# Patient Record
Sex: FEMALE | Race: WHITE | NOT HISPANIC OR LATINO | Employment: FULL TIME | URBAN - NONMETROPOLITAN AREA
[De-identification: names, ages, dates, MRNs, and addresses within clinical notes are randomized per-mention and may not be internally consistent; named-entity substitution may affect disease eponyms.]

---

## 2017-01-19 ENCOUNTER — HISTORY (OUTPATIENT)
Dept: FAMILY MEDICINE | Facility: OTHER | Age: 37
End: 2017-01-19

## 2017-01-19 ENCOUNTER — AMBULATORY - GICH (OUTPATIENT)
Dept: FAMILY MEDICINE | Facility: OTHER | Age: 37
End: 2017-01-19

## 2017-01-19 DIAGNOSIS — K21.9 GASTRO-ESOPHAGEAL REFLUX DISEASE WITHOUT ESOPHAGITIS: ICD-10-CM

## 2017-01-19 DIAGNOSIS — Z01.812 ENCOUNTER FOR PREPROCEDURAL LABORATORY EXAMINATION: ICD-10-CM

## 2017-01-19 DIAGNOSIS — M77.32 CALCANEAL SPUR OF LEFT FOOT: ICD-10-CM

## 2017-01-19 DIAGNOSIS — F33.9 RECURRENT MAJOR DEPRESSIVE DISORDER (H): ICD-10-CM

## 2017-01-19 DIAGNOSIS — Z01.818 ENCOUNTER FOR OTHER PREPROCEDURAL EXAMINATION: ICD-10-CM

## 2017-01-19 DIAGNOSIS — J45.20 MILD INTERMITTENT ASTHMA, UNCOMPLICATED: ICD-10-CM

## 2017-01-19 LAB
HEMOGLOBIN: 14.5 G/DL (ref 12–16)
MCV RBC AUTO: 95 FL (ref 80–100)

## 2017-02-03 ENCOUNTER — AMBULATORY - GICH (OUTPATIENT)
Dept: SCHEDULING | Facility: OTHER | Age: 37
End: 2017-02-03

## 2017-02-09 ENCOUNTER — AMBULATORY - GICH (OUTPATIENT)
Dept: SCHEDULING | Facility: OTHER | Age: 37
End: 2017-02-09

## 2017-03-21 ENCOUNTER — HISTORY (OUTPATIENT)
Dept: FAMILY MEDICINE | Facility: OTHER | Age: 37
End: 2017-03-21

## 2017-03-21 ENCOUNTER — OFFICE VISIT - GICH (OUTPATIENT)
Dept: FAMILY MEDICINE | Facility: OTHER | Age: 37
End: 2017-03-21

## 2017-03-21 DIAGNOSIS — Z12.4 ENCOUNTER FOR SCREENING FOR MALIGNANT NEOPLASM OF CERVIX: ICD-10-CM

## 2017-03-21 DIAGNOSIS — N91.1 SECONDARY AMENORRHEA: ICD-10-CM

## 2017-03-21 LAB
A/G RATIO - HISTORICAL: 1.6 (ref 1–2)
ABSOLUTE BASOPHILS - HISTORICAL: 0.1 THOU/CU MM
ABSOLUTE EOSINOPHILS - HISTORICAL: 0.1 THOU/CU MM
ABSOLUTE LYMPHOCYTES - HISTORICAL: 3.3 THOU/CU MM (ref 0.9–2.9)
ABSOLUTE MONOCYTES - HISTORICAL: 0.7 THOU/CU MM
ABSOLUTE NEUTROPHILS - HISTORICAL: 5.8 THOU/CU MM (ref 1.7–7)
ALBUMIN SERPL-MCNC: 4.5 G/DL (ref 3.5–5.7)
ALP SERPL-CCNC: 95 IU/L (ref 34–104)
ALT (SGPT) - HISTORICAL: 31 IU/L (ref 7–52)
ANION GAP - HISTORICAL: 8 (ref 5–18)
AST SERPL-CCNC: 24 IU/L (ref 13–39)
BASOPHILS # BLD AUTO: 0.6 %
BILIRUB SERPL-MCNC: 0.3 MG/DL (ref 0.3–1)
BUN SERPL-MCNC: 10 MG/DL (ref 7–25)
BUN/CREAT RATIO - HISTORICAL: 15
CALCIUM SERPL-MCNC: 9.7 MG/DL (ref 8.6–10.3)
CHLORIDE SERPLBLD-SCNC: 103 MMOL/L (ref 98–107)
CO2 SERPL-SCNC: 28 MMOL/L (ref 21–31)
CREAT SERPL-MCNC: 0.68 MG/DL (ref 0.7–1.3)
EOSINOPHIL NFR BLD AUTO: 0.8 %
ERYTHROCYTE [DISTWIDTH] IN BLOOD BY AUTOMATED COUNT: 11.6 % (ref 11.5–15.5)
ESTRADIOL SERPL-MCNC: 221 PG/ML
GFR IF NOT AFRICAN AMERICAN - HISTORICAL: >60 ML/MIN/1.73M2
GLOBULIN - HISTORICAL: 2.9 G/DL (ref 2–3.7)
GLUCOSE SERPL-MCNC: 101 MG/DL (ref 70–105)
HCG BETA QUANT,PREGNANCY - HISTORICAL: <0.6 MIU/ML
HCT VFR BLD AUTO: 40.4 % (ref 33–51)
HEMOGLOBIN: 14 G/DL (ref 12–16)
LYMPHOCYTES NFR BLD AUTO: 33.4 % (ref 20–44)
MCH RBC QN AUTO: 33.8 PG (ref 26–34)
MCHC RBC AUTO-ENTMCNC: 34.5 G/DL (ref 32–36)
MCV RBC AUTO: 98 FL (ref 80–100)
MONOCYTES NFR BLD AUTO: 7.1 %
NEUTROPHILS NFR BLD AUTO: 58.1 % (ref 42–72)
PLATELET # BLD AUTO: 321 THOU/CU MM (ref 140–440)
PMV BLD: 7.9 FL (ref 6.5–11)
POTASSIUM SERPL-SCNC: 3.8 MMOL/L (ref 3.5–5.1)
PROT SERPL-MCNC: 7.4 G/DL (ref 6.4–8.9)
RED BLOOD COUNT - HISTORICAL: 4.12 MIL/CU MM (ref 4–5.2)
SODIUM SERPL-SCNC: 139 MMOL/L (ref 133–143)
TSH - HISTORICAL: 1.56 UIU/ML (ref 0.34–5.6)
WHITE BLOOD COUNT - HISTORICAL: 10 THOU/CU MM (ref 4.5–11)

## 2017-03-21 ASSESSMENT — ANXIETY QUESTIONNAIRES
4. TROUBLE RELAXING: SEVERAL DAYS
1. FEELING NERVOUS, ANXIOUS, OR ON EDGE: MORE THAN HALF THE DAYS
2. NOT BEING ABLE TO STOP OR CONTROL WORRYING: NEARLY EVERY DAY
7. FEELING AFRAID AS IF SOMETHING AWFUL MIGHT HAPPEN: NEARLY EVERY DAY
5. BEING SO RESTLESS THAT IT IS HARD TO SIT STILL: NOT AT ALL
6. BECOMING EASILY ANNOYED OR IRRITABLE: NEARLY EVERY DAY
3. WORRYING TOO MUCH ABOUT DIFFERENT THINGS: NEARLY EVERY DAY
GAD7 TOTAL SCORE: 15

## 2017-03-21 ASSESSMENT — PATIENT HEALTH QUESTIONNAIRE - PHQ9: SUM OF ALL RESPONSES TO PHQ QUESTIONS 1-9: 12

## 2017-03-28 ENCOUNTER — HOSPITAL ENCOUNTER (OUTPATIENT)
Dept: RADIOLOGY | Facility: OTHER | Age: 37
End: 2017-03-28
Attending: FAMILY MEDICINE

## 2017-03-28 DIAGNOSIS — N91.1 SECONDARY AMENORRHEA: ICD-10-CM

## 2017-03-29 ENCOUNTER — COMMUNICATION - GICH (OUTPATIENT)
Dept: FAMILY MEDICINE | Facility: OTHER | Age: 37
End: 2017-03-29

## 2017-04-04 ENCOUNTER — COMMUNICATION - GICH (OUTPATIENT)
Dept: FAMILY MEDICINE | Facility: OTHER | Age: 37
End: 2017-04-04

## 2017-04-05 ENCOUNTER — COMMUNICATION - GICH (OUTPATIENT)
Dept: FAMILY MEDICINE | Facility: OTHER | Age: 37
End: 2017-04-05

## 2017-04-05 DIAGNOSIS — N91.1 SECONDARY AMENORRHEA: ICD-10-CM

## 2017-04-06 ENCOUNTER — AMBULATORY - GICH (OUTPATIENT)
Dept: LAB | Facility: OTHER | Age: 37
End: 2017-04-06

## 2017-04-06 DIAGNOSIS — N91.1 SECONDARY AMENORRHEA: ICD-10-CM

## 2017-04-06 LAB
FSH - HISTORICAL: 7.8 MIU/ML
LUTEINIZING HORMONE - HISTORICAL: 3.8 MIU/ML

## 2017-04-13 ENCOUNTER — AMBULATORY - GICH (OUTPATIENT)
Dept: SCHEDULING | Facility: OTHER | Age: 37
End: 2017-04-13

## 2017-05-25 ENCOUNTER — AMBULATORY - GICH (OUTPATIENT)
Dept: SCHEDULING | Facility: OTHER | Age: 37
End: 2017-05-25

## 2017-06-05 ENCOUNTER — AMBULATORY - GICH (OUTPATIENT)
Dept: SCHEDULING | Facility: OTHER | Age: 37
End: 2017-06-05

## 2017-08-14 ENCOUNTER — AMBULATORY - GICH (OUTPATIENT)
Dept: SCHEDULING | Facility: OTHER | Age: 37
End: 2017-08-14

## 2017-08-14 ENCOUNTER — AMBULATORY - GICH (OUTPATIENT)
Dept: RADIOLOGY | Facility: OTHER | Age: 37
End: 2017-08-14

## 2017-08-14 DIAGNOSIS — R52 PAIN: ICD-10-CM

## 2017-08-14 DIAGNOSIS — R60.9 EDEMA: ICD-10-CM

## 2017-08-14 DIAGNOSIS — S86.012A STRAIN OF LEFT ACHILLES TENDON: ICD-10-CM

## 2017-08-14 DIAGNOSIS — R53.1 WEAKNESS: ICD-10-CM

## 2017-08-16 ENCOUNTER — HOSPITAL ENCOUNTER (OUTPATIENT)
Dept: RADIOLOGY | Facility: OTHER | Age: 37
End: 2017-08-16
Attending: PODIATRIST

## 2017-08-16 DIAGNOSIS — S86.012A STRAIN OF LEFT ACHILLES TENDON: ICD-10-CM

## 2017-08-16 DIAGNOSIS — R60.9 EDEMA: ICD-10-CM

## 2017-08-16 DIAGNOSIS — R53.1 WEAKNESS: ICD-10-CM

## 2017-08-16 DIAGNOSIS — R52 PAIN: ICD-10-CM

## 2017-08-21 ENCOUNTER — AMBULATORY - GICH (OUTPATIENT)
Dept: SCHEDULING | Facility: OTHER | Age: 37
End: 2017-08-21

## 2017-09-11 ENCOUNTER — AMBULATORY - GICH (OUTPATIENT)
Dept: SCHEDULING | Facility: OTHER | Age: 37
End: 2017-09-11

## 2017-09-19 ENCOUNTER — HISTORY (OUTPATIENT)
Dept: FAMILY MEDICINE | Facility: OTHER | Age: 37
End: 2017-09-19

## 2017-09-19 ENCOUNTER — OFFICE VISIT - GICH (OUTPATIENT)
Dept: FAMILY MEDICINE | Facility: OTHER | Age: 37
End: 2017-09-19

## 2017-09-19 DIAGNOSIS — G43.009 MIGRAINE WITHOUT AURA AND WITHOUT STATUS MIGRAINOSUS, NOT INTRACTABLE: ICD-10-CM

## 2017-09-19 DIAGNOSIS — G56.02 CARPAL TUNNEL SYNDROME OF LEFT WRIST: ICD-10-CM

## 2017-09-26 ENCOUNTER — HISTORY (OUTPATIENT)
Dept: EMERGENCY MEDICINE | Facility: OTHER | Age: 37
End: 2017-09-26

## 2017-09-28 ENCOUNTER — COMMUNICATION - GICH (OUTPATIENT)
Dept: FAMILY MEDICINE | Facility: OTHER | Age: 37
End: 2017-09-28

## 2017-09-28 DIAGNOSIS — G89.18 OTHER ACUTE POSTPROCEDURAL PAIN: ICD-10-CM

## 2017-12-28 NOTE — TELEPHONE ENCOUNTER
Patient Information     Patient Name MRN Nohelia Yousif [id number] Female 1980      Telephone Encounter by Dawn Ordoñez at 2017  2:31 PM     Author:  Dawn Ordoñez Service:  (none) Author Type:  (none)     Filed:  2017  2:32 PM Encounter Date:  2017 Status:  Signed     :  Dawn Ordoñez            Informed Nohelia of prescription sent to pharmacy.  Dawn Ordoñez LPN............................... 2017 2:32 PM

## 2017-12-28 NOTE — TELEPHONE ENCOUNTER
Patient Information     Patient Name MRN Nohelia Yousif [id number] Female 1980      Telephone Encounter by Lyla Schneider DO at 2017  2:01 PM     Author:  Lyla Schneider DO Service:  (none) Author Type:  PHYS- Osteopathic     Filed:  2017  2:05 PM Encounter Date:  2017 Status:  Signed     :  Lyla Schneider DO (PHYS- Osteopathic)            All pain medication works relatively similar; and you have an allergy for oxycodone.  I would be willing to try tramadol to see if that would help relieve more pain.    LYLA SCHNEIDER DO

## 2017-12-28 NOTE — TELEPHONE ENCOUNTER
Patient Information     Patient Name MRN Nohelia Yousif [id number] Female 1980      Telephone Encounter by Oleg Hill at 2017  9:59 AM     Author:  Oleg Hill Service:  (none) Author Type:  (none)     Filed:  2017 10:00 AM Encounter Date:  2017 Status:  Signed     :  Oleg Hill            SMS-Pt called and stated she had surgery in [place], and the pain meds they gave her are not working. They told her to contact her PCP. Please call pt and advise.  Thank you,  Oleg Hill ....................  2017   10:00 AM

## 2017-12-28 NOTE — TELEPHONE ENCOUNTER
Patient Information     Patient Name MRN Nohelia Yousif [id number] Female 1980      Telephone Encounter by Dawn Ordoñez at 2017  2:21 PM     Author:  Dawn Ordoñez Service:  (none) Author Type:  (none)     Filed:  2017  2:22 PM Encounter Date:  2017 Status:  Signed     :  Dawn Ordoñez            Prescription faxed to [facility]. Called and left message for Nohelia to return call.  .Dawn Ordoñez LPN............................... 2017 2:22 PM

## 2017-12-28 NOTE — PROGRESS NOTES
Patient Information     Patient Name MRN Sex Nohelia Aguero [id number] Female 1980      Progress Notes by Jamaica Bruno at 2017  8:57 AM     Author:  Jamaica Bruno Service:  (none) Author Type:  Other Clinical Staff     Filed:  2017  8:57 AM Date of Service:  2017  8:57 AM Status:  Signed     :  Jamaica Bruno (Other Clinical Staff)            Falls Risk Criteria:    Age 65 and older or under age 4        Sensory deficits    Poor vision    Use of ambulatory aides    Impaired judgment    Unable to walk independently    Meets High Risk criteria for falls:  no

## 2017-12-28 NOTE — PROGRESS NOTES
"Patient Information     Patient Name MRN Sex Nohelia Aguero [id number] Female 1980      Progress Notes by Heriberto Roche MD at 2017  4:00 PM     Author:  Heriberto Roche MD Service:  (none) Author Type:  Physician     Filed:  2017  7:55 AM Encounter Date:  2017 Status:  Signed     :  Heriberto Roche MD (Physician)            SUBJECTIVE:    Nohelia Crockett is a 37 y.o. female who presents for left arm symptoms     HPI    For a few days has had a sense the left arm is \"falling asleep\"  Initially was intermittent, but is not constant.  Tries to shake it out, without much help.  Tingling into all 5 fingers.  Never has had this before.  Has had 2 shoulder surgeries, last was about 3 years ago, no tingling then.  S/P right carpal tunnel release.    Also wants a refill on her migraine med.    Allergies      Allergen   Reactions     Aspirin  Hives     Pcn [Penicillins]  Anaphylaxis     Percocet [Oxycodone-Acetaminophen]  Intolerance-Can't Take     Can't take too high of a dose/ causes nausea    ,   Current Outpatient Prescriptions on File Prior to Visit       Medication  Sig Dispense Refill     citalopram (CELEXA) 20 mg tablet Take 1 tablet by mouth once daily. 90 tablet 4     No current facility-administered medications on file prior to visit.    ,   Past Medical History:     Diagnosis  Date     Adhesive capsulitis of right shoulder 2016     ASTHMA, MILD     intermittent       ATTENTION DEFICIT DISORDER      H pylori ulcer      treated      H/O mass excision right hand 9/3/2014     Hemorrhagic cyst of ovary 2006    Left      History of renal stone 2004    Right      LEARNING DISABILITY      Mass of hand 2014     MIGRAINE HEADACHE     Occasional severe HA, does not require daily prophylaxis       Plantar fasciitis 2014     S/P arthrocopic shoulder surgery 2016    and   Past Surgical History:      Procedure  Laterality Date     APPENDECTOMY       CARPAL " TUNNEL RELEASE Right 2012     CHOLECYSTECTOMY  1999     CYSTOSCOPY  2004    with retrogrades       DILATION AND CURETTAGE  11/07    Ablation       EXCISION OF MASS FROM HAND Right 9/3/2014    Pathology showed epidermal inclusion cysts       Excision of right volar small finger mass  08/13/2012     LITHOTRIPSY  12/04    extracorporeal shockwave lithotripsy       SHOULDER ARTHROSCOPY  10/05    débridement and subacromial decompression of right shoulder.       SHOULDER ARTHROSCOPY  08/2007    acromioplasty distal clavicle excision, and extensive debridement of the bursa of the right shoulder by Dr. Villa.  Also left?       TUBAL LIGATION  2004       REVIEW OF SYSTEMS:  Review of Systems   Constitutional: Negative for chills and fever.   Neurological: Positive for tingling and headaches.       OBJECTIVE:  /68  Resp 16  Wt 105.7 kg (233 lb)  BMI 38.77 kg/m2    EXAM:   Physical Exam   Constitutional: She is oriented to person, place, and time and well-developed, well-nourished, and in no distress. No distress.   Neck: Normal range of motion.   Musculoskeletal:   No pain in right ulnar groove.  tinell's at wrist is mildly positive and Phalen's is positive.   Neurological: She is alert and oriented to person, place, and time.   Skin: She is not diaphoretic.   cap refill in left hand is normal.     ASSESSMENT/PLAN:    ICD-10-CM    1. Carpal tunnel syndrome, left G56.02 WRIST BRACE      EMG   2. Migraine without aura and without status migrainosus, not intractable G43.009 rizatriptan (MAXALT) 5 mg tablet        Plan:  She is quite bothered by this and wants to pursue an aggressive work up with the EMG.  Brace in the meantime for symptoms relief.    Heriberto Roche MD ....................  9/19/2017   4:19 PM

## 2017-12-28 NOTE — TELEPHONE ENCOUNTER
Patient Information     Patient Name MRN Nohelia Yousif [id number] Female 1980      Telephone Encounter by Dawn Ordoñez at 2017 12:58 PM     Author:  Dawn Ordoñez Service:  (none) Author Type:  (none)     Filed:  2017  1:05 PM Encounter Date:  2017 Status:  Signed     :  Dawn Ordoñez            Nohelia states that she was given hydrocodone from her hand surgery on Tuesday. Nohelia states that she was bit by a dog, brought the the ER and then brought to [place] for surgery. The hydrocodone is making her sleepy but isn't really helping with the pain. She states that it feels like it is burning.  Can something else be prescribed?  Dawn Ordoñez LPN............................... 2017 1:05 PM

## 2017-12-30 NOTE — NURSING NOTE
Patient Information     Patient Name MRN Sex Nohelia Aguero [id number] Female 1980      Nursing Note by Robina Fuentes at 2017  4:00 PM     Author:  Robina Fuentes Service:  (none) Author Type:  (none)     Filed:  2017  4:13 PM Encounter Date:  2017 Status:  Signed     :  Robina Fuentes            Numb feeling going down Lt arm to her fingers, times two days  Robina Fuentes ....................  2017   4:07 PM

## 2018-01-03 NOTE — NURSING NOTE
Patient Information     Patient Name MRN Sex Nohelia Aguero [id number] Female 1980      Nursing Note by Gosselin, Norma J at 3/21/2017  2:45 PM     Author:  Gosselin, Norma J Service:  (none) Author Type:  (none)     Filed:  3/21/2017  2:59 PM Encounter Date:  3/21/2017 Status:  Signed     :  Gosselin, Norma J            Patient presents to clinic follow up Celexa and she has been having missed periods.  Norma J Gosselin LPN....................  3/21/2017   2:48 PM

## 2018-01-03 NOTE — H&P
Patient Information     Patient Name MRN Sex Nohelia Aguero [id number] Female 1980      H&P by Lyla Hendrix DO at 2017  9:15 AM     Author:  Lyla Hendrix DO Service:  (none) Author Type:  PHYS- Osteopathic     Filed:  2017  5:49 AM Encounter Date:  2017 Status:  Signed     :  Lyla Hendrix DO (PHYS- Osteopathic)            ----------------- PREOPERATIVE EXAM ------------------  2017    SUBJECTIVE:  Nohelia Crockett is a 36 y.o. female here for preoperative optimization.    I was asked to see Nohelia Crockett by Dr. Moore for  preoperative evaluation due to GERD, Asthma.    Date of Surgery: 2017  Type of Surgery: Bone spur revision  Surgeon: Dr. Moore  Hospital:  [facility]    HPI:  Nohelia has undergone multiple procedures for bone spurs that were not improved with more conservative treatments; she again plans to have one removed from her heel.  She has a history of Asthma, which has been under good control as of late.  ATAQ score = 0 today.  She has not needed her albuterol inhaler in ~2 months.  No nighttime symptoms.  Typically only needs if she is going to be really active.    She also has GERD, which she relies on her Omeprazole.  Hasn't been on other medications.  Has not required EGD procedures.  Fairly well controlled unless she is eating a large meal, or forgets her medication.      Fever/Chills or other infectious symptoms in past month:  (NO)   >10lb weight loss in past two months:  (NO)     Health Care Directive/Code status: FULL  Hx of blood transfusions:   (NO)   History of VRE/MRSA:  (NO) Date:  Site:     Preoperative Evaluation: Obstructive Sleep Apnea screening    S: Snore -  Do you snore loudly? (louder than talking or loud enough to be heard through closed doors)(NO)  T: Tired - Do you often feel tired, fatigued, or sleepy during the daytime?(NO)  O: Observed - Has anyone ever observed you stop breathing during  "your sleep?(NO)  P: Pressure - Do you have or are you being treated for high blood pressure?(NO)  B: BMI - BMI greater than 35kg/m2?(YES)  A: Age - Age over 50 years old?(NO)  N: Neck - Neck circumference greater than 40 cm?(YES)  G: Gender - Gender: Male?(NO)    Total number of \"YES\" responses:  2  Scoring: Low risk of NOMAN 0-2      Patient Active Problem List       Diagnosis  Date Noted     Obesity (BMI 35.0-39.9 without comorbidity)  12/14/2016     S/P arthrocopic shoulder surgery  06/06/2016     Adhesive capsulitis of right shoulder  06/06/2016     Gastroesophageal reflux disease without esophagitis  05/26/2016     Pain of multiple sites  10/19/2015     History of arthroscopy of right knee  07/02/2015     Plantar fasciitis  11/14/2014     H/O mass excision right hand  09/03/2014     GENITAL HERPES  12/07/2009     Has had only one outbreak        ATTENTION DEFICIT DISORDER       LEARNING DISABILITY       KNEE PAIN       secondary to patellofemoral dysfunction          MIGRAINE HEADACHE       Occasional severe HA, does not require daily prophylaxis        ASTHMA, MILD       intermittent            Past Medical History      Diagnosis   Date     Adhesive capsulitis of right shoulder  6/6/2016     ASTHMA, MILD       intermittent       ATTENTION DEFICIT DISORDER       H pylori ulcer  1999      treated      H/O mass excision right hand  9/3/2014     Hemorrhagic cyst of ovary  2006     Left      History of renal stone  2004     Right      LEARNING DISABILITY       Mass of hand  8/26/2014     MIGRAINE HEADACHE       Occasional severe HA, does not require daily prophylaxis       Plantar fasciitis  11/14/2014     S/P arthrocopic shoulder surgery  6/6/2016       Past Surgical History       Procedure   Laterality Date     Appendectomy   1999     Cholecystectomy   1999     Tubal ligation   2004     Cystoscopy   2004     with retrogrades       Lithotripsy   12/04     extracorporeal shockwave lithotripsy       Shoulder " arthroscopy   10/05     débridement and subacromial decompression of right shoulder.       Shoulder arthroscopy   2007     acromioplasty distal clavicle excision, and extensive debridement of the bursa of the right shoulder by Dr. Villa.  Also left?       Dilation and curettage        Ablation       Excision of right volar small finger mass   2012     Carpal tunnel release  Right 2012     Excision of mass from hand  Right 9/3/2014     Pathology showed epidermal inclusion cysts         Current Outpatient Prescriptions       Medication  Sig Dispense Refill     albuterol HFA (PROAIR HFA) 90 mcg/actuation inhaler Inhale 2 Puffs by mouth 4 times daily if needed for Shortness Of Breath or Wheezing. 1 Inhaler 0     citalopram (CELEXA) 20 mg tablet Take 1 tablet by mouth once daily. 90 tablet 4     omeprazole (PRILOSEC-OTC) 20 mg tablet Take 1 tablet by mouth once daily. 30 tablet 11     rizatriptan (MAXALT) 5 mg tablet Take 1-2 tablets by mouth every 2 hours if needed for Migraine. Max dose: 30mg per 24 hrs. 20 tablet 5     No current facility-administered medications for this visit.      Medications have been reviewed by me and are current to the best of my knowledge and ability.    Recent use of: NSAIDS    Allergies:  Allergies      Allergen   Reactions     Aspirin  Hives     Pcn [Penicillins]  Anaphylaxis     Percocet [Oxycodone-Acetaminophen]  Intolerance-Can't Take     Can't take too high of a dose/ causes nausea      Latex allergy  no  Immunizations:  Immunization History     Administered  Date(s) Administered     Td (Age >=7 Years) 2004     Tdap 2013       Family History       Problem   Relation Age of Onset     Cancer  Mother 46     Lung cancer       Blood Disease  Mother      Asthma  Father      Diabetes  Father      2       Hypertension  Father      Heart Disease  Father      CHF       Other  Father       at age 65 from complications of a motor vehicle accident.       Good Health   "Sister      Good Health  Sister      Cancer-breast  No Family History      Cancer-colon  No Family History      Cancer-prostate  No Family History      Cancer-ovarian  No Family History      Anesthesia Problem  No Family History        Denies family hx of bleeding tendencies, anesthesia complications, or other problems with surgery.    Social History        Substance Use Topics          Smoking status:   Former Smoker      Packs/day:  0.50      Years:  16.00      Types:  Cigarettes      Smokeless tobacco:   Never Used      Alcohol use   0.0 oz/week     0 Standard drinks or equivalent per week        Comment: rarely         ROS:    Surgical:  patient denies previous complications from prior surgeries including but not limited to prolonged bleeding, anesthesia complications, dysrhythmias, surgical wound infections, or prolonged hospital stay.  Cardiorespiratory: denies chest pain, palpitations, shortness of breath, cough. Most exertion in the past 2 weeks was up and down stairs and housework at home and work.  Scraping the driveway at work (45 minutes).  Complete ROS otherwise negative except as noted in HPI.     -------------------------------------------------------------    PHYSICAL EXAM:  /70  Pulse 77  Ht 1.651 m (5' 5\")  Wt 102.5 kg (226 lb)  SpO2 99%  BMI 37.61 kg/m2    EXAM:  General Appearance: Pleasant, alert, appropriate appearance for age. No acute distress  Head Exam: Normal. Normocephalic, atraumatic.  Eyes: PERRL, EOMI  Ears: Normal TM's bilaterally.   OroPharynx: Mucosa pink and moist. Dentition in good repair.  Mallampati II.  Neck: Supple, no masses or nodes, no lymphadenopathy.  No thyromegaly.  Lungs: Normal chest wall and respirations. Clear to auscultation, no wheezes or crackles.  Cardiovascular: Regular rate and rhythm. S1, S2, no murmurs.  Gastrointestinal: Soft, nontender, no abnormal masses or organomegaly. BS normal   Musculoskeletal: No edema. No warm or erythematous joints.  Skin: " no concerning or new rashes.  Neurologic Exam: CN 2-12 grossly intact.  Normal gait.  Symmetric DTRs, normal gross motor movement, tone, and coordination. No tremor.  Psychiatric Exam: Alert and oriented, appropriate affect.      EKG:  not indicated    Results for orders placed or performed in visit on 01/19/17      HEMOGLOBIN      Result  Value Ref Range    HEMOGLOBIN                14.5 12.0 - 16.0 g/dL    MCV                       95 80 - 100 fL     ---------------------------------------------------------------    ASSESSEMENT AND PLAN:     Nohelia was seen today for preoperative exam.    Diagnoses and all orders for this visit:    Preoperative examination  -     HEMOGLOBIN; Future  -     HEMOGLOBIN    Pre-procedure lab exam  -     HEMOGLOBIN; Future  -     HEMOGLOBIN    Calcaneal spur of foot, left  -     HEMOGLOBIN; Future  -     HEMOGLOBIN    Gastroesophageal reflux disease without esophagitis    Mild intermittent asthma without complication    Episode of recurrent major depressive disorder, unspecified depression episode severity  -     citalopram (CELEXA) 20 mg tablet; Take 1 tablet by mouth once daily.  -     HEMOGLOBIN; Future  -     HEMOGLOBIN        PRE OP RECOMMENDATIONS:    No family history of problems with bleeding or anesthesia. Patient is able to tolerate greater than 4 METs of activity without any cardiopulmonary symptoms. ASA PS class 2 . No cardiopulmonary workup is neccessary for the current procedure. Please contact the office with any questions or concerns.    Patient is on chronic pain medications (NO);   Patient is on antiplatlet/anticoagulation (NO)  Other medications that need adjustment perioperatively (NO)    Other:  Patient was advised to call our office and the surgical services with any change in condition or new symptoms if they were to develop between today and their surgical date; especially any cardiopulmonary symptoms or symptoms concerning for an infection.    Recommendations:  Proceed with surgery as previously planned.

## 2018-01-03 NOTE — PROGRESS NOTES
Patient Information     Patient Name MRN Sex Nohelia Aguero [id number] Female 1980      Progress Notes by Lyla Hendrix DO at 2017  9:15 AM     Author:  Lyla Hendrix DO Service:  (none) Author Type:  PHYS- Osteopathic     Filed:  2017  5:49 AM Encounter Date:  2017 Status:  Signed     :  Lyla Hendrix DO (PHYS- Osteopathic)            ----------------- PREOPERATIVE EXAM ------------------  2017    SUBJECTIVE:  Nohelia Crockett is a 36 y.o. female here for preoperative optimization.    I was asked to see Nohelia Crockett by Dr. Moore for  preoperative evaluation due to GERD, Asthma.    Date of Surgery: 2017  Type of Surgery: Bone spur revision  Surgeon: Dr. Moore  Hospital:  [facility] [place]    HPI:  Nohelia has undergone multiple procedures for bone spurs that were not improved with more conservative treatments; she again plans to have one removed from her heel.  She has a history of Asthma, which has been under good control as of late.  ATAQ score = 0 today.  She has not needed her albuterol inhaler in ~2 months.  No nighttime symptoms.  Typically only needs if she is going to be really active.    She also has GERD, which she relies on her Omeprazole.  Hasn't been on other medications.  Has not required EGD procedures.  Fairly well controlled unless she is eating a large meal, or forgets her medication.      Fever/Chills or other infectious symptoms in past month:  (NO)   >10lb weight loss in past two months:  (NO)     Health Care Directive/Code status: FULL  Hx of blood transfusions:   (NO)   History of VRE/MRSA:  (NO) Date:  Site:     Preoperative Evaluation: Obstructive Sleep Apnea screening    S: Snore -  Do you snore loudly? (louder than talking or loud enough to be heard through closed doors)(NO)  T: Tired - Do you often feel tired, fatigued, or sleepy during the daytime?(NO)  O: Observed - Has anyone ever observed you stop  "breathing during your sleep?(NO)  P: Pressure - Do you have or are you being treated for high blood pressure?(NO)  B: BMI - BMI greater than 35kg/m2?(YES)  A: Age - Age over 50 years old?(NO)  N: Neck - Neck circumference greater than 40 cm?(YES)  G: Gender - Gender: Male?(NO)    Total number of \"YES\" responses:  2  Scoring: Low risk of NOMAN 0-2      Patient Active Problem List       Diagnosis  Date Noted     Obesity (BMI 35.0-39.9 without comorbidity)  12/14/2016     S/P arthrocopic shoulder surgery  06/06/2016     Adhesive capsulitis of right shoulder  06/06/2016     Gastroesophageal reflux disease without esophagitis  05/26/2016     Pain of multiple sites  10/19/2015     History of arthroscopy of right knee  07/02/2015     Plantar fasciitis  11/14/2014     H/O mass excision right hand  09/03/2014     GENITAL HERPES  12/07/2009     Has had only one outbreak        ATTENTION DEFICIT DISORDER       LEARNING DISABILITY       KNEE PAIN       secondary to patellofemoral dysfunction          MIGRAINE HEADACHE       Occasional severe HA, does not require daily prophylaxis        ASTHMA, MILD       intermittent            Past Medical History      Diagnosis   Date     Adhesive capsulitis of right shoulder  6/6/2016     ASTHMA, MILD       intermittent       ATTENTION DEFICIT DISORDER       H pylori ulcer  1999      treated      H/O mass excision right hand  9/3/2014     Hemorrhagic cyst of ovary  2006     Left      History of renal stone  2004     Right      LEARNING DISABILITY       Mass of hand  8/26/2014     MIGRAINE HEADACHE       Occasional severe HA, does not require daily prophylaxis       Plantar fasciitis  11/14/2014     S/P arthrocopic shoulder surgery  6/6/2016       Past Surgical History       Procedure   Laterality Date     Appendectomy   1999     Cholecystectomy   1999     Tubal ligation   2004     Cystoscopy   2004     with retrogrades       Lithotripsy   12/04     extracorporeal shockwave lithotripsy       " Shoulder arthroscopy   10/05     débridement and subacromial decompression of right shoulder.       Shoulder arthroscopy   2007     acromioplasty distal clavicle excision, and extensive debridement of the bursa of the right shoulder by Dr. Villa.  Also left?       Dilation and curettage        Ablation       Excision of right volar small finger mass   2012     Carpal tunnel release  Right      Excision of mass from hand  Right 9/3/2014     Pathology showed epidermal inclusion cysts         Current Outpatient Prescriptions       Medication  Sig Dispense Refill     albuterol HFA (PROAIR HFA) 90 mcg/actuation inhaler Inhale 2 Puffs by mouth 4 times daily if needed for Shortness Of Breath or Wheezing. 1 Inhaler 0     citalopram (CELEXA) 20 mg tablet Take 1 tablet by mouth once daily. 90 tablet 4     omeprazole (PRILOSEC-OTC) 20 mg tablet Take 1 tablet by mouth once daily. 30 tablet 11     rizatriptan (MAXALT) 5 mg tablet Take 1-2 tablets by mouth every 2 hours if needed for Migraine. Max dose: 30mg per 24 hrs. 20 tablet 5     No current facility-administered medications for this visit.      Medications have been reviewed by me and are current to the best of my knowledge and ability.    Recent use of: NSAIDS    Allergies:  Allergies      Allergen   Reactions     Aspirin  Hives     Pcn [Penicillins]  Anaphylaxis     Percocet [Oxycodone-Acetaminophen]  Intolerance-Can't Take     Can't take too high of a dose/ causes nausea      Latex allergy  no  Immunizations:  Immunization History     Administered  Date(s) Administered     Td (Age >=7 Years) 2004     Tdap 2013       Family History       Problem   Relation Age of Onset     Cancer  Mother 46     Lung cancer       Blood Disease  Mother      Asthma  Father      Diabetes  Father      2       Hypertension  Father      Heart Disease  Father      CHF       Other  Father       at age 65 from complications of a motor vehicle accident.       Good  "Health  Sister      Good Health  Sister      Cancer-breast  No Family History      Cancer-colon  No Family History      Cancer-prostate  No Family History      Cancer-ovarian  No Family History      Anesthesia Problem  No Family History        Denies family hx of bleeding tendencies, anesthesia complications, or other problems with surgery.    Social History        Substance Use Topics          Smoking status:   Former Smoker      Packs/day:  0.50      Years:  16.00      Types:  Cigarettes      Smokeless tobacco:   Never Used      Alcohol use   0.0 oz/week     0 Standard drinks or equivalent per week        Comment: rarely         ROS:    Surgical:  patient denies previous complications from prior surgeries including but not limited to prolonged bleeding, anesthesia complications, dysrhythmias, surgical wound infections, or prolonged hospital stay.  Cardiorespiratory: denies chest pain, palpitations, shortness of breath, cough. Most exertion in the past 2 weeks was up and down stairs and housework at home and work.  Scraping the driveway at work (45 minutes).  Complete ROS otherwise negative except as noted in HPI.     -------------------------------------------------------------    PHYSICAL EXAM:  /70  Pulse 77  Ht 1.651 m (5' 5\")  Wt 102.5 kg (226 lb)  SpO2 99%  BMI 37.61 kg/m2    EXAM:  General Appearance: Pleasant, alert, appropriate appearance for age. No acute distress  Head Exam: Normal. Normocephalic, atraumatic.  Eyes: PERRL, EOMI  Ears: Normal TM's bilaterally.   OroPharynx: Mucosa pink and moist. Dentition in good repair.  Mallampati II.  Neck: Supple, no masses or nodes, no lymphadenopathy.  No thyromegaly.  Lungs: Normal chest wall and respirations. Clear to auscultation, no wheezes or crackles.  Cardiovascular: Regular rate and rhythm. S1, S2, no murmurs.  Gastrointestinal: Soft, nontender, no abnormal masses or organomegaly. BS normal   Musculoskeletal: No edema. No warm or erythematous " joints.  Skin: no concerning or new rashes.  Neurologic Exam: CN 2-12 grossly intact.  Normal gait.  Symmetric DTRs, normal gross motor movement, tone, and coordination. No tremor.  Psychiatric Exam: Alert and oriented, appropriate affect.      EKG:  not indicated    Results for orders placed or performed in visit on 01/19/17      HEMOGLOBIN      Result  Value Ref Range    HEMOGLOBIN                14.5 12.0 - 16.0 g/dL    MCV                       95 80 - 100 fL     ---------------------------------------------------------------    ASSESSEMENT AND PLAN:     Nohelia was seen today for preoperative exam.    Diagnoses and all orders for this visit:    Preoperative examination  -     HEMOGLOBIN; Future  -     HEMOGLOBIN    Pre-procedure lab exam  -     HEMOGLOBIN; Future  -     HEMOGLOBIN    Calcaneal spur of foot, left  -     HEMOGLOBIN; Future  -     HEMOGLOBIN    Gastroesophageal reflux disease without esophagitis    Mild intermittent asthma without complication    Episode of recurrent major depressive disorder, unspecified depression episode severity  -     citalopram (CELEXA) 20 mg tablet; Take 1 tablet by mouth once daily.  -     HEMOGLOBIN; Future  -     HEMOGLOBIN        PRE OP RECOMMENDATIONS:    No family history of problems with bleeding or anesthesia. Patient is able to tolerate greater than 4 METs of activity without any cardiopulmonary symptoms. ASA PS class 2 . No cardiopulmonary workup is neccessary for the current procedure. Please contact the office with any questions or concerns.    Patient is on chronic pain medications (NO);   Patient is on antiplatlet/anticoagulation (NO)  Other medications that need adjustment perioperatively (NO)    Other:  Patient was advised to call our office and the surgical services with any change in condition or new symptoms if they were to develop between today and their surgical date; especially any cardiopulmonary symptoms or symptoms concerning for an  infection.    Recommendations: Proceed with surgery as previously planned.

## 2018-01-03 NOTE — NURSING NOTE
Patient Information     Patient Name MRN Sex Nohelia Aguero [id number] Female 1980      Nursing Note by Dawn Ordoñez at 2017  9:15 AM     Author:  Dawn Ordoñez Service:  (none) Author Type:  (none)     Filed:  2017  9:32 AM Encounter Date:  2017 Status:  Signed     :  Dawn Ordoñez            This patient presents today for a Preoperative exam for this procedure: burn spur removed left heel    Date of Surgery: 17    Surgeon:  Dr. Moore  Facility:  [facility]  Dawn Ordoñez LPN............................... 2017 9:18 AM

## 2018-01-04 NOTE — TELEPHONE ENCOUNTER
Patient Information     Patient Name MRN Sex Nohelia Aguero [id number] Female 1980      Telephone Encounter by Marylu Anderson at 3/29/2017  2:39 PM     Author:  Marylu Anderson Service:  (none) Author Type:  (none)     Filed:  3/29/2017  2:41 PM Encounter Date:  3/29/2017 Status:  Signed     :  Marylu Anderson            Patient called for results of ultrasound done yesterday. I advised her of normal results. She would like to know what Dr Hendrix would like her to do next for evaluation/treatment .

## 2018-01-04 NOTE — TELEPHONE ENCOUNTER
"Patient Information     Patient Name MRN Nohelia Yousif [id number] Female 1980      Telephone Encounter by Lyla Schneider DO at 3/29/2017  2:48 PM     Author:  Lyla Schneider DO Service:  (none) Author Type:  PHYS- Osteopathic     Filed:  3/29/2017  2:49 PM Encounter Date:  3/29/2017 Status:  Signed     :  Lyla Schneider DO (PHYS- Osteopathic)            Normal ultrasound; and pap smear also returned normal.  Awaiting some hormone levels to monitor for possible \"early menopause.\"  Plan will depend on that.  In the meantime, continue monitoring/tracking menses or lack of menses.  LYLA SCHNEIDER DO         "

## 2018-01-04 NOTE — TELEPHONE ENCOUNTER
Patient Information     Patient Name MRN Nohelia Yousif [id number] Female 1980      Telephone Encounter by Jamaica Armstrong at 2017  3:11 PM     Author:  Jamaica Armstrong Service:  (none) Author Type:  (none)     Filed:  2017  3:11 PM Encounter Date:  2017 Status:  Signed     :  Jamaica Armstrong            Pt would like to have her results from her hormone test by phone if possible.    Jamaica Armstrong ....................  2017   3:11 PM

## 2018-01-04 NOTE — TELEPHONE ENCOUNTER
Patient Information     Patient Name MRNohelia Marshall [id number] Female 1980      Telephone Encounter by Dori Barraza at 2017  1:53 PM     Author:  Dori Barraza  Service:  (none) Author Type:  (none)     Filed:  2017  7:31 AM  Encounter Date:  2017 Status:  Addendum     :  Dori Barraza        Related Notes: Original Note by Dori Barraza filed at 2017  1:54 PM            Patient is wondering if her test results are in yet and if she could get those from the nurse. Please advise.    Dori Barraza ....................  2017   1:54 PM

## 2018-01-04 NOTE — TELEPHONE ENCOUNTER
Patient Information     Patient Name MRN Sex Nohelia Aguero [id number] Female 1980      Telephone Encounter by Lyla Schneider DO at 2017  2:04 PM     Author:  Lyla Schneider DO Service:  (none) Author Type:  PHYS- Osteopathic     Filed:  2017  2:05 PM Encounter Date:  2017 Status:  Signed     :  Lyla Schneider DO (PHYS- Osteopathic)            Not returned yet.  LYLA SCHNEIDER DO

## 2018-01-04 NOTE — PROGRESS NOTES
Patient Information     Patient Name MRN Sex Nohelia Aguero [id number] Female 1980      Progress Notes by Lyla Hendrix DO at 3/21/2017  2:45 PM     Author:  Lyla Hendrix DO Service:  (none) Author Type:  PHYS- Osteopathic     Filed:  3/21/2017  8:36 PM Encounter Date:  3/21/2017 Status:  Signed     :  Lyla Hendrix DO (PHYS- Osteopathic)            SUBJECTIVE:  Nohelia Crockett is a 37 y.o. female who presents for concerns of missed period.    HPI  Nohelia is here with concerns of missed period since December.  She feels like she still gets a vaginal odor/discharge change around the time of her supposed to have a period.  No increased hot flashes; always has to sleep with a fan on.  Sexually active with her ; history of TL and uterine ablation in the past.  No chance of pregnancy.      Allergies      Allergen   Reactions     Aspirin  Hives     Pcn [Penicillins]  Anaphylaxis     Percocet [Oxycodone-Acetaminophen]  Intolerance-Can't Take     Can't take too high of a dose/ causes nausea    ,   Current Outpatient Prescriptions on File Prior to Visit       Medication  Sig Dispense Refill     albuterol HFA (PROAIR HFA) 90 mcg/actuation inhaler Inhale 2 Puffs by mouth 4 times daily if needed for Shortness Of Breath or Wheezing. 1 Inhaler 0     citalopram (CELEXA) 20 mg tablet Take 1 tablet by mouth once daily. 90 tablet 4     omeprazole (PRILOSEC-OTC) 20 mg tablet Take 1 tablet by mouth once daily. 30 tablet 11     rizatriptan (MAXALT) 5 mg tablet Take 1-2 tablets by mouth every 2 hours if needed for Migraine. Max dose: 30mg per 24 hrs. 20 tablet 5     No current facility-administered medications on file prior to visit.     and   Past Medical History      Diagnosis   Date     Adhesive capsulitis of right shoulder  2016     ASTHMA, MILD       intermittent       ATTENTION DEFICIT DISORDER       H pylori ulcer        treated      H/O mass excision right hand  9/3/2014      "Hemorrhagic cyst of ovary  2006     Left      History of renal stone  2004     Right      LEARNING DISABILITY       Mass of hand  8/26/2014     MIGRAINE HEADACHE       Occasional severe HA, does not require daily prophylaxis       Plantar fasciitis  11/14/2014     S/P arthrocopic shoulder surgery  6/6/2016       REVIEW OF SYSTEMS:  Review of Systems   Constitutional: Negative for chills and fever.   Cardiovascular: Negative for chest pain and palpitations.   Gastrointestinal: Negative for constipation, diarrhea, nausea and vomiting.       OBJECTIVE:  /80  Pulse 60  Temp 99  F (37.2  C) (Tympanic)  Ht 1.651 m (5' 5\")  Wt 103.4 kg (228 lb)  BMI 37.94 kg/m2    EXAM:   Physical Exam   Constitutional: She is well-developed, well-nourished, and in no distress.   HENT:   Head: Normocephalic and atraumatic.   Eyes: EOM are normal. Pupils are equal, round, and reactive to light.   Cardiovascular: Normal rate and normal heart sounds.    Pulmonary/Chest: Effort normal and breath sounds normal.   Abdominal: Soft. Bowel sounds are normal. She exhibits no distension and no mass. There is no tenderness. There is no rebound and no guarding.   Genitourinary: Vagina normal, uterus normal, cervix normal, right adnexa normal and left adnexa normal. Rectal exam shows guaiac negative stool. No vaginal discharge found.   Psychiatric: Mood and affect normal.   Nursing note and vitals reviewed.    Results for orders placed or performed in visit on 03/21/17      TSH      Result  Value Ref Range    TSH 1.56 0.34 - 5.60 uIU/mL   ESTRADIOL      Result  Value Ref Range    ESTRADIOL  pg/mL   COMP METABOLIC PANEL      Result  Value Ref Range    SODIUM 139 133 - 143 mmol/L    POTASSIUM 3.8 3.5 - 5.1 mmol/L    CHLORIDE 103 98 - 107 mmol/L    CO2,TOTAL 28 21 - 31 mmol/L    ANION GAP 8 5 - 18                    GLUCOSE 101 70 - 105 mg/dL    CALCIUM 9.7 8.6 - 10.3 mg/dL    BUN 10 7 - 25 mg/dL    CREATININE 0.68 (L) 0.70 - 1.30 mg/dL    " BUN/CREAT RATIO           15                    GFR if African American >60 >60 ml/min/1.73m2    GFR if not African American >60 >60 ml/min/1.73m2    ALBUMIN 4.5 3.5 - 5.7 g/dL    PROTEIN,TOTAL 7.4 6.4 - 8.9 g/dL    GLOBULIN                  2.9 2.0 - 3.7 g/dL    A/G RATIO 1.6 1.0 - 2.0                    BILIRUBIN,TOTAL 0.3 0.3 - 1.0 mg/dL    ALK PHOSPHATASE 95 34 - 104 IU/L    ALT (SGPT) 31 7 - 52 IU/L    AST (SGOT) 24 13 - 39 IU/L   HCG BETA QUANT,PREGNANCY      Result  Value Ref Range    HCG BETA QUANT,PREGNANCY GIH <0.6 <0.6 mIU/mL   CBC WITH AUTO DIFFERENTIAL      Result  Value Ref Range    WHITE BLOOD COUNT         10.0 4.5 - 11.0 thou/cu mm    RED BLOOD COUNT           4.12 4.00 - 5.20 mil/cu mm    HEMOGLOBIN                14.0 12.0 - 16.0 g/dL    HEMATOCRIT                40.4 33.0 - 51.0 %    MCV                       98 80 - 100 fL    MCH                       33.8 26.0 - 34.0 pg    MCHC                      34.5 32.0 - 36.0 g/dL    RDW                       11.6 11.5 - 15.5 %    PLATELET COUNT            321 140 - 440 thou/cu mm    MPV                       7.9 6.5 - 11.0 fL    NEUTROPHILS               58.1 42.0 - 72.0 %    LYMPHOCYTES               33.4 20.0 - 44.0 %    MONOCYTES                 7.1 <12.0 %    EOSINOPHILS               0.8 <8.0 %    BASOPHILS                 0.6 <3.0 %    ABSOLUTE NEUTROPHILS      5.8 1.7 - 7.0 thou/cu mm    ABSOLUTE LYMPHOCYTES      3.3 (H) 0.9 - 2.9 thou/cu mm    ABSOLUTE MONOCYTES        0.7 <0.9 thou/cu mm    ABSOLUTE EOSINOPHILS      0.1 <0.5 thou/cu mm    ABSOLUTE BASOPHILS        0.1 <0.3 thou/cu mm       ASSESSMENT/PLAN:    ICD-10-CM    1. Secondary amenorrhea N91.1 FSH      LUTEINIZING HORMONE      TSH      ESTRADIOL      CBC AND DIFFERENTIAL      COMP METABOLIC PANEL      HCG BETA QUANT,PREGNANCY      US PELVIS COMPLETE TA AND TV      TSH      ESTRADIOL      CBC AND DIFFERENTIAL      COMP METABOLIC PANEL      HCG BETA QUANT,PREGNANCY      CBC WITH AUTO  DIFFERENTIAL   2. Cervical cancer screening Z12.4 GYN THIN PREP PAP SCREEN IMAGED      GYN THIN PREP PAP SCREEN IMAGED        Plan:   Secondary amenorrhea, new:  Labs ordered as above.  Exam reassuring.  Will also plan to obtain a pelvic US.    If all normal; consideration for progesterone challenge; otherwise if any abnormality seen - referral to OBGYN.    Follow up pending above results.  Pap smear also obtained during pelvic exam as she was due.

## 2018-01-04 NOTE — TELEPHONE ENCOUNTER
Patient Information     Patient Name MRN Sex Nohelia Aguero [id number] Female 1980      Telephone Encounter by Lyla Schneider DO at 2017  3:45 PM     Author:  Lyla Schneider DO Service:  (none) Author Type:  PHYS- Osteopathic     Filed:  2017  3:46 PM Encounter Date:  2017 Status:  Signed     :  Lyla Schneider DO (PHYS- Osteopathic)            See phone note, 2017   LYLA SCHNEIDER DO

## 2018-01-04 NOTE — TELEPHONE ENCOUNTER
Patient Information     Patient Name MRN Sex Nohelia Aguero [id number] Female 1980      Telephone Encounter by Dawn Ordoñez at 3/29/2017  2:57 PM     Author:  Dawn Ordoñez Service:  (none) Author Type:  (none)     Filed:  3/29/2017  2:57 PM Encounter Date:  3/29/2017 Status:  Signed     :  Dawn Ordoñez of DO boo Robin.  Dawn Ordoñez LPN............................... 3/29/2017 2:57 PM

## 2018-01-04 NOTE — TELEPHONE ENCOUNTER
Patient Information     Patient Name MRN Nohelia Yousif [id number] Female 1980      Telephone Encounter by Gosselin, Norma J at 2017  3:22 PM     Author:  Gosselin, Norma J Service:  (none) Author Type:  (none)     Filed:  2017  3:26 PM Encounter Date:  2017 Status:  Signed     :  Gosselin, Norma J            Please order LH and FSH again. These were not drawn during her last appointment. Then patient will need to need a lab only appointment.  Patient will schedule a lab only appointment.  Norma J Gosselin LPN....................  2017   3:23 PM

## 2018-01-04 NOTE — TELEPHONE ENCOUNTER
Patient Information     Patient Name MRN Sex Nohelia Aguero [id number] Female 1980      Telephone Encounter by Gosselin, Norma J at 2017  4:24 PM     Author:  Gosselin, Norma J Service:  (none) Author Type:  (none)     Filed:  2017  4:24 PM Encounter Date:  2017 Status:  Signed     :  Gosselin, Norma J            Orders are in.  Norma J Gosselin LPN....................  2017   4:24 PM

## 2018-01-04 NOTE — TELEPHONE ENCOUNTER
Patient Information     Patient Name MRN Nohelia Yousif [id number] Female 1980      Telephone Encounter by Dawn Ordoñez at 2017  3:21 PM     Author:  Dawn Ordoñez Service:  (none) Author Type:  (none)     Filed:  2017  3:23 PM Encounter Date:  2017 Status:  Signed     :  Dawn Ordoñez            Spoke with Glo in lab and the 2 hormone labs that are still pending were not ordered correctly, therefore they were not ran. Would you like to re-order the labs and valerie as future/standing order instead of switching to normal? Once orders are placed, Nohelia will have to come back to be redrawn.  Dawn Ordoñez LPN............................... 2017 3:23 PM

## 2018-01-04 NOTE — TELEPHONE ENCOUNTER
Patient Information     Patient Name MRN Sex Nohelia Aguero [id number] Female 1980      Telephone Encounter by Ingrid Garcia CNA at 3/29/2017 12:50 PM     Author:  Ingrid Garcia CNA Service:  (none) Author Type:  NURS- Nurse Assist/Care Tech     Filed:  3/29/2017 12:52 PM Encounter Date:  3/29/2017 Status:  Signed     :  Ingrid Garcia CNA (NURS- Nurse Assist/Care Tech)            Patient called in regards to her results from her ultrasound that was done yesterday morning. Please call patient back about this. Thank you!

## 2018-01-04 NOTE — PROGRESS NOTES
Patient Information     Patient Name MRN Sex Nohelia Aguero [id number] Female 1980      Progress Notes by Viviane Velázquez R.T. ([facility]) at 3/28/2017  9:22 AM     Author:  [facility] Service:  (none) Author Type:  [employer] - Registered Radiologic Technologist     Filed:  3/28/2017  9:22 AM Date of Service:  3/28/2017  9:22 AM Status:  Signed     :  Viviane Velázquez R.T. ([facility]) ([facility] - Registered Radiologic Technologist)            Falls Risk Criteria:    Age 65 and older or under age 4        Sensory deficits    Poor vision    Use of ambulatory aides    Impaired judgment    Unable to walk independently    Meets High Risk criteria for falls:  no

## 2018-01-18 ENCOUNTER — COMMUNICATION - GICH (OUTPATIENT)
Dept: FAMILY MEDICINE | Facility: OTHER | Age: 38
End: 2018-01-18

## 2018-01-25 ENCOUNTER — HISTORY (OUTPATIENT)
Dept: FAMILY MEDICINE | Facility: OTHER | Age: 38
End: 2018-01-25

## 2018-01-25 ENCOUNTER — OFFICE VISIT - GICH (OUTPATIENT)
Dept: FAMILY MEDICINE | Facility: OTHER | Age: 38
End: 2018-01-25

## 2018-01-25 DIAGNOSIS — F33.9 RECURRENT MAJOR DEPRESSIVE DISORDER (H): ICD-10-CM

## 2018-01-25 DIAGNOSIS — G43.709 CHRONIC MIGRAINE WITHOUT AURA WITHOUT STATUS MIGRAINOSUS, NOT INTRACTABLE: ICD-10-CM

## 2018-01-25 DIAGNOSIS — F41.1 GENERALIZED ANXIETY DISORDER: ICD-10-CM

## 2018-01-25 ASSESSMENT — PATIENT HEALTH QUESTIONNAIRE - PHQ9: SUM OF ALL RESPONSES TO PHQ QUESTIONS 1-9: 12

## 2018-01-26 VITALS
SYSTOLIC BLOOD PRESSURE: 128 MMHG | RESPIRATION RATE: 16 BRPM | HEIGHT: 65 IN | BODY MASS INDEX: 37.99 KG/M2 | TEMPERATURE: 99 F | WEIGHT: 233 LBS | DIASTOLIC BLOOD PRESSURE: 80 MMHG | BODY MASS INDEX: 38.77 KG/M2 | WEIGHT: 228 LBS | SYSTOLIC BLOOD PRESSURE: 120 MMHG | HEART RATE: 60 BPM | DIASTOLIC BLOOD PRESSURE: 68 MMHG

## 2018-01-26 VITALS
OXYGEN SATURATION: 99 % | HEIGHT: 65 IN | HEART RATE: 77 BPM | WEIGHT: 226 LBS | DIASTOLIC BLOOD PRESSURE: 70 MMHG | BODY MASS INDEX: 37.65 KG/M2 | SYSTOLIC BLOOD PRESSURE: 100 MMHG

## 2018-02-04 ASSESSMENT — ANXIETY QUESTIONNAIRES: GAD7 TOTAL SCORE: 15

## 2018-02-04 ASSESSMENT — PATIENT HEALTH QUESTIONNAIRE - PHQ9: SUM OF ALL RESPONSES TO PHQ QUESTIONS 1-9: 12

## 2018-02-09 VITALS
HEART RATE: 84 BPM | BODY MASS INDEX: 39.14 KG/M2 | SYSTOLIC BLOOD PRESSURE: 138 MMHG | DIASTOLIC BLOOD PRESSURE: 90 MMHG | WEIGHT: 235.2 LBS

## 2018-02-11 ASSESSMENT — PATIENT HEALTH QUESTIONNAIRE - PHQ9: SUM OF ALL RESPONSES TO PHQ QUESTIONS 1-9: 12

## 2018-02-13 NOTE — TELEPHONE ENCOUNTER
Patient Information     Patient Name MRN Nohelia Yousif [id number] Female 1980      Telephone Encounter by Emeli Vallejo at 2018 11:24 AM     Author:  Emeli Vallejo Service:  (none) Author Type:  (none)     Filed:  2018 11:27 AM Encounter Date:  2018 Status:  Signed     :  Emeli Vallejo            Patient is moving, and the place to where she is moving is asking for a note from provider in order for her to have her dog. She says that this needs to indicate that she has her dog and needs the dog because of her depression. Please advise. Patient would like to pick this up today if/when done.   Emeli Vallejo LPN...................2018   11:27 AM

## 2018-02-13 NOTE — TELEPHONE ENCOUNTER
Patient Information     Patient Name MRN Sex Nohelia Aguero [id number] Female 1980      Telephone Encounter by Bernice La at 2018 11:06 AM     Author:  Bernice La Service:  (none) Author Type:  (none)     Filed:  2018 11:13 AM Encounter Date:  2018 Status:  Signed     :  Bernice La            SMS - Patient requesting note from provider to be able to have  dog because of depression. Please call.    Bernice La ....................  2018   11:13 AM

## 2018-02-13 NOTE — TELEPHONE ENCOUNTER
Patient Information     Patient Name MRN Nohelia Yousif [id number] Female 1980      Telephone Encounter by Dawn Ordoñez at 2018  1:25 PM     Author:  Dawn Ordoñez Service:  (none) Author Type:  (none)     Filed:  2018  1:26 PM Encounter Date:  2018 Status:  Signed     :  Dawn Ordoñez here at check in window for letter, Dori,  was told Lyla Hendrix DO response by Lyla Hendrix DO and she is going to relay the message to Nohelia.  Dawn Ordoñez LPN............................... 2018 1:26 PM

## 2018-02-13 NOTE — NURSING NOTE
Patient Information     Patient Name MRN Nohelia Yousif [id number] Female 1980      Nursing Note by Dawn Ordoñez at 2018 10:30 AM     Author:  Dawn Ordoñez Service:  (none) Author Type:  (none)     Filed:  2018 10:31 AM Encounter Date:  2018 Status:  Signed     :  Dawn Ordoñez is wanting a note for a  pet note.  Dawn Ordoñez LPN............................... 2018 10:26 AM

## 2018-02-13 NOTE — PROGRESS NOTES
Patient Information     Patient Name MRN Sex Nohelia Aguero [id number] Female 1980      Progress Notes by Lyla Hendirx DO at 2018 10:30 AM     Author:  Lyla Hendrix DO Service:  (none) Author Type:  PHYS- Osteopathic     Filed:  2018  6:19 PM Encounter Date:  2018 Status:  Signed     :  Lyla Hendrix DO (PHYS- Osteopathic)            SUBJECTIVE:  Nohelia Crockett is a 37 y.o. female who presents for discussion re: anxiety and depression - needing letter for pet dog; along with migraine medication adjustment.    HPI  Nohelia is here for a letter stating her dog is beneficial for her anxiety and depression.  She has had  letters previously for a cat, who has since passed.  She has had her dog x 2 years.  He (Moises) is a boxer, pit, lab, border collie mix that is brindle in color and approximately 2 years old and approximately 40#.    She also reports that her migraine medication is no longer helping.  Has tried imitrex and now as of late - on Maxalt up to 10mg at a time.  Continues to get migraines.  Trials with Excedrin Migraine first, and if not resolved, will trial her triptan.  Interested in trying a different med.    Allergies      Allergen   Reactions     Aspirin  Hives     Pcn [Penicillins]  Anaphylaxis     Percocet [Oxycodone-Acetaminophen]  Intolerance-Can't Take     Can't take too high of a dose/ causes nausea    ,   No current outpatient prescriptions on file prior to visit.     No current facility-administered medications on file prior to visit.     and   Past Medical History:     Diagnosis  Date     Adhesive capsulitis of right shoulder 2016     ASTHMA, MILD     intermittent       ATTENTION DEFICIT DISORDER      H pylori ulcer      treated      H/O mass excision right hand 9/3/2014     Hemorrhagic cyst of ovary 2006    Left      History of renal stone 2004    Right      LEARNING DISABILITY      Mass of hand 2014     MIGRAINE  HEADACHE     Occasional severe HA, does not require daily prophylaxis       Plantar fasciitis 11/14/2014     S/P arthrocopic shoulder surgery 6/6/2016     REVIEW OF SYSTEMS:  Review of Systems   All other systems reviewed and are negative.      OBJECTIVE:  /90 (Cuff Site: Right Arm, Position: Sitting, Cuff Size: Adult Large)  Pulse 84  Wt 106.7 kg (235 lb 3.2 oz)  BMI 39.14 kg/m2    EXAM:   Physical Exam   Constitutional: She is well-developed, well-nourished, and in no distress.   HENT:   Head: Normocephalic and atraumatic.   Right Ear: External ear normal.   Left Ear: External ear normal.   Cardiovascular: Normal rate and normal heart sounds.    Pulmonary/Chest: Effort normal and breath sounds normal.   Psychiatric: Mood and affect normal.   Nursing note and vitals reviewed.      ASSESSMENT/PLAN:    ICD-10-CM    1. Episode of recurrent major depressive disorder, unspecified depression episode severity (HC) F33.9 citalopram (CELEXA) 40 mg tablet   2. DARA (generalized anxiety disorder) F41.1    3. Chronic migraine without aura without status migrainosus, not intractable G43.709 eletriptan (RELPAX) 20 mg tablet        Plan:   1/2.   letter completed as she has had in the past.  Continue with therapy and medications as planned.  Acutely worsened due to going through a divorce and moving at this time.  Celexa increased to 40mg daily today; consider decrease again ~spring/summer if situation is improving at that time.  She continues to have a positive outlook.  3. Migraine, chronic, stable: D/C maxalt; start Relpax.  If not effective; consider Fiorcet vs prophylactic medication.    Follow up in 3-4 months; sooner prn.

## 2018-02-13 NOTE — TELEPHONE ENCOUNTER
Patient Information     Patient Name MRN Nohelia Yousif [id number] Female 1980      Telephone Encounter by Lyla Schneider DO at 2018  1:14 PM     Author:  Lyla Schneider DO Service:  (none) Author Type:  PHYS- Osteopathic     Filed:  2018  1:15 PM Encounter Date:  2018 Status:  Signed     :  Lyla Schneider DO (PHYS- Osteopathic)            I request my patients to get this from their psychiatric provider if they have one or make an appointment, as we have not discussed this in the past.  LYLA SCHNEIDER DO

## 2018-02-19 ENCOUNTER — DOCUMENTATION ONLY (OUTPATIENT)
Dept: FAMILY MEDICINE | Facility: OTHER | Age: 38
End: 2018-02-19

## 2018-02-19 PROBLEM — G43.909 MIGRAINE HEADACHE: Status: ACTIVE | Noted: 2018-02-19

## 2018-02-19 PROBLEM — M25.569 KNEE PAIN: Status: ACTIVE | Noted: 2018-02-19

## 2018-02-19 PROBLEM — J45.909 ASTHMA: Status: ACTIVE | Noted: 2018-02-19

## 2018-02-19 PROBLEM — F98.8 ATTENTION DEFICIT DISORDER: Status: ACTIVE | Noted: 2018-02-19

## 2018-02-19 PROBLEM — F81.9 LEARNING DISABILITY: Status: ACTIVE | Noted: 2018-02-19

## 2018-02-19 RX ORDER — CITALOPRAM HYDROBROMIDE 20 MG/1
20 TABLET ORAL DAILY
COMMUNITY
Start: 2017-01-19 | End: 2018-07-25

## 2018-02-19 RX ORDER — RIZATRIPTAN BENZOATE 5 MG/1
5-10 TABLET ORAL
COMMUNITY
Start: 2017-09-19 | End: 2018-07-25

## 2018-02-19 RX ORDER — TRAMADOL HYDROCHLORIDE 50 MG/1
50 TABLET ORAL 3 TIMES DAILY PRN
COMMUNITY
Start: 2017-09-28 | End: 2018-07-25

## 2018-04-30 ENCOUNTER — OFFICE VISIT (OUTPATIENT)
Dept: FAMILY MEDICINE | Facility: OTHER | Age: 38
End: 2018-04-30
Attending: NURSE PRACTITIONER
Payer: MEDICAID

## 2018-04-30 VITALS
OXYGEN SATURATION: 98 % | SYSTOLIC BLOOD PRESSURE: 120 MMHG | HEART RATE: 102 BPM | HEIGHT: 65 IN | BODY MASS INDEX: 38.15 KG/M2 | DIASTOLIC BLOOD PRESSURE: 82 MMHG | WEIGHT: 229 LBS | TEMPERATURE: 99.6 F

## 2018-04-30 DIAGNOSIS — J06.9 VIRAL UPPER RESPIRATORY TRACT INFECTION: ICD-10-CM

## 2018-04-30 DIAGNOSIS — R07.0 THROAT PAIN: Primary | ICD-10-CM

## 2018-04-30 LAB
DEPRECATED S PYO AG THROAT QL EIA: NORMAL
DEPRECATED S PYO AG THROAT QL EIA: NORMAL
SPECIMEN SOURCE: NORMAL

## 2018-04-30 PROCEDURE — G0463 HOSPITAL OUTPT CLINIC VISIT: HCPCS | Performed by: NURSE PRACTITIONER

## 2018-04-30 PROCEDURE — 87880 STREP A ASSAY W/OPTIC: CPT | Performed by: NURSE PRACTITIONER

## 2018-04-30 PROCEDURE — 99213 OFFICE O/P EST LOW 20 MIN: CPT | Performed by: NURSE PRACTITIONER

## 2018-04-30 NOTE — PATIENT INSTRUCTIONS
Self-Care for Sore Throats    Sore throats happen for many reasons, such as colds, allergies, and infections caused by viruses or bacteria. In any case, your throat becomes red and sore. Your goal for self-care is to reduce your discomfort while giving your throat a chance to heal.  Moisten and soothe your throat  Tips include the following:    Try a sip of water first thing after waking up.    Keep your throat moist by drinking 6 or more glasses of clear liquids every day.    Run a cool-air humidifier in your room overnight.    Avoid cigarette smoke.     Suck on throat lozenges, cough drops, hard candy, ice chips, or frozen fruit-juice bars. Use the sugar-free versions if your diet or medical condition requires them.  Gargle to ease irritation  Gargling every hour or 2 can ease irritation. Try gargling with 1 of these solutions:    1/4 teaspoon of salt in 1/2 cup of warm water    An over-the-counter anesthetic gargle  Use medicine for more relief  Over-the-counter medicine can reduce sore throat symptoms. Ask your pharmacist if you have questions about which medicine to use:    Ease pain with anesthetic sprays. Aspirin or an aspirin substitute also helps. Remember, never give aspirin to anyone 18 or younger, or if you are already taking blood thinners.     For sore throats caused by allergies, try antihistamines to block the allergic reaction.    Remember: unless a sore throat is caused by a bacterial infection, antibiotics won t help you.  Prevent future sore throats  Prevention tips include the following:    Stop smoking or reduce contact with secondhand smoke. Smoke irritates the tender throat lining.    Limit contact with pets and with allergy-causing substances, such as pollen and mold.    When you re around someone with a sore throat or cold, wash your hands often to keep viruses or bacteria from spreading.    Don t strain your vocal cords.  Call your healthcare provider  Contact your healthcare provider if  you have:    A temperature over 101 F (38.3 C)    White spots on the throat    Great difficulty swallowing    Trouble breathing    A skin rash    Recent exposure to someone else with strep bacteria    Severe hoarseness and swollen glands in the neck or jaw

## 2018-04-30 NOTE — PROGRESS NOTES
"Nursing Notes:   Sanaz Go CMA  4/30/2018  3:53 PM  Signed  Patient present to clinic today with a sore throat and dizziness. Started last night and continued to get worse. No other cold or flu sx.  Sanaz Go CMA..............4/30/2018........3:27 PM      SUBJECTIVE:   Nohelia Crockett is a 38 year old female who presents to clinic today for the following health issues:    Sore throat      Duration: last night    Description  sore throat and dizziness feeling. No nasal congestion, no cough    Severity: mild    Accompanying signs and symptoms: Hurts to swallow, no other uri s/s.     History (predisposing factors):  none    Precipitating or alleviating factors: None    Therapies tried and outcome:  none      Problem list and histories reviewed & adjusted, as indicated.  Additional history: as documented    Current Outpatient Prescriptions   Medication Sig Dispense Refill     citalopram (CELEXA) 20 MG tablet Take 20 mg by mouth daily       rizatriptan (MAXALT) 5 MG tablet Take 5-10 mg by mouth every 2 hours as needed       traMADol (ULTRAM) 50 MG tablet Take 50 mg by mouth 3 times daily as needed       Allergies   Allergen Reactions     Penicillins Anaphylaxis     Aspirin Hives     Oxycodone-Acetaminophen Other (See Comments)     Can't take too high of a dose/ causes nausea       Reviewed and updated as needed this visit by clinical staff  Tobacco  Allergies  Meds  Med Hx  Surg Hx  Fam Hx  Soc Hx      Reviewed and updated as needed this visit by Provider         ROS:  A comprehensive 10 point ROS was obtained and documented for notable findings in the HPI.       OBJECTIVE:     /82 (BP Location: Right arm, Patient Position: Sitting, Cuff Size: Adult Regular)  Pulse 102  Temp 99.6  F (37.6  C) (Tympanic)  Ht 5' 4.75\" (1.645 m)  Wt 229 lb (103.9 kg)  SpO2 98%  Breastfeeding? No  BMI 38.4 kg/m2  Body mass index is 38.4 kg/(m^2).  GENERAL: healthy, alert and no distress  EYES: Eyes grossly " normal to inspection, PERRL and conjunctivae and sclerae normal  HENT: normal cephalic/atraumatic, ear canals and TM's normal, nose and mouth without ulcers or lesions, oropharynx clear, oral mucous membranes moist and tonsillar erythema  NECK: no adenopathy  RESP: lungs clear to auscultation - no rales, rhonchi or wheezes  CV: regular rate and rhythm, normal S1 S2, no S3 or S4, no murmur, click or rub, no peripheral edema and peripheral pulses strong  SKIN: no suspicious lesions or rashes  PSYCH: mentation appears normal, affect normal/bright    Diagnostic Test Results:  Strep screen - Negative    ASSESSMENT/PLAN:     1. Throat pain  - Strep, Rapid Screen    2. Viral upper respiratory tract infection      Medical Decision Making:    Differential Diagnosis:  URI Adult/Peds:  Strep pharyngitis, Viral pharyngitis and Viral upper respiratory illness    Serious Comorbid Conditions:  Adult:  None    PLAN:    URI Adult:  Tylenol, Ibuprofen, Fluids, Rest, Saline gargles, Saline nasal spray and Vaporizer  Viral illness discussed.     Followup:    If not improving or if condition worsens, follow up with your Primary Care Provider        Mela Quiñones NP, 4/30/2018 3:47 PM

## 2018-04-30 NOTE — MR AVS SNAPSHOT
After Visit Summary   4/30/2018    Nohelia Crockett    MRN: [id number]           Patient Information     Date Of Birth          1980        Visit Information        Provider Department      4/30/2018 3:15 PM Mela Quiñones NP [facility]        Today's Diagnoses     Throat pain    -  1      Care Instructions      Self-Care for Sore Throats    Sore throats happen for many reasons, such as colds, allergies, and infections caused by viruses or bacteria. In any case, your throat becomes red and sore. Your goal for self-care is to reduce your discomfort while giving your throat a chance to heal.  Moisten and soothe your throat  Tips include the following:    Try a sip of water first thing after waking up.    Keep your throat moist by drinking 6 or more glasses of clear liquids every day.    Run a cool-air humidifier in your room overnight.    Avoid cigarette smoke.     Suck on throat lozenges, cough drops, hard candy, ice chips, or frozen fruit-juice bars. Use the sugar-free versions if your diet or medical condition requires them.  Gargle to ease irritation  Gargling every hour or 2 can ease irritation. Try gargling with 1 of these solutions:    1/4 teaspoon of salt in 1/2 cup of warm water    An over-the-counter anesthetic gargle  Use medicine for more relief  Over-the-counter medicine can reduce sore throat symptoms. Ask your pharmacist if you have questions about which medicine to use:    Ease pain with anesthetic sprays. Aspirin or an aspirin substitute also helps. Remember, never give aspirin to anyone 18 or younger, or if you are already taking blood thinners.     For sore throats caused by allergies, try antihistamines to block the allergic reaction.    Remember: unless a sore throat is caused by a bacterial infection, antibiotics won t help you.  Prevent future sore throats  Prevention tips include the following:    Stop smoking or reduce contact with secondhand smoke.  "Smoke irritates the tender throat lining.    Limit contact with pets and with allergy-causing substances, such as pollen and mold.    When you re around someone with a sore throat or cold, wash your hands often to keep viruses or bacteria from spreading.    Don t strain your vocal cords.  Call your healthcare provider  Contact your healthcare provider if you have:    A temperature over 101 F (38.3 C)    White spots on the throat    Great difficulty swallowing    Trouble breathing    A skin rash    Recent exposure to someone else with strep bacteria    Severe hoarseness and swollen glands in the neck or jaw                   Follow-ups after your visit        Who to contact     If you have questions or need follow up information about today's clinic visit or your schedule please contact [facility] [place] directly at [phone].  Normal or non-critical lab and imaging results will be communicated to you by "[employer]hart, letter or phone within 4 business days after the clinic has received the results. If you do not hear from us within 7 days, please contact the clinic through "[employer]hart or phone. If you have a critical or abnormal lab result, we will notify you by phone as soon as possible.  Submit refill requests through [employer] or call your pharmacy and they will forward the refill request to us. Please allow 3 business days for your refill to be completed.          Additional Information About Your Visit        [employer] Information     [employer] lets you send messages to your doctor, view your test results, renew your prescriptions, schedule appointments and more. To sign up, go to [email or web address] . Click on \"Log in\" on the left side of the screen, which will take you to the Welcome page. Then click on \"Sign up Now\" on the right side of the page.     You will be asked to enter the access code listed below, as well as some personal information. Please follow the directions to create your username and " "password.     Your access code is: [email or web address]  Expires: 2018  3:58 PM     Your access code will  in 90 days. If you need help or a new code, please call your [facility] or [phone].        Care EveryWhere ID     This is your Care EveryWhere ID. This could be used by other organizations to access your [facility] medical records  [id number]        Your Vitals Were     Pulse Temperature Height Pulse Oximetry Breastfeeding? BMI (Body Mass Index)    102 99.6  F (37.6  C) (Tympanic) 5' 4.75\" (1.645 m) 98% No 38.4 kg/m2       Blood Pressure from Last 3 Encounters:   18 120/82   18 138/90   17 128/68    Weight from Last 3 Encounters:   18 229 lb (103.9 kg)   18 235 lb 3.2 oz (106.7 kg)   17 233 lb (105.7 kg)              We Performed the Following     Strep, Rapid Screen        Primary Care Provider Office Phone # Fax #    Lyla Hendrix [phone]       [address]  [facility] [address]        Equal Access to Services     RYAN JIN AH: Hadii prashant stephenson hadasho Soomaali, waaxda luqadaha, qaybta kaalmada adeegyada, lópez boykin. So [facility] [phone].    ATENCIÓN: Si habla español, tiene a holly disposición servicios gratuitos de asistencia lingüística. Llapril al [phone].    We comply with applicable federal civil rights laws and [place] laws. We do not discriminate on the basis of race, color, national origin, age, disability, sex, sexual orientation, or gender identity.            Thank you!     Thank you for choosing [facility] AND [facility]  for your care. Our goal is always to provide you with excellent care. Hearing back from our patients is one way we can continue to improve our services. Please take a few minutes to complete the written survey that you may receive in the mail after your visit with us. Thank you!             Your Updated Medication List - Protect others around you: Learn how to " safely use, store and throw away your medicines at www.disposemymeds.org.          This list is accurate as of 4/30/18  3:58 PM.  Always use your most recent med list.                   Brand Name Dispense Instructions for use Diagnosis    citalopram 20 MG tablet    celeXA     Take 20 mg by mouth daily        rizatriptan 5 MG tablet    MAXALT     Take 5-10 mg by mouth every 2 hours as needed        traMADol 50 MG tablet    ULTRAM     Take 50 mg by mouth 3 times daily as needed

## 2018-04-30 NOTE — NURSING NOTE
Patient present to clinic today with a sore throat and dizziness. Started last night and continued to get worse. No other cold or flu sx.  Sanaz Go CMA..............4/30/2018........3:27 PM

## 2018-07-23 NOTE — PROGRESS NOTES
Patient Information     Patient Name  Nohelia Crockett MRN  [id number] Sex  Female   1980      Letter by Lyla Schneider DO at      Author:  Lyla Schneider DO Service:  (none) Author Type:  (none)    Filed:   Encounter Date:  2018 Status:  (Other)           Nohelia Crockett  [address]  [address] [facility] [address]          2018    To whom it may concern:    I have been caring for Nohelia for 2 years and am writing a letter to support her need for a  animal in the form of her dog.  He is beneficial for her anxiety and depression; and is also an effective therapy dog for her Autistic son as well.  Her dog, Moises, is approximately 2 years old (~40#) mixed breed who is brindle in color.    If you have any further questions or concerns.    Sincerely,          LYLA SCHNEIDER DO

## 2018-07-25 ENCOUNTER — OFFICE VISIT (OUTPATIENT)
Dept: FAMILY MEDICINE | Facility: OTHER | Age: 38
End: 2018-07-25
Attending: NURSE PRACTITIONER
Payer: COMMERCIAL

## 2018-07-25 ENCOUNTER — HOSPITAL ENCOUNTER (OUTPATIENT)
Dept: GENERAL RADIOLOGY | Facility: OTHER | Age: 38
Discharge: HOME | End: 2018-07-25
Attending: NURSE PRACTITIONER | Admitting: NURSE PRACTITIONER
Payer: COMMERCIAL

## 2018-07-25 VITALS
WEIGHT: 226.8 LBS | DIASTOLIC BLOOD PRESSURE: 68 MMHG | SYSTOLIC BLOOD PRESSURE: 118 MMHG | TEMPERATURE: 97.8 F | BODY MASS INDEX: 38.03 KG/M2 | HEART RATE: 104 BPM

## 2018-07-25 DIAGNOSIS — M79.672 LEFT FOOT PAIN: Primary | ICD-10-CM

## 2018-07-25 DIAGNOSIS — N64.4 BREAST PAIN: ICD-10-CM

## 2018-07-25 DIAGNOSIS — M79.672 LEFT FOOT PAIN: ICD-10-CM

## 2018-07-25 PROCEDURE — 73630 X-RAY EXAM OF FOOT: CPT | Mod: LT

## 2018-07-25 PROCEDURE — 99214 OFFICE O/P EST MOD 30 MIN: CPT | Performed by: NURSE PRACTITIONER

## 2018-07-25 PROCEDURE — G0463 HOSPITAL OUTPT CLINIC VISIT: HCPCS | Mod: 25

## 2018-07-25 PROCEDURE — G0463 HOSPITAL OUTPT CLINIC VISIT: HCPCS

## 2018-07-25 ASSESSMENT — ENCOUNTER SYMPTOMS
ARTHRALGIAS: 1
JOINT SWELLING: 1

## 2018-07-25 ASSESSMENT — PAIN SCALES - GENERAL: PAINLEVEL: SEVERE PAIN (7)

## 2018-07-25 NOTE — PROGRESS NOTES
SUBJECTIVE:   Nohelia Crockett is a 38 year old female who presents to clinic today for the following health issues:    Since today for couple of issues--first issue is her left foot namely Achilles tendon and heel--had surgery done by Dr. Moore a year and a half ago and continues to have ongoing pain and intermittent swelling.  She denies any injury.  Has been elevating it frequently wears soft wide toe box shoes  Denies any redness, nausea, fevers--is able to put full weight and walk however chronic pain told by this time pain would have resolved    Also reports left generalized breast pain, started a few weeks ago, denies any injury, history of biopsy and left breast negative findings  Denies any nipple redness, drainage, dimpling  Last mammogram 2 years ago    HPI    Patient Active Problem List   Diagnosis     Adhesive capsulitis of right shoulder     Asthma     Attention deficit disorder     Gastroesophageal reflux disease without esophagitis     Genital herpes     H/O excision of mass     History of arthroscopy of right knee     Knee pain     Learning disability     Migraine headache     Obesity (BMI 35.0-39.9 without comorbidity)     Pain of multiple sites     Plantar fasciitis     S/P shoulder surgery     Past Surgical History:   Procedure Laterality Date     APPENDECTOMY OPEN  1999     ARTHROSCOPY SHOULDER      10/05,debridement and subacromial decompression of right shoulder.     ARTHROSCOPY SHOULDER  08/2007    acromioplasty distal clavicle excision, and extensive debridement of the bursa of the right shoulder by Dr. Villa.  Also left?     CHOLECYSTECTOMY  1999     CYSTOSCOPY  2004    with retrogrades     DILATION AND CURETTAGE  11/2007    Ablation     LAPAROSCOPIC TUBAL LIGATION  2004     LITHOTRIPSY  12/2004    extracorporeal shockwave     OTHER SURGICAL HISTORY  08/13/2012     OTHER SURGICAL HISTORY Right 09/03/2014    Pathology showed epidermal inclusion cysts     RELEASE CARPAL TUNNEL  2012        Social History   Substance Use Topics     Smoking status: Former Smoker     Packs/day: 0.50     Years: 16.00     Types: Cigarettes     Smokeless tobacco: Never Used     Alcohol use 0.0 oz/week      Comment: Alcoholic Drinks/day: rarely     Family History   Problem Relation Age of Onset     Cancer Mother 46     Cancer,Lung cancer     Blood Disease Mother      Blood Disease     Asthma Father      Asthma     Diabetes Father      Diabetes,2     Hypertension Father      Hypertension     HEART DISEASE Father      Heart Disease,CHF     Other - See Comments Father       at age 65 from complications of a motor vehicle accident.     Family History Negative Sister      Good Health     Family History Negative Sister      Good Health     Breast Cancer No family hx of      Cancer-breast     Colon Cancer No family hx of      Cancer-colon     Prostate Cancer No family hx of      Cancer-prostate     Ovarian Cancer No family hx of      Cancer-ovarian     Anesthesia Reaction No family hx of      Anesthesia Problem         Current Outpatient Prescriptions   Medication Sig Dispense Refill     Citalopram Hydrobromide (CELEXA PO) Take 20 mg by mouth daily       Allergies   Allergen Reactions     Penicillins Anaphylaxis     Aspirin Hives     Oxycodone-Acetaminophen Other (See Comments)     Can't take too high of a dose/ causes nausea     BP Readings from Last 3 Encounters:   18 118/68   18 120/82   18 138/90    Wt Readings from Last 3 Encounters:   18 226 lb 12.8 oz (102.9 kg)   18 229 lb (103.9 kg)   18 235 lb 3.2 oz (106.7 kg)                  Labs reviewed in EPIC    Review of Systems   Breasts:  Positive for tenderness.   Musculoskeletal: Positive for arthralgias and joint swelling.   All other systems reviewed and are negative.       OBJECTIVE:     /68 (BP Location: Right arm, Patient Position: Sitting, Cuff Size: Adult Large)  Pulse 104  Temp 97.8  F (36.6  C) (Tympanic)  Wt 226  lb 12.8 oz (102.9 kg)  Breastfeeding? No  BMI 38.03 kg/m2  Body mass index is 38.03 kg/(m^2).  Physical Exam   Constitutional: She is oriented to person, place, and time. She appears well-developed and well-nourished.   Cardiovascular: Normal rate.    Pulmonary/Chest: Effort normal.   Bilateral breast exam limited by body habitus, unable to palpate any masses or focal tender areas  No erythema, dimpling, nipple discharge  Unable to palpate any lymphadenopathy  Denies any tenderness palpating left breast   Musculoskeletal: Normal range of motion. She exhibits edema and tenderness. She exhibits no deformity.   Left Achilles tendon area mild edema, no warmth, erythema  CMS intact and equal bilateral lower extremities  Normal heel toe gait reproducible palpable tenderness over posterior Achilles tendon area    No ankle edema   Neurological: She is alert and oriented to person, place, and time.   Skin: Skin is warm and dry.   Psychiatric: She has a normal mood and affect. Her behavior is normal. Thought content normal.   Nursing note and vitals reviewed.          ASSESSMENT/PLAN:     Called Dr. Moore's office during office visit--coordinated a follow-up appointment tomorrow on chronic foot pain post surgical site  At [facility]--- obtained plain x-rays and CD to carry to appointment    Will schedule for diagnostic mammography    1. Left foot pain    - PODIATRY/FOOT & ANKLE SURGERY REFERRAL  - XR Foot Left G/E 3 Views; Future    2. Breast pain    - MA Diagnostic Bilateral w/Garth; Future      MILLICENT Mccollum [facility]

## 2018-07-25 NOTE — NURSING NOTE
Pt present to clinic today for left foot pain, she has had surgery on it over a year ago and is having more pain and swelling. She also has concerns with left breast, she states it feels like there is fluid in it and it has been sore. It has been going on for a couple of weeks. She would like a refill on the celexa.  Donya Mcpherson LPN on 7/25/2018 at 10:37 AM

## 2018-07-25 NOTE — MR AVS SNAPSHOT
After Visit Summary   7/25/2018    Nohelia Crockett    MRN: [id number]           Patient Information     Date Of Birth          1980        Visit Information        Provider Department      7/25/2018 10:30 AM Renetta Flores APRN CNP [facility]        Today's Diagnoses     Left foot pain    -  1    Breast pain          Care Instructions    You have apt tomorrow 726 with Dr Moore at [facility] office    You will be scheduled for breast imaging          Follow-ups after your visit        Additional Services     PODIATRY/FOOT & ANKLE SURGERY REFERRAL       Dr Moore--has apt tomorrow  7/26 at [facility] with Dr Moore  followup on left foot achilles tendinosis and surgery January 2017                  Follow-up notes from your care team     Return if symptoms worsen or fail to improve.      Future tests that were ordered for you today     Open Future Orders        Priority Expected Expires Ordered    MA Diagnostic Bilateral w/Garth Routine  7/25/2019 7/25/2018    XR Foot Left G/E 3 Views Routine 7/25/2018 7/25/2019 7/25/2018            Who to contact     If you have questions or need follow up information about today's clinic visit or your schedule please contact [facility] directly at [phone].  Normal or non-critical lab and imaging results will be communicated to you by MyChart, letter or phone within 4 business days after the clinic has received the results. If you do not hear from us within 7 days, please contact the clinic through MyChart or phone. If you have a critical or abnormal lab result, we will notify you by phone as soon as possible.  Submit refill requests through itsDapper or call your pharmacy and they will forward the refill request to us. Please allow 3 business days for your refill to be completed.          Additional Information About Your Visit        Care EveryWhere ID     This is your Care EveryWhere ID. This  could be used by other organizations to access your [place] medical records  [id number]        Your Vitals Were     Pulse Temperature Breastfeeding? BMI (Body Mass Index)          104 97.8  F (36.6  C) (Tympanic) No 38.03 kg/m2         Blood Pressure from Last 3 Encounters:   07/25/18 118/68   04/30/18 120/82   01/25/18 138/90    Weight from Last 3 Encounters:   07/25/18 226 lb 12.8 oz (102.9 kg)   04/30/18 229 lb (103.9 kg)   01/25/18 235 lb 3.2 oz (106.7 kg)              We Performed the Following     PODIATRY/FOOT & ANKLE SURGERY REFERRAL        Primary Care Provider Office Phone # Fax #    Lyla DAVIS Simeon,  [phone]       [address] [facility] [address]        Equal Access to Services     [facility] HospitalSAUL[facility] : Hadii prashant stephenson hadasho Soomaali, waaxda luqadaha, qaybta kaalmada adebakariyamaite, lópez dorado . So [facility] [phone].    ATENCIÓN: Si habla español, tiene a holly disposición servicios gratuitos de asistencia lingüística. Llame al [phone].    We comply with applicable federal civil rights laws and [place] laws. We do not discriminate on the basis of race, color, national origin, age, disability, sex, sexual orientation, or gender identity.            Thank you!     Thank you for choosing [facility] AND [facility]  for your care. Our goal is always to provide you with excellent care. Hearing back from our patients is one way we can continue to improve our services. Please take a few minutes to complete the written survey that you may receive in the mail after your visit with us. Thank you!             Your Updated Medication List - Protect others around you: Learn how to safely use, store and throw away your medicines at www.disposemymeds.org.          This list is accurate as of 7/25/18 11:30 AM.  Always use your most recent med list.                   Brand Name Dispense Instructions for use Diagnosis    CELEXA PO      Take 20 mg by mouth daily

## 2018-07-25 NOTE — PATIENT INSTRUCTIONS
You have apt tomorrow 726 with Dr Moore at [facility]    You will be scheduled for breast imaging

## 2018-07-26 ENCOUNTER — HOSPITAL ENCOUNTER (OUTPATIENT)
Dept: MAMMOGRAPHY | Facility: OTHER | Age: 38
Discharge: HOME | End: 2018-07-26
Attending: NURSE PRACTITIONER | Admitting: NURSE PRACTITIONER
Payer: COMMERCIAL

## 2018-07-26 DIAGNOSIS — N64.4 BREAST PAIN: ICD-10-CM

## 2018-07-26 PROCEDURE — 77066 DX MAMMO INCL CAD BI: CPT

## 2018-07-26 ASSESSMENT — PATIENT HEALTH QUESTIONNAIRE - PHQ9: SUM OF ALL RESPONSES TO PHQ QUESTIONS 1-9: 12

## 2019-02-19 NOTE — PROGRESS NOTES
"There are no exam notes on file for this visit.    ANNUAL PHYSICAL - FEMALE    HPI: patient who presents for a yearly exam.  Concerns include:  Chronic pain.  She is hurting all the time, but mornings are worse and after periods of rest are also bothersome.  Takes her 30-40 minutes to get up moving before she feels functional.  Her biggest areas of concern remain her:  1. L heel.  She had an MRI to this area with Dr. Moore at the end of 2017; but hasn't had significant follow up with him.  At that time some tendon thickening was observed.  2. Hands.  Had images in 2014 of hands that were normal.  Describing a stiffness sensation.  Has been active with her hands in repetitive jobs in the past.  3. Back.  Mostly upper between the shoulder blades; however upon further questioning what she is describing her \"hips\" is her SI and lumbar region of the back is also bothersome.  She does admit to some weight gain; and is \"waiting for the weather to get nice\" so she can get outside and be more active.  Describes that she is always \"on the move\" at work.  Worse with prolonged standing/sitting in one position; better with movement or laying down.  Has had PT in the past; no prior water therapy.    Other areas that are less bothersome include her shoulders, knees.    She also is interested in starting back on something for her anxiety; and possibly her ADHD as well.  She previously was seeing Dr. Quick and he stopped her Adderall.  She mostly feels anxious without it.  She has not been reprimanded at work because of not finishing tasks.   Previously she was also on an serotonin specific reuptake inhibitor for mood.  Would like to return due to some anxiety she has been experiencing.      No LMP recorded. Patient has had an ablation.   Contraception: TL; ablation  Risk for STI?: New boyfriend x 4 months.  Last pap: 3/21/2017, normal.  Due next year.  Any hx of abnormal paps:  No  FH of early CA?: Mom with lung CA in her " 40s  Cholesterol/DM concerns/screening: Due  Tobacco?: No  Calcium intake: No  DEXA: NA  Last mammo: 7/23/2018; normal  Colonoscopy: due @ 48  Immunizations: Tdap 5/30/2013; declines flu.  Will discuss shingles vaccine @ 48; pneumonia vaccines @ 65/64.    Patient Active Problem List   Diagnosis     Adhesive capsulitis of right shoulder     Asthma     Attention deficit disorder     Gastroesophageal reflux disease without esophagitis     Genital herpes     H/O excision of mass     History of arthroscopy of right knee     Knee pain     Learning disability     Migraine headache     Obesity (BMI 35.0-39.9 without comorbidity)     Pain of multiple sites     Plantar fasciitis     S/P shoulder surgery     Past Medical History:   Diagnosis Date     Adhesive capsulitis of right shoulder     6/6/2016     Cyst of ovary 2006    Left     Localized swelling, mass, or lump of upper extremity 08/26/2014     Migraine without status migrainosus, not intractable     Occasional severe HA, does not require daily prophylaxis     Other developmental disorders of scholastic skills      Other specified behavioral and emotional disorders with onset usually occurring in childhood and adolescence      Other specified postprocedural states 09/03/2014     Other specified postprocedural states 06/06/2016     Peptic ulcer without hemorrhage or perforation 1999     Personal history of urinary calculi 2004    Right     Plantar fascial fibromatosis 11/14/2014     Uncomplicated asthma     intermittent     Past Surgical History:   Procedure Laterality Date     APPENDECTOMY OPEN  1999     ARTHROSCOPY SHOULDER      10/05,debridement and subacromial decompression of right shoulder.     ARTHROSCOPY SHOULDER  08/2007    acromioplasty distal clavicle excision, and extensive debridement of the bursa of the right shoulder by Dr. Villa.  Also left?     CHOLECYSTECTOMY  1999     CYSTOSCOPY  2004    with retrogrades     DILATION AND CURETTAGE  11/2007     Ablation     LAPAROSCOPIC TUBAL LIGATION  2004     LITHOTRIPSY  2004    extracorporeal shockwave     OTHER SURGICAL HISTORY  2012     OTHER SURGICAL HISTORY Right 2014    Pathology showed epidermal inclusion cysts     RELEASE CARPAL TUNNEL       Social History     Socioeconomic History     Marital status:      Spouse name: Not on file     Number of children: Not on file     Years of education: Not on file     Highest education level: Not on file   Social Needs     Financial resource strain: Not on file     Food insecurity - worry: Not on file     Food insecurity - inability: Not on file     Transportation needs - medical: Not on file     Transportation needs - non-medical: Not on file   Occupational History     Not on file   Tobacco Use     Smoking status: Former Smoker     Packs/day: 0.50     Years: 16.00     Pack years: 8.00     Types: Cigarettes     Smokeless tobacco: Never Used   Substance and Sexual Activity     Alcohol use: Yes     Alcohol/week: 0.0 oz     Comment: Alcoholic Drinks/day: rarely     Drug use: No     Comment: Drug use: No     Sexual activity: Yes     Partners: Male     Birth control/protection: Surgical   Other Topics Concern     Parent/sibling w/ CABG, MI or angioplasty before 65F 55M? Not Asked   Social History Narrative    Patient is , lives with  and her 2 children, and his 1 child.  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     Family History   Problem Relation Age of Onset     Cancer Mother 46        Cancer,Lung cancer     Blood Disease Mother         Blood Disease     Asthma Father         Asthma     Diabetes Father         Diabetes,2     Hypertension Father         Hypertension     Heart Disease Father         Heart Disease,CHF     Other - See Comments Father          at age 65 from complications of a motor vehicle accident.     Family History  "Negative Sister         Good Health     Family History Negative Sister         Good Health     Breast Cancer No family hx of         Cancer-breast     Colon Cancer No family hx of         Cancer-colon     Prostate Cancer No family hx of         Cancer-prostate     Ovarian Cancer No family hx of         Cancer-ovarian     Anesthesia Reaction No family hx of         Anesthesia Problem       Current Outpatient Medications   Medication Sig Dispense Refill     citalopram (CELEXA) 20 MG tablet Take 1 tablet (20 mg) by mouth daily 90 tablet 3     meloxicam (MOBIC) 15 MG tablet Take 1 tablet (15 mg) by mouth daily 90 tablet 3      REVIEW OF SYSTEMS:  Refer to HPI; all other systems reviewed and negative.    PHYSICAL EXAM:  /72 (BP Location: Right arm, Patient Position: Sitting, Cuff Size: Adult Large)   Pulse 80   Temp 98.2  F (36.8  C)   Resp 25   Ht 1.651 m (5' 5\")   Wt 104 kg (229 lb 3.2 oz)   BMI 38.14 kg/m    CONSTITUTIONAL:  Alert, cooperative, NAD.  EYES: No scleral icterus.  PERRLA.  Conjunctiva clear.  ENT/MOUTH: External ears and nose normal.  TMs normal.  Moist mucous membranes. Oropharynx clear.    ENDO: No thyromegaly or thyroid nodules.  LYMPH:  No cervical or supraclavicular LA.    BREASTS: Declines  CARDIOVASCULAR: Regular, S1, S2.  No S3 or S4.  No murmur/gallop/rub.  No peripheral edema.  RESPIRATORY: CTA bilaterally, no wheezes, rhonchi or rales.  GI: Bowel sounds wnl.  Soft, nontender, non-distended.  No masses or HSM.  No rebound or guarding.  : Declines as pap is not due.  Pap smear obtained: No  MSKEL: Grossly normal ROM.  No clubbing.  + Heel and toe stance adequate with a couple steps in each direction.   INTEGUMENTARY:Warm, dry.  No rash noted on exposed skin.  NEUROLOGIC: Facies symmetric.  Grossly normal movement and tone.  No tremor.  Normal DTRs.  Sensation intact.  PSYCHIATRIC: Affect normal.  Speech fluent.      PHQ-9 SCORE 1/25/2018 7/25/2018 2/20/2019   PHQ-9 Total Score 12 12 " 13     Results for orders placed or performed in visit on 02/20/19   Anti Nuclear Tiffani IgG by IFA with Reflex   Result Value Ref Range    MICH interpretation Negative NEG^Negative   Rheumatoid factor   Result Value Ref Range    Rheumatoid Factor <14 <14 IU/mL   CRP inflammation   Result Value Ref Range    CRP Inflammation 0.9 (H) <0.5 mg/L   Sedimentation Rate (ESR)   Result Value Ref Range    Sed Rate 14 1 - 15 mm/h   Cyclic Citrullinated Peptide Antibody IgG   Result Value Ref Range    Cyclic Citrullinated Peptide Antibody, IgG 2 <7 U/mL   Hemoglobin A1c   Result Value Ref Range    Hemoglobin A1C 5.3 4.0 - 6.0 %   Lipid Panel   Result Value Ref Range    Cholesterol 156 <200 mg/dL    Triglycerides 157 (H) <150 mg/dL    HDL Cholesterol 48 23 - 92 mg/dL    LDL Cholesterol Calculated 77 <100 mg/dL    Non HDL Cholesterol 108 <130 mg/dL   GC/Chlamydia by PCR   Result Value Ref Range    Specimen Source Urine     Neisseria gonorrhoreae PCR Not Detected NDET^Not Detected    Chlamydia Trachomatis PCR Not Detected NDET^Not Detected       ASSESSMENT/PLAN:  1. Routine history and physical examination of adult    2. Attention deficit hyperactivity disorder (ADHD), combined type  Discussed referral to psychiatry.  May improve solely with anxiety treatment.  Uncertain of the need for a stimulant as she has been very productive and keeping her job now for 4+ years without difficulty.  - MENTAL HEALTH REFERRAL  - Adult; Psychiatry and Medication Management; Psychiatry; Other: Not Listed - Enter Referral Details in Scheduling Comments Below; Patient call to schedule    3. Obesity (BMI 35.0-39.9 without comorbidity)  Chronic.  Discussed today importance of 150 minutes of physical activity.  I'm not certain she understands that this is in addition to movement of day to day activities/work, etc.    4. Gastroesophageal reflux disease without esophagitis  Chronic, no significant change.    5. Lipid screening  Screening today.  - Lipid  Panel; Future  - Lipid Panel    6. Screening for diabetes mellitus  Screening labs ordered.  - Hemoglobin A1c; Future  - Hemoglobin A1c    7. Chronic heel pain, left  Referral to PT; however she should follow up with Ortho re: tendon thickening in area of prior surgery.  Referral placed to see Dr. Moore.  - ORTHOPEDICS ADULT REFERRAL  - meloxicam (MOBIC) 15 MG tablet; Take 1 tablet (15 mg) by mouth daily  Dispense: 90 tablet; Refill: 3  - PHYSICAL THERAPY REFERRAL; Future    8. Thickening of tendon  As above.  - ORTHOPEDICS ADULT REFERRAL    9. Generalized anxiety disorder  Chronic, recurrent.  Restart Celexa at 20mg daily.  I've explained to her that drugs of the SSRI class can have side effects such as weight gain, sexual dysfunction, insomnia, headache, nausea. These medications are generally effective at alleviating symptoms of anxiety and/or depression. Let me know if significant side effects do occur.   - citalopram (CELEXA) 20 MG tablet; Take 1 tablet (20 mg) by mouth daily  Dispense: 90 tablet; Refill: 3    10. Lumbar back pain  NSAID need; continued movement and weight loss stressed.  Referral to PT to discuss strengthening core, weight loss and reduce joint pains.  - meloxicam (MOBIC) 15 MG tablet; Take 1 tablet (15 mg) by mouth daily  Dispense: 90 tablet; Refill: 3  - PHYSICAL THERAPY REFERRAL; Future    11. Bilateral hand pain  NSAID will also help here.  - meloxicam (MOBIC) 15 MG tablet; Take 1 tablet (15 mg) by mouth daily  Dispense: 90 tablet; Refill: 3  - PHYSICAL THERAPY REFERRAL; Future    12. Special screening examination for chlamydial disease  New boyfriend - screen for chlamydia.  - GC/Chlamydia by PCR    13. Pain in joint, multiple sites  Due to overall nature; believe this to be OA (mostly bilateral, large joints included); however due to degree of reported stiffness.  Plan to obtain some screening autoimmune/RA labs - as ordered below.  - Cyclic Citrullinated Peptide Antibody IgG;  Future  - Sedimentation Rate (ESR); Future  - CRP inflammation; Future  - Rheumatoid factor; Future  - Anti Nuclear Tiffani IgG by IFA with Reflex  - Rheumatoid factor  - CRP inflammation  - Sedimentation Rate (ESR)  - Cyclic Citrullinated Peptide Antibody IgG      Relevant cancer screening discussed.    Counseled on healthy diet, Calcium and vitamin D intake, and exercise.    Lyla Hendrix, DO  Family Practice

## 2019-02-20 ENCOUNTER — OFFICE VISIT (OUTPATIENT)
Dept: FAMILY MEDICINE | Facility: OTHER | Age: 39
End: 2019-02-20
Attending: FAMILY MEDICINE
Payer: COMMERCIAL

## 2019-02-20 VITALS
BODY MASS INDEX: 38.19 KG/M2 | TEMPERATURE: 98.2 F | DIASTOLIC BLOOD PRESSURE: 72 MMHG | HEART RATE: 80 BPM | HEIGHT: 65 IN | SYSTOLIC BLOOD PRESSURE: 110 MMHG | RESPIRATION RATE: 25 BRPM | WEIGHT: 229.2 LBS

## 2019-02-20 DIAGNOSIS — K21.9 GASTROESOPHAGEAL REFLUX DISEASE WITHOUT ESOPHAGITIS: ICD-10-CM

## 2019-02-20 DIAGNOSIS — Z11.8 SPECIAL SCREENING EXAMINATION FOR CHLAMYDIAL DISEASE: ICD-10-CM

## 2019-02-20 DIAGNOSIS — G89.29 CHRONIC HEEL PAIN, LEFT: ICD-10-CM

## 2019-02-20 DIAGNOSIS — M79.641 BILATERAL HAND PAIN: ICD-10-CM

## 2019-02-20 DIAGNOSIS — F90.2 ATTENTION DEFICIT HYPERACTIVITY DISORDER (ADHD), COMBINED TYPE: ICD-10-CM

## 2019-02-20 DIAGNOSIS — E66.9 OBESITY (BMI 35.0-39.9 WITHOUT COMORBIDITY): ICD-10-CM

## 2019-02-20 DIAGNOSIS — Z13.220 LIPID SCREENING: ICD-10-CM

## 2019-02-20 DIAGNOSIS — M54.50 LUMBAR BACK PAIN: ICD-10-CM

## 2019-02-20 DIAGNOSIS — M79.672 CHRONIC HEEL PAIN, LEFT: ICD-10-CM

## 2019-02-20 DIAGNOSIS — M25.50 PAIN IN JOINT, MULTIPLE SITES: ICD-10-CM

## 2019-02-20 DIAGNOSIS — M67.80 THICKENING OF TENDON: ICD-10-CM

## 2019-02-20 DIAGNOSIS — Z13.1 SCREENING FOR DIABETES MELLITUS: ICD-10-CM

## 2019-02-20 DIAGNOSIS — M79.642 BILATERAL HAND PAIN: ICD-10-CM

## 2019-02-20 DIAGNOSIS — F41.1 GENERALIZED ANXIETY DISORDER: ICD-10-CM

## 2019-02-20 DIAGNOSIS — Z00.00 ROUTINE HISTORY AND PHYSICAL EXAMINATION OF ADULT: Primary | ICD-10-CM

## 2019-02-20 LAB
C TRACH DNA SPEC QL PROBE+SIG AMP: NOT DETECTED
CHOLEST SERPL-MCNC: 156 MG/DL
CRP SERPL-MCNC: 0.9 MG/L
ERYTHROCYTE [SEDIMENTATION RATE] IN BLOOD BY WESTERGREN METHOD: 14 MM/H (ref 1–15)
HBA1C MFR BLD: 5.3 % (ref 4–6)
HDLC SERPL-MCNC: 48 MG/DL (ref 23–92)
LDLC SERPL CALC-MCNC: 77 MG/DL
N GONORRHOEA DNA SPEC QL PROBE+SIG AMP: NOT DETECTED
NONHDLC SERPL-MCNC: 108 MG/DL
RHEUMATOID FACT SER NEPH-ACNC: <14 IU/ML (ref 0–20)
SPECIMEN SOURCE: NORMAL
TRIGL SERPL-MCNC: 157 MG/DL

## 2019-02-20 PROCEDURE — 36415 COLL VENOUS BLD VENIPUNCTURE: CPT | Performed by: FAMILY MEDICINE

## 2019-02-20 PROCEDURE — 99396 PREV VISIT EST AGE 40-64: CPT | Performed by: FAMILY MEDICINE

## 2019-02-20 PROCEDURE — 80061 LIPID PANEL: CPT | Performed by: FAMILY MEDICINE

## 2019-02-20 PROCEDURE — 86038 ANTINUCLEAR ANTIBODIES: CPT | Performed by: FAMILY MEDICINE

## 2019-02-20 PROCEDURE — 87591 N.GONORRHOEAE DNA AMP PROB: CPT | Performed by: FAMILY MEDICINE

## 2019-02-20 PROCEDURE — 86140 C-REACTIVE PROTEIN: CPT | Performed by: FAMILY MEDICINE

## 2019-02-20 PROCEDURE — 86431 RHEUMATOID FACTOR QUANT: CPT | Performed by: FAMILY MEDICINE

## 2019-02-20 PROCEDURE — 83036 HEMOGLOBIN GLYCOSYLATED A1C: CPT | Performed by: FAMILY MEDICINE

## 2019-02-20 PROCEDURE — 87491 CHLMYD TRACH DNA AMP PROBE: CPT | Performed by: FAMILY MEDICINE

## 2019-02-20 PROCEDURE — G0463 HOSPITAL OUTPT CLINIC VISIT: HCPCS | Performed by: FAMILY MEDICINE

## 2019-02-20 PROCEDURE — 85652 RBC SED RATE AUTOMATED: CPT | Performed by: FAMILY MEDICINE

## 2019-02-20 PROCEDURE — 86200 CCP ANTIBODY: CPT | Performed by: FAMILY MEDICINE

## 2019-02-20 PROCEDURE — 99212 OFFICE O/P EST SF 10 MIN: CPT | Performed by: FAMILY MEDICINE

## 2019-02-20 RX ORDER — MELOXICAM 15 MG/1
15 TABLET ORAL DAILY
Qty: 90 TABLET | Refills: 3 | Status: SHIPPED | OUTPATIENT
Start: 2019-02-20 | End: 2020-02-21

## 2019-02-20 RX ORDER — CITALOPRAM HYDROBROMIDE 20 MG/1
20 TABLET ORAL DAILY
Qty: 90 TABLET | Refills: 3 | Status: SHIPPED | OUTPATIENT
Start: 2019-02-20 | End: 2020-02-21

## 2019-02-20 ASSESSMENT — ANXIETY QUESTIONNAIRES
5. BEING SO RESTLESS THAT IT IS HARD TO SIT STILL: SEVERAL DAYS
GAD7 TOTAL SCORE: 8
2. NOT BEING ABLE TO STOP OR CONTROL WORRYING: SEVERAL DAYS
IF YOU CHECKED OFF ANY PROBLEMS ON THIS QUESTIONNAIRE, HOW DIFFICULT HAVE THESE PROBLEMS MADE IT FOR YOU TO DO YOUR WORK, TAKE CARE OF THINGS AT HOME, OR GET ALONG WITH OTHER PEOPLE: SOMEWHAT DIFFICULT
7. FEELING AFRAID AS IF SOMETHING AWFUL MIGHT HAPPEN: SEVERAL DAYS
6. BECOMING EASILY ANNOYED OR IRRITABLE: SEVERAL DAYS
1. FEELING NERVOUS, ANXIOUS, OR ON EDGE: SEVERAL DAYS
3. WORRYING TOO MUCH ABOUT DIFFERENT THINGS: SEVERAL DAYS

## 2019-02-20 ASSESSMENT — PATIENT HEALTH QUESTIONNAIRE - PHQ9
SUM OF ALL RESPONSES TO PHQ QUESTIONS 1-9: 13
5. POOR APPETITE OR OVEREATING: MORE THAN HALF THE DAYS

## 2019-02-20 ASSESSMENT — MIFFLIN-ST. JEOR: SCORE: 1705.52

## 2019-02-20 ASSESSMENT — PAIN SCALES - GENERAL: PAINLEVEL: MODERATE PAIN (5)

## 2019-02-20 NOTE — PATIENT INSTRUCTIONS
Osteoarthritis: Coping with Pain  There are many ways to control your pain. You re making a good start by learning about osteoarthritis and its treatments. Knowing more about this condition helps you work with your healthcare provider to find answers to problems. Keeping a positive outlook can help you manage pain from day to day. And making time each day to relax and enjoy yourself may help you control osteoarthritis pain, instead of letting it control you. Try these methods to help you cope with, and even reduce, your pain.  Take control  Relaxing may help relieve muscle aches that result from joint pain. To relax, try these techniques:    Breathe slowly and calmly and think of a peaceful scene.    Meditate by focusing your mind on one word, object, or idea.  Getting plenty of sleep can help reduce pain and let you function better. If pain is making it hard for you to sleep, ask your healthcare provider about ways to control pain and ensure a good night s sleep. Cutting back on caffeine and alcohol can help you sleep better. So can going to bed and getting up at about the same time every day.  Use distraction  Getting your mind off the pain may seem hard to do. But it can actually help reduce pain. When you are in pain, try one of these ways of distracting yourself:    Watch a funny movie with a friend.    Listen to music you enjoy.    Read a novel.    Talk with friends or family.    Go to a museum, park, or other favorite attraction.    Arrange to do a regular activity, such as volunteer work.  Heat and cold  Using heat and cold treatments are simple ways to lessen arthritis symptoms:    Heat soothes stiff joints and tired muscles. Heat works well before exercise, for example. Heat treatments include:  ? A warm shower or bath, or soak (for example, fill the sink with warm water and move your fingers, hands, and wrists around in the water)  ? A moist heating pad  ? A warm, moist wash cloth  ? An electric blanket  or throw    Cold treatments help to numb painful areas and decrease swelling. Cold treatments include the following wrapped in a thin towel:  ? An ice pack or bag of ice. To make an ice pack, put ice cubes in a plastic bag that seals at the top.  ? A gel-filled cold pack  Be careful when using heat or cold. You can injure your skin. Each treatment should only last for 10 to 20 minutes. Your healthcare provider or therapist can give you specific instructions.  Acupuncture  Acupuncture is a 2,000-year-old practice. Providers insert thin needles in specific parts of the body. Research shows that it can help to relieve the pain of arthritis.  For more information or to find a provider in your area, contact the American Academy of Medical Acupuncture. Its website is: www.medicalacupuncture.org/.  Massage  Therapeutic massage has many benefits. It may:    Help you and your muscles relax    Improve blood flow to muscles and joints    Help joints stay more flexible  Look for a certified massage therapist. Many are trained to treat sore muscles and joint pain and stiffness.  Vitamins, supplements, and herbs  People with arthritis, or other long-term conditions that cause pain, often look for alternative ways to lessen pain. Vitamins, supplements, and herbs may or may not help you to feel better. Before you try any vitamin, supplement, or herb, make sure you ask your healthcare provider or pharmacist.  Physical therapy and occupational therapy  Evaluation by a physical therapist and or occupational therapist for assessment for limitations in activities of daily living  Help with developing an appropriate exercise routine for both muscle strengthening and cardiovascular health  Weight management  Studies have shown that weight loss in overweight people can improve osteoarthritis symptoms.  Talk with your healthcare provider about your optimal ideal weight and weight management methods if needed.  Psychological  treatments  Research shows that many psychological therapies or those that deal with thinking and emotions, help people cope with arthritis pain. Therapies include cognitive behavioral therapy (CBT), pain coping skills training, biofeedback, stress management, and hypnosis. Ask your healthcare provider for more information about these therapies.  For more information about many of these methods, contact the National Center for Complementary and Alternative Medicine (NCCAM) at  https://ncc.nih.gov.  Date Last Reviewed: 6/1/2018 2000-2018 [employer]. [address] [place] [id number]. All rights reserved. This information is not intended as a substitute for professional medical care. Always follow your healthcare professional's instructions.      Arthritis: Exercise     Look for exercise classes for arthritis in your community.     Exercise is important to your overall health. It is especially important in people with arthritis. Regular exercise can:    Keep your heart and blood vessels healthy    Help with weight management, or weight loss    Improve your mood    Help prevent and manage health problems such as:  ? Diabetes  ? High blood pressure  ? High cholesterol  ? Depression  In people with arthritis, it offers all of those benefits and it can:    Lessen pain and stiffness    Strengthen muscles that support your joints    Help you to be able to do the things you enjoy  Exercise and arthritis  Exercise is an important part of any arthritis treatment plan. A complete program consists of the following 3 types of exercises:    Aerobic exercises for cardiovascular health and overall fitness.     Strengthening exercises to build up muscles to help prevent injury and keep joints stable.    Range-of-motion exercises to keep muscles and joints flexible.  Getting started  Talk with your healthcare provider about what is safe for you. Make sure you:    Learn how to do exercises correctly and  safely. Consider talking with a physical therapist or  used to working with people with arthritis.    Start slowly and build. If you haven't been exercising, start slowly. Don't exercise too hard or too long.    Create a routine. Set aside specific times for exercise every day.    Warm up carefully. Take 5 to 10 minutes at the beginning and end of exercising to warm up and cool down. Just do the same exercises at a slower pace for 5 to 10 minutes.    Work at a comfortable, smooth pace. Move your joints gently to prevent injury.    Pay attention to your body. Don't exercise a painful or swollen joint; switch to another activity. Follow the 2-hour pain rule: You did too much if your joint or muscle pain lasts 2 hours or more after exercising, or is worse the next day. This doesn't mean you should stop exercising. Just do less.  Aerobic exercise  Aerobic exercise improves overall health and helps control weight. Choose those that don't add extra stress to your joints. For example, walking, swimming, or bicycling.  Most people should exercise for at least 30 minutes, most days of the week. You don't have to exercise all at once. Try exercising for 10 minutes, 3 times a day, for example.  Strengthening exercises  Strengthening your muscles helps to protect your joints and prevent injuries. Try to do strengthening exercises 2 to 3 times a week:    These exercises can be done with exercise or resistance bands (inexpensive exercise aids that add resistance), or with light weights. Some people use soup cans as weights.    Isometric exercises are done by tightening the muscles without moving the joint. This may be a good way to strengthen the muscles around a stiff joint.  A physical therapist or  can teach you how to do these exercises.  Range-of-motion (ROM) exercises  Range-of-motion (ROM) exercises allow you to move each of your joints in every way they are intended to move. You should do ROM exercises for  each joint 2 to 3 times a day. This will help you maintain full use of all of your joints.  Sample ROM exercises  The following are just a sample of ROM exercises--one for your neck, shoulders, elbows, hips, knees, and ankles. To completely move each joint through its full range of motion, you will have to do a few exercises for each joint. A physical therapist or  can teach you how to do full ROM exercises for each joint.   Repeat each for these exercises 5 to 10 times. Make sure you move slowly:  1. Neck turns. Sit in a straight-backed chair. Look straight ahead. Slowly turn your head to the right, then return it to center. Repeat. Do the same thing, turning your head to the left. Repeat.  2. Shoulder raise. Lie on your back or sit in a chair. Raise one arm over your head, keeping your elbow straight. Keep the arm close to your ear. Return it slowly to your side. Repeat with your other arm.  3. Elbow stretch. Sit in a chair. If you are able, put both arms out to your sides to form a T. Slowly touch your shoulders with the tips of your fingers. Then return to the T-position. Repeat.  4. Hip stretch. Lie on your back with your legs straight and about 6 inches apart. With your foot flexed, slide your leg out to the side, then slide it back to the starting position. Repeat with your other leg.  5. Knee bend. Sit in a chair with your legs bent at the knees in front of you. Straighten one leg as much as you can, then bring it back to the floor. Repeat this 5 to 10 times. Then do the same thing with the other leg.   6. Ankle stretch. Sit with your feet flat on the floor. Lift your toes off of the floor while your heels stay down. Repeat. Then lift your heels off the floor while your toes stay down. Repeat.  Other exercise  Many other exercise and activities benefit people with arthritis. It is most important to find exercise and activities that you enjoy. You might try:    Yoga, including chair yoga, helps to keep  your joints strong and flexible    Saud Chi, an ancient type of exercise with slow, gentle movements    Water exercise, including water walking  For more information on exercise for arthritis go to the Arthritis Foundation website: www.arthritis.org.  Date Last Reviewed: 6/1/2018 2000-2018 [employer]. [address] [place] [id number]. All rights reserved. This information is not intended as a substitute for professional medical care. Always follow your healthcare professional's instructions.

## 2019-02-21 LAB
ANA SER QL IF: NEGATIVE
CCP AB SER IA-ACNC: 2 U/ML

## 2019-02-21 ASSESSMENT — ANXIETY QUESTIONNAIRES: GAD7 TOTAL SCORE: 8

## 2019-02-21 ASSESSMENT — ASTHMA QUESTIONNAIRES: ACT_TOTALSCORE: 25

## 2019-02-26 ENCOUNTER — HOSPITAL ENCOUNTER (OUTPATIENT)
Dept: PHYSICAL THERAPY | Facility: OTHER | Age: 39
Setting detail: THERAPIES SERIES
End: 2019-02-26
Attending: FAMILY MEDICINE
Payer: COMMERCIAL

## 2019-02-26 DIAGNOSIS — M54.50 LUMBAR BACK PAIN: ICD-10-CM

## 2019-02-26 DIAGNOSIS — M79.641 BILATERAL HAND PAIN: ICD-10-CM

## 2019-02-26 DIAGNOSIS — M79.672 CHRONIC HEEL PAIN, LEFT: ICD-10-CM

## 2019-02-26 DIAGNOSIS — M79.642 BILATERAL HAND PAIN: ICD-10-CM

## 2019-02-26 DIAGNOSIS — G89.29 CHRONIC HEEL PAIN, LEFT: ICD-10-CM

## 2019-02-26 PROCEDURE — 97110 THERAPEUTIC EXERCISES: CPT | Mod: GP

## 2019-02-26 PROCEDURE — 97161 PT EVAL LOW COMPLEX 20 MIN: CPT | Mod: GP

## 2019-02-26 PROCEDURE — 97140 MANUAL THERAPY 1/> REGIONS: CPT | Mod: GP
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
## 2019-03-06 ENCOUNTER — HOSPITAL ENCOUNTER (OUTPATIENT)
Dept: PHYSICAL THERAPY | Facility: OTHER | Age: 39
Setting detail: THERAPIES SERIES
End: 2019-03-06
Attending: PHYSICIAN ASSISTANT
Payer: COMMERCIAL

## 2019-03-06 PROCEDURE — 97140 MANUAL THERAPY 1/> REGIONS: CPT | Mod: GP

## 2019-03-06 PROCEDURE — 97110 THERAPEUTIC EXERCISES: CPT | Mod: GP

## 2019-03-06 PROCEDURE — 97035 APP MDLTY 1+ULTRASOUND EA 15: CPT | Mod: GP

## 2019-03-07 ENCOUNTER — HOSPITAL ENCOUNTER (OUTPATIENT)
Dept: GENERAL RADIOLOGY | Facility: OTHER | Age: 39
Discharge: HOME | End: 2019-03-07
Attending: PODIATRIST | Admitting: PODIATRIST
Payer: COMMERCIAL

## 2019-03-07 ENCOUNTER — OFFICE VISIT (OUTPATIENT)
Dept: ORTHOPEDICS | Facility: OTHER | Age: 39
End: 2019-03-07
Attending: FAMILY MEDICINE
Payer: COMMERCIAL

## 2019-03-07 DIAGNOSIS — M79.672 FOOT PAIN, LEFT: ICD-10-CM

## 2019-03-07 DIAGNOSIS — M79.672 FOOT PAIN, LEFT: Primary | ICD-10-CM

## 2019-03-07 PROCEDURE — 73630 X-RAY EXAM OF FOOT: CPT

## 2019-03-07 PROCEDURE — G0463 HOSPITAL OUTPT CLINIC VISIT: HCPCS | Mod: 25 | Performed by: PODIATRIST

## 2019-03-08 ENCOUNTER — HOSPITAL ENCOUNTER (OUTPATIENT)
Dept: PHYSICAL THERAPY | Facility: OTHER | Age: 39
Setting detail: THERAPIES SERIES
End: 2019-03-08
Attending: PHYSICIAN ASSISTANT
Payer: COMMERCIAL

## 2019-03-08 PROCEDURE — 97110 THERAPEUTIC EXERCISES: CPT | Mod: GP

## 2019-03-08 PROCEDURE — 97140 MANUAL THERAPY 1/> REGIONS: CPT | Mod: GP

## 2019-03-11 NOTE — PROGRESS NOTES
CHIEF COMPLAINT: Left Heel Pain    PROBLEMS:  FH DIABETES (ICD-V18.0) (OPN56-V01.3)    PATIENT REPORTED MEDICATIONS:  MELOXICAM TABLET (MELOXICAM TABS)   TRAMADOL HCL 50 MG ORAL TABLET (TRAMADOL HCL) 1-2 tabs q q 8-12 hrs prn pain; Route: ORAL  CITALOPRAM HYDROBROMIDE TABLET (CITALOPRAM HYDROBROMIDE TABS) 1 tab PO daily    PATIENT REPORTED ALLERGIES:  PENICILLIN (PENICILLIN V POTASSIUM) (SevereReaction: No reaction details noted)  ASPIRIN (SevereReaction: No reaction details noted)  PERCOCET (SevereReaction: No reaction details noted)    RISK FACTORS:  Tobacco use:   former smoker  Passive smoke exposure: No  Alcohol Use:   No    HISTORY OF PRESENT ILLNESS:    This patient is familiar to myself.  I saw her some time ago.  We did an Achilles tendon debridement and posterior heel spur resection in 2017.  She did well for awhile and had some recurrence of pain.  She is doing therapy for her back currently.  Therapy suggested getting back into see me and considering doing some more therapy on the ankle and foot.  Here today to discuss this and have this evaluated.    The patient's health history form dated 2019 was reviewed and signed.  Past medical history, surgical history, social history, family history, and review of systems noted.     PAST MEDICAL HISTORY:    Possible Arthritis    PAST ORTHOPEDIC SURGICAL HISTORY:    Left Achilles Tendon Debridement And Repair; Heel Spur Resection Posterior Heel 2017, Melecio Moore DPM  Shoulder Surgery x4  Right Hand Surgery x5  Right Knee    PAST SURGICAL HISTORY:    Appendectomy  Cholecystectomy  Kidney Stone Surgery  Ablation  Tubal Ligation    FAMILY HISTORY:    Father:  , Heart Failure, Diabetes  Mother:  , Cancer, Blood Clots    SOCIAL HISTORY:   Occupation:  DSP  Marital Status:    Alcohol Use:  No  Tobacco Use:  Former  Secondhand Smoke Exposure:  No  History of HIV:  No  History of Hepatitis:  No    REVIEW OF SYSTEMS:  Joint or  Muscle pain: Yes  Stiffness:  Yes  Swelling:  Yes  Difficulty in walking: Yes  Cold extremities: Yes  Rash or Itching: No  Bruising:  No  Numbness/Tingling: No    PHYSICAL EXAMINATION:    CONSTITUTIONAL:  Patient is alert and oriented x3, well-appearing and in no apparent distress.  Affect is pleasant and answers questions appropriately.  VASCULAR:  Circulation is intact with palpable pedal pulses and has adequate capillary fill time.  NEUROLOGIC:  Light touch sensation is intact to digits.  INTEGUMENT:  No dermatologic lesions are noted.  Skin with normal texture and turgor.  MUSCULOSKELETAL:  No obvious recurrence in swelling.  Does have a thickened Achilles tendon.  Mild tenderness to palpation.  She is more tender with function and activity than with palpation today.  She does have significant equinus appreciated with the knee extended, improved with the knee flexed.  Otherwise ambulatory in normal shoe gear without assistive device.    X-RAY:  We did repeat foot x-rays which demonstrate no obvious recurrence of heel spurring.  Adequate resection of the posterior aspect of the calcaneus.  Thickened Achilles tendon and soft tissue is appreciated posterior to this.  No obvious acute osseous pathology.    ASSESSMENT:    Recurrent insertional Achilles tendonitis, right side.  History of spur resection and Achilles tendon debridement.    PLAN:   Discuss condition and treatment with the patient today.  We did discuss starting therapy again; evaluate and treat, iontophoresis and dry needling was ordered if able.  We will see what kind of improvement she gets with this.  Reappoint with continued pain.    Dictated by Melecio Moore DPM  cc:  DO Dr. Oscar Robin at [facility]    D:  03/07/2019  T:  03/11/2019    Typed and/or reviewed and corrected by signing  below, and sent to the Physician for final review and signature.    This report was created using voice recording software  and computer-generated templates. Although every effort has been made to review for and eliminate errors, some errors may still occur.         Electronically signed by Walter Chanel  on 03/11/2019 at 2:57 PM

## 2019-03-13 ENCOUNTER — HOSPITAL ENCOUNTER (OUTPATIENT)
Dept: PHYSICAL THERAPY | Facility: OTHER | Age: 39
Setting detail: THERAPIES SERIES
End: 2019-03-13
Attending: PHYSICIAN ASSISTANT
Payer: COMMERCIAL

## 2019-03-13 PROCEDURE — 97140 MANUAL THERAPY 1/> REGIONS: CPT | Mod: GP

## 2019-03-13 PROCEDURE — 97110 THERAPEUTIC EXERCISES: CPT | Mod: GP

## 2019-03-15 ENCOUNTER — HOSPITAL ENCOUNTER (OUTPATIENT)
Dept: PHYSICAL THERAPY | Facility: OTHER | Age: 39
Setting detail: THERAPIES SERIES
End: 2019-03-15
Attending: PHYSICIAN ASSISTANT
Payer: COMMERCIAL

## 2019-03-15 PROCEDURE — 97140 MANUAL THERAPY 1/> REGIONS: CPT | Mod: GP

## 2019-03-15 PROCEDURE — 97110 THERAPEUTIC EXERCISES: CPT | Mod: GP

## 2019-03-18 ENCOUNTER — HOSPITAL ENCOUNTER (OUTPATIENT)
Dept: PHYSICAL THERAPY | Facility: OTHER | Age: 39
Setting detail: THERAPIES SERIES
End: 2019-03-18
Attending: FAMILY MEDICINE
Payer: COMMERCIAL

## 2019-03-18 PROCEDURE — 97140 MANUAL THERAPY 1/> REGIONS: CPT | Mod: GP

## 2019-03-18 PROCEDURE — 97110 THERAPEUTIC EXERCISES: CPT | Mod: GP

## 2019-03-25 ENCOUNTER — HOSPITAL ENCOUNTER (OUTPATIENT)
Dept: PHYSICAL THERAPY | Facility: OTHER | Age: 39
Setting detail: THERAPIES SERIES
End: 2019-03-25
Attending: FAMILY MEDICINE
Payer: COMMERCIAL

## 2019-03-25 PROCEDURE — 97110 THERAPEUTIC EXERCISES: CPT | Mod: GP

## 2019-03-25 PROCEDURE — 97140 MANUAL THERAPY 1/> REGIONS: CPT | Mod: GP

## 2019-03-27 ENCOUNTER — HOSPITAL ENCOUNTER (OUTPATIENT)
Dept: PHYSICAL THERAPY | Facility: OTHER | Age: 39
Setting detail: THERAPIES SERIES
End: 2019-03-27
Attending: FAMILY MEDICINE
Payer: COMMERCIAL

## 2019-03-27 PROCEDURE — 97110 THERAPEUTIC EXERCISES: CPT | Mod: GP

## 2019-03-27 PROCEDURE — 97035 APP MDLTY 1+ULTRASOUND EA 15: CPT | Mod: GP

## 2019-03-27 PROCEDURE — 97140 MANUAL THERAPY 1/> REGIONS: CPT | Mod: GP

## 2019-04-02 ENCOUNTER — HOSPITAL ENCOUNTER (OUTPATIENT)
Dept: PHYSICAL THERAPY | Facility: OTHER | Age: 39
Setting detail: THERAPIES SERIES
End: 2019-04-02
Attending: FAMILY MEDICINE
Payer: COMMERCIAL

## 2019-04-02 PROCEDURE — 97110 THERAPEUTIC EXERCISES: CPT | Mod: GP

## 2019-04-02 PROCEDURE — 97035 APP MDLTY 1+ULTRASOUND EA 15: CPT | Mod: GP

## 2019-04-02 PROCEDURE — 97140 MANUAL THERAPY 1/> REGIONS: CPT | Mod: GP

## 2019-04-03 NOTE — PROGRESS NOTES
"Outpatient Physical Therapy Progress Note     Patient: Nohelia Crockett  : 1980    End Dates of Reporting Period:   4/3/2019    Referring Provider: Dr. Hendrix, Dr. Melecio Moore    Therapy Diagnosis: Left heel pain     Client Self Report: Reports she was able to walk for 1 mile with minimal increase in edema and tenderness today.  Was sore last night. Reports she can do everything she needs to do, it just \"hurts\".     Objective Measurements:  Objective Measure: Tenderness  Details: Left calcaneus remains tender 2-3/4. Less mid achilles and gastroc/soleus complex.   ROM: Left ankle dorsiflexion is at +5 deg L with knee extended       Goals:  Goal Identifier Pain   Goal Description Patient will report decreased mid and upper back pain to 2/10 VAS or less allowing completion of all nml home and work tasks including lifting/carrying reasonable weights in 8 weeks   Target Date 19   Date Met  19   Progress:     Goal Identifier Mobility   Goal Description Patient will demo 50% improvement in thoracic extension reducing strain/pain with normal movements and supporting work tasks that5 require a standing posiiton for l1-2 hours in 8 weeks   Target Date 19   Date Met  19   Progress:     Goal Identifier Strength   Goal Description Patient will demo improved mid and upper back strength to support improved posture, reducing lower cervical /upper to mid  back pain. Patient will be observed able to hold improved posture in sitting and standing throughout a 45' PT session   Target Date 19   Date Met      Progress:Patient will continue with her strengthening as a HEP     Goal Identifier Heel pain   Goal Description Patient will report a 50% decrease in left heel pain with walking for work and home tasks in 8 weeks   Target Date 19   Date Met      Progress:Continue goal. Patient reports persistent left heel pain after activity      Goal Identifier Left ankle mobility   Goal Description " Patient will demo 15 deg. of left ankle dorsiflexion allowing a more normal gait and removing strain from the achilles tendon   Target Date 05/26/19   Date Met      Progress:Continue goal. Slow improvement noted         Progress Toward Goals:   Progress this reporting period: As Above          Plan:  Changes to goals: See new goals for treatment of the left heel above. PT treatment transitioned to treatment of the left heel pain, limited mobility    Discharge:  No

## 2019-04-05 ENCOUNTER — HOSPITAL ENCOUNTER (OUTPATIENT)
Dept: PHYSICAL THERAPY | Facility: OTHER | Age: 39
Setting detail: THERAPIES SERIES
End: 2019-04-05
Attending: FAMILY MEDICINE
Payer: COMMERCIAL

## 2019-04-05 PROCEDURE — 97110 THERAPEUTIC EXERCISES: CPT | Mod: GP

## 2019-04-05 PROCEDURE — 97140 MANUAL THERAPY 1/> REGIONS: CPT | Mod: GP

## 2019-04-08 ENCOUNTER — HOSPITAL ENCOUNTER (OUTPATIENT)
Dept: PHYSICAL THERAPY | Facility: OTHER | Age: 39
Setting detail: THERAPIES SERIES
End: 2019-04-08
Attending: FAMILY MEDICINE
Payer: COMMERCIAL

## 2019-04-08 PROCEDURE — 97110 THERAPEUTIC EXERCISES: CPT | Mod: GP

## 2019-04-08 PROCEDURE — 97140 MANUAL THERAPY 1/> REGIONS: CPT | Mod: GP

## 2019-04-08 PROCEDURE — 97035 APP MDLTY 1+ULTRASOUND EA 15: CPT | Mod: GP

## 2019-04-15 ENCOUNTER — HOSPITAL ENCOUNTER (OUTPATIENT)
Dept: PHYSICAL THERAPY | Facility: OTHER | Age: 39
Setting detail: THERAPIES SERIES
End: 2019-04-15
Attending: FAMILY MEDICINE
Payer: COMMERCIAL

## 2019-04-15 PROCEDURE — 97035 APP MDLTY 1+ULTRASOUND EA 15: CPT | Mod: GP

## 2019-04-15 PROCEDURE — 97110 THERAPEUTIC EXERCISES: CPT | Mod: GP

## 2019-04-15 PROCEDURE — 97140 MANUAL THERAPY 1/> REGIONS: CPT | Mod: GP

## 2019-04-18 ENCOUNTER — HOSPITAL ENCOUNTER (OUTPATIENT)
Dept: PHYSICAL THERAPY | Facility: OTHER | Age: 39
Setting detail: THERAPIES SERIES
End: 2019-04-18
Attending: FAMILY MEDICINE
Payer: COMMERCIAL

## 2019-04-18 PROCEDURE — 97110 THERAPEUTIC EXERCISES: CPT | Mod: GP

## 2019-04-18 PROCEDURE — 97140 MANUAL THERAPY 1/> REGIONS: CPT | Mod: GP

## 2019-04-18 PROCEDURE — 97035 APP MDLTY 1+ULTRASOUND EA 15: CPT | Mod: GP

## 2019-04-22 ENCOUNTER — HOSPITAL ENCOUNTER (OUTPATIENT)
Dept: PHYSICAL THERAPY | Facility: OTHER | Age: 39
Setting detail: THERAPIES SERIES
End: 2019-04-22
Attending: FAMILY MEDICINE
Payer: COMMERCIAL

## 2019-04-22 PROCEDURE — 97110 THERAPEUTIC EXERCISES: CPT | Mod: GP

## 2019-04-22 PROCEDURE — 97140 MANUAL THERAPY 1/> REGIONS: CPT | Mod: GP

## 2019-04-22 NOTE — PROGRESS NOTES
Outpatient Physical Therapy Progress Note     Patient: Nohelia Crockett  : 1980    End Dates of Reporting Period:  2019    Referring Provider: Dr. Hendrix    Therapy Diagnosis: Left heel pain     Client Self Report: Persistent episodes of heel pain. The heel will swell after prolonged activity. Rates pain as variable from 3-5/10 VAS with infrequent episodes of short duration sharper pain. She is continuing with her HEP and stretching frequently although it is painful.    Objective Measurements:  Objective Measure: Tenderness  Details: Persistent left calcaneul tendernes 2-3/4. Mid calf tender points as well.  Objective Measure: AROM  Details: Dorsiflexion to 15 knee bent, 10 degrees straight knee. -5-8 deg from right uninvolved side.               Goals:  Goal Identifier Pain   Goal Description Patient will report decreased mid and upper back pain to 2/10 VAS or less allowing completion of all nml home and work tasks including lifting/carrying reasonable weights in 8 weeks   Target Date 19   Date Met  19   Progress:     Goal Identifier Mobility   Goal Description Patient will demo 50% improvement in thoracic extension reducing strain/pain with normal movements and supporting work tasks that5 require a standing posiiton for l1-2 hours in 8 weeks   Target Date 19   Date Met  19   Progress:     Goal Identifier Strength   Goal Description Patient will demo improved mid and upper back strength to support improved posture, reducing lower cervical /upper to mid  back pain. Patient will be observed able to hold improved posture in sitting and standing throughout a 45' PT session   Target Date 19   Date Met   2019   Progress:     Goal Identifier Heel pain   Goal Description Patient will report a 50% decrease in left heel pain with walking for work and home tasks in 8 weeks   Target Date 19   Date Met      Progress:Continue goal. Persistent left heel pain.     Goal  Identifier Left ankle mobility   Goal Description Patient will demo 15 deg. of left ankle dorsiflexion allowing a more normal gait and removing strain from the achilles tendon   Target Date 05/26/19   Date Met      Progress:Continue goal. See measurements above       Progress Toward Goals:   Progress this reporting period: As Above          Plan:  Continue therapy per current plan of care. Continue work to reduce pain, promote tissue healing and redevelopment of improved tendon tissue    Discharge:  No

## 2019-04-25 ENCOUNTER — OFFICE VISIT (OUTPATIENT)
Dept: ORTHOPEDICS | Facility: OTHER | Age: 39
End: 2019-04-25
Attending: PODIATRIST
Payer: COMMERCIAL

## 2019-04-25 DIAGNOSIS — Z00.00 ROUTINE GENERAL MEDICAL EXAMINATION AT A HEALTH CARE FACILITY: Primary | ICD-10-CM

## 2019-04-25 PROCEDURE — G0463 HOSPITAL OUTPT CLINIC VISIT: HCPCS

## 2019-05-01 NOTE — PROGRESS NOTES
CHIEF COMPLAINT: Left Heel Pain    PROBLEMS:  FH DIABETES (ICD-V18.0) (STL31-K50.3)    PATIENT REPORTED MEDICATIONS:  MELOXICAM TABLET (MELOXICAM TABS)   TRAMADOL HCL 50 MG ORAL TABLET (TRAMADOL HCL) 1-2 tabs q q 8-12 hrs prn pain; Route: ORAL  CITALOPRAM HYDROBROMIDE TABLET (CITALOPRAM HYDROBROMIDE TABS) 1 tab PO daily    PATIENT REPORTED ALLERGIES:  PENICILLIN (PENICILLIN V POTASSIUM) (SevereReaction: No reaction details noted)  ASPIRIN (SevereReaction: No reaction details noted)  PERCOCET (SevereReaction: No reaction details noted)      HISTORY OF PRESENT ILLNESS:    The patient is familiar to myself.  She tried therapy for recurrent Achilles tendonitis and equinus, and had no luck.  She would like to consider surgical options at this point.    PAST MEDICAL HISTORY:    Possible Arthritis    PAST ORTHOPEDIC SURGICAL HISTORY:    Left Achilles Tendon Debridement And Repair; Heel Spur Resection Posterior Heel 2017, Melecio Moore DPM  Shoulder Surgery x4  Right Hand Surgery x5  Right Knee    PAST SURGICAL HISTORY:    Appendectomy  Cholecystectomy  Kidney Stone Surgery  Ablation  Tubal Ligation    FAMILY HISTORY:    Father:  , Heart Failure, Diabetes  Mother:  , Cancer, Blood Clots    SOCIAL HISTORY:   Occupation:  DSP  Marital Status:    Alcohol Use:  No  Tobacco Use:  Former  Secondhand Smoke Exposure:  No  History of HIV:  No  History of Hepatitis:  No    PHYSICAL EXAMINATION:    CONSTITUTIONAL:  Patient is alert and oriented x3, well-appearing and in no apparent distress.  Affect is pleasant and answers questions appropriately.  VASCULAR:  Circulation is intact with palpable pedal pulses and has adequate capillary fill time.  NEUROLOGIC:  Light touch sensation is intact to digits.  INTEGUMENT:  No dermatologic lesions are noted.  Skin with normal texture and turgor.  MUSCULOSKELETAL:  Continued pain to the posterior heel.  Achilles tendon is minimally tendinotic at reinsertion site.   It is intact.  She has significant equinus appreciated with the knee extended and improved with the knee flexed.    ASSESSMENT:    Recurrent Achilles tendonitis with ankle equinus, left.    PLAN:   Discussed condition and treatment with the patient today.  Will plan on a gastroc recession, minimal incision through lateral approach, Achilles spur resection and bursa resection.  She will schedule this at her convenience.  We discussed this in detail including surgery, recovery, risks, potential complications, alternatives and benefits.  She will get a preoperative examination prior to proceeding and follow up accordingly postoperatively.    Dictated by Melecio Moore DPM  cc:  DO Dr. Oscar Robin at [facility]    D:  04/25/2019  T:  05/01/2019    Typed and/or reviewed and corrected by signing  below, and sent to the Physician for final review and signature.    This report was created using voice recording software and computer-generated templates. Although every effort has been made to review for and eliminate errors, some errors may still occur.         Electronically signed by Walter Dobbs -  on 05/01/2019 at 8:48 AM    Electronically signed by Melecio Moore DPM on 05/01/2019 at 11:22 AM

## 2019-05-08 ENCOUNTER — OFFICE VISIT (OUTPATIENT)
Dept: FAMILY MEDICINE | Facility: OTHER | Age: 39
End: 2019-05-08
Attending: NURSE PRACTITIONER
Payer: COMMERCIAL

## 2019-05-08 VITALS
BODY MASS INDEX: 38.32 KG/M2 | TEMPERATURE: 98.6 F | HEART RATE: 82 BPM | HEIGHT: 65 IN | DIASTOLIC BLOOD PRESSURE: 76 MMHG | WEIGHT: 230 LBS | OXYGEN SATURATION: 98 % | SYSTOLIC BLOOD PRESSURE: 114 MMHG | RESPIRATION RATE: 20 BRPM

## 2019-05-08 DIAGNOSIS — Z01.818 PREOP GENERAL PHYSICAL EXAM: Primary | ICD-10-CM

## 2019-05-08 DIAGNOSIS — J45.20 MILD INTERMITTENT ASTHMA WITHOUT COMPLICATION: ICD-10-CM

## 2019-05-08 DIAGNOSIS — R29.818 SUSPECTED SLEEP APNEA: ICD-10-CM

## 2019-05-08 LAB — HGB BLD-MCNC: 13.4 G/DL (ref 11.7–15.7)

## 2019-05-08 PROCEDURE — G0463 HOSPITAL OUTPT CLINIC VISIT: HCPCS

## 2019-05-08 PROCEDURE — 99213 OFFICE O/P EST LOW 20 MIN: CPT | Performed by: NURSE PRACTITIONER

## 2019-05-08 PROCEDURE — 36415 COLL VENOUS BLD VENIPUNCTURE: CPT | Mod: ZL | Performed by: NURSE PRACTITIONER

## 2019-05-08 PROCEDURE — G0463 HOSPITAL OUTPT CLINIC VISIT: HCPCS | Mod: 25

## 2019-05-08 PROCEDURE — 93005 ELECTROCARDIOGRAM TRACING: CPT | Performed by: NURSE PRACTITIONER

## 2019-05-08 PROCEDURE — 93010 ELECTROCARDIOGRAM REPORT: CPT | Performed by: INTERNAL MEDICINE

## 2019-05-08 PROCEDURE — 85018 HEMOGLOBIN: CPT | Mod: ZL | Performed by: NURSE PRACTITIONER

## 2019-05-08 ASSESSMENT — MIFFLIN-ST. JEOR: SCORE: 1709.15

## 2019-05-08 ASSESSMENT — PATIENT HEALTH QUESTIONNAIRE - PHQ9: SUM OF ALL RESPONSES TO PHQ QUESTIONS 1-9: 2

## 2019-05-08 ASSESSMENT — PAIN SCALES - GENERAL: PAINLEVEL: EXTREME PAIN (8)

## 2019-05-08 NOTE — NURSING NOTE
"Chief Complaint   Patient presents with     Pre-Op Exam     Left Heel Dr Reinking         Initial /76   Pulse 82   Temp 98.6  F (37  C) (Temporal)   Resp 20   Ht 1.651 m (5' 5\")   Wt 104.3 kg (230 lb)   SpO2 98%   BMI 38.27 kg/m   Estimated body mass index is 38.27 kg/m  as calculated from the following:    Height as of this encounter: 1.651 m (5' 5\").    Weight as of this encounter: 104.3 kg (230 lb).    Medication Reconciliation: complete      Norma J. Gosselin, LPN  "

## 2019-05-08 NOTE — PROGRESS NOTES
[facility]  [address]  [place] [address]  [phone]  Dept: [phone]    PRE-OP EVALUATION:  Today's date: 2019    Nohelia Crockett (: 1980) presents for pre-operative evaluation assessment as requested by Dr. Moore.  She requires evaluation and anesthesia risk assessment prior to undergoing surgery/procedure for treatment of Left heel  .    Proposed Surgery/ Procedure: Left Heel Surgery  Date of Surgery/ Procedure: 19  Time of Surgery/ Procedure:   Hospital/Surgical Facility: [facility]    Primary Physician: Lyla Hendrix  Type of Anesthesia Anticipated: General    Patient has a Health Care Directive or Living Will:  NO    1. NO - Do you have a history of heart attack, stroke, stent, bypass or surgery on an artery in the head, neck, heart or legs?  2. NO - Do you ever have any pain or discomfort in your chest?  3. NO - Do you have a history of  Heart Failure?  4. NO - Are you troubled by shortness of breath when: walking on the level, up a slight hill or at night?  5. NO - Do you currently have a cold, bronchitis or other respiratory infection?  6. NO - Do you have a cough, shortness of breath or wheezing?  7. YES - DO YOU SOMETIMES GET PAINS IN THE CALVES OF YOUR LEGS WHEN YOU WALK? Currently Left calf  8. NO - Do you or anyone in your family have previous history of blood clots?  9. NO - Do you or does anyone in your family have a serious bleeding problem such as prolonged bleeding following surgeries or cuts?  10. NO - Have you ever had problems with anemia or been told to take iron pills?  11. NO - Have you had any abnormal blood loss such as black, tarry or bloody stools, or abnormal vaginal bleeding?  12. NO - Have you ever had a blood transfusion?  13. NO - Have you or any of your relatives ever had problems with anesthesia?  14. YES - DO YOU HAVE SLEEP APNEA, EXCESSIVE SNORING OR DAYTIME DROWSINESS? Snores loudly  15. NO -  Do you have any prosthetic heart valves?  16. NO - Do you have prosthetic joints?  17. NO - Is there any chance that you may be pregnant?      HPI:     HPI related to upcoming procedure: Had a heel spur surgery 2 years ago, which required cutting through the achilles tendon. Was doing well for about 6 months, then starting getting more painful, swelling. Has tried ice, heat, therapy, tylenol, ibuprofen, mobic, biofreeze, other OTC options. Has been back to see Dr Moore and is ready to move forward with surgical repair.       DEPRESSION - Patient has a long history of Depression of moderate severity requiring medication for control with recent symptoms being stable..Current symptoms of depression include none.                                                                                                                                                                                    .  MILD, INTERMITTENT ASTHMA - very mild, has not needed to use inhaler for over 2 years    MEDICAL HISTORY:     Patient Active Problem List    Diagnosis Date Noted     Suspected sleep apnea 05/08/2019     Priority: Medium     Asthma 02/19/2018     Priority: Medium     Overview:   intermittent       Attention deficit disorder 02/19/2018     Priority: Medium     Knee pain 02/19/2018     Priority: Medium     Overview:   secondary to patellofemoral dysfunction       Learning disability 02/19/2018     Priority: Medium     Migraine headache 02/19/2018     Priority: Medium     Overview:   Occasional severe HA, does not require daily prophylaxis       Obesity (BMI 35.0-39.9 without comorbidity) 12/14/2016     Priority: Medium     Adhesive capsulitis of right shoulder 06/06/2016     Priority: Medium     S/P shoulder surgery 06/06/2016     Priority: Medium     Gastroesophageal reflux disease without esophagitis 05/26/2016     Priority: Medium     Pain of multiple sites 10/19/2015     Priority: Medium     History of arthroscopy of right knee  07/02/2015     Priority: Medium     Plantar fasciitis 11/14/2014     Priority: Medium     H/O excision of mass 09/03/2014     Priority: Medium     Genital herpes 12/07/2009     Priority: Medium     Overview:   Has had only one outbreak        Past Medical History:   Diagnosis Date     Adhesive capsulitis of right shoulder     6/6/2016     Cyst of ovary 2006    Left     Localized swelling, mass, or lump of upper extremity 08/26/2014     Migraine without status migrainosus, not intractable     Occasional severe HA, does not require daily prophylaxis     Other developmental disorders of scholastic skills      Other specified behavioral and emotional disorders with onset usually occurring in childhood and adolescence      Other specified postprocedural states 09/03/2014     Other specified postprocedural states 06/06/2016     Peptic ulcer without hemorrhage or perforation 1999     Personal history of urinary calculi 2004    Right     Plantar fascial fibromatosis 11/14/2014     Uncomplicated asthma     intermittent     Past Surgical History:   Procedure Laterality Date     APPENDECTOMY OPEN  1999     ARTHROSCOPY SHOULDER      10/05,debridement and subacromial decompression of right shoulder.     ARTHROSCOPY SHOULDER  08/2007    acromioplasty distal clavicle excision, and extensive debridement of the bursa of the right shoulder by Dr. Villa.  Also left?     CHOLECYSTECTOMY  1999     CYSTOSCOPY  2004    with retrogrades     DILATION AND CURETTAGE  11/2007    Ablation     LAPAROSCOPIC TUBAL LIGATION  2004     LITHOTRIPSY  12/2004    extracorporeal shockwave     OTHER SURGICAL HISTORY  08/13/2012     OTHER SURGICAL HISTORY Right 09/03/2014    Pathology showed epidermal inclusion cysts     RELEASE CARPAL TUNNEL  2012     Current Outpatient Medications   Medication Sig Dispense Refill     citalopram (CELEXA) 20 MG tablet Take 1 tablet (20 mg) by mouth daily 90 tablet 3     meloxicam (MOBIC) 15 MG tablet Take 1 tablet (15 mg)  "by mouth daily 90 tablet 3     OTC products: None, except as noted above    Allergies   Allergen Reactions     Penicillins Anaphylaxis     Aspirin Hives     Oxycodone-Acetaminophen Other (See Comments)     Can't take too high of a dose/ causes nausea      Latex Allergy: NO    Social History     Tobacco Use     Smoking status: Former Smoker     Packs/day: 0.50     Years: 16.00     Pack years: 8.00     Types: Cigarettes     Last attempt to quit: 01/2016     Years since quitting: 3.3     Smokeless tobacco: Never Used   Substance Use Topics     Alcohol use: Yes     Alcohol/week: 0.0 oz     Comment: Alcoholic Drinks/day: rarely     History   Drug Use No     Comment: Drug use: No       REVIEW OF SYSTEMS:   CONSTITUTIONAL: NEGATIVE for fever, chills, change in weight  INTEGUMENTARY/SKIN: NEGATIVE for worrisome rashes, moles or lesions  EYES: NEGATIVE for vision changes or irritation  ENT/MOUTH: NEGATIVE for ear, mouth and throat problems  RESP: NEGATIVE for significant cough or SOB  CV: POSITIVE for swelling in L heel  GI: NEGATIVE for nausea, abdominal pain, heartburn, or change in bowel habits  : NEGATIVE for frequency, dysuria, or hematuria  MUSCULOSKELETAL:POSITIVE  for L heel pain  NEURO: POSITIVE for paresthesias L foot up to L knee  ENDOCRINE: NEGATIVE for temperature intolerance, skin/hair changes  HEME: NEGATIVE for bleeding problems  PSYCHIATRIC: NEGATIVE for changes in mood or affect    EXAM:   /76   Pulse 82   Temp 98.6  F (37  C) (Temporal)   Resp 20   Ht 1.651 m (5' 5\")   Wt 104.3 kg (230 lb)   SpO2 98%   BMI 38.27 kg/m      GENERAL APPEARANCE: alert, active, no distress, cooperative and obese     EYES: EOMI, PERRL     HENT: ear canals and TM's normal, nose and mouth without ulcers or lesions and poor dentition     NECK: no adenopathy, no asymmetry, masses, or scars and thyroid normal to palpation     RESP: lungs clear to auscultation - no rales, rhonchi or wheezes     CV: regular rates and " rhythm, normal S1 S2, no S3 or S4 and no murmur, click or rub     ABDOMEN:  soft, nontender, no HSM or masses and bowel sounds normal     MS: extremities normal- no gross deformities noted, no evidence of inflammation in joints, FROM in all extremities.     SKIN: no suspicious lesions or rashes     NEURO: Normal strength and tone, sensory exam grossly normal, mentation intact and speech normal     PSYCH: mentation appears normal. and affect normal/bright     LYMPHATICS: No cervical adenopathy    DIAGNOSTICS:   EKG: appears normal, NSR, there are no prior tracings available  Results for orders placed or performed in visit on 05/08/19   Hemoglobin   Result Value Ref Range    Hemoglobin 13.4 11.7 - 15.7 g/dL       Recent Labs   Lab Test 02/20/19  1114 03/21/17  1553 03/21/17  1534 01/19/17  1007 05/20/15  1755  10/27/14  1248   HGB  --   --  14.0 14.5  --    < > 16.5*   PLT  --   --  321  --   --   --  283   NA  --  139  --   --  136   < >  --    POTASSIUM  --  3.8  --   --  3.6   < >  --    CR  --  0.68*  --   --  0.77   < >  --    A1C 5.3  --   --   --   --   --   --     < > = values in this interval not displayed.        IMPRESSION:   Reason for surgery/procedure: L heel pain  Diagnosis/reason for consult: Pre-operative clearance    The proposed surgical procedure is considered INTERMEDIATE risk.    REVISED CARDIAC RISK INDEX  The patient has the following serious cardiovascular risks for perioperative complications such as (MI, PE, VFib and 3  AV Block):  No serious cardiac risks  INTERPRETATION: 1 risks: Class II (low risk - 0.9% complication rate)    The patient has the following additional risks for perioperative complications:      ICD-10-CM    1. Preop general physical exam Z01.818 Hemoglobin     EKG 12-lead, tracing only     Hemoglobin   2. Mild intermittent asthma without complication J45.20    3. Suspected sleep apnea R29.818        RECOMMENDATIONS:       Obstructive Sleep Apnea (or suspected sleep  apnea)  Hospital staff are advised to monitor for sleep related oxygen desaturations due to suspicion of NOMAN      --Patient is to take all scheduled medications on the day of surgery EXCEPT for modifications listed below.  Mobic is to be held until after surgery, will take celexa when she returns home after surgery.    APPROVAL GIVEN to proceed with proposed procedure, without further diagnostic evaluation       Signed Electronically by: Crystal Mulligan NP    Copy of this evaluation report is provided to requesting physician.    [facility] Preop Guidelines    Revised Cardiac Risk Index

## 2019-05-23 ENCOUNTER — OFFICE VISIT (OUTPATIENT)
Dept: ORTHOPEDICS | Facility: OTHER | Age: 39
End: 2019-05-23
Attending: PODIATRIST
Payer: COMMERCIAL

## 2019-05-23 DIAGNOSIS — Z00.00 ROUTINE GENERAL MEDICAL EXAMINATION AT A HEALTH CARE FACILITY: Primary | ICD-10-CM

## 2019-05-23 PROCEDURE — 99024 POSTOP FOLLOW-UP VISIT: CPT | Performed by: PODIATRIST

## 2019-06-27 ENCOUNTER — OFFICE VISIT (OUTPATIENT)
Dept: ORTHOPEDICS | Facility: OTHER | Age: 39
End: 2019-06-27
Attending: PODIATRIST
Payer: COMMERCIAL

## 2019-06-27 DIAGNOSIS — M79.673 HEEL PAIN: ICD-10-CM

## 2019-06-27 DIAGNOSIS — Z98.890 S/P DEBRIDEMENT: ICD-10-CM

## 2019-06-27 DIAGNOSIS — M77.32 HEEL SPUR, LEFT: Primary | ICD-10-CM

## 2019-06-27 DIAGNOSIS — Z00.00 ROUTINE GENERAL MEDICAL EXAMINATION AT A HEALTH CARE FACILITY: Primary | ICD-10-CM

## 2019-06-27 PROCEDURE — 99024 POSTOP FOLLOW-UP VISIT: CPT | Performed by: PODIATRIST

## 2019-06-27 PROCEDURE — G0463 HOSPITAL OUTPT CLINIC VISIT: HCPCS

## 2019-07-02 NOTE — PROGRESS NOTES
CHIEF COMPLAINT: Left Heel    PROBLEMS:  FH DIABETES (ICD-V18.0) (QPV04-L11.3)    PATIENT REPORTED MEDICATIONS:  MELOXICAM TABLET (MELOXICAM TABS)   TRAMADOL HCL 50 MG ORAL TABLET (TRAMADOL HCL) 1-2 tabs q q 8-12 hrs prn pain; Route: ORAL  CITALOPRAM HYDROBROMIDE TABLET (CITALOPRAM HYDROBROMIDE TABS) 1 tab PO daily    PATIENT REPORTED ALLERGIES:  PENICILLIN (PENICILLIN V POTASSIUM) (SevereReaction: No reaction details noted)  ASPIRIN (SevereReaction: No reaction details noted)  PERCOCET (SevereReaction: No reaction details noted)      HISTORY OF PRESENT ILLNESS:    The patient is here six weeks out from gastroc recession, Achilles tendon debridement and posterior heel spur resection.  Doing well.  No acute concerns.    PAST MEDICAL HISTORY:    Possible Arthritis    PAST ORTHOPEDIC SURGICAL HISTORY:    Posterior Heel Spur Resection, Achilles Tendon Debridement and Repair, and Gastroc Recession 2019, Melecio Moore DPM  Left Achilles Tendon Debridement And Repair; Heel Spur Resection Posterior Heel 2017, Melecio Moore DPM  Shoulder Surgery x4  Right Hand Surgery x5  Right Knee    PAST SURGICAL HISTORY:    Appendectomy  Cholecystectomy  Kidney Stone Surgery  Ablation  Tubal Ligation    FAMILY HISTORY:    Father:  , Heart Failure, Diabetes  Mother:  , Cancer, Blood Clots    SOCIAL HISTORY:   Occupation:  DSP  Marital Status:    Alcohol Use:  No  Tobacco Use:  Former  Secondhand Smoke Exposure:  No  History of HIV:  No  History of Hepatitis:  No    PHYSICAL EXAMINATION:    Surgical site well-healed.  Minimal symptoms at this point.  Has good muscle strength and good motion.  CMS is intact.    ASSESSMENT:    Status post gastroc recession, Achilles tendon debridement and posterior heel spur resection.  Doing well.    PLAN:   Discussed progression of postoperative course.  The patient is doing well.  Start therapy for strengthening, range of motion and gait training.  Progress into a  normal shoe as able.  She was restricted to no heavy lifting at work, but otherwise can return without other restrictions.  Follow up with me in four weeks and release from there if doing well.    Dictated by Melecio Moore DPM   cc:  DO Dr. Oscar Robin at [facility]    D:  06/27/2019  T:  07/01/2019    Typed and/or reviewed and corrected by signing  below, and sent to the Physician for final review and signature.    This report was created using voice recording software and computer-generated templates. Although every effort has been made to review for and eliminate errors, some errors may still occur.         Electronically signed by Walter Dobbs -  on 07/01/2019 at 11:16 AM    Electronically signed by Melecio Moore DPM on 07/01/2019 at 11:25 AM

## 2019-07-09 ENCOUNTER — HOSPITAL ENCOUNTER (OUTPATIENT)
Dept: PHYSICAL THERAPY | Facility: OTHER | Age: 39
Setting detail: THERAPIES SERIES
End: 2019-07-09
Attending: PODIATRIST
Payer: COMMERCIAL

## 2019-07-09 DIAGNOSIS — M77.32 HEEL SPUR, LEFT: ICD-10-CM

## 2019-07-09 DIAGNOSIS — M79.673 HEEL PAIN: ICD-10-CM

## 2019-07-09 DIAGNOSIS — Z98.890 S/P DEBRIDEMENT: ICD-10-CM

## 2019-07-09 PROCEDURE — 97161 PT EVAL LOW COMPLEX 20 MIN: CPT | Mod: GP

## 2019-07-09 PROCEDURE — 97110 THERAPEUTIC EXERCISES: CPT | Mod: GP

## 2019-07-12 ENCOUNTER — HOSPITAL ENCOUNTER (OUTPATIENT)
Dept: PHYSICAL THERAPY | Facility: OTHER | Age: 39
Setting detail: THERAPIES SERIES
End: 2019-07-12
Attending: PODIATRIST
Payer: COMMERCIAL

## 2019-07-12 PROCEDURE — 97140 MANUAL THERAPY 1/> REGIONS: CPT | Mod: GP

## 2019-07-12 PROCEDURE — 97110 THERAPEUTIC EXERCISES: CPT | Mod: GP

## 2019-07-12 NOTE — PROGRESS NOTES
"   07/09/19 1600   General Information   Type of Visit Initial OP Ortho PT Evaluation   Start of Care Date 07/09/19   Referring Physician Dr Melecio Moore   Patient/Family Goals Statement To be able to walk w/o pain   Orders Evaluate and Treat   Orders Comment Improve ROM , strength   Date of Order 06/27/19   Certification Required? No   Medical Diagnosis S/P debridement Z98.890, Left heel pain   Surgical/Medical history reviewed Yes   General Information Comments DOS 5/17/2019   Body Part(s)   Body Part(s) Ankle/Foot   Presentation and Etiology   Pertinent history of current problem (include personal factors and/or comorbidities that impact the POC) Patient is a 38 y/o female whom presents to PT  6 weeks S/P left heell spur excision with gastroc debridement.and gastro recession. Patient reports that her heel and achilles \"are feeling great\". The ankle was in a boot for 4 weeks. She is not currently using any type of ankle brace. She is now walking FWB with minimal impairment. She has been usinig ice occasionally as needed. Repports some \"soreness and tenderness\". She has returned to work which makes her very happy.    Impairments A. Pain;C. Swelling;D. Decreased ROM;E. Decreased flexibility;G. Impaired balance;K. Numbness   Functional Limitations perform required work activities;perform desired leisure / sports activities   Symptom Location Left gastroc, left heel. Numbness around the incisions.    How/Where did it occur Other   Onset date of current episode/exacerbation 05/17/19  (Revision of previous heel spur excision surgery. )   Chronicity Chronic   Pain rating (0-10 point scale) Best (/10);Worst (/10)   Best (/10) 0   Worst (/10) 6/10 for short duration   Pain quality C. Aching;D. Burning   Frequency of pain/symptoms C. With activity   Pain/symptoms are: Worse during the day   Pain/symptoms exacerbated by B. Walking   Pain/symptoms eased by C. Rest;E. Changing positions;H. Cold;I. OTC medication(s) "   Progression of symptoms since onset: Improved   Current / Previous Interventions   Diagnostic Tests: Other   Other diagnostic tests None recent   Prior Level of Function   Prior Level of Function-Mobility NML with intermittent episodes of limitation due to heel pain.   Prior Level of Function-ADLs As above   Current Level of Function   Current Community Support Family/friend caregiver   Patient role/employment history A. Employed   Employment Comments Working part time, 25-30 hrs. per week.    Living environment [place]   Fall Risk Screen   Fall screen completed by PT   Have you fallen 2 or more times in the past year? No   Have you fallen and had an injury in the past year? No   Is patient a fall risk? No   Abuse Screen (yes response referral indicated)   Feels Unsafe at Home or Work/School no   Feels Threatened by Someone no   Does Anyone Try to Keep You From Having Contact with Others or Doing Things Outside Your Home? no   Physical Signs of Abuse Present no   Ankle/Foot Objective Findings   Side (if bilateral, select both right and left) Left   Observation No deformity. Incisions are well healed   Integumentary NML   Gait/Locomotion Decreased stance time and step length left   Balance/ Proprioception (Single Leg Stance) Poor left   Foot Position In Standing B Abducted   Ankle/Foot ROM Comment Good AROM considering recent surgery.    Ankle/Foot Strength Comments Good considering recent surgery   Ankle/Foot Special Tests Comments Not indicated   Palpation 2-3/4 tenderness of the postsurgical incisions   Accessory Motion/Joint Mobility Hypomobilie mid and rear foot   Left DF (Knee Ext) AROM -5   Left PF AROM 65   Left Calcanceal Inversion AROM 25 ddeg.  -10 compared to right   Left Calcaneal Eversion AROM 10   Left DF/Inversion Strength 4-   Left DF/Eversion Strength 5   Left PF/Inversion Strength 4-   Left PF/Eversion Strength 5   Left PF Strength 3-   Left Gastroc (in WB) Flexibility Limited with noted  scar tissue restriction   Planned Therapy Interventions   Planned Therapy Interventions gait training;joint mobilization;manual therapy;ROM;strengthening;stretching;balance training   Planned Modality Interventions   Planned Modality Interventions Cryotherapy;Hot packs   Clinical Impression   Criteria for Skilled Therapeutic Interventions Met yes, treatment indicated   PT Diagnosis Gait impairment due to recent left achilles tendon recession with calcaneal spur resection.   Influenced by the following impairments Pain, weakness and immobility   Functional limitations due to impairments Gait impairment, difficulty with stairs   Clinical Presentation Stable/Uncomplicated   Clinical Decision Making (Complexity) Low complexity   Therapy Frequency 2 times/Week   Predicted Duration of Therapy Intervention (days/wks) 8 weeks   Risk & Benefits of therapy have been explained Yes   Patient, Family & other staff in agreement with plan of care Yes   Education Assessment   Preferred Learning Style Listening;Reading;Demonstration;Pictures/video   Barriers to Learning No barriers   ORTHO GOALS   PT Ortho Eval Goals 1;2;3   Ortho Goal 1   Goal Identifier Pain   Goal Description Patient will report she is able to walk as desired at home and work without limit due to pain in excess of 0-2/10 in 8 weeks. She will report an absence of pain at rest.   Target Date 09/09/19   Ortho Goal 2   Goal Identifier Mobility   Goal Description Patient will demo full left ankle AROM equal to the right allowing normal gait on all surfaces, allowing work tasks in  kneeling and squat positions in 8 weeks   Target Date 09/09/19   Ortho Goal 3   Goal Identifier Strength   Goal Description Patient will demo MMT of 5/5 for all left ankle group muscles. This will allow full participation in recereational walking, play with her kids and work tasks without limit due to ankle instability in 8 weeks   Target Date 09/09/19   Total Evaluation Time   PT Eval, Low  Complexity Minutes (92800) 86

## 2019-07-12 NOTE — ADDENDUM NOTE
Encounter addended by: Donte Chisholm, PT on: 7/12/2019 1:51 PM   Actions taken: Sign clinical note, Flowsheet accepted

## 2019-07-17 ENCOUNTER — HOSPITAL ENCOUNTER (OUTPATIENT)
Dept: PHYSICAL THERAPY | Facility: OTHER | Age: 39
Setting detail: THERAPIES SERIES
End: 2019-07-17
Attending: PODIATRIST
Payer: COMMERCIAL

## 2019-07-17 PROCEDURE — 97110 THERAPEUTIC EXERCISES: CPT | Mod: GP

## 2019-07-17 PROCEDURE — 97140 MANUAL THERAPY 1/> REGIONS: CPT | Mod: GP

## 2019-07-19 ENCOUNTER — HOSPITAL ENCOUNTER (OUTPATIENT)
Dept: PHYSICAL THERAPY | Facility: OTHER | Age: 39
Setting detail: THERAPIES SERIES
End: 2019-07-19
Attending: PODIATRIST
Payer: COMMERCIAL

## 2019-07-19 PROCEDURE — 97140 MANUAL THERAPY 1/> REGIONS: CPT | Mod: GP

## 2019-07-19 PROCEDURE — 97110 THERAPEUTIC EXERCISES: CPT | Mod: GP

## 2019-07-23 ENCOUNTER — HOSPITAL ENCOUNTER (OUTPATIENT)
Dept: PHYSICAL THERAPY | Facility: OTHER | Age: 39
Setting detail: THERAPIES SERIES
End: 2019-07-23
Attending: PODIATRIST
Payer: COMMERCIAL

## 2019-07-23 PROCEDURE — 97140 MANUAL THERAPY 1/> REGIONS: CPT | Mod: GP

## 2019-07-23 PROCEDURE — 97110 THERAPEUTIC EXERCISES: CPT | Mod: GP

## 2019-07-26 ENCOUNTER — HOSPITAL ENCOUNTER (OUTPATIENT)
Dept: PHYSICAL THERAPY | Facility: OTHER | Age: 39
Setting detail: THERAPIES SERIES
End: 2019-07-26
Attending: PODIATRIST
Payer: COMMERCIAL

## 2019-07-26 PROCEDURE — 97110 THERAPEUTIC EXERCISES: CPT | Mod: GP

## 2019-07-26 PROCEDURE — 97140 MANUAL THERAPY 1/> REGIONS: CPT | Mod: GP

## 2019-08-01 ENCOUNTER — OFFICE VISIT (OUTPATIENT)
Dept: ORTHOPEDICS | Facility: OTHER | Age: 39
End: 2019-08-01
Attending: PODIATRIST
Payer: COMMERCIAL

## 2019-08-01 ENCOUNTER — HOSPITAL ENCOUNTER (OUTPATIENT)
Dept: PHYSICAL THERAPY | Facility: OTHER | Age: 39
Setting detail: THERAPIES SERIES
End: 2019-08-01
Attending: FAMILY MEDICINE
Payer: COMMERCIAL

## 2019-08-01 DIAGNOSIS — Z00.00 ROUTINE GENERAL MEDICAL EXAMINATION AT A HEALTH CARE FACILITY: Primary | ICD-10-CM

## 2019-08-01 PROCEDURE — 97140 MANUAL THERAPY 1/> REGIONS: CPT | Mod: GP

## 2019-08-01 PROCEDURE — G0463 HOSPITAL OUTPT CLINIC VISIT: HCPCS

## 2019-08-01 PROCEDURE — 97110 THERAPEUTIC EXERCISES: CPT | Mod: GP

## 2019-08-01 PROCEDURE — 99024 POSTOP FOLLOW-UP VISIT: CPT | Performed by: PODIATRIST

## 2019-08-06 NOTE — PROGRESS NOTES
CHIEF COMPLAINT: Left Heel Surgical Recheck    PROBLEMS:  FH DIABETES (ICD-V18.0) (KHY93-C23.3)    PATIENT REPORTED MEDICATIONS:  MELOXICAM TABLET (MELOXICAM TABS)   TRAMADOL HCL 50 MG ORAL TABLET (TRAMADOL HCL) 1-2 tabs q q 8-12 hrs prn pain; Route: ORAL  CITALOPRAM HYDROBROMIDE TABLET (CITALOPRAM HYDROBROMIDE TABS) 1 tab PO daily    PATIENT REPORTED ALLERGIES:  PENICILLIN (PENICILLIN V POTASSIUM) (SevereReaction: No reaction details noted)  ASPIRIN (SevereReaction: No reaction details noted)  PERCOCET (SevereReaction: No reaction details noted)      HISTORY OF PRESENT ILLNESS:    Nohelia comes in today.  She is status post left gastroc recession, Achilles tendon debridement and posterior heel spur resection.  She is approximately 11 weeks out.  She states that she is doing well.  She has no acute concerns.    PAST MEDICAL HISTORY:    Possible Arthritis    PAST ORTHOPEDIC SURGICAL HISTORY:    Posterior Heel Spur Resection, Achilles Tendon Debridement and Repair, and Gastroc Recession 2019, Melecio Moore DPM  Left Achilles Tendon Debridement And Repair; Heel Spur Resection Posterior Heel 2017, Melecio Moore DPM  Shoulder Surgery x4  Right Hand Surgery x5  Right Knee    PAST SURGICAL HISTORY:    Appendectomy  Cholecystectomy  Kidney Stone Surgery  Ablation  Tubal Ligation    FAMILY HISTORY:    Father:  , Heart Failure, Diabetes  Mother:  , Cancer, Blood Clots    SOCIAL HISTORY:   Occupation:  DSP  Marital Status:    Alcohol Use:  No  Tobacco Use:  Former  Secondhand Smoke Exposure:  No  History of HIV:  No  History of Hepatitis:  No    PHYSICAL EXAMINATION:    On exam today the incisions are clean and dry.  There are no signs of infection.  No swelling or any pain on palpation.  She has no pain in the calf.  No numbness or tingling.  She has good muscle strength and good range of motion.    ASSESSMENT:    Status post left gastroc recession, Achilles tendon debridement and  posterior heel spur resection.  Doing well.    PLAN:   Will have her continue working on strengthening with scar massage to loosen that up.  She is to return on a PRN basis.    Dictated by Sekou Bills PA-C  cc:  DO Dr. Oscar Robin at [facility]    D:  08/01/2019  T:  08/06/2019    Typed and/or reviewed and corrected by signing  below, and sent to the Physician for final review and signature.     This report was created using voice recording software and computer-generated templates. Although every effort has been made to review for and eliminate errors, some errors may still occur.

## 2019-09-04 ENCOUNTER — OFFICE VISIT (OUTPATIENT)
Dept: FAMILY MEDICINE | Facility: OTHER | Age: 39
End: 2019-09-04
Attending: PHYSICIAN ASSISTANT
Payer: COMMERCIAL

## 2019-09-04 VITALS
OXYGEN SATURATION: 97 % | HEIGHT: 65 IN | BODY MASS INDEX: 38.67 KG/M2 | DIASTOLIC BLOOD PRESSURE: 80 MMHG | RESPIRATION RATE: 16 BRPM | WEIGHT: 232.1 LBS | HEART RATE: 74 BPM | TEMPERATURE: 97.4 F | SYSTOLIC BLOOD PRESSURE: 120 MMHG

## 2019-09-04 DIAGNOSIS — R59.0 CERVICAL ADENOPATHY: ICD-10-CM

## 2019-09-04 DIAGNOSIS — K02.9 CARIOUS TEETH: Primary | ICD-10-CM

## 2019-09-04 PROCEDURE — 99214 OFFICE O/P EST MOD 30 MIN: CPT | Performed by: PHYSICIAN ASSISTANT

## 2019-09-04 PROCEDURE — G0463 HOSPITAL OUTPT CLINIC VISIT: HCPCS

## 2019-09-04 RX ORDER — CLINDAMYCIN HCL 300 MG
300 CAPSULE ORAL 3 TIMES DAILY
Qty: 21 CAPSULE | Refills: 0 | Status: SHIPPED | OUTPATIENT
Start: 2019-09-04 | End: 2020-02-21

## 2019-09-04 ASSESSMENT — PAIN SCALES - GENERAL: PAINLEVEL: EXTREME PAIN (9)

## 2019-09-04 ASSESSMENT — MIFFLIN-ST. JEOR: SCORE: 1718.68

## 2019-09-04 NOTE — NURSING NOTE
Patient in clinic for swollen neck on left side since yesterday. No injury associated.  Tx with ice, tylenol and ibuprofen.    Nereyda Pena LPN LPN....................  9/4/2019   11:24 AM    Chief Complaint   Patient presents with     Neck Problem     left side jaw       Medication Reconciliation: complete    Nereyda Pena LPN

## 2019-09-04 NOTE — PROGRESS NOTES
HPI:    Nohelia Crockett is a 39 year old female presents to clinic today for evaluation of swelling and tender lymph node on left neck  Onset yesterday, course is worsening  Associated symptoms: swelling, pain under jaw radiates over lower teeth up to left ear. Discomfort 8-9/10  Teeth are non tender, no sensitivity to cold or heat  No URI symptoms  No prior episodes      Past Medical History:   Diagnosis Date     Adhesive capsulitis of right shoulder     6/6/2016     Cyst of ovary 2006    Left     Localized swelling, mass, or lump of upper extremity 08/26/2014     Migraine without status migrainosus, not intractable     Occasional severe HA, does not require daily prophylaxis     Other developmental disorders of scholastic skills      Other specified behavioral and emotional disorders with onset usually occurring in childhood and adolescence      Other specified postprocedural states 09/03/2014     Other specified postprocedural states 06/06/2016     Peptic ulcer without hemorrhage or perforation 1999     Personal history of urinary calculi 2004    Right     Plantar fascial fibromatosis 11/14/2014     Uncomplicated asthma     intermittent       Past Surgical History:   Procedure Laterality Date     APPENDECTOMY OPEN  1999     ARTHROSCOPY SHOULDER      10/05,debridement and subacromial decompression of right shoulder.     ARTHROSCOPY SHOULDER  08/2007    acromioplasty distal clavicle excision, and extensive debridement of the bursa of the right shoulder by Dr. Villa.  Also left?     CHOLECYSTECTOMY  1999     CYSTOSCOPY  2004    with retrogrades     DILATION AND CURETTAGE  11/2007    Ablation     LAPAROSCOPIC TUBAL LIGATION  2004     LITHOTRIPSY  12/2004    extracorporeal shockwave     OTHER SURGICAL HISTORY  08/13/2012     OTHER SURGICAL HISTORY Right 09/03/2014    Pathology showed epidermal inclusion cysts     RELEASE CARPAL TUNNEL  2012         Current Outpatient Medications   Medication Sig Dispense Refill  "    citalopram (CELEXA) 20 MG tablet Take 1 tablet (20 mg) by mouth daily 90 tablet 3     meloxicam (MOBIC) 15 MG tablet Take 1 tablet (15 mg) by mouth daily (Patient not taking: Reported on 9/4/2019) 90 tablet 3       Allergies   Allergen Reactions     Penicillins Anaphylaxis     Aspirin Hives     Oxycodone-Acetaminophen Other (See Comments)     Can't take too high of a dose/ causes nausea       ROS:  General: feels well, no fever  HENT: POSITIVE per HPI  Respiratory: negative  Cardiac: negative  Abdomen: negative  Musculoskeletal: negative  Skin: negative    EXAM:  Vitals:    09/04/19 1128   BP: 120/80   BP Location: Right arm   Patient Position: Sitting   Cuff Size: Adult Large   Pulse: 74   Resp: 16   Temp: 97.4  F (36.3  C)   TempSrc: Tympanic   SpO2: 97%   Weight: 105.3 kg (232 lb 1.6 oz)   Height: 1.651 m (5' 5\")     General appearance: well appearing female, in no acute distress  Head: normocephalic, atraumatic  Ears: TM's with cone of light, no erythema, canals clear bilaterally  Eyes: conjunctivae normal  Orophayrnx: moist mucous membranes, Teeth are carious, gums are erythematous with mild swelling. No definite abscess noted. Mild posterior oral pharynx erythema, no exudates or petechia  Neck: Left cervical adneopathy.  Swelling noted over submandibular gland/left jaw and left neck. TPP same area. Normal ROM neck. No erythema or warmth.   Stetons and Vivi's glands appear normal, no stones or purulent drainage noted  Respiratory: clear to auscultation bilaterally  Cardiac: RRR with no murmurs  Dermatological: no rashes or lesions  Psychological: normal affect, alert and pleasant      ASSESSMENT AND PLAN:    1. Carious teeth    2. Cervical adenopathy      Swelling over left submandibular gland, left jaw and neck  Pain and TTP submandibular jaw and cervical lymph nodes on left  Teeth are carious with redness and swelling in gums.   Discussed possibility of sialolithiasis/sialadnitis. No " warmth/redness/purulent drainage or bad taste in mouth   No erythema or warmth over jaw, neck or face  Will treat for suspected tooth infection  Good hydration  Warm compress, massage, suck on lemon drops (sialolithiasis)  Start clindamycin 300 mg oral tablet, take one tablet 3 times daily for 7 days  Follow up with PCP if symptoms persist or worsen  Patient received verbal and written instruction including review of warning signs    Saima Jackson PA-C on 9/4/2019 at 6:56 PM

## 2019-09-04 NOTE — PATIENT INSTRUCTIONS
Swelling over left submandibular gland and cervical lymph nodes on left  Teeth are carious with redness and swelling in gums. No noted abscess  Stetons and Vivi's glands appear normal, no stones or purulent drainage noted  Good hydration  Warm compress, massage, suck on lemon drops  Will treat for suspected tooth infection  Start clindamycin 300 mg oral tablet, take one tablet 3 times daily for 7 days  Follow up with PCP if symptoms persist or worsen             Patient Education     Salivary Gland Swelling, Uncertain Cause  Salivary glands make saliva in response to food in your mouth. Saliva is mostly water. It also has minerals and proteins that help break down food and keep the mouth and teeth healthy. There are three pairs of salivary glands:    Parotid glands (in front of the ear)    Submandibular glands (below the jaw)    Sublingual glands (below the tongue)  Each gland has a duct (channel) that carries saliva from the gland into the mouth.   Swelling of the salivary glands can sometimes occur. Causes can include:    Viral infection (such as childhood mumps)    Bacterial infections    Sjögren's syndrome    Diabetes    Malnutrition    Sarcoidosis    Blockage of the salivary duct (from stones or tumors)  Certain medicines can affect salivary flow. This can lead to swelling of the gland. Be sure to tell your healthcare provider about all of the medicines you take.  Tests are being done to determine the cause of the swelling. These may include blood tests, X-ray, ultrasound, CT scan, or injection of dye into the duct to look for blockage. Treatment depends on the exact cause of the swelling.  Home care    If the area is painful, you can take over-the-counter medicines, such as acetaminophen or ibuprofen, unless you were prescribed another medicine. Wetting a cloth with warm water and putting it over the affected gland for 10-15 minutes at a time can also help ease pain.    To help prevent blockages and  infections:  ? Drink 6-8 glasses of fluid per day (such as water, tea, and clear soup) to keep well hydrated.  ? If you smoke, ask your healthcare provider for help to quit. Smoking makes salivary gland stones more likely.  ? Maintain good dental hygiene. Brush and floss your teeth daily. See your dentist for regular cleanings.  Follow-up care  Follow up with your healthcare provider or as advised. See your healthcare provider for further exams and testing. If you have been referred to a specialist, make an appointment promptly.  When to seek medical advice  Call your healthcare provider if any of the following occur:    Increasing pain or swelling in the gland    Inability to open mouth or pain when opening mouth    Fever of 100.4 F (38 C) or higher, or as directed by your healthcare provider    Redness over the gland    Pus draining into the mouth    Trouble breathing or swallowing    Any new symptoms  Prevention  Here are steps you can take to help prevent an infection:    Keep good hand washing habits.    Don t have close contact with people who have sore throats, colds, or other upper respiratory infections.    Don t smoke, and stay away from secondhand smoke.    Stay up to date with of your vaccines.  Date Last Reviewed: 11/1/2017 2000-2018 [employer]. [address] [place] [address]. All rights reserved. This information is not intended as a substitute for professional medical care. Always follow your healthcare professional's instructions.        Patient Education     Dental Abscess   A dental abscess is an infection of the tooth socket. It often starts with a crack or cavity in the tooth. A pocket of pus forms between the tooth and the bone. The infection causes pain and swelling of the gum, cheek, or jaw. The pain is often made worse by drinking hot or cold fluids, or biting on hard foods. Pain may be felt in the facial sinus or in the ear. A severe infection can cause  "problems with swallowing and breathing.  Causes    Cavities    Trauma    Previous dental work  Symptoms    Pain    Swelling around the tooth or face and cheek    Redness    Bad breath    Bad taste in the mouth    Fever  You will be started on an antibiotic. But, final treatment requires draining the pus. This can be done by removing the tooth or getting a root canal. An oral surgeon typically removes diseased teeth. An endodontist does a root canal. This involves drilling an opening in the tooth to get to access the canals in the root. Once these are reached, the pus can be drained. Then the canals are cleaned and shaped before filling them with a special material called cesar percha. After the infection has healed, a crown is placed over the tooth.  Home care  The following guidelines will help you care for your abscess at home:    Don't have hot or cold foods and liquids. Your tooth may be sensitive to temperature changes.    If your tooth is chipped or cracked, or if there is a large open cavity, apply oil of cloves directly to the tooth to reduce pain. Oil of cloves is sold over-the-counter in pharmacies. Some pharmacies carry an over-the-counter \"toothache kit.\" This contains oil of cloves and a paste, which can be applied over the exposed tooth to decrease sensitivity.    Apply an ice pack (ice cubes in a plastic bag, wrapped in a towel) over the injured area for 10 to 20 minutes every 1 to 2 hours the first day for pain relief. Continue this 3 to 4 times a day until the pain and swelling goes away. To make an ice pack, put ice cubes in a plastic bag that seals at the top. Wrap the bag in a clean, thin towel or cloth. Never put ice or an ice pack directly on the skin.    You can take acetaminophen or ibuprofen for pain, unless you were given a different pain medicine to use. If you have chronic liver or kidney disease, have ever had a stomach ulcer or gastrointestinal bleeding, or are taking blood-thinning " medicines, talk with your healthcare provider before using these medicines.    An antibiotic will be prescribed. Take it as directed until completed, even if you are feeling better sooner.  Follow-up care  Follow up as advised with an endodontist, or oral surgeon. Even though your pain may improve with the treatment given today, only a dentist, endodontist, or oral surgeon can provide full treatment for this problem.    If a culture was done, you will be told if the treatment needs to be changed. You can call in as directed for the results.    If X-rays were taken, they will be reviewed by a specialist. You will be given the results, especially if they affect treatment.  Call 911  Call 911 if any of these occur:    Trouble breathing or swallowing, or wheezing    Hoarse voice or trouble speaking    Confusion    Extreme drowsiness or trouble awakening    Fainting or loss of consciousness    Rapid heart rate  When to seek medical advice  Call your healthcare provider right away if any of these occur:    Swollen or red face or eyelid    Pain gets worse or spreads to the neck    You have a fever of 100.4 F (38 C) or higher, or as directed by your healthcare provider    Unusual drowsiness, a headache or stiff neck, or weakness    Pus drains from the gum or tooth    You can't open your mouth wide  Date Last Reviewed: 4/1/2018 2000-2018 [employer]. [address] [place] [address], [place] [address]. All rights reserved. This information is not intended as a substitute for professional medical care. Always follow your healthcare professional's instructions.

## 2020-02-21 ENCOUNTER — OFFICE VISIT (OUTPATIENT)
Dept: FAMILY MEDICINE | Facility: OTHER | Age: 40
End: 2020-02-21
Attending: FAMILY MEDICINE
Payer: COMMERCIAL

## 2020-02-21 VITALS
WEIGHT: 235 LBS | RESPIRATION RATE: 20 BRPM | HEART RATE: 98 BPM | SYSTOLIC BLOOD PRESSURE: 118 MMHG | BODY MASS INDEX: 39.11 KG/M2 | OXYGEN SATURATION: 98 % | DIASTOLIC BLOOD PRESSURE: 86 MMHG | TEMPERATURE: 98.4 F

## 2020-02-21 DIAGNOSIS — H81.10 BENIGN PAROXYSMAL POSITIONAL VERTIGO, UNSPECIFIED LATERALITY: Primary | ICD-10-CM

## 2020-02-21 DIAGNOSIS — F41.1 GENERALIZED ANXIETY DISORDER: ICD-10-CM

## 2020-02-21 PROCEDURE — 99214 OFFICE O/P EST MOD 30 MIN: CPT | Performed by: FAMILY MEDICINE

## 2020-02-21 PROCEDURE — G0463 HOSPITAL OUTPT CLINIC VISIT: HCPCS

## 2020-02-21 RX ORDER — CITALOPRAM HYDROBROMIDE 40 MG/1
40 TABLET ORAL DAILY
Qty: 90 TABLET | Refills: 1 | Status: SHIPPED | OUTPATIENT
Start: 2020-02-21 | End: 2020-09-11

## 2020-02-21 ASSESSMENT — ASTHMA QUESTIONNAIRES
QUESTION_4 LAST FOUR WEEKS HOW OFTEN HAVE YOU USED YOUR RESCUE INHALER OR NEBULIZER MEDICATION (SUCH AS ALBUTEROL): NOT AT ALL
ACT_TOTALSCORE: 25
QUESTION_3 LAST FOUR WEEKS HOW OFTEN DID YOUR ASTHMA SYMPTOMS (WHEEZING, COUGHING, SHORTNESS OF BREATH, CHEST TIGHTNESS OR PAIN) WAKE YOU UP AT NIGHT OR EARLIER THAN USUAL IN THE MORNING: NOT AT ALL
QUESTION_1 LAST FOUR WEEKS HOW MUCH OF THE TIME DID YOUR ASTHMA KEEP YOU FROM GETTING AS MUCH DONE AT WORK, SCHOOL OR AT HOME: NONE OF THE TIME
QUESTION_5 LAST FOUR WEEKS HOW WOULD YOU RATE YOUR ASTHMA CONTROL: COMPLETELY CONTROLLED
QUESTION_2 LAST FOUR WEEKS HOW OFTEN HAVE YOU HAD SHORTNESS OF BREATH: NOT AT ALL

## 2020-02-21 ASSESSMENT — ENCOUNTER SYMPTOMS
FATIGUE: 0
SORE THROAT: 0
SINUS PAIN: 0
NUMBNESS: 0
HEADACHES: 1
WEAKNESS: 0
SINUS PRESSURE: 0
COUGH: 0
FACIAL ASYMMETRY: 0
PALPITATIONS: 0
CHILLS: 0
WOUND: 0
DIZZINESS: 1
LIGHT-HEADEDNESS: 0
ACTIVITY CHANGE: 0
PARESTHESIAS: 0

## 2020-02-21 ASSESSMENT — ANXIETY QUESTIONNAIRES
GAD7 TOTAL SCORE: 16
3. WORRYING TOO MUCH ABOUT DIFFERENT THINGS: MORE THAN HALF THE DAYS
2. NOT BEING ABLE TO STOP OR CONTROL WORRYING: NEARLY EVERY DAY
1. FEELING NERVOUS, ANXIOUS, OR ON EDGE: MORE THAN HALF THE DAYS
6. BECOMING EASILY ANNOYED OR IRRITABLE: MORE THAN HALF THE DAYS
IF YOU CHECKED OFF ANY PROBLEMS ON THIS QUESTIONNAIRE, HOW DIFFICULT HAVE THESE PROBLEMS MADE IT FOR YOU TO DO YOUR WORK, TAKE CARE OF THINGS AT HOME, OR GET ALONG WITH OTHER PEOPLE: SOMEWHAT DIFFICULT
7. FEELING AFRAID AS IF SOMETHING AWFUL MIGHT HAPPEN: NEARLY EVERY DAY
5. BEING SO RESTLESS THAT IT IS HARD TO SIT STILL: MORE THAN HALF THE DAYS

## 2020-02-21 ASSESSMENT — PATIENT HEALTH QUESTIONNAIRE - PHQ9
SUM OF ALL RESPONSES TO PHQ QUESTIONS 1-9: 17
5. POOR APPETITE OR OVEREATING: MORE THAN HALF THE DAYS

## 2020-02-21 ASSESSMENT — PAIN SCALES - GENERAL: PAINLEVEL: NO PAIN (0)

## 2020-02-21 NOTE — NURSING NOTE
"Chief Complaint   Patient presents with     Vertigo       Initial /86   Pulse 98   Temp 98.4  F (36.9  C) (Tympanic)   Resp 20   Wt 106.6 kg (235 lb)   LMP 02/07/2020 (Approximate)   SpO2 98%   BMI 39.11 kg/m   Estimated body mass index is 39.11 kg/m  as calculated from the following:    Height as of 9/4/19: 1.651 m (5' 5\").    Weight as of this encounter: 106.6 kg (235 lb).  Medication Reconciliation: complete    Mlia Garcia LPN  "

## 2020-02-21 NOTE — LETTER
My Depression Action Plan  Name: Nohelia Crockett   Date of Birth 1980  Date: 2/21/2020    My doctor: Lyla Hendrix   My clinic: [facility]  [address]  [place] [id number] [phone]          GREEN    ZONE   Good Control    What it looks like:     Things are going generally well. You have normal ups and downs. You may even feel depressed from time to time, but bad moods usually last less than a day.   What you need to do:  1. Continue to care for yourself (see self care plan)  2. Check your depression survival kit and update it as needed  3. Follow your physician s recommendations including any medication.  4. Do not stop taking medication unless you consult with your physician first.           YELLOW         ZONE Getting Worse    What it looks like:     Depression is starting to interfere with your life.     It may be hard to get out of bed; you may be starting to isolate yourself from others.    Symptoms of depression are starting to last most all day and this has happened for several days.     You may have suicidal thoughts but they are not constant.   What you need to do:     1. Call your care team. Your response to treatment will improve if you keep your care team informed of your progress. Yellow periods are signs an adjustment may need to be made.     2. Continue your self-care.  Just get dressed and ready for the day.  Don't give yourself time to talk yourself out of it.    3. Talk to someone in your support network.    4. Open up your Depression Self-Care Plan/Wellness Kit.           RED    ZONE Medical Alert - Get Help    What it looks like:     Depression is seriously interfering with your life.     You may experience these or other symptoms: You can t get out of bed most days, can t work or engage in other necessary activities, you have trouble taking care of basic hygiene, or basic responsibilities, thoughts of suicide or death that will  not go away, self-injurious behavior.     What you need to do:  1. Call your care team and request a same-day appointment. If they are not available (weekends or after hours) call your local crisis line, emergency room or 911.            Depression Self-Care Plan / Wellness Kit    Self-Care for Depression  Here s the deal. Your body and mind are really not as separate as most people think.  What you do and think affects how you feel and how you feel influences what you do and think. This means if you do things that people who feel good do, it will help you feel better.  Sometimes this is all it takes.  There is also a place for medication and therapy depending on how severe your depression is, so be sure to consult with your medical provider and/ or Behavioral Health Consultant if your symptoms are worsening or not improving.     In order to better manage my stress, I will:    Exercise  Get some form of exercise, every day. This will help reduce pain and release endorphins, the  feel good  chemicals in your brain. This is almost as good as taking antidepressants!  This is not the same as joining a gym and then never going! (they count on that by the way ) It can be as simple as just going for a walk or doing some gardening, anything that will get you moving.      Hygiene   Maintain good hygiene (get out of bed in the morning, make your bed, brush your teeth, take a shower, and get dressed like you were going to work, even if you are unemployed).  If your clothes don't fit try to get ones that do.    Diet  Strive to eat foods that are good for me, drink plenty of water, and avoid excessive sugar, caffeine, alcohol, and other mood-altering substances.  Some foods that are helpful in depression are: complex carbohydrates, B vitamins, flaxseed, fish or fish oil, fresh fruits and vegetables.    Psychotherapy  Agree to participate in Individual Therapy (if recommended).    Medication  If prescribed medications, I agree to  take them.  Missing doses can result in serious side effects.  I understand that drinking alcohol, or other illicit drug use, may cause potential side effects.  I will not stop my medication abruptly without first discussing it with my provider.    Staying Connected With Others  Stay in touch with my friends, family members, and my primary care provider/team.    Use your imagination  Be creative.  We all have a creative side; it doesn t matter if it s oil painting, sand castles, or mud pies! This will also kick up the endorphins.    Witness Beauty  (AKA stop and smell the roses) Take a look outside, even in mid-winter. Notice colors, textures. Watch the squirrels and birds.     Service to others  Be of service to others.  There is always someone else in need.  By helping others we can  get out of ourselves  and remember the really important things.  This also provides opportunities for practicing all the other parts of the program.    Humor  Laugh and be silly!  Adjust your TV habits for less news and crime-drama and more comedy.    Control your stress  Try breathing deep, massage therapy, biofeedback, and meditation. Find time to relax each day.     Crisis Text Line  http://www.crisistextline.org    The Crisis Text Line serves anyone, in any type of crisis, providing access to free, 24/7 support and information via the medium people already use and trust:    Here's how it works:  1.  Text 973-534 from anywhere in the [place], anytime, about any type of crisis.  2.  A live, trained Crisis Counselor receives the text and responds quickly.  3.  The volunteer Crisis Counselor will help you move from a 'hot moment to a cool moment'.    My support system    Clinic Contact:  Phone number:    Contact 1:  Phone number:    Contact 2:  Phone number:    [place]ah[place]/:  Phone number:    Therapist:  Phone number:    Local crisis center:    Phone number:    Other community support:  Phone number:

## 2020-02-21 NOTE — LETTER
My Asthma Action Plan    Name: Nohelia Crockett   YOB: 1980  Date: 2/21/2020   My doctor: Lyla Hendrix, DO   My clinic: [facility]        My Rescue Medicine:   Albuterol inhaler (Proair/Ventolin/Proventil HFA)  2-4 puffs EVERY 4 HOURS as needed. Use a spacer if recommended by your provider.   My Asthma Severity:   Intermittent / Exercise Induced  Know your asthma triggers: Patient is unaware of triggers             GREEN ZONE   Good Control    I feel good    No cough or wheeze    Can work, sleep and play without asthma symptoms       Take your asthma control medicine every day.     1. If exercise triggers your asthma, take your rescue medication    15 minutes before exercise or sports, and    During exercise if you have asthma symptoms  2. Spacer to use with inhaler: If you have a spacer, make sure to use it with your inhaler             YELLOW ZONE Getting Worse  I have ANY of these:    I do not feel good    Cough or wheeze    Chest feels tight    Wake up at night   1. Keep taking your Green Zone medications  2. Start taking your rescue medicine:    every 20 minutes for up to 1 hour. Then every 4 hours for 24-48 hours.  3. If you stay in the Yellow Zone for more than 12-24 hours, contact your doctor.  4. If you do not return to the Green Zone in 12-24 hours or you get worse, start taking your oral steroid medicine if prescribed by your provider.           RED ZONE Medical Alert - Get Help  I have ANY of these:    I feel awful    Medicine is not helping    Breathing getting harder    Trouble walking or talking    Nose opens wide to breathe       1. Take your rescue medicine NOW  2. If your provider has prescribed an oral steroid medicine, start taking it NOW  3. Call your doctor NOW  4. If you are still in the Red Zone after 20 minutes and you have not reached your doctor:    Take your rescue medicine again and    Call 911 or go to the emergency room right away    See your  regular doctor within 2 weeks of an Emergency Room or Urgent Care visit for follow-up treatment.          Annual Reminders:  Meet with Asthma Educator,  Flu Shot in the Fall, consider Pneumonia Vaccination for patients with asthma (aged 19 and older).    Pharmacy: [facility]  [place]    Electronically signed by Lyla Hendrix, DO   Date: 02/21/20                    Asthma Triggers  How To Control Things That Make Your Asthma Worse    Triggers are things that make your asthma worse.  Look at the list below to help you find your triggers and   what you can do about them. You can help prevent asthma flare-ups by staying away from your triggers.      Trigger                                                          What you can do   Cigarette Smoke  Tobacco smoke can make asthma worse. Do not allow smoking in your home, car or around you.  Be sure no one smokes at a child s day care or school.  If you smoke, ask your health care provider for ways to help you quit.  Ask family members to quit too.  Ask your health care provider for a referral to Quit Plan to help you quit smoking, or call 5-353-393PLAN.     Colds, Flu, Bronchitis  These are common triggers of asthma. Wash your hands often.  Don t touch your eyes, nose or mouth.  Get a flu shot every year.     Dust Mites  These are tiny bugs that live in cloth or carpet. They are too small to see. Wash sheets and blankets in hot water every week.   Encase pillows and mattress in dust mite proof covers.  Avoid having carpet if you can. If you have carpet, vacuum weekly.   Use a dust mask and HEPA vacuum.   Pollen and Outdoor Mold  Some people are allergic to trees, grass, or weed pollen, or molds. Try to keep your windows closed.  Limit time out doors when pollen count is high.   Ask you health care provider about taking medicine during allergy season.     Animal Dander  Some people are allergic to skin flakes, urine or  saliva from pets with fur or feathers. Keep pets with fur or feathers out of your home.    If you can t keep the pet outdoors, then keep the pet out of your bedroom.  Keep the bedroom door closed.  Keep pets off cloth furniture and away from stuffed toys.     Mice, Rats, and Cockroaches  Some people are allergic to the waste from these pests.   Cover food and garbage.  Clean up spills and food crumbs.  Store grease in the refrigerator.   Keep food out of the bedroom.   Indoor Mold  This can be a trigger if your home has high moisture. Fix leaking faucets, pipes, or other sources of water.   Clean moldy surfaces.  Dehumidify basement if it is damp and smelly.   Smoke, Strong Odors, and Sprays  These can reduce air quality. Stay away from strong odors and sprays, such as perfume, powder, hair spray, paints, smoke incense, paint, cleaning products, candles and new carpet.   Exercise or Sports  Some people with asthma have this trigger. Be active!  Ask your doctor about taking medicine before sports or exercise to prevent symptoms.    Warm up for 5-10 minutes before and after sports or exercise.     Other Triggers of Asthma  Cold air:  Cover your nose and mouth with a scarf.  Sometimes laughing or crying can be a trigger.  Some medicines and food can trigger asthma.

## 2020-02-21 NOTE — PROGRESS NOTES
"Nursing Notes:   Mila Garcia LPN  2/21/2020 12:52 PM  Sign at exiting of workspace  Chief Complaint   Patient presents with     Vertigo     Initial /86   Pulse 98   Temp 98.4  F (36.9  C) (Tympanic)   Resp 20   Wt 106.6 kg (235 lb)   LMP 02/07/2020 (Approximate)   SpO2 98%   BMI 39.11 kg/m    Estimated body mass index is 39.11 kg/m  as calculated from the following:    Height as of 9/4/19: 1.651 m (5' 5\").    Weight as of this encounter: 106.6 kg (235 lb).  Medication Reconciliation: complete  Mila Garcia LPN    SUBJECTIVE:   Nohelia Crockett is a 40 year old female who presents to clinic today for the following health issues:    HPI  Nohelia is here for need of a PT referral due to vertigo.  She has had BPPV in the past and symptoms seem to have returned.  Remembers having this ~10 years ago.  \"woozy\"/spinning sensation; feels unsteady on her feet.  Worse within the last 1.5-2 weeks.  Worse with moving head from side to side/bending over/changing position on couch or in bed.  Hasn't taken any medications for it besides some Tylenol every couple days for a headache.  Had a cold ~1 month ago; but no persisting symptoms.    Also noted elevated score on PHQ today, with concerning thoughts of self-harm/suicide.  She states her anxiety is not in control.  Difficulty sleeping - more in the day and restless all night.  Appetite is up and down.    PHQ 2/20/2019 5/8/2019 2/21/2020   PHQ-9 Total Score 13 2 17   Q9: Thoughts of better off dead/self-harm past 2 weeks Not at all Not at all Several days   F/U: Thoughts of suicide or self-harm - - -   F/U: Safety concerns - - -     DARA-7 SCORE 3/21/2017 2/20/2019 2/21/2020   Total Score 15 8 16       Patient Active Problem List    Diagnosis Date Noted     Suspected sleep apnea 05/08/2019     Priority: Medium     Asthma 02/19/2018     Priority: Medium     Overview:   intermittent       Attention deficit disorder 02/19/2018     Priority: Medium     Knee pain " 02/19/2018     Priority: Medium     Overview:   secondary to patellofemoral dysfunction       Learning disability 02/19/2018     Priority: Medium     Migraine headache 02/19/2018     Priority: Medium     Overview:   Occasional severe HA, does not require daily prophylaxis       Obesity (BMI 35.0-39.9 without comorbidity) 12/14/2016     Priority: Medium     Adhesive capsulitis of right shoulder 06/06/2016     Priority: Medium     S/P shoulder surgery 06/06/2016     Priority: Medium     Gastroesophageal reflux disease without esophagitis 05/26/2016     Priority: Medium     Pain of multiple sites 10/19/2015     Priority: Medium     History of arthroscopy of right knee 07/02/2015     Priority: Medium     Plantar fasciitis 11/14/2014     Priority: Medium     H/O excision of mass 09/03/2014     Priority: Medium     Genital herpes 12/07/2009     Priority: Medium     Overview:   Has had only one outbreak       Past Medical History:   Diagnosis Date     Adhesive capsulitis of right shoulder     6/6/2016     Cyst of ovary 2006    Left     Localized swelling, mass, or lump of upper extremity 08/26/2014     Migraine without status migrainosus, not intractable     Occasional severe HA, does not require daily prophylaxis     Other developmental disorders of scholastic skills      Other specified behavioral and emotional disorders with onset usually occurring in childhood and adolescence      Other specified postprocedural states 09/03/2014     Other specified postprocedural states 06/06/2016     Peptic ulcer without hemorrhage or perforation 1999     Personal history of urinary calculi 2004    Right     Plantar fascial fibromatosis 11/14/2014     Uncomplicated asthma     intermittent      Past Surgical History:   Procedure Laterality Date     APPENDECTOMY OPEN  1999     ARTHROSCOPY SHOULDER      10/05,debridement and subacromial decompression of right shoulder.     ARTHROSCOPY SHOULDER  08/2007    acromioplasty distal clavicle  excision, and extensive debridement of the bursa of the right shoulder by Dr. Villa.  Also left?     CHOLECYSTECTOMY       CYSTOSCOPY      with retrogrades     DILATION AND CURETTAGE  2007    Ablation     LAPAROSCOPIC TUBAL LIGATION       LITHOTRIPSY  2004    extracorporeal shockwave     OTHER SURGICAL HISTORY  2012     OTHER SURGICAL HISTORY Right 2014    Pathology showed epidermal inclusion cysts     RELEASE CARPAL TUNNEL       Family History   Problem Relation Age of Onset     Cancer Mother 46        Cancer,Lung cancer     Blood Disease Mother         Blood Disease     Asthma Father         Asthma     Diabetes Father         Diabetes,2     Hypertension Father         Hypertension     Heart Disease Father         Heart Disease,CHF     Other - See Comments Father          at age 65 from complications of a motor vehicle accident.     Family History Negative Sister         Good Health     Family History Negative Sister         Good Health     Breast Cancer No family hx of         Cancer-breast     Colon Cancer No family hx of         Cancer-colon     Prostate Cancer No family hx of         Cancer-prostate     Ovarian Cancer No family hx of         Cancer-ovarian     Anesthesia Reaction No family hx of         Anesthesia Problem     Social History     Tobacco Use     Smoking status: Former Smoker     Packs/day: 0.50     Years: 16.00     Pack years: 8.00     Types: Cigarettes     Last attempt to quit: 2016     Years since quittin.1     Smokeless tobacco: Never Used   Substance Use Topics     Alcohol use: Yes     Alcohol/week: 0.0 standard drinks     Comment: Alcoholic Drinks/day: rarely     Social History     Social History Narrative    Patient is  (2018).  Lives with her son (Herman); daughter (Jluis) lives on her own.  Dog ( animal) - Moises, ~40# Brindle, mixed breed.    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     Current Outpatient Medications   Medication Sig Dispense Refill     citalopram (CELEXA) 20 MG tablet Take 1 tablet (20 mg) by mouth daily 90 tablet 3     Allergies   Allergen Reactions     Penicillins Anaphylaxis     Aspirin Hives     Oxycodone-Acetaminophen Other (See Comments)     Can't take too high of a dose/ causes nausea     Review of Systems   Constitutional: Negative for activity change, chills and fatigue.   HENT: Negative for congestion, sinus pressure, sinus pain, sneezing and sore throat.    Respiratory: Negative for cough.    Cardiovascular: Negative for chest pain and palpitations.   Skin: Negative for rash and wound.   Neurological: Positive for dizziness and headaches. Negative for syncope, facial asymmetry, weakness, light-headedness, numbness and paresthesias.        OBJECTIVE:     /86   Pulse 98   Temp 98.4  F (36.9  C) (Tympanic)   Resp 20   Wt 106.6 kg (235 lb)   LMP 02/07/2020 (Approximate)   SpO2 98%   BMI 39.11 kg/m    Body mass index is 39.11 kg/m .  Physical Exam  Vitals signs and nursing note reviewed.   Constitutional:       Appearance: Normal appearance. She is obese. She is not ill-appearing.   HENT:      Head: Normocephalic and atraumatic.      Right Ear: Tympanic membrane and ear canal normal.      Left Ear: Tympanic membrane and ear canal normal.   Eyes:      General: No visual field deficit.     Extraocular Movements:      Right eye: Normal extraocular motion and no nystagmus.      Left eye: Normal extraocular motion and no nystagmus.      Conjunctiva/sclera:      Right eye: Right conjunctiva is not injected.      Left eye: Left conjunctiva is not injected.   Neck:      Musculoskeletal: Normal range of motion.   Cardiovascular:      Rate and Rhythm: Normal rate and regular rhythm.   Pulmonary:      Effort: Pulmonary effort is normal.      Breath sounds: Normal breath sounds.    Skin:     General: Skin is warm.      Capillary Refill: Capillary refill takes less than 2 seconds.   Neurological:      Mental Status: She is alert.   Psychiatric:         Mood and Affect: Mood normal.         Behavior: Behavior normal.     Diagnostic Test Results:  None    ASSESSMENT/PLAN:     1. Generalized anxiety disorder  Worsening; increase Celexa to 40mg daily.  Reviewed that she has CRT phone number available and she does.  Aware with any worsening of self-harm/suicide thoughts she would present to ER.  Discussed that she would not act on anything because of her children.  - citalopram 40 MG PO tablet; Take 1 tablet (40 mg) by mouth daily  Dispense: 90 tablet; Refill: 1    2. Benign paroxysmal positional vertigo, unspecified laterality  No other concerning neurological symptoms on exam today; therefore will refer to PT for BPPV treatment.  - PHYSICAL THERAPY REFERRAL; Future    Follow up in 2-3 months; sooner prn.    Lyla Hendrix, [facility]

## 2020-02-22 ASSESSMENT — ANXIETY QUESTIONNAIRES: GAD7 TOTAL SCORE: 16

## 2020-02-22 ASSESSMENT — ASTHMA QUESTIONNAIRES: ACT_TOTALSCORE: 25

## 2020-02-24 ENCOUNTER — HOSPITAL ENCOUNTER (OUTPATIENT)
Dept: PHYSICAL THERAPY | Facility: OTHER | Age: 40
Setting detail: THERAPIES SERIES
End: 2020-02-24
Attending: FAMILY MEDICINE
Payer: COMMERCIAL

## 2020-02-24 DIAGNOSIS — H81.10 BENIGN PAROXYSMAL POSITIONAL VERTIGO, UNSPECIFIED LATERALITY: ICD-10-CM

## 2020-02-24 PROCEDURE — 97162 PT EVAL MOD COMPLEX 30 MIN: CPT | Mod: GP | Performed by: PHYSICAL THERAPIST

## 2020-02-24 PROCEDURE — 97110 THERAPEUTIC EXERCISES: CPT | Mod: GP | Performed by: PHYSICAL THERAPIST

## 2020-02-24 NOTE — PROGRESS NOTES
[facility]        OUTPATIENT PHYSICAL THERAPY FUNCTIONAL EVALUATION  PLAN OF TREATMENT FOR OUTPATIENT REHABILITATION  (COMPLETE FOR INITIAL CLAIMS ONLY)  Patient's Last Name, First Name, M.I.  YOB: 1980  Nohelia Crockett     Provider's Name   [facility]   Medical Record No.  [id number]     Start of Care Date:  02/24/20   Onset Date:  02/10/20   Type:     _X__PT   ____OT  ____SLP Medical Diagnosis:  BPPV     PT Diagnosis:  impaired mobility Visits from SOC:  1                              __________________________________________________________________________________  Plan of Treatment/Functional Goals:  neuromuscular re-education, visual perception(vestibular therapy)           GOALS  head motion  Patient will be able to turn and nod head quickly with no limitations due to dizziness for increased safety with shopping and driving.  04/20/20    forward bending  Patient will be able to forward bend and return to stand with no limitation due to lightheadedness or dizziness for improved safety with home and work tasks.  04/20/20    walking  Patient will score 20/24 or better on DGI indicating low fall risk.  04/20/20               Therapy Frequency:  2 times/Week   Predicted Duration of Therapy Intervention:  8 weeks    Katerine Connelly, PT                                    I CERTIFY THE NEED FOR THESE SERVICES FURNISHED UNDER        THIS PLAN OF TREATMENT AND WHILE UNDER MY CARE     (Physician co-signature of this document indicates review and certification of the therapy plan).                Certification Date From:  02/24/20   Certification Date To:  04/20/20    Referring Provider:  Dr. Hendrix    Initial Assessment  See Epic Evaluation- Start of Care Date: 02/24/20

## 2020-02-24 NOTE — PROGRESS NOTES
"   02/24/20 1300   Quick Adds   Quick Adds Certification;Vestibular Eval   Type of Visit Initial OP PT Evaluation   General Information   Start of Care Date 02/24/20   Referring Physician Dr. Hendrix   Orders Evaluate and Treat as Indicated   Order Date 02/21/20   Medical Diagnosis BPPV   Onset of illness/injury or Date of Surgery 02/10/20   Precautions/Limitations no known precautions/limitations   Surgical/Medical history reviewed Yes   Pertinent history of current vestibular problem (include personal factors and/or comorbidities that impact the POC)  ADHD;Anxiety;Depression;Learning disability;Migraines;Prior concussion(s)  (Skull fracture at 1yo)   Pertinent history of current problem (include personal factors and/or comorbidities that impact the POC) Patient states she started having vertigo symptoms about 2 weeks ago.  Had similar symptoms about 15 years ago.  Was seen by [facility] and this seemed to help.  Currently, symptoms come on with \"anything and everything\".  Will start when just sitting still.  Lasts \"all the time\", \"almost non-stop\".  When asked if she is spinning or the room, patient responds \"both\".  Denies any hearing or vision changes.  Feels pretty steady when walking, but has had to grab the wall occasionally.  Able to drive, but feels somewhat lightheaded.  Has not had a cold for around 1 month.  Denies dizziness with laying down or rolling R/L.    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  Able to complete household and self-care tasks with increased caution and time.   Patient/Family Goals Statement Decrease dizziness.   Fall Risk Screen   Fall screen completed by PT   Have you fallen 2 or more times in the past year? No   Have you " fallen and had an injury in the past year? No   Is patient a fall risk? No   Balance Special Tests Modified CTSIB Conditions   Condition 1, seconds 30 Seconds   Condition 2, seconds 30 Seconds   Condition 4, seconds 30 Seconds   Condition 5, seconds 30 Seconds   Modified CTSIB Comments Increased sway on condition 5   Oculomotor Exam   Smooth Pursuit Normal   Saccades Normal   VOR Abnormal   VOR Comments Feeling of continued motion after stopping; Difficulty maintaining focus on target.   VOR Cancellation Abnormal   VOR Cancellation Comments Occasional saccadic intrusions   Rapid Head Thrust Normal   Convergence Testing Abnormal   Convergence Testing Comments 22 cm   Infrared Goggle Exam or Frenzel Lense Exam   Vestibular Suppressant in Last 24 Hours? No   Exam completed with Frenzel Lenses   Spontaneous Nystagmus Negative   Gaze Evoked Nystagmus Negative   [place] (right) Negative   [place]pike (Left) Negative   HSCC Supine Roll Test (Right) Negative   HSCC Supine Roll Test (Left) Negative   Planned Therapy Interventions   Planned Therapy Interventions neuromuscular re-education;visual perception  (vestibular therapy)   Clinical Impression   Criteria for Skilled Therapeutic Interventions Met yes, treatment indicated   PT Diagnosis impaired mobility   Influenced by the following impairments dizziness, lightheadedness, balance impairment   Functional limitations due to impairments transfers, walking, forward bending, head motion   Clinical Presentation Evolving/Changing   Clinical Presentation Rationale clinical observation   Clinical Decision Making (Complexity) Moderate complexity   Therapy Frequency 2 times/Week   Predicted Duration of Therapy Intervention (days/wks) 8 weeks   Risk & Benefits of therapy have been explained Yes   Patient, Family & other staff in agreement with plan of care Yes   Clinical Impression Comments Negative positional testing today.  Symptoms consistent with vestibular neuritis.      GOALS   PT Eval Goals 1;2;3   Goal 1   Goal Identifier head motion   Goal Description Patient will be able to turn and nod head quickly with no limitations due to dizziness for increased safety with shopping and driving.   Target Date 04/20/20   Goal 2   Goal Identifier forward bending   Goal Description Patient will be able to forward bend and return to stand with no limitation due to lightheadedness or dizziness for improved safety with home and work tasks.   Target Date 04/20/20   Goal 3   Goal Identifier walking   Goal Description Patient will score 20/24 or better on DGI indicating low fall risk.   Target Date 04/20/20   Total Evaluation Time   PT Eval, Moderate Complexity Minutes (22934) 30   Therapy Certification   Certification date from 02/24/20   Certification date to 04/20/20   Medical Diagnosis BPPV

## 2020-03-06 ENCOUNTER — HOSPITAL ENCOUNTER (OUTPATIENT)
Dept: PHYSICAL THERAPY | Facility: OTHER | Age: 40
Setting detail: THERAPIES SERIES
End: 2020-03-06
Attending: FAMILY MEDICINE
Payer: COMMERCIAL

## 2020-03-06 PROCEDURE — 97112 NEUROMUSCULAR REEDUCATION: CPT | Mod: GP | Performed by: PHYSICAL THERAPIST

## 2020-03-23 ENCOUNTER — TELEPHONE (OUTPATIENT)
Dept: PHYSICAL THERAPY | Facility: OTHER | Age: 40
End: 2020-03-23

## 2020-05-27 ASSESSMENT — ENCOUNTER SYMPTOMS
PALPITATIONS: 0
WOUND: 0
WHEEZING: 0
SHORTNESS OF BREATH: 0
CHEST TIGHTNESS: 0
COLOR CHANGE: 0
SINUS PRESSURE: 0
COUGH: 0

## 2020-05-27 NOTE — PROGRESS NOTES
"Nursing Notes:   Mila Garcia LPN  5/28/2020  9:45 AM  Signed  Chief Complaint   Patient presents with     Musculoskeletal Problem     left ankle     Initial /64   Pulse 81   Temp 97.1  F (36.2  C) (Tympanic)   Resp 12   Wt 108.9 kg (240 lb)   SpO2 98%   BMI 39.94 kg/m   Estimated body mass index is 39.94 kg/m  as calculated from the following:    Height as of 9/4/19: 1.651 m (5' 5\").    Weight as of this encounter: 108.9 kg (240 lb).  Medication Reconciliation: complete  Mila Garcia LPN    SUBJECTIVE:   Nohelia Crockett is a 40 year old female who presents to clinic today for the following health issues:    HPI  Nohelia is here for L ankle swelling x 1-1.5 weeks.  Same ankle she has had surgery on x2; but pain is more related to the outer aspect of the ankle.  No known injury/fall/trauma to the area.  + painful, throbbing type.  Otherwise sharp/stabbing with movement.  Prolonged walking can exacerbate symptoms.  Has tried ice/heat, ibuprofen/tylenol, rest, epsom salt soaks - without significant relief.    She also has concerns about missing a period.  Not always regular anyway.  Wondering if menopause is playing a role.  LMP was \"early April\" and unable to tell me a specific date.  She has been sexually active, again does not tell me when the last time of unprotected intercourse occurred.  She states her last partner was the same within the last two months.  Denies any discharge, odor.  Concerns for pregnancy (has taken home test and was negative); or STD.    Patient Active Problem List    Diagnosis Date Noted     Suspected sleep apnea 05/08/2019     Priority: Medium     Asthma 02/19/2018     Priority: Medium     Overview:   intermittent       Attention deficit disorder 02/19/2018     Priority: Medium     Knee pain 02/19/2018     Priority: Medium     Overview:   secondary to patellofemoral dysfunction       Learning disability 02/19/2018     Priority: Medium     Migraine headache 02/19/2018 "     Priority: Medium     Overview:   Occasional severe HA, does not require daily prophylaxis       Obesity (BMI 35.0-39.9 without comorbidity) 12/14/2016     Priority: Medium     Adhesive capsulitis of right shoulder 06/06/2016     Priority: Medium     S/P shoulder surgery 06/06/2016     Priority: Medium     Gastroesophageal reflux disease without esophagitis 05/26/2016     Priority: Medium     Pain of multiple sites 10/19/2015     Priority: Medium     History of arthroscopy of right knee 07/02/2015     Priority: Medium     Plantar fasciitis 11/14/2014     Priority: Medium     H/O excision of mass 09/03/2014     Priority: Medium     Genital herpes 12/07/2009     Priority: Medium     Overview:   Has had only one outbreak       Past Medical History:   Diagnosis Date     Adhesive capsulitis of right shoulder     6/6/2016     Cyst of ovary 2006    Left     Localized swelling, mass, or lump of upper extremity 08/26/2014     Migraine without status migrainosus, not intractable     Occasional severe HA, does not require daily prophylaxis     Other developmental disorders of scholastic skills      Other specified behavioral and emotional disorders with onset usually occurring in childhood and adolescence      Other specified postprocedural states 09/03/2014     Other specified postprocedural states 06/06/2016     Peptic ulcer without hemorrhage or perforation 1999     Personal history of urinary calculi 2004    Right     Plantar fascial fibromatosis 11/14/2014     Uncomplicated asthma     intermittent      Past Surgical History:   Procedure Laterality Date     APPENDECTOMY OPEN  1999     ARTHROSCOPY SHOULDER      10/05,debridement and subacromial decompression of right shoulder.     ARTHROSCOPY SHOULDER  08/2007    acromioplasty distal clavicle excision, and extensive debridement of the bursa of the right shoulder by Dr. Villa.  Also left?     CHOLECYSTECTOMY  1999     CYSTOSCOPY  2004    with retrogrades     DILATION  AND CURETTAGE  2007    Ablation     LAPAROSCOPIC TUBAL LIGATION       LITHOTRIPSY  2004    extracorporeal shockwave     OTHER SURGICAL HISTORY  2012     OTHER SURGICAL HISTORY Right 2014    Pathology showed epidermal inclusion cysts     RELEASE CARPAL TUNNEL       Family History   Problem Relation Age of Onset     Cancer Mother 46        Cancer,Lung cancer     Blood Disease Mother         Blood Disease     Asthma Father         Asthma     Diabetes Father         Diabetes,2     Hypertension Father         Hypertension     Heart Disease Father         Heart Disease,CHF     Other - See Comments Father          at age 65 from complications of a motor vehicle accident.     Family History Negative Sister         Good Health     Family History Negative Sister         Good Health     Breast Cancer No family hx of         Cancer-breast     Colon Cancer No family hx of         Cancer-colon     Prostate Cancer No family hx of         Cancer-prostate     Ovarian Cancer No family hx of         Cancer-ovarian     Anesthesia Reaction No family hx of         Anesthesia Problem     Social History     Tobacco Use     Smoking status: Former Smoker     Packs/day: 0.50     Years: 16.00     Pack years: 8.00     Types: Cigarettes     Last attempt to quit: 2016     Years since quittin.4     Smokeless tobacco: Never Used   Substance Use Topics     Alcohol use: Yes     Alcohol/week: 0.0 standard drinks     Comment: Alcoholic Drinks/day: rarely     Social History     Social History Narrative    Patient is  (2018).  Lives with her son (Herman); daughter (Jluis) lives on her own.  Dog ( animal) - Moises, ~40# Brindle, mixed breed.    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     Current Outpatient Medications   Medication Sig Dispense Refill     predniSONE (DELTASONE) 10 MG  tablet Take 4 tablets PO daily x 3 day; 3 tabs x 3 days; 2 tabs x 3 days; 1 tab x 3 days; then stop. 30 tablet 0     citalopram 40 MG PO tablet Take 1 tablet (40 mg) by mouth daily 90 tablet 1     Allergies   Allergen Reactions     Penicillins Anaphylaxis     Aspirin Hives     Oxycodone-Acetaminophen Other (See Comments)     Can't take too high of a dose/ causes nausea     Review of Systems   HENT: Negative for congestion, hearing loss and sinus pressure.    Respiratory: Negative for cough, chest tightness, shortness of breath and wheezing.    Cardiovascular: Negative for palpitations.   Skin: Negative for color change, rash and wound.      OBJECTIVE:     /64   Pulse 81   Temp 97.1  F (36.2  C) (Tympanic)   Resp 12   Wt 108.9 kg (240 lb)   SpO2 98%   BMI 39.94 kg/m    Body mass index is 39.94 kg/m .  Physical Exam  Vitals signs and nursing note reviewed.   Constitutional:       Appearance: Normal appearance.   HENT:      Head: Normocephalic and atraumatic.   Cardiovascular:      Rate and Rhythm: Normal rate and regular rhythm.      Pulses: Normal pulses.      Heart sounds: Normal heart sounds.   Pulmonary:      Effort: Pulmonary effort is normal.      Breath sounds: Normal breath sounds.   Skin:     Capillary Refill: Capillary refill takes less than 2 seconds.   Neurological:      General: No focal deficit present.      Mental Status: She is alert.   Psychiatric:         Mood and Affect: Mood normal.         Behavior: Behavior normal.     Diagnostic Test Results:  Results for orders placed or performed in visit on 05/28/20   FSH     Status: None   Result Value Ref Range    FSH 5.7 IU/L   Luteinizing Hormone     Status: None   Result Value Ref Range    Lutropin 1.7 IU/L   Estradiol     Status: None   Result Value Ref Range    Estradiol 57 pg/mL   TSH     Status: None   Result Value Ref Range    Thyrotropin 1.55 0.34 - 5.60 IU/mL   HCG quantitative pregnancy     Status: None   Result Value Ref Range    HCG  Quantitative Serum <1 IU/L   Prolactin     Status: None   Result Value Ref Range    Prolactin 7.85 ng/mL       ASSESSMENT/PLAN:     1. Pain and swelling of left ankle  With likely chronic inflammation reaction due to previous surgeries.  Plan to treat with prednisone burst/taper.   If not responding; or symptoms return quickly, consider Orthopedic referral due to prior surgeries to same joint/region.  RICE therapy.  Do not wear brace; unless prolonged walking/activity occurring.   Small amount of Norco to help with nighttime pain in the interim due to no NSAID use with steroid.  - predniSONE (DELTASONE) 10 MG tablet; Take 4 tablets PO daily x 3 day; 3 tabs x 3 days; 2 tabs x 3 days; 1 tab x 3 days; then stop.  Dispense: 30 tablet; Refill: 0    2. Missed period  New - lab evaluation is reassuring.  Uncertain cause: stress, perimenopause, other.  Continue to monitor.  If persists; plan for pelvic exam, STD/infection testing and possibly pelvic US.  Has had TL.  - FSH  - Luteinizing Hormone  - Estradiol  - TSH  - HCG quantitative pregnancy  - Prolactin    Follow up pending ankle response to above therapy.    Lyla Hendrix, [facility]

## 2020-05-28 ENCOUNTER — OFFICE VISIT (OUTPATIENT)
Dept: FAMILY MEDICINE | Facility: OTHER | Age: 40
End: 2020-05-28
Attending: FAMILY MEDICINE
Payer: COMMERCIAL

## 2020-05-28 VITALS
OXYGEN SATURATION: 98 % | DIASTOLIC BLOOD PRESSURE: 64 MMHG | TEMPERATURE: 97.1 F | BODY MASS INDEX: 39.94 KG/M2 | SYSTOLIC BLOOD PRESSURE: 110 MMHG | HEART RATE: 81 BPM | WEIGHT: 240 LBS | RESPIRATION RATE: 12 BRPM

## 2020-05-28 DIAGNOSIS — M25.472 PAIN AND SWELLING OF LEFT ANKLE: Primary | ICD-10-CM

## 2020-05-28 DIAGNOSIS — N92.6 MISSED PERIOD: ICD-10-CM

## 2020-05-28 DIAGNOSIS — M25.572 PAIN AND SWELLING OF LEFT ANKLE: Primary | ICD-10-CM

## 2020-05-28 LAB
B-HCG SERPL-ACNC: <1 IU/L
ESTRADIOL SERPL-MCNC: 57 PG/ML
FSH SERPL-ACNC: 5.7 IU/L
LH SERPL-ACNC: 1.7 IU/L
PROLACTIN SERPL-MCNC: 7.85 NG/ML
TSH SERPL DL<=0.05 MIU/L-ACNC: 1.55 IU/ML (ref 0.34–5.6)

## 2020-05-28 PROCEDURE — 84443 ASSAY THYROID STIM HORMONE: CPT | Mod: ZL | Performed by: FAMILY MEDICINE

## 2020-05-28 PROCEDURE — 99214 OFFICE O/P EST MOD 30 MIN: CPT | Performed by: FAMILY MEDICINE

## 2020-05-28 PROCEDURE — 82670 ASSAY OF TOTAL ESTRADIOL: CPT | Mod: ZL | Performed by: FAMILY MEDICINE

## 2020-05-28 PROCEDURE — 84146 ASSAY OF PROLACTIN: CPT | Mod: ZL | Performed by: FAMILY MEDICINE

## 2020-05-28 PROCEDURE — 83001 ASSAY OF GONADOTROPIN (FSH): CPT | Mod: ZL | Performed by: FAMILY MEDICINE

## 2020-05-28 PROCEDURE — G0463 HOSPITAL OUTPT CLINIC VISIT: HCPCS

## 2020-05-28 PROCEDURE — 36415 COLL VENOUS BLD VENIPUNCTURE: CPT | Mod: ZL | Performed by: FAMILY MEDICINE

## 2020-05-28 PROCEDURE — 84702 CHORIONIC GONADOTROPIN TEST: CPT | Mod: ZL | Performed by: FAMILY MEDICINE

## 2020-05-28 PROCEDURE — 83002 ASSAY OF GONADOTROPIN (LH): CPT | Mod: ZL | Performed by: FAMILY MEDICINE

## 2020-05-28 RX ORDER — PREDNISONE 10 MG/1
TABLET ORAL
Qty: 30 TABLET | Refills: 0 | Status: SHIPPED | OUTPATIENT
Start: 2020-05-28 | End: 2020-08-14

## 2020-05-28 ASSESSMENT — PATIENT HEALTH QUESTIONNAIRE - PHQ9: SUM OF ALL RESPONSES TO PHQ QUESTIONS 1-9: 4

## 2020-05-28 ASSESSMENT — PAIN SCALES - GENERAL: PAINLEVEL: MODERATE PAIN (5)

## 2020-05-28 NOTE — NURSING NOTE
"Chief Complaint   Patient presents with     Musculoskeletal Problem     left ankle       Initial /64   Pulse 81   Temp 97.1  F (36.2  C) (Tympanic)   Resp 12   Wt 108.9 kg (240 lb)   SpO2 98%   BMI 39.94 kg/m   Estimated body mass index is 39.94 kg/m  as calculated from the following:    Height as of 9/4/19: 1.651 m (5' 5\").    Weight as of this encounter: 108.9 kg (240 lb).  Medication Reconciliation: complete    Mila Garcia LPN  "

## 2020-06-24 ENCOUNTER — TELEPHONE (OUTPATIENT)
Dept: FAMILY MEDICINE | Facility: OTHER | Age: 40
End: 2020-06-24

## 2020-06-24 DIAGNOSIS — R42 VERTIGO: Primary | ICD-10-CM

## 2020-06-24 NOTE — TELEPHONE ENCOUNTER
"Spoke with patient and she has concerns of her vertigo that has come back \"worse than it ever has been.\" Patient states she had to call into work today because the vertigo is making her nauseas and is unable to keep her food down, but is able to keep tea down. Patient said she has thrown up twice now. She is willing to do a virtual/phone visit if that's what Dr. Hendrix wants.  Irineo Palma LPN on 6/24/2020 at 3:07 PM    "

## 2020-06-25 RX ORDER — MECLIZINE HYDROCHLORIDE 25 MG/1
25 TABLET ORAL 3 TIMES DAILY PRN
Qty: 60 TABLET | Refills: 1 | Status: SHIPPED | OUTPATIENT
Start: 2020-06-25 | End: 2020-11-06

## 2020-06-25 NOTE — TELEPHONE ENCOUNTER
She has PT that she attends for this; if she is not able to get in, will trial meclizine.  Sent to her pharmacy.    Otherwise I would recommend that she is seen.  Anyone with openings?  Lyla Hendrix, DO

## 2020-08-14 ENCOUNTER — OFFICE VISIT (OUTPATIENT)
Dept: FAMILY MEDICINE | Facility: OTHER | Age: 40
End: 2020-08-14
Attending: FAMILY MEDICINE
Payer: COMMERCIAL

## 2020-08-14 VITALS
SYSTOLIC BLOOD PRESSURE: 106 MMHG | DIASTOLIC BLOOD PRESSURE: 76 MMHG | WEIGHT: 243.13 LBS | TEMPERATURE: 97.8 F | RESPIRATION RATE: 16 BRPM | HEART RATE: 88 BPM | OXYGEN SATURATION: 97 % | BODY MASS INDEX: 40.46 KG/M2

## 2020-08-14 DIAGNOSIS — S66.819A STRAIN OF EXTENSOR POLLICIS LONGUS TENDON: ICD-10-CM

## 2020-08-14 DIAGNOSIS — R42 VERTIGO: Primary | ICD-10-CM

## 2020-08-14 PROCEDURE — 99214 OFFICE O/P EST MOD 30 MIN: CPT | Performed by: FAMILY MEDICINE

## 2020-08-14 PROCEDURE — G0463 HOSPITAL OUTPT CLINIC VISIT: HCPCS

## 2020-08-14 ASSESSMENT — PAIN SCALES - GENERAL: PAINLEVEL: MODERATE PAIN (5)

## 2020-08-14 NOTE — PATIENT INSTRUCTIONS
Patient Education     Understanding De Quervain Tenosynovitis    De Quervain tenosynovitis is a condition that can cause wrist and thumb pain. Tendons connect muscles in your wrist and forearm to the bones in your thumb. The tendons have a protective cover (sheath). The sheath s lining makes a fluid that lets the tendons slide easily when you straighten your wrist and thumb. If any of these tendons are irritated or injured, they can become swollen and inflamed. This is called de Quervain tenosynovitis.  How to say it  Barby [id number]   What causes de Quervain tenosynovitis?  This condition is most often caused from overuse. For example, making the same wrist motions over and over can irritate the tendons. This includes doing things like unscrewing jar lids or grasping a tool. Activities such as typing, playing racquet sports, knitting, and texting can also lead to the condition.  Symptoms of de Quervain tenosynovitis  You may have pain, soreness, redness, and swelling along the side of your wrist and the base of your thumb. You may feel pain when you pinch or grasp things, turn or touch your wrist, or make a fist. Your thumb may catch or make a crackling sound when you move it.  Treatment for de Quervain tenosynovitis  Treatments may include:    Resting the wrist and thumb. This involves limiting movements that make your symptoms worse. You also may need to avoid certain hobbies, sports, and types of work for a time.    Cold packs. These help reduce pain and swelling.    Prescription or over-the-counter pain medicines. These help relieve pain and swelling.    Splint or brace. This helps keep the thumb and wrist from moving and gives time for your tendons to heal.    Exercises or physical therapy. These help stretch, strengthen, and improve the range of motion in your wrist and thumb.    Shots of medicine into the area around the tendon. These may help relieve symptoms for a time.    Surgery. You  may need surgery if other treatments don t relieve symptoms. During surgery, the surgeon releases the sheath that surrounds the tendons so the tendons can move more easily.  Possible complications of de Quervain tenosynovitis  Without proper care and treatment, healing may take longer than normal. Also, symptoms may continue or get worse. Over time, the problem may become long-term (chronic). This can make it hard to use your wrist and thumb for normal activities.  When to call your healthcare provider  Call your healthcare provider right away if you have any of these:    Fever of 100.4 F (38 C) or higher, or as directed    Symptoms that don t get better with treatment, or get worse    Pain, numbness, or coldness in the hand    New symptoms   Date Last Reviewed: 3/10/2016    9541-3641 [employer]. [address] [place] [address], [place] [address]. All rights reserved. This information is not intended as a substitute for professional medical care. Always follow your healthcare professional's instructions.

## 2020-08-14 NOTE — PROGRESS NOTES
SUBJECTIVE:   Nohelia Crockett is a 40 year old female who presents to clinic today for the following health issues:    HPI  Vertigo:  Does not always feel them coming on.  Lightheaded and it hits her. Feels like the room is spinning.  Occasionally feels like she is going to lose her balance.  Between 3-5x/day.  Rarely 1x/day.  Sitting, standing, walking, laying.  No hearing changes; no vision changes.  Last eye exam: last year.  Uses cheaters, but no Rx glasses.  1-2 minutes.    Right hand.  Kicked thumb by resident.  ~1 month ago.  Still painful; not improving.  Continues to work and use her hand.  Has tried ice, ibuprofen, brace (thumb spica kind) without relief.        Patient Active Problem List    Diagnosis Date Noted     Suspected sleep apnea 05/08/2019     Priority: Medium     Asthma 02/19/2018     Priority: Medium     Overview:   intermittent       Attention deficit disorder 02/19/2018     Priority: Medium     Knee pain 02/19/2018     Priority: Medium     Overview:   secondary to patellofemoral dysfunction       Learning disability 02/19/2018     Priority: Medium     Migraine headache 02/19/2018     Priority: Medium     Overview:   Occasional severe HA, does not require daily prophylaxis       Obesity (BMI 35.0-39.9 without comorbidity) 12/14/2016     Priority: Medium     Adhesive capsulitis of right shoulder 06/06/2016     Priority: Medium     S/P shoulder surgery 06/06/2016     Priority: Medium     Gastroesophageal reflux disease without esophagitis 05/26/2016     Priority: Medium     Pain of multiple sites 10/19/2015     Priority: Medium     History of arthroscopy of right knee 07/02/2015     Priority: Medium     Plantar fasciitis 11/14/2014     Priority: Medium     H/O excision of mass 09/03/2014     Priority: Medium     Genital herpes 12/07/2009     Priority: Medium     Overview:   Has had only one outbreak       Past Medical History:   Diagnosis Date     Adhesive capsulitis of right shoulder      2016     Cyst of ovary 2006    Left     Localized swelling, mass, or lump of upper extremity 2014     Migraine without status migrainosus, not intractable     Occasional severe HA, does not require daily prophylaxis     Other developmental disorders of scholastic skills      Other specified behavioral and emotional disorders with onset usually occurring in childhood and adolescence      Other specified postprocedural states 2014     Other specified postprocedural states 2016     Peptic ulcer without hemorrhage or perforation      Personal history of urinary calculi     Right     Plantar fascial fibromatosis 2014     Uncomplicated asthma     intermittent      Past Surgical History:   Procedure Laterality Date     APPENDECTOMY OPEN       ARTHROSCOPY SHOULDER      10/05,debridement and subacromial decompression of right shoulder.     ARTHROSCOPY SHOULDER  2007    acromioplasty distal clavicle excision, and extensive debridement of the bursa of the right shoulder by Dr. Villa.  Also left?     CHOLECYSTECTOMY       CYSTOSCOPY      with retrogrades     DILATION AND CURETTAGE  2007    Ablation     LAPAROSCOPIC TUBAL LIGATION  2004     LITHOTRIPSY  2004    extracorporeal shockwave     OTHER SURGICAL HISTORY  2012     OTHER SURGICAL HISTORY Right 2014    Pathology showed epidermal inclusion cysts     RELEASE CARPAL TUNNEL  2012     Family History   Problem Relation Age of Onset     Cancer Mother 46        Cancer,Lung cancer     Blood Disease Mother         Blood Disease     Asthma Father         Asthma     Diabetes Father         Diabetes,2     Hypertension Father         Hypertension     Heart Disease Father         Heart Disease,CHF     Other - See Comments Father          at age 65 from complications of a motor vehicle accident.     Family History Negative Sister         Good Health     Family History Negative Sister         Good Health     Breast  Cancer No family hx of         Cancer-breast     Colon Cancer No family hx of         Cancer-colon     Prostate Cancer No family hx of         Cancer-prostate     Ovarian Cancer No family hx of         Cancer-ovarian     Anesthesia Reaction No family hx of         Anesthesia Problem     Social History     Tobacco Use     Smoking status: Former Smoker     Packs/day: 0.50     Years: 16.00     Pack years: 8.00     Types: Cigarettes     Last attempt to quit: 2016     Years since quittin.6     Smokeless tobacco: Never Used   Substance Use Topics     Alcohol use: Yes     Alcohol/week: 0.0 standard drinks     Comment: Alcoholic Drinks/day: rarely     Social History     Social History Narrative    Patient is  (2018).  Lives with her son (Herman); daughter (Jluis) lives on her own.  Dog ( animal) - Moises, ~40# Brindle, mixed breed.    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     Current Outpatient Medications   Medication Sig Dispense Refill     citalopram 40 MG PO tablet Take 1 tablet (40 mg) by mouth daily 90 tablet 1     meclizine (ANTIVERT) 25 MG tablet Take 1 tablet (25 mg) by mouth 3 times daily as needed for dizziness 60 tablet 1     Allergies   Allergen Reactions     Penicillins Anaphylaxis     Aspirin Hives     Oxycodone-Acetaminophen Other (See Comments)     Can't take too high of a dose/ causes nausea     Review of Systems   Constitutional: Negative for appetite change, chills, fatigue and fever.   Respiratory: Negative for cough, chest tightness and shortness of breath.    Cardiovascular: Negative for chest pain.   Gastrointestinal: Negative for abdominal pain, constipation and diarrhea.   Musculoskeletal: Positive for joint swelling (R thumb).   Skin: Negative for rash.   Neurological: Positive for dizziness and weakness. Negative for facial asymmetry,  light-headedness, numbness and headaches.   Hematological: Negative for adenopathy. Does not bruise/bleed easily.        OBJECTIVE:     /76   Pulse 88   Temp 97.8  F (36.6  C) (Tympanic)   Resp 16   Wt 110.3 kg (243 lb 2 oz)   SpO2 97%   BMI 40.46 kg/m    Body mass index is 40.46 kg/m .  Physical Exam  Vitals signs and nursing note reviewed.   Constitutional:       Appearance: Normal appearance. She is obese.   HENT:      Head: Normocephalic and atraumatic.   Cardiovascular:      Rate and Rhythm: Normal rate and regular rhythm.      Pulses: Normal pulses.      Heart sounds: Normal heart sounds.   Pulmonary:      Effort: Pulmonary effort is normal.   Skin:     General: Skin is warm.      Capillary Refill: Capillary refill takes less than 2 seconds.   Neurological:      General: No focal deficit present.      Mental Status: She is alert.      Cranial Nerves: No cranial nerve deficit.      Sensory: No sensory deficit.      Motor: No weakness.      Coordination: Coordination normal.      Gait: Gait normal.      Deep Tendon Reflexes: Reflexes normal.   Psychiatric:         Mood and Affect: Mood normal.         Behavior: Behavior normal.     Diagnostic Test Results:  No results found for any visits on 08/14/20.    ASSESSMENT/PLAN:     1. Vertigo  Not improving with conservative measures.  Will plan to proceed with MRI.  Consideration for referral if any findings (ENT vs Neuro).  - MR Brain w/o & w Contrast; Future    2. Strain of extensor pollicis longus tendon  RICE therapy; note provided for limited use x 2 weeks.  Rx for voltaren gel to be used up to QID.  If not improving, may need imaging vs steroid injection to 1st MCP of R hand.  - diclofenac (VOLTAREN) 1 % topical gel; Place 2 g onto the skin 4 times daily  Dispense: 200 g; Refill: 1    Follow up prn.    Lyla Hendrix DO  [facility]

## 2020-08-14 NOTE — LETTER
August 14, 2020      Nohelia Crockett  [address] [address]        To Whom It May Concern,     Nohelia was seen in the clinic today and evaluated for right hand pain; please allow her to wear a brace to right hand/wrist at work for the next 2-4 weeks and limit lifting/pushing/pulling to 3# or less with right hand.    Feel free to contact me at the above information with any questions or concerns.      Sincerely,          Lyla Hendrix, DO  Family Practice

## 2020-08-15 ASSESSMENT — ASTHMA QUESTIONNAIRES: ACT_TOTALSCORE: 25

## 2020-08-16 ASSESSMENT — ENCOUNTER SYMPTOMS
FACIAL ASYMMETRY: 0
JOINT SWELLING: 1
ADENOPATHY: 0
CHILLS: 0
FATIGUE: 0
BRUISES/BLEEDS EASILY: 0
ABDOMINAL PAIN: 0
NUMBNESS: 0
APPETITE CHANGE: 0
FEVER: 0
WEAKNESS: 1
DIARRHEA: 0
DIZZINESS: 1
CHEST TIGHTNESS: 0
CONSTIPATION: 0
SHORTNESS OF BREATH: 0
LIGHT-HEADEDNESS: 0
COUGH: 0
HEADACHES: 0

## 2020-08-24 ENCOUNTER — HOSPITAL ENCOUNTER (OUTPATIENT)
Dept: MRI IMAGING | Facility: OTHER | Age: 40
Discharge: HOME | End: 2020-08-24
Attending: FAMILY MEDICINE | Admitting: FAMILY MEDICINE
Payer: COMMERCIAL

## 2020-08-24 DIAGNOSIS — R42 VERTIGO: ICD-10-CM

## 2020-08-24 PROCEDURE — A9575 INJ GADOTERATE MEGLUMI 0.1ML: HCPCS | Performed by: FAMILY MEDICINE

## 2020-08-24 PROCEDURE — 25500064 ZZH RX 255 OP 636: Performed by: FAMILY MEDICINE

## 2020-08-24 PROCEDURE — 70543 MRI ORBT/FAC/NCK W/O &W/DYE: CPT

## 2020-08-24 RX ADMIN — GADOTERATE MEGLUMINE 20 ML: 376.9 INJECTION INTRAVENOUS at 08:52

## 2020-08-25 ENCOUNTER — TELEPHONE (OUTPATIENT)
Dept: FAMILY MEDICINE | Facility: OTHER | Age: 40
End: 2020-08-25

## 2020-08-25 DIAGNOSIS — R20.2 LEFT HAND PARESTHESIA: ICD-10-CM

## 2020-08-25 DIAGNOSIS — R42 VERTIGO: ICD-10-CM

## 2020-08-25 DIAGNOSIS — R90.82 WHITE MATTER ABNORMALITY ON MRI OF BRAIN: Primary | ICD-10-CM

## 2020-08-25 NOTE — TELEPHONE ENCOUNTER
Called to relate MRI findings: white matter changes/foci.  Has had recurrent vertigo, and paresthesias of L hand and outer aspect of L thigh - therefore recommending referral to Neurology to discuss further.  Patient understanding, and will await call from Lizbeth Rodriguez - Neurology clinic for appointment.      Lyla Hendrix,  on 8/25/2020 at 10:39 AM

## 2020-09-10 ENCOUNTER — TELEPHONE (OUTPATIENT)
Dept: FAMILY MEDICINE | Facility: OTHER | Age: 40
End: 2020-09-10

## 2020-09-10 DIAGNOSIS — F41.1 GENERALIZED ANXIETY DISORDER: ICD-10-CM

## 2020-09-10 DIAGNOSIS — R42 VERTIGO: Primary | ICD-10-CM

## 2020-09-10 DIAGNOSIS — R20.2 LEFT HAND PARESTHESIA: ICD-10-CM

## 2020-09-10 DIAGNOSIS — R90.82 WHITE MATTER ABNORMALITY ON MRI OF BRAIN: ICD-10-CM

## 2020-09-10 NOTE — TELEPHONE ENCOUNTER
Klaudia form [facility] Neurology with Dr. Holguin- reviewed the referral and wants an MRI prior of the cervical spine with and without contrast.  and re do referral once is completed.    [facility] Kluadia- with [phone] call with any questions, and please contact them when MRI is scheduled.     Stated patient is not aware of the need for the MRI prior to being seen for Neurology.     Nohelia Simms on 9/10/2020 at 11:20 AM

## 2020-09-11 RX ORDER — CITALOPRAM HYDROBROMIDE 40 MG/1
TABLET ORAL
Qty: 90 TABLET | Refills: 1 | Status: SHIPPED | OUTPATIENT
Start: 2020-09-11 | End: 2021-03-03

## 2020-09-11 NOTE — TELEPHONE ENCOUNTER
"[facility] sent Rx request for the following:   citalopram (CELEXA) 40 MG tablet   Sig:Take 1 tablet by mouth once daily    Last Prescription Date:   02/21/2020  Last Fill Qty/Refills:         90, R-1    Last Office Visit:              08/14/2020 (Simeon)   Future Office visit:           None noted   SSRIs Protocol Passed -        Passed - Recent (12 mo) or future (30 days) visit within the authorizing provider's specialty     Patient has had an office visit with the authorizing provider or a provider within the authorizing providers department within the previous 12 mos or has a future within next 30 days. See \"Patient Info\" tab in inbasket, or \"Choose Columns\" in Meds & Orders section of the refill encounter.              Passed - Medication is active on med list        Passed - Patient is age 18 or older        Passed - No active pregnancy on record        Passed - No positive pregnancy test in last 12 months           Will fill to reach 02/2021 annual review with PCP due date.  Prescription approved per [facility] Refill Protocol.  Christa Keller RN ....................  9/11/2020   2:11 PM      "

## 2020-09-15 NOTE — TELEPHONE ENCOUNTER
She also needs a letter stating that she has MS, for her work.  She will take the letter to make copies for her work.   Marybeth Alexandre LPN ..........9/15/2020 8:44 AM

## 2020-09-18 ENCOUNTER — HOSPITAL ENCOUNTER (OUTPATIENT)
Dept: MRI IMAGING | Facility: OTHER | Age: 40
Discharge: HOME | End: 2020-09-18
Attending: FAMILY MEDICINE | Admitting: FAMILY MEDICINE
Payer: COMMERCIAL

## 2020-09-18 DIAGNOSIS — R20.2 LEFT HAND PARESTHESIA: ICD-10-CM

## 2020-09-18 DIAGNOSIS — R90.82 WHITE MATTER ABNORMALITY ON MRI OF BRAIN: ICD-10-CM

## 2020-09-18 DIAGNOSIS — M99.61 OSSEOUS AND SUBLUXATION STENOSIS OF INTERVERTEBRAL FORAMINA OF CERVICAL REGION: ICD-10-CM

## 2020-09-18 DIAGNOSIS — R42 VERTIGO: ICD-10-CM

## 2020-09-18 DIAGNOSIS — R90.82 WHITE MATTER ABNORMALITY ON MRI OF BRAIN: Primary | ICD-10-CM

## 2020-09-18 PROCEDURE — A9575 INJ GADOTERATE MEGLUMI 0.1ML: HCPCS | Performed by: FAMILY MEDICINE

## 2020-09-18 PROCEDURE — 72156 MRI NECK SPINE W/O & W/DYE: CPT

## 2020-09-18 PROCEDURE — 25500064 ZZH RX 255 OP 636: Performed by: FAMILY MEDICINE

## 2020-09-18 RX ORDER — GADOTERATE MEGLUMINE 376.9 MG/ML
20 INJECTION INTRAVENOUS ONCE
Status: COMPLETED | OUTPATIENT
Start: 2020-09-18 | End: 2020-09-18

## 2020-09-18 RX ADMIN — GADOTERATE MEGLUMINE 20 ML: 376.9 INJECTION INTRAVENOUS at 09:49

## 2020-09-18 NOTE — PROGRESS NOTES
Called and discussed MRI C-spine results with Nohelia.  Symptoms of L hand paresthesia accounted for foraminal stenosis of mod-severe amount.  PT referral will be placed for this.  Aware of possible steroid injection or referral to NSG may be needed if no improvement OR worsening.    Neurology referral re-done, which was requested after results of MRI C-spine were back.     Lyla Hendrix, DO on 9/18/2020 at 12:19 PM

## 2020-10-08 ENCOUNTER — HOSPITAL ENCOUNTER (OUTPATIENT)
Dept: PHYSICAL THERAPY | Facility: OTHER | Age: 40
Setting detail: THERAPIES SERIES
End: 2020-10-08
Attending: FAMILY MEDICINE
Payer: COMMERCIAL

## 2020-10-08 DIAGNOSIS — R20.2 LEFT HAND PARESTHESIA: ICD-10-CM

## 2020-10-08 DIAGNOSIS — M99.61 OSSEOUS AND SUBLUXATION STENOSIS OF INTERVERTEBRAL FORAMINA OF CERVICAL REGION: ICD-10-CM

## 2020-10-08 PROCEDURE — 97161 PT EVAL LOW COMPLEX 20 MIN: CPT | Mod: GP

## 2020-10-08 PROCEDURE — 97140 MANUAL THERAPY 1/> REGIONS: CPT | Mod: GP

## 2020-10-08 PROCEDURE — 97110 THERAPEUTIC EXERCISES: CPT | Mod: GP

## 2020-10-08 NOTE — PROGRESS NOTES
[facility]          OUTPATIENT PHYSICAL THERAPY ORTHOPEDIC EVALUATION  PLAN OF TREATMENT FOR OUTPATIENT REHABILITATION  (COMPLETE FOR INITIAL CLAIMS ONLY)  Patient's Last Name, First Name, M.I.  YOB: 1980  Nohelia Crockett    Provider s Name:  [facility]   Medical Record No.  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  Target Date: 11/19/20    Goal Identifier: ADLs  Goal Description: Pt will be able to walk her dog on a leash without increasing parasthesias and pain for 15 minutes  Target Date: 11/19/20    Goal Identifier: Neurodynamic testing  Goal Description: Pt will demonstrate decreased neurodynamic irritability evidenced by improved tension testing with PT  Target Date: 11/05/20       Therapy Frequency:  2 times/Week  Predicted Duration of Therapy Intervention:  8 weeks    Gary Gamino PT                 I CERTIFY THE NEED FOR THESE SERVICES FURNISHED UNDER        THIS PLAN OF TREATMENT AND WHILE UNDER MY CARE     (Physician co-signature of this document indicates review and certification of the therapy plan).                       Certification Date From:  10/08/20   Certification Date To:  12/03/20    Referring Provider:  Dr. Arita  Simeon, DO    Initial Assessment        See Epic Evaluation Start of Care Date: 10/08/20

## 2020-10-08 NOTE — PROGRESS NOTES
10/08/20 0700   General Information   Type of Visit Initial OP Ortho PT Evaluation   Start of Care Date 10/08/20   Referring Physician Dr. Lyla Hendrix, DO   Orders Evaluate and Treat   Orders Comment neck - foraminal stenosis @ C4-C5 causing radiculopathy/paresthesias of L hand   Date of Order 09/18/20   Certification Required? 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 She does not have any neck or shoulder pain but does have issues below the elbow. Pt notes that her pains can be random and hasnt notice a significant pattern for increasing the pain and numbess. Her biggest concern is her ability to  certain things such as her dogs leash and holding onto the steering wheel while driving.    Body Part(s)   Body Part(s) Cervical Spine   Presentation and Etiology   Impairments A. Pain;C. Swelling;D. Decreased ROM;E. Decreased flexibility;F. Decreased strength and endurance;K. Numbness;L. Tingling   Functional Limitations perform activities of daily living;perform desired leisure / sports activities;perform required work activities   Symptom Location distal L UE below elbow to hand.   How/Where did it occur From insidious onset   Onset date of current episode/exacerbation   (Pt unsure of DOI)   Chronicity New   Pain rating (0-10 point scale) Best (/10);Worst (/10)   Best (/10) 0   Worst (/10) 2   Pain quality C. Aching;G. Cramping   Frequency of pain/symptoms B. Intermittent   Pain/symptoms are: Worse in the morning   Pain/symptoms exacerbated by C. Lifting;D. Carrying;B. Walking;A. Sitting;G. Certain positions;H. Overhead reach;K. Home tasks   Pain/symptoms eased by D. Nothing   Progression of symptoms since onset: Unchanged   Current / Previous Interventions   Diagnostic Tests: MRI   MRI Results Results   MRI results  C4-C5 mod to severe L foraminal stenosis   Prior Level of Function   Prior Level of Function-Mobility independent   Prior Level of Function-ADLs independent   Current Level of Function   Patient role/employment history A. 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   [facility]   Current equipment-Gait/Locomotion None   Current equipment-ADL None   Fall Risk Screen   Fall screen completed by PT   Is patient a fall risk? Yes   Fall screen comments Fall risk due to vertigo   Cervical Spine   Integumentary  unremarkable   Posture forward head   Cervical Flexion ROM 55   Cervical Extension ROM 42   Cervical Right Side Bending ROM 50   Cervical Left Side Bending ROM 50   Cervical Right Rotation ROM 60   Cervical Left Rotation ROM 52   Thoracic Flexion ROM WFLs   Thoracic Extension ROM WFLs   Shoulder AROM Screen WFLs   Shoulder Shrug (C2-C4) Strength 5/5   Shoulder Abd (C5) Strength 4+/5   Shoulder Add (C7) Strength 5/5   Shoulder ER (C5, C6) Strength 5/5   Shoulder IR (C5, C6) Strength 5/5   Elbow Flexion (C5, C6) Strength 5/5   Elbow Extension (C7) Strength 5/5   Wrist Extension (C6) Strength 4/5   Wrist Flexion (C7) Strength 4+/5   Upper Trapezius Flexibility mild limitation   Levator Scapula Flexibility mild limitation   Spurling Test positive   Cervical Distraction Test positive   Cervical Rotation/Lateral Flexion Test negative   Neer Impingement Test negative   Cullen Impingement Test negative   Sulcus Sign negative   Palpation no significant tenderness, triggerpoints noted   UE Neural Tension UE Neural Tension Tests   ULTT I (Median and Anterior Interosseous) Positive   ULTT II (Median and Musculocutaneous and Axillary) Negative   ULTT III (Radial) Negative   ULTT IV (Ulnar) Negative   Planned Therapy Interventions   Planned Therapy Interventions joint mobilization;manual therapy;neuromuscular re-education;ROM;strengthening;stretching   Planned Modality  Interventions   Planned Modality Interventions Traction;Cryotherapy;Electrical stimulation;Hot packs   Clinical Impression   Criteria for Skilled Therapeutic Interventions Met yes, treatment indicated   PT Diagnosis Impaired UE function   Influenced by the following impairments pain, numbness, weakness   Functional limitations due to impairments limited activity tolerance   Clinical Presentation Stable/Uncomplicated   Clinical Decision Making (Complexity) Low complexity   Therapy Frequency 2 times/Week   Predicted Duration of Therapy Intervention (days/wks) 8 weeks   Risk & Benefits of therapy have been explained Yes   Patient, Family & other staff in agreement with plan of care Yes   Clinical Impression Comments Pt has signs and symptoms consistent with nerve compression. Pt would benefit from skilled therapy services to improve ROM and improve functional activity tolerance   ORTHO GOALS   PT Ortho Eval Goals 1;2;3   Ortho Goal 1   Goal Identifier Work tolerance   Goal Description Pt will be able to complete all work tasks with less than 3 instances of numbness/tingling and pain throughout the day.   Target Date 11/19/20   Ortho Goal 2   Goal Identifier ADLs   Goal Description Pt will be able to walk her dog on a leash without increasing parasthesias and pain for 15 minutes   Target Date 11/19/20   Ortho Goal 3   Goal Identifier Neurodynamic testing   Goal Description Pt will demonstrate decreased neurodynamic irritability evidenced by improved tension testing with PT   Target Date 11/05/20   Total Evaluation Time   PT Edilberto Low Complexity Minutes (28556) 30   Therapy Certification   Certification date from 10/08/20   Certification date to 12/03/20   Medical Diagnosis Left hand parasthesia, Osseous and subluxation 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

## 2020-10-09 ENCOUNTER — TRANSFERRED RECORDS (OUTPATIENT)
Dept: HEALTH INFORMATION MANAGEMENT | Facility: OTHER | Age: 40
End: 2020-10-09

## 2020-10-13 ENCOUNTER — HOSPITAL ENCOUNTER (OUTPATIENT)
Dept: PHYSICAL THERAPY | Facility: OTHER | Age: 40
Setting detail: THERAPIES SERIES
End: 2020-10-13
Attending: FAMILY MEDICINE
Payer: COMMERCIAL

## 2020-10-13 PROCEDURE — 97140 MANUAL THERAPY 1/> REGIONS: CPT | Mod: GP

## 2020-10-13 PROCEDURE — 97110 THERAPEUTIC EXERCISES: CPT | Mod: GP

## 2020-10-15 ENCOUNTER — HOSPITAL ENCOUNTER (OUTPATIENT)
Dept: PHYSICAL THERAPY | Facility: OTHER | Age: 40
Setting detail: THERAPIES SERIES
End: 2020-10-15
Attending: FAMILY MEDICINE
Payer: COMMERCIAL

## 2020-10-15 PROCEDURE — 97110 THERAPEUTIC EXERCISES: CPT | Mod: GP

## 2020-10-15 PROCEDURE — 97140 MANUAL THERAPY 1/> REGIONS: CPT | Mod: GP

## 2020-10-21 ENCOUNTER — HOSPITAL ENCOUNTER (OUTPATIENT)
Dept: PHYSICAL THERAPY | Facility: OTHER | Age: 40
Setting detail: THERAPIES SERIES
End: 2020-10-21
Attending: FAMILY MEDICINE
Payer: COMMERCIAL

## 2020-10-21 PROCEDURE — 97140 MANUAL THERAPY 1/> REGIONS: CPT | Mod: GP

## 2020-10-21 PROCEDURE — 97110 THERAPEUTIC EXERCISES: CPT | Mod: GP

## 2020-10-23 ENCOUNTER — HOSPITAL ENCOUNTER (OUTPATIENT)
Dept: PHYSICAL THERAPY | Facility: OTHER | Age: 40
Setting detail: THERAPIES SERIES
End: 2020-10-23
Attending: FAMILY MEDICINE
Payer: COMMERCIAL

## 2020-10-23 PROCEDURE — 97140 MANUAL THERAPY 1/> REGIONS: CPT | Mod: GP

## 2020-10-23 PROCEDURE — 97110 THERAPEUTIC EXERCISES: CPT | Mod: GP

## 2020-11-03 ENCOUNTER — HOSPITAL ENCOUNTER (OUTPATIENT)
Dept: PHYSICAL THERAPY | Facility: OTHER | Age: 40
Setting detail: THERAPIES SERIES
End: 2020-11-03
Attending: FAMILY MEDICINE
Payer: COMMERCIAL

## 2020-11-03 PROCEDURE — 97140 MANUAL THERAPY 1/> REGIONS: CPT | Mod: GP

## 2020-11-03 PROCEDURE — 97110 THERAPEUTIC EXERCISES: CPT | Mod: GP

## 2020-11-06 DIAGNOSIS — R20.2 LEFT HAND PARESTHESIA: Primary | ICD-10-CM

## 2020-11-06 NOTE — PROGRESS NOTES
Notified by PT re: recommendation to be evaluated with OT due to limited improvement of treatment.  Order placed.  Lyla Hendrix DO on 11/6/2020 at 8:13 AM

## 2020-11-09 ENCOUNTER — HOSPITAL ENCOUNTER (OUTPATIENT)
Dept: PHYSICAL THERAPY | Facility: OTHER | Age: 40
Setting detail: THERAPIES SERIES
End: 2020-11-09
Attending: FAMILY MEDICINE
Payer: COMMERCIAL

## 2020-11-09 PROCEDURE — 97110 THERAPEUTIC EXERCISES: CPT | Mod: GP

## 2020-11-09 PROCEDURE — 97140 MANUAL THERAPY 1/> REGIONS: CPT | Mod: GP

## 2020-11-11 ENCOUNTER — HOSPITAL ENCOUNTER (OUTPATIENT)
Dept: PHYSICAL THERAPY | Facility: OTHER | Age: 40
Setting detail: THERAPIES SERIES
End: 2020-11-11
Attending: FAMILY MEDICINE
Payer: COMMERCIAL

## 2020-11-11 PROCEDURE — 97110 THERAPEUTIC EXERCISES: CPT | Mod: GP

## 2020-11-11 PROCEDURE — 97140 MANUAL THERAPY 1/> REGIONS: CPT | Mod: GP

## 2020-11-18 ENCOUNTER — HOSPITAL ENCOUNTER (OUTPATIENT)
Dept: PHYSICAL THERAPY | Facility: OTHER | Age: 40
Setting detail: THERAPIES SERIES
End: 2020-11-18
Attending: FAMILY MEDICINE
Payer: COMMERCIAL

## 2020-11-18 ENCOUNTER — HOSPITAL ENCOUNTER (OUTPATIENT)
Dept: OCCUPATIONAL THERAPY | Facility: OTHER | Age: 40
Setting detail: THERAPIES SERIES
End: 2020-11-18
Attending: FAMILY MEDICINE
Payer: COMMERCIAL

## 2020-11-18 DIAGNOSIS — R20.2 LEFT HAND PARESTHESIA: ICD-10-CM

## 2020-11-18 PROCEDURE — 97110 THERAPEUTIC EXERCISES: CPT | Mod: GP,XU

## 2020-11-18 PROCEDURE — 97140 MANUAL THERAPY 1/> REGIONS: CPT | Mod: GP,XU

## 2020-11-18 PROCEDURE — 97110 THERAPEUTIC EXERCISES: CPT | Mod: GO,XU

## 2020-11-18 PROCEDURE — 97165 OT EVAL LOW COMPLEX 30 MIN: CPT | Mod: GO

## 2020-11-18 PROCEDURE — 97760 ORTHOTIC MGMT&TRAING 1ST ENC: CPT | Mod: GO

## 2020-11-18 PROCEDURE — 97035 APP MDLTY 1+ULTRASOUND EA 15: CPT | Mod: GO

## 2020-11-18 PROCEDURE — 97140 MANUAL THERAPY 1/> REGIONS: CPT | Mod: GO,XU

## 2020-11-20 ENCOUNTER — HOSPITAL ENCOUNTER (OUTPATIENT)
Dept: OCCUPATIONAL THERAPY | Facility: OTHER | Age: 40
Setting detail: THERAPIES SERIES
End: 2020-11-20
Attending: FAMILY MEDICINE
Payer: COMMERCIAL

## 2020-11-20 PROCEDURE — 97035 APP MDLTY 1+ULTRASOUND EA 15: CPT | Mod: GO

## 2020-11-20 PROCEDURE — 97110 THERAPEUTIC EXERCISES: CPT | Mod: GO

## 2020-11-20 PROCEDURE — 97140 MANUAL THERAPY 1/> REGIONS: CPT | Mod: GO

## 2020-11-20 NOTE — PROGRESS NOTES
[facility]      OUTPATIENT OCCUPATIONAL THERAPY HAND EVALUATION  PLAN OF TREATMENT FOR OUTPATIENT REHABILITATION  (COMPLETE FOR INITIAL CLAIMS ONLY)  Patient's Last Name, First Name, M.I.  YOB: 1980  KeiraNohelia  GAYATRI                        Provider s Name: [facility] Medical Record No.  [id number]     Onset Date:  Gradual     Start of Care Date:  11/18/2020   Type:     ___PT  _X_OT   ___SLP    Medical Diagnosis:  Left hand paraesthesia    Occupational Therapy Diagnosis:   Decreased sensation affecting ADL/IADL and work performance     Visits from SOC: 1      _________________________________________________________________________________  Plan of Treatment/Functional Goals:  Planned Therapy Interventions:    paraffin, ultrasound, phonophoresis with 10% ketoprofen, Iontophoresis with 4% Dexamethasone, therapeutic procedures, therapeutic activities, hot pack, cold pack, manual therapies, orthotics      Goals  1.  Goal Identifier: Strength        Goal Description: Patient will increase left dominant  strength at least 10# to improve ability to complete household chores        Target Date: 12/30/20   2. Goal Identifier: ROM       Goal Description: Patient will improve left dominant wrist ROM at least 10 degrees in each plane to improve tissue extensibility and improve ease with writing.       Target Date: 01/15/21   3. Goal Identifier: sleep       Goal Description: patient will report improved sleep as evidenced by rating of at least 2 (mild difficulty) on QuickDASH       Target Date: 01/15/21                Treatment Frequency:    Predicated Duration of Therapy Intervention:       TESSA Castrejon/L         I CERTIFY THE NEED FOR THESE SERVICES FURNISHED UNDER        THIS PLAN OF TREATMENT AND WHILE UNDER MY CARE     (Physician co-signature of this document indicates review  and certification of the therapy plan).                Certification Period: 11/18/2020   to 1/15/2020             Referring Physician:  Dr. Hendrix    Initial Assessment        See Epic Evaluation Start of Care Date:
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
## 2020-11-25 ENCOUNTER — HOSPITAL ENCOUNTER (OUTPATIENT)
Dept: OCCUPATIONAL THERAPY | Facility: OTHER | Age: 40
Setting detail: THERAPIES SERIES
End: 2020-11-25
Attending: FAMILY MEDICINE
Payer: COMMERCIAL

## 2020-11-25 ENCOUNTER — HOSPITAL ENCOUNTER (OUTPATIENT)
Dept: PHYSICAL THERAPY | Facility: OTHER | Age: 40
Setting detail: THERAPIES SERIES
End: 2020-11-25
Attending: FAMILY MEDICINE
Payer: COMMERCIAL

## 2020-11-25 PROCEDURE — 97035 APP MDLTY 1+ULTRASOUND EA 15: CPT | Mod: GO

## 2020-11-25 PROCEDURE — 97140 MANUAL THERAPY 1/> REGIONS: CPT | Mod: GO

## 2020-11-25 PROCEDURE — 97110 THERAPEUTIC EXERCISES: CPT | Mod: GO

## 2020-11-25 PROCEDURE — 97110 THERAPEUTIC EXERCISES: CPT | Mod: GP

## 2020-11-25 PROCEDURE — 97140 MANUAL THERAPY 1/> REGIONS: CPT | Mod: GP
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
## 2020-11-30 ENCOUNTER — HOSPITAL ENCOUNTER (OUTPATIENT)
Dept: PHYSICAL THERAPY | Facility: OTHER | Age: 40
Setting detail: THERAPIES SERIES
End: 2020-11-30
Attending: FAMILY MEDICINE
Payer: COMMERCIAL

## 2020-11-30 ENCOUNTER — HOSPITAL ENCOUNTER (OUTPATIENT)
Dept: OCCUPATIONAL THERAPY | Facility: OTHER | Age: 40
Setting detail: THERAPIES SERIES
End: 2020-11-30
Attending: FAMILY MEDICINE
Payer: COMMERCIAL

## 2020-11-30 PROCEDURE — 97110 THERAPEUTIC EXERCISES: CPT | Mod: GP

## 2020-11-30 PROCEDURE — 97140 MANUAL THERAPY 1/> REGIONS: CPT | Mod: GO

## 2020-11-30 PROCEDURE — 97140 MANUAL THERAPY 1/> REGIONS: CPT | Mod: GP

## 2020-11-30 PROCEDURE — 97035 APP MDLTY 1+ULTRASOUND EA 15: CPT | Mod: GO

## 2020-11-30 PROCEDURE — 97110 THERAPEUTIC EXERCISES: CPT | Mod: GO

## 2020-12-02 ENCOUNTER — HOSPITAL ENCOUNTER (OUTPATIENT)
Dept: PHYSICAL THERAPY | Facility: OTHER | Age: 40
Setting detail: THERAPIES SERIES
End: 2020-12-02
Attending: FAMILY MEDICINE
Payer: COMMERCIAL

## 2020-12-02 ENCOUNTER — HOSPITAL ENCOUNTER (OUTPATIENT)
Dept: OCCUPATIONAL THERAPY | Facility: OTHER | Age: 40
Setting detail: THERAPIES SERIES
End: 2020-12-02
Attending: FAMILY MEDICINE
Payer: COMMERCIAL

## 2020-12-02 PROCEDURE — 97110 THERAPEUTIC EXERCISES: CPT | Mod: GO

## 2020-12-02 PROCEDURE — 97110 THERAPEUTIC EXERCISES: CPT | Mod: GP

## 2020-12-02 PROCEDURE — 97140 MANUAL THERAPY 1/> REGIONS: CPT | Mod: GP

## 2020-12-02 PROCEDURE — 97140 MANUAL THERAPY 1/> REGIONS: CPT | Mod: GO

## 2020-12-02 PROCEDURE — 97035 APP MDLTY 1+ULTRASOUND EA 15: CPT | Mod: GO

## 2020-12-07 ENCOUNTER — HOSPITAL ENCOUNTER (OUTPATIENT)
Dept: OCCUPATIONAL THERAPY | Facility: OTHER | Age: 40
Setting detail: THERAPIES SERIES
End: 2020-12-07
Attending: FAMILY MEDICINE
Payer: COMMERCIAL

## 2020-12-07 PROCEDURE — 97110 THERAPEUTIC EXERCISES: CPT | Mod: GO

## 2020-12-07 PROCEDURE — 97140 MANUAL THERAPY 1/> REGIONS: CPT | Mod: GO

## 2020-12-07 PROCEDURE — 97035 APP MDLTY 1+ULTRASOUND EA 15: CPT | Mod: GO

## 2020-12-07 PROCEDURE — 97010 HOT OR COLD PACKS THERAPY: CPT | Mod: GO

## 2020-12-08 ENCOUNTER — HOSPITAL ENCOUNTER (OUTPATIENT)
Dept: PHYSICAL THERAPY | Facility: OTHER | Age: 40
Setting detail: THERAPIES SERIES
End: 2020-12-08
Attending: FAMILY MEDICINE
Payer: COMMERCIAL

## 2020-12-08 PROCEDURE — 97110 THERAPEUTIC EXERCISES: CPT | Mod: GP

## 2020-12-08 PROCEDURE — 97140 MANUAL THERAPY 1/> REGIONS: CPT | Mod: GP

## 2020-12-08 NOTE — PROGRESS NOTES
[facility]      OUTPATIENT PHYSICAL THERAPY  PLAN OF TREATMENT FOR OUTPATIENT REHABILITATION    Patient's Last Name, First Name, M.I.                YOB: 1980  Nohelia Crockett                        Provider's Name  [facility] Medical Record No.  [id number]                               Onset Date: Unsure of onset date   Start of Care Date: 10/8/2020   Type:     _X_PT   ___OT   ___SLP Medical Diagnosis: Left hand parasthesia, osseous and subluxation 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                       PT Diagnosis: impaired UE function, decreased activity tolerance      _________________________________________________________________________________  Plan of Treatment:    Frequency/Duration: 2x/week for 6 weeks     Goals:  Goal Identifier Work tolerance   Goal Description Pt will be able to complete all work tasks with less than 3 instances of numbness/tingling and pain throughout the day.   Target Date 11/19/20   Date Met      Progress: Improvement noted. Pt has been able to complete tasks for longer durations without sensations of tingling and numbness.     Goal Identifier ADLs   Goal Description Pt will be able to walk her dog on a leash without increasing parasthesias and pain for 15 minutes   Target Date 11/19/20   Date Met      Progress: Not met, continue. Pt able to walk her dog more often but still does have impaired sensations with walking her dog.     Goal Identifier Neurodynamic testing   Goal Description Pt will demonstrate decreased neurodynamic irritability evidenced by improved tension testing with PT   Target Date 11/05/20   Date Met      Progress: Progressing       Progress Toward Goals:   Progress this reporting period: Quite a bit of improvement in the last few weeks have been seen. Decreased frequency of numbness and tingling with  activity. Pt notes she has been able to work more without as frequent of symptoms as well as her ability to complete tasks at home.       Certification date from 12/4/2020 to 1/15/2021.    Gary Gamino, PT          I CERTIFY THE NEED FOR THESE SERVICES FURNISHED UNDER        THIS PLAN OF TREATMENT AND WHILE UNDER MY CARE     (Physician co-signature of this document indicates review and certification of the therapy plan).                Referring Provider: Dr. Lyla Hendrix, DO

## 2020-12-15 ENCOUNTER — HOSPITAL ENCOUNTER (OUTPATIENT)
Dept: PHYSICAL THERAPY | Facility: OTHER | Age: 40
Setting detail: THERAPIES SERIES
End: 2020-12-15
Attending: FAMILY MEDICINE
Payer: COMMERCIAL

## 2020-12-15 PROCEDURE — 97140 MANUAL THERAPY 1/> REGIONS: CPT | Mod: GP

## 2020-12-15 PROCEDURE — 97110 THERAPEUTIC EXERCISES: CPT | Mod: GP

## 2020-12-17 ENCOUNTER — NURSE TRIAGE (OUTPATIENT)
Dept: NURSING | Facility: CLINIC | Age: 40
End: 2020-12-17

## 2020-12-17 ENCOUNTER — HOSPITAL ENCOUNTER (EMERGENCY)
Facility: OTHER | Age: 40
Discharge: HOME | End: 2020-12-17
Attending: EMERGENCY MEDICINE | Admitting: EMERGENCY MEDICINE
Payer: COMMERCIAL

## 2020-12-17 VITALS
HEIGHT: 64 IN | DIASTOLIC BLOOD PRESSURE: 68 MMHG | OXYGEN SATURATION: 99 % | SYSTOLIC BLOOD PRESSURE: 132 MMHG | RESPIRATION RATE: 16 BRPM | HEART RATE: 80 BPM | TEMPERATURE: 97.9 F | BODY MASS INDEX: 43.28 KG/M2 | WEIGHT: 253.53 LBS

## 2020-12-17 DIAGNOSIS — N39.0 URINARY TRACT INFECTION WITH HEMATURIA, SITE UNSPECIFIED: ICD-10-CM

## 2020-12-17 DIAGNOSIS — R31.9 URINARY TRACT INFECTION WITH HEMATURIA, SITE UNSPECIFIED: ICD-10-CM

## 2020-12-17 LAB
ALBUMIN UR-MCNC: 100 MG/DL
APPEARANCE UR: ABNORMAL
BACTERIA #/AREA URNS HPF: ABNORMAL /HPF
BILIRUB UR QL STRIP: NEGATIVE
COLOR UR AUTO: ABNORMAL
GLUCOSE UR STRIP-MCNC: 30 MG/DL
HGB UR QL STRIP: ABNORMAL
KETONES UR STRIP-MCNC: NEGATIVE MG/DL
LEUKOCYTE ESTERASE UR QL STRIP: ABNORMAL
MUCOUS THREADS #/AREA URNS LPF: PRESENT /LPF
NITRATE UR QL: NEGATIVE
PH UR STRIP: 6 PH (ref 5–7)
RBC #/AREA URNS AUTO: >182 /HPF (ref 0–2)
SOURCE: ABNORMAL
SP GR UR STRIP: 1.02 (ref 1–1.03)
SQUAMOUS #/AREA URNS AUTO: 8 /HPF (ref 0–1)
UROBILINOGEN UR STRIP-MCNC: NORMAL MG/DL (ref 0–2)
WBC #/AREA URNS AUTO: 471 /HPF (ref 0–5)

## 2020-12-17 PROCEDURE — 87086 URINE CULTURE/COLONY COUNT: CPT | Performed by: EMERGENCY MEDICINE

## 2020-12-17 PROCEDURE — 87088 URINE BACTERIA CULTURE: CPT | Performed by: EMERGENCY MEDICINE

## 2020-12-17 PROCEDURE — 99283 EMERGENCY DEPT VISIT LOW MDM: CPT | Performed by: EMERGENCY MEDICINE

## 2020-12-17 PROCEDURE — 250N000013 HC RX MED GY IP 250 OP 250 PS 637: Performed by: EMERGENCY MEDICINE

## 2020-12-17 PROCEDURE — 81001 URINALYSIS AUTO W/SCOPE: CPT | Performed by: EMERGENCY MEDICINE

## 2020-12-17 RX ORDER — PHENAZOPYRIDINE HYDROCHLORIDE 100 MG/1
200 TABLET, FILM COATED ORAL ONCE
Status: COMPLETED | OUTPATIENT
Start: 2020-12-17 | End: 2020-12-17

## 2020-12-17 RX ORDER — SULFAMETHOXAZOLE/TRIMETHOPRIM 800-160 MG
1 TABLET ORAL 2 TIMES DAILY
Qty: 14 TABLET | Refills: 0 | Status: SHIPPED | OUTPATIENT
Start: 2020-12-17 | End: 2020-12-24

## 2020-12-17 RX ORDER — PHENAZOPYRIDINE HYDROCHLORIDE 200 MG/1
200 TABLET, FILM COATED ORAL 3 TIMES DAILY
Qty: 9 TABLET | Refills: 0 | Status: SHIPPED | OUTPATIENT
Start: 2020-12-17 | End: 2020-12-20

## 2020-12-17 RX ORDER — SULFAMETHOXAZOLE/TRIMETHOPRIM 800-160 MG
1 TABLET ORAL ONCE
Status: COMPLETED | OUTPATIENT
Start: 2020-12-17 | End: 2020-12-17

## 2020-12-17 RX ADMIN — PHENAZOPYRIDINE HYDROCHLORIDE 200 MG: 100 TABLET ORAL at 02:58

## 2020-12-17 RX ADMIN — SULFAMETHOXAZOLE AND TRIMETHOPRIM 1 TABLET: 800; 160 TABLET ORAL at 02:58

## 2020-12-17 ASSESSMENT — ENCOUNTER SYMPTOMS
HEADACHES: 0
CHEST TIGHTNESS: 0
NAUSEA: 0
AGITATION: 0
CHILLS: 0
ARTHRALGIAS: 0
FREQUENCY: 1
VOMITING: 0
FLANK PAIN: 0
DYSURIA: 1
SHORTNESS OF BREATH: 0
FEVER: 0

## 2020-12-17 ASSESSMENT — MIFFLIN-ST. JEOR: SCORE: 1795

## 2020-12-17 NOTE — TELEPHONE ENCOUNTER
Patient reports urinary frequency and blood with wiping. Denies painful urination. Denies fever. Disposition: See PCP within 24 hours. Protocol and care advice reviewed.  Caller states understanding of the recommended disposition.  Advised to call back if further questions or concerns.    Aysha Yu RN/[facility]      COVID 19 Nurse Triage Plan/Patient Instructions    Please be aware that novel coronavirus (COVID-19) may be circulating in the community. If you develop symptoms such as fever, cough, or SOB or if you have concerns about the presence of another infection including coronavirus (COVID-19), please contact your health care provider or visit [email or web address].     Disposition/Instructions    In-Person Visit with provider recommended. Reference Visit Selection Guide.    Thank you for taking steps to prevent the spread of this virus.  o Limit your contact with others.  o Wear a simple mask to cover your cough.  o Wash your hands well and often.    Resources    [facility]: About COVID-19: [email or web address]    CDC: What to Do If You're Sick: www.cdc.gov/coronavirus/2019-ncov/about/steps-when-sick.html    CDC: Ending Home Isolation: www.cdc.gov/coronavirus/2019-ncov/hcp/disposition-in-home-patients.html     CDC: Caring for Someone: www.cdc.gov/coronavirus/2019-ncov/if-you-are-sick/care-for-someone.html     [facility]: Interim Guidance for Hospital Discharge to Home: www.health.[facility].[place]/diseases/coronavirus/hcp/hospdischarge.pdf    [facility] clinical trials (COVID-19 research studies): clinicalaffairs.[facility]/n-clinical-trials     Below are the COVID-19 hotlines at the [facility] ([facility]). Interpreters are available.   o For health questions: Call 798-898-9675 or 1-594.856.1828 (7 a.m. to 7 p.m.)  o For questions about schools and childcare: Call 446-510-0755 or 1-198.735.4161 (7 a.m. to 7 p.m.)     Additional Information    Negative:  Shock suspected (e.g., cold/pale/clammy skin, too weak to stand, low BP, rapid pulse)    Negative: Sounds like a life-threatening emergency to the triager    Negative: [1] Unable to urinate (or only a few drops) > 4 hours AND     [2] bladder feels very full (e.g., palpable bladder or strong urge to urinate)    Negative: [1] Decreased urination and [2] drinking very little AND [2] dehydration suspected (e.g., dark urine, no urine > 12 hours, very dry mouth, very lightheaded)    Negative: Patient sounds very sick or weak to the triager    Urinating more frequently than usual (i.e., frequency)    Negative: Fever > 100.5 F (38.1 C)    Negative: Side (flank) or lower back pain present    Negative: [1] Can't control passage of urine (i.e., urinary incontinence) AND [2] new onset (< 2 weeks) or worsening    Blood in the urine is main symptom    Protocols used: URINARY SYMPTOMS-A-

## 2020-12-17 NOTE — ED AVS SNAPSHOT
[facility]  [address]  [place] [address]  Phone: [phone]  Fax: [phone]                                    Nohelia Crockett   MRN: [id number]    Department: [facility]   Date of Visit: 12/17/2020           After Visit Summary Signature Page    I have received my discharge instructions, and my questions have been answered. I have discussed any challenges I see with this plan with the nurse or doctor.    ..........................................................................................................................................  Patient/Patient Representative Signature      ..........................................................................................................................................  Patient Representative Print Name and Relationship to Patient    ..................................................               ................................................  Date                                   Time    ..........................................................................................................................................  Reviewed by Signature/Title    ...................................................              ..............................................  Date                                               Time          22EPIC Rev 08/18
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
## 2020-12-17 NOTE — ED TRIAGE NOTES
Patient presents to ED with complaints of urinary incontinence, hematuria, frequency, and abdominal pain that occurred suddenly over the last few hours. She is wearing a peripad due to the frequent incontinence. Afebrile. History of kidney stones however she states she does not have CVA tenderness or similar symptoms. Pain is located in lower abdominal area- near bladder. Urine specimen obtained. No known history of UTI. Sexually active last 2 days ago. No acute distress. Ready for MD Liss Maloney RN on 12/17/2020 at 2:06 AM

## 2020-12-19 LAB
BACTERIA SPEC CULT: ABNORMAL
SPECIMEN SOURCE: ABNORMAL

## 2020-12-21 ENCOUNTER — HOSPITAL ENCOUNTER (OUTPATIENT)
Dept: OCCUPATIONAL THERAPY | Facility: OTHER | Age: 40
Setting detail: THERAPIES SERIES
End: 2020-12-21
Attending: FAMILY MEDICINE
Payer: COMMERCIAL

## 2020-12-21 PROCEDURE — 97035 APP MDLTY 1+ULTRASOUND EA 15: CPT | Mod: GO

## 2020-12-21 PROCEDURE — 97140 MANUAL THERAPY 1/> REGIONS: CPT | Mod: GO

## 2020-12-21 PROCEDURE — 97110 THERAPEUTIC EXERCISES: CPT | Mod: GO

## 2020-12-28 ENCOUNTER — HOSPITAL ENCOUNTER (OUTPATIENT)
Dept: OCCUPATIONAL THERAPY | Facility: OTHER | Age: 40
Setting detail: THERAPIES SERIES
End: 2020-12-28
Attending: FAMILY MEDICINE
Payer: COMMERCIAL

## 2020-12-28 ENCOUNTER — HOSPITAL ENCOUNTER (OUTPATIENT)
Dept: PHYSICAL THERAPY | Facility: OTHER | Age: 40
Setting detail: THERAPIES SERIES
End: 2020-12-28
Attending: FAMILY MEDICINE
Payer: COMMERCIAL

## 2020-12-28 PROCEDURE — 97110 THERAPEUTIC EXERCISES: CPT | Mod: GP

## 2020-12-28 PROCEDURE — 97035 APP MDLTY 1+ULTRASOUND EA 15: CPT | Mod: GO

## 2020-12-28 PROCEDURE — 97140 MANUAL THERAPY 1/> REGIONS: CPT | Mod: GP

## 2020-12-28 PROCEDURE — 97140 MANUAL THERAPY 1/> REGIONS: CPT | Mod: GO

## 2020-12-28 PROCEDURE — 97110 THERAPEUTIC EXERCISES: CPT | Mod: GO

## 2020-12-30 ENCOUNTER — HOSPITAL ENCOUNTER (OUTPATIENT)
Dept: PHYSICAL THERAPY | Facility: OTHER | Age: 40
Setting detail: THERAPIES SERIES
End: 2020-12-30
Attending: FAMILY MEDICINE
Payer: COMMERCIAL

## 2020-12-30 PROCEDURE — 97110 THERAPEUTIC EXERCISES: CPT | Mod: GP

## 2020-12-30 PROCEDURE — 97140 MANUAL THERAPY 1/> REGIONS: CPT | Mod: GP

## 2021-01-03 ENCOUNTER — HEALTH MAINTENANCE LETTER (OUTPATIENT)
Age: 41
End: 2021-01-03

## 2021-01-06 ENCOUNTER — HOSPITAL ENCOUNTER (OUTPATIENT)
Dept: PHYSICAL THERAPY | Facility: OTHER | Age: 41
Setting detail: THERAPIES SERIES
End: 2021-01-06
Attending: FAMILY MEDICINE
Payer: COMMERCIAL

## 2021-01-06 PROCEDURE — 97140 MANUAL THERAPY 1/> REGIONS: CPT | Mod: GP

## 2021-01-06 PROCEDURE — 97110 THERAPEUTIC EXERCISES: CPT | Mod: GP

## 2021-01-07 ENCOUNTER — HOSPITAL ENCOUNTER (OUTPATIENT)
Dept: OCCUPATIONAL THERAPY | Facility: OTHER | Age: 41
Setting detail: THERAPIES SERIES
End: 2021-01-07
Attending: FAMILY MEDICINE
Payer: COMMERCIAL

## 2021-01-07 PROCEDURE — 97140 MANUAL THERAPY 1/> REGIONS: CPT | Mod: GO

## 2021-01-07 PROCEDURE — 97035 APP MDLTY 1+ULTRASOUND EA 15: CPT | Mod: GO

## 2021-01-07 PROCEDURE — 97110 THERAPEUTIC EXERCISES: CPT | Mod: GO

## 2021-01-08 ENCOUNTER — HOSPITAL ENCOUNTER (OUTPATIENT)
Dept: PHYSICAL THERAPY | Facility: OTHER | Age: 41
Setting detail: THERAPIES SERIES
End: 2021-01-08
Attending: FAMILY MEDICINE
Payer: COMMERCIAL

## 2021-01-08 PROCEDURE — 97110 THERAPEUTIC EXERCISES: CPT | Mod: GP

## 2021-01-08 PROCEDURE — 97140 MANUAL THERAPY 1/> REGIONS: CPT | Mod: GP

## 2021-01-12 ENCOUNTER — HOSPITAL ENCOUNTER (OUTPATIENT)
Dept: PHYSICAL THERAPY | Facility: OTHER | Age: 41
Setting detail: THERAPIES SERIES
End: 2021-01-12
Attending: FAMILY MEDICINE
Payer: COMMERCIAL

## 2021-01-12 PROCEDURE — 97110 THERAPEUTIC EXERCISES: CPT | Mod: GP

## 2021-01-12 PROCEDURE — 97140 MANUAL THERAPY 1/> REGIONS: CPT | Mod: GP

## 2021-01-20 ENCOUNTER — HOSPITAL ENCOUNTER (OUTPATIENT)
Dept: PHYSICAL THERAPY | Facility: OTHER | Age: 41
Setting detail: THERAPIES SERIES
End: 2021-01-20
Attending: FAMILY MEDICINE
Payer: COMMERCIAL

## 2021-01-20 PROCEDURE — 97140 MANUAL THERAPY 1/> REGIONS: CPT | Mod: GP

## 2021-01-20 PROCEDURE — 97110 THERAPEUTIC EXERCISES: CPT | Mod: GP

## 2021-01-20 NOTE — PROGRESS NOTES
Outpatient Physical Therapy Progress Note     Patient: Nohelia Crockett  : 1980    Beginning/End Dates of Reporting Period:  10/8/2020 to 2021    Referring Provider: Dr. Lyla Hendrix,DO    Therapy Diagnosis: Neck pain with L UE numbness and tingling     Client Self Report: Pt reports that she is feeling so much better with decrease numbness and tingling into L arm and hand. She only gets symptoms about 2-3x/week. Previously, it was constant. She is able to walk her dog without symptoms. Pt states that sleeping had improved and only waking about 2x/night and is able to fall asleep. She states that previously, she was waking 5x/night and having a difficult time falling back. Driving is better,but, this still can aggrevate condition with putting it up on steering wheel.  She states that she had been feeling good up until yesterday,when she awoke with increase anterior L rib pain. She states that her nubness and tingling are a little worse yesterday and today.  Pain:  1-2/10.    Objective Measurements:  Objective Measure: General Listening  Details: L Anterior   Objective Measure: CROM  Details: Flexion:60, Extension: 60, SB: L 48, R 50 ; Rotation: L 70, R 86 degress  Objective Measure: Palpation/ Flexibility  Details: + cervical anterior fascial and visceral sheath tightness: thyropericardial, clavipectoral, Charpy's, pleural dome: L > R/  + L levator scapula, scalene, and pec minor tightness  Objective Measure: Segmental Testing/ joint mobility  Details: FRS-R C4; FRS-L T1, Elevated L 1st rib and laterally flexed L 2nd rib  Objective Measure: Dural Testing  Details: + L C5-6-7 Brachial Plexus: median n            Outcome Measures (most recent score):  Neck Disability Index: 24% self disability score    Goals:  Goal Identifier Work tolerance   Goal Description Pt will be able to complete all work tasks with less than 3 instances of numbness/tingling and pain throughout the day.   Target Date 21    Date Met  01/20/21(Pt states that she numbness and tingling is minimal 2-3x/wk)   Progress:     Goal Identifier ADLs   Goal Description Pt will be able to walk her dog on a leash without increasing parasthesias and pain for 15 minutes   Target Date 01/05/21   Date Met  01/20/21   Progress:     Goal Identifier Neurodynamic testing   Goal Description Pt will demonstrate decreased neurodynamic irritability evidenced by improved tension testing with PT   Target Date 01/05/21   Date Met  01/20/21   Progress:     Goal Identifier     Goal Description     Target Date     Date Met      Progress:     Goal Identifier     Goal Description     Target Date     Date Met      Progress:     Goal Identifier     Goal Description     Target Date     Date Met      Progress:     Goal Identifier     Goal Description     Target Date     Date Met      Progress:     Goal Identifier     Goal Description     Target Date     Date Met      Progress:     Progress Toward Goals:   Progress this reporting period: Pt has progressed toward goals.  Note pt felt better after treatment with decrease L UE symptoms      Plan:  Continue therapy per current plan of care.    Discharge:  No. Pt to be seen for 4 more appointments.

## 2021-01-21 ENCOUNTER — HOSPITAL ENCOUNTER (OUTPATIENT)
Dept: OCCUPATIONAL THERAPY | Facility: OTHER | Age: 41
Setting detail: THERAPIES SERIES
End: 2021-01-21
Attending: FAMILY MEDICINE
Payer: COMMERCIAL

## 2021-01-21 ENCOUNTER — HOSPITAL ENCOUNTER (OUTPATIENT)
Dept: PHYSICAL THERAPY | Facility: OTHER | Age: 41
Setting detail: THERAPIES SERIES
End: 2021-01-21
Attending: FAMILY MEDICINE
Payer: COMMERCIAL

## 2021-01-21 PROCEDURE — 97140 MANUAL THERAPY 1/> REGIONS: CPT | Mod: GO

## 2021-01-21 PROCEDURE — 97140 MANUAL THERAPY 1/> REGIONS: CPT | Mod: GP

## 2021-01-21 PROCEDURE — 97110 THERAPEUTIC EXERCISES: CPT | Mod: GP

## 2021-01-21 PROCEDURE — 97110 THERAPEUTIC EXERCISES: CPT | Mod: GO

## 2021-01-22 NOTE — PROGRESS NOTES
Outpatient Occupational Therapy Discharge Note     Patient: Nohelia Crockett  : 1980    Beginning/End Dates of Reporting Period:  2020 to 2021    Referring Provider: Dr. Hendrix    Therapy Diagnosis: Decreased performance with ADL and IADL tasks    Client Self Report: Nohelia reports that her left forearm, wrist and hand have continued to feel good.  She has been able to complete more, prolonged activities with LUE such as coloring intricate pictures.      Objective Measurements:  LUE Strength (lbs)   20    48 67   Lateral Pinch 18 20   3-Pt Pinch 12 16     LUE AROM (degrees)   20   Wrist Flexion 70 82   Wrist Extension 40 53     Goals:     Goal Identifier Strength    Goal Description Patient will increase left dominant  strength at least 10# to improve ability to complete household chores    Target Date 20   Date Met   21   Progress: Goal Met, see above.      Goal Identifier ROM   Goal Description Patient will improve left dominant wrist ROM at least 10 degrees in each plane to improve tissue extensibility and improve ease with writing.   Target Date 01/15/21   Date Met   21   Progress: Goal Met, see above.      Goal Identifier sleep   Goal Description patient will report improved sleep as evidenced by rating of at least 2 (mild difficulty) on QuickDASH(No pain )   Target Date 01/15/21   Date Met   21   Progress: Goal Met.  Patient currently reports no pain from L forearm, wrist, and hand impacting sleep.      Progress Toward Goals:   Progress this reporting period: Patient has made good progress during course of occupational therapy, meeting all goals.  She reports that her LUE function has improved significantly since starting OT.  She is happy with progress and is ready to be discharged.  She reports independence with home exercise program and that it is comprehensive enough to continue with strengthening.     Plan:  Discharge from  therapy.    Discharge:    Reason for Discharge: Patient has met all goals.    Equipment Issued: HEP    Discharge Plan: Patient to continue home program.

## 2021-01-27 ENCOUNTER — HOSPITAL ENCOUNTER (OUTPATIENT)
Dept: PHYSICAL THERAPY | Facility: OTHER | Age: 41
Setting detail: THERAPIES SERIES
End: 2021-01-27
Attending: FAMILY MEDICINE
Payer: COMMERCIAL

## 2021-01-27 PROCEDURE — 97110 THERAPEUTIC EXERCISES: CPT | Mod: GP

## 2021-01-27 PROCEDURE — 97140 MANUAL THERAPY 1/> REGIONS: CPT | Mod: GP

## 2021-02-04 ENCOUNTER — HOSPITAL ENCOUNTER (OUTPATIENT)
Dept: PHYSICAL THERAPY | Facility: OTHER | Age: 41
Setting detail: THERAPIES SERIES
End: 2021-02-04
Attending: FAMILY MEDICINE
Payer: COMMERCIAL

## 2021-02-04 PROCEDURE — 97140 MANUAL THERAPY 1/> REGIONS: CPT | Mod: GP

## 2021-02-07 NOTE — PROGRESS NOTES
OUTPATIENT PHYSICAL THERAPY  PLAN OF TREATMENT FOR OUTPATIENT REHABILITATION AND PROGRESS NOTE           Patient's Last Name, First Name, Nohelia Wang Date of Birth  1980   Provider's Name  [facility] Medical Record No.  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   Target Date 01/05/21   Date Met  01/20/21(Pt states that she numbness and tingling is minimal 2-3x/wk)   Progress:     Goal Identifier ADLs   Goal Description Pt will be able to walk her dog on a leash without increasing parasthesias and pain for 15 minutes   Target Date 01/05/21   Date Met  01/20/21   Progress:     Goal Identifier Neurodynamic testing   Goal Description Pt will demonstrate decreased neurodynamic irritability evidenced by improved tension testing with PT   Target Date 01/05/21   Date Met  01/20/21   Progress:       Progress Toward Goals:   Progress this reporting period: Pt has made tremendous strides in her recovery.  She is having significantly less numbness and tingling.  She has had improved ability to function including work-related tasks as well as leisure tasks at home.  She is able to sleep better at night being woken up significantly less throughout oftentimes not even by pain.    Plan of care moving forward is to see patient an additional 1 or 2 times provided she has no setback.  She is trialing a few weeks here of independent  management.  Patient will follow up in a couple weeks and if at that point everything is going well we will discharge the patient    Gary Gamino, PT           I CERTIFY THE NEED FOR THESE SERVICES FURNISHED UNDER        THIS PLAN OF TREATMENT AND WHILE UNDER MY CARE     (Physician co-signature of this document indicates review and certification of the therapy plan).                Referring Provider: Dr. Lyla Hendrix, DO

## 2021-02-23 ENCOUNTER — HOSPITAL ENCOUNTER (OUTPATIENT)
Dept: PHYSICAL THERAPY | Facility: OTHER | Age: 41
Setting detail: THERAPIES SERIES
End: 2021-02-23
Attending: FAMILY MEDICINE
Payer: COMMERCIAL

## 2021-02-23 PROCEDURE — 97140 MANUAL THERAPY 1/> REGIONS: CPT | Mod: GP

## 2021-02-23 NOTE — PROGRESS NOTES
Outpatient Physical Therapy Discharge Note     Patient: Nohelia Crockett  : 1980    Beginning/End Dates of Reporting Period:  20 to 2021    Referring Provider: Dr. Hendrix    Therapy Diagnosis: BPPV         Client Self Report: NA  Patient was seen for evaluation and 1 follow-up.  On 3/23/21 patient cancelled all remaining visits due to COVID-19 concerns, but chart was held open.  Patient had not returned to therapy within 30 days.    Objective Measurements:  NA      Goals:  Unable to reassess due to unplanned discharge.  Goal Identifier head motion   Goal Description Patient will be able to turn and nod head quickly with no limitations due to dizziness for increased safety with shopping and driving.   Target Date 20   Date Met      Progress:     Goal Identifier forward bending   Goal Description Patient will be able to forward bend and return to stand with no limitation due to lightheadedness or dizziness for improved safety with home and work tasks.   Target Date 20   Date Met      Progress:     Goal Identifier walking   Goal Description Patient will score 20/24 or better on DGI indicating low fall risk.   Target Date 20   Date Met      Progress:       Progress Toward Goals:   Unknown due to unplanned discharge.    Plan:  Discharge from therapy.    Discharge:    Reason for Discharge: Patient had not been seen in greater than 30 days.    Equipment Issued: none    Discharge Plan: None

## 2021-02-24 NOTE — PROGRESS NOTES
Outpatient Physical Therapy Discharge Note     Patient: Nohelia Crockett  : 1980    Beginning/End Dates of Reporting Period:  10/8/2020 to 2021    Referring Provider: Dr. Hendrix    Therapy Diagnosis: impaired cervical mobility; impaired UE function; L UE parasthesia     Client Self Report: Patient last seen approximately 3 weeks ago.  She has had very minimal issues since that point.  Does still get occasional stiffness as well as some some tingling but significantly better.  She is ready to discharge from therapy today and has the tools necessary to continue working through the process if it flares up again.    Objective Measurements:  Objective Measure: General Listening  Details: L Anterior   Objective Measure: CROM  Details: Flexion:60, Extension: 60, SB: L 48, R 50 ; Rotation: L 70, R 86 degress  Objective Measure: Palpation/ Flexibility  Details: + cervical anterior fascial and visceral sheath tightness: thyropericardial, clavipectoral, Charpy's, pleural dome: L > R/  + L levator scapula, scalene, and pec minor tightness  Objective Measure: Segmental Testing/ joint mobility  Details: FRS-R C4; FRS-L T1, Elevated L 1st rib and laterally flexed L 2nd rib  Objective Measure: Dural Testing  Details: + L C5-6-7 Brachial Plexus: median n       Goals:  Goal Identifier Work tolerance   Goal Description Pt will be able to complete all work tasks with less than 3 instances of numbness/tingling and pain throughout the day.   Target Date 21   Date Met  21(Pt states that she numbness and tingling is minimal 2-3x/wk)   Progress:     Goal Identifier ADLs   Goal Description Pt will be able to walk her dog on a leash without increasing parasthesias and pain for 15 minutes   Target Date 21   Date Met  21   Progress:     Goal Identifier Neurodynamic testing   Goal Description Pt will demonstrate decreased neurodynamic irritability evidenced by improved tension testing with PT   Target Date  01/05/21   Date Met  01/20/21   Progress:     Progress Toward Goals:   Progress this reporting period: Patient has consistently met all of her goals.  She is also been able to work through any flareups that she has gotten in the last few weeks.  Patient notes she is significantly improved in her functional status with the abilities to do all activities that she wishes at work and at home.  Does occasionally still get some numbness and does still have some stiffness however she is wanting to work on this at home and feels very comfortable discharging today.    Plan:  Discharge from therapy.    Discharge:    Reason for Discharge: Patient has met all goals.    Equipment Issued: theraband    Discharge Plan: Patient to continue home program.

## 2021-02-28 ENCOUNTER — ALLIED HEALTH/NURSE VISIT (OUTPATIENT)
Dept: FAMILY MEDICINE | Facility: OTHER | Age: 41
End: 2021-02-28
Attending: FAMILY MEDICINE
Payer: COMMERCIAL

## 2021-02-28 DIAGNOSIS — R50.9 FEVER AND CHILLS: Primary | ICD-10-CM

## 2021-02-28 LAB
LABORATORY COMMENT REPORT: NORMAL
SARS-COV-2 RNA RESP QL NAA+PROBE: NEGATIVE
SARS-COV-2 RNA RESP QL NAA+PROBE: NORMAL
SPECIMEN SOURCE: NORMAL
SPECIMEN SOURCE: NORMAL

## 2021-02-28 PROCEDURE — 87635 SARS-COV-2 COVID-19 AMP PRB: CPT | Mod: ZL | Performed by: FAMILY MEDICINE

## 2021-02-28 PROCEDURE — C9803 HOPD COVID-19 SPEC COLLECT: HCPCS

## 2021-03-03 ENCOUNTER — OFFICE VISIT (OUTPATIENT)
Dept: FAMILY MEDICINE | Facility: OTHER | Age: 41
End: 2021-03-03
Attending: FAMILY MEDICINE
Payer: COMMERCIAL

## 2021-03-03 VITALS
TEMPERATURE: 97.8 F | OXYGEN SATURATION: 99 % | SYSTOLIC BLOOD PRESSURE: 114 MMHG | DIASTOLIC BLOOD PRESSURE: 78 MMHG | HEART RATE: 88 BPM | RESPIRATION RATE: 20 BRPM | WEIGHT: 244.25 LBS | BODY MASS INDEX: 41.93 KG/M2

## 2021-03-03 DIAGNOSIS — J06.9 UPPER RESPIRATORY TRACT INFECTION, UNSPECIFIED TYPE: Primary | ICD-10-CM

## 2021-03-03 PROCEDURE — 99213 OFFICE O/P EST LOW 20 MIN: CPT | Performed by: FAMILY MEDICINE

## 2021-03-03 PROCEDURE — G0463 HOSPITAL OUTPT CLINIC VISIT: HCPCS

## 2021-03-03 RX ORDER — DOXYCYCLINE 100 MG/1
100 CAPSULE ORAL 2 TIMES DAILY
Qty: 20 CAPSULE | Refills: 0 | Status: SHIPPED | OUTPATIENT
Start: 2021-03-03 | End: 2021-03-13

## 2021-03-03 RX ORDER — ALBUTEROL SULFATE 90 UG/1
2 AEROSOL, METERED RESPIRATORY (INHALATION) EVERY 4 HOURS PRN
Qty: 1 INHALER | Refills: 2 | Status: SHIPPED | OUTPATIENT
Start: 2021-03-03 | End: 2022-03-02

## 2021-03-03 ASSESSMENT — PAIN SCALES - GENERAL: PAINLEVEL: NO PAIN (0)

## 2021-03-03 NOTE — PROGRESS NOTES
SUBJECTIVE:   Nohelia Crockett is a 41 year old female who presents to clinic today for the following health issues:    HPI  Nohelia is here for URI symptoms that began 8 days ago.  Persisting.  She had negative covid testing; and continues to have low grade temperature elevations; borderline fever.  Temperatures mostly running 99.5-100.5.  Needing to be out of work until fever free x 24 hours without medication.  She is using ibuprofen/tylenol ~2-3 times per day.  + cold chills, some mild dry cough, diarrhea (resolved now).  No change in appetite.   History of asthma.    Patient Active Problem List    Diagnosis Date Noted     Suspected sleep apnea 05/08/2019     Priority: Medium     Asthma 02/19/2018     Priority: Medium     Overview:   intermittent       Attention deficit disorder 02/19/2018     Priority: Medium     Knee pain 02/19/2018     Priority: Medium     Overview:   secondary to patellofemoral dysfunction       Learning disability 02/19/2018     Priority: Medium     Migraine headache 02/19/2018     Priority: Medium     Overview:   Occasional severe HA, does not require daily prophylaxis       Obesity (BMI 35.0-39.9 without comorbidity) 12/14/2016     Priority: Medium     Adhesive capsulitis of right shoulder 06/06/2016     Priority: Medium     S/P shoulder surgery 06/06/2016     Priority: Medium     Gastroesophageal reflux disease without esophagitis 05/26/2016     Priority: Medium     Pain of multiple sites 10/19/2015     Priority: Medium     History of arthroscopy of right knee 07/02/2015     Priority: Medium     Plantar fasciitis 11/14/2014     Priority: Medium     H/O excision of mass 09/03/2014     Priority: Medium     Genital herpes 12/07/2009     Priority: Medium     Overview:   Has had only one outbreak       Past Medical History:   Diagnosis Date     Adhesive capsulitis of right shoulder     6/6/2016     Cyst of ovary 2006    Left     Localized swelling, mass, or lump of upper extremity  2014     Migraine without status migrainosus, not intractable     Occasional severe HA, does not require daily prophylaxis     Other developmental disorders of scholastic skills      Other specified behavioral and emotional disorders with onset usually occurring in childhood and adolescence      Other specified postprocedural states 2014     Other specified postprocedural states 2016     Peptic ulcer without hemorrhage or perforation      Personal history of urinary calculi 2004    Right     Plantar fascial fibromatosis 2014     Uncomplicated asthma     intermittent      Past Surgical History:   Procedure Laterality Date     APPENDECTOMY OPEN       ARTHROSCOPY SHOULDER      10/05,debridement and subacromial decompression of right shoulder.     ARTHROSCOPY SHOULDER  2007    acromioplasty distal clavicle excision, and extensive debridement of the bursa of the right shoulder by Dr. Villa.  Also left?     CHOLECYSTECTOMY       CYSTOSCOPY      with retrogrades     DILATION AND CURETTAGE  2007    Ablation     LAPAROSCOPIC TUBAL LIGATION  2004     LITHOTRIPSY  2004    extracorporeal shockwave     OTHER SURGICAL HISTORY  2012     OTHER SURGICAL HISTORY Right 2014    Pathology showed epidermal inclusion cysts     RELEASE CARPAL TUNNEL  2012     Family History   Problem Relation Age of Onset     Cancer Mother 46        Cancer,Lung cancer     Blood Disease Mother         Blood Disease     Asthma Father         Asthma     Diabetes Father         Diabetes,2     Hypertension Father         Hypertension     Heart Disease Father         Heart Disease,CHF     Other - See Comments Father          at age 65 from complications of a motor vehicle accident.     Family History Negative Sister         Good Health     Family History Negative Sister         Good Health     Breast Cancer No family hx of         Cancer-breast     Colon Cancer No family hx of          Cancer-colon     Prostate Cancer No family hx of         Cancer-prostate     Ovarian Cancer No family hx of         Cancer-ovarian     Anesthesia Reaction No family hx of         Anesthesia Problem     Social History     Tobacco Use     Smoking status: Former Smoker     Packs/day: 0.50     Years: 16.00     Pack years: 8.00     Types: Cigarettes     Quit date: 2016     Years since quittin.1     Smokeless tobacco: Never Used   Substance Use Topics     Alcohol use: Yes     Alcohol/week: 0.0 standard drinks     Comment: Alcoholic Drinks/day: rarely     Social History     Social History Narrative    Patient is  (2018).  Lives with her son (Herman); daughter (Jluis) lives on her own.  Dog ( animal) - Moises, ~40# Gaylaindmary beth, mixed breed.    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     No current outpatient medications on file.     Allergies   Allergen Reactions     Penicillins Anaphylaxis     Aspirin Hives     Oxycodone-Acetaminophen Other (See Comments)     Can't take too high of a dose/ causes nausea        OBJECTIVE:     /78   Pulse 88   Temp 97.8  F (36.6  C) (Tympanic)   Resp 20   Wt 110.8 kg (244 lb 4 oz)   SpO2 99%   BMI 41.93 kg/m    Body mass index is 41.93 kg/m .  Physical Exam  Vitals signs and nursing note reviewed.   Constitutional:       Appearance: Normal appearance. She is obese.   HENT:      Head: Normocephalic.      Right Ear: Tympanic membrane, ear canal and external ear normal.      Left Ear: Tympanic membrane, ear canal and external ear normal.      Nose: Congestion present.      Mouth/Throat:      Mouth: Mucous membranes are moist.      Pharynx: No oropharyngeal exudate or posterior oropharyngeal erythema.   Neck:      Musculoskeletal: Normal range of motion.   Cardiovascular:      Rate and Rhythm: Normal rate and regular rhythm.      Pulses: Normal  pulses.   Pulmonary:      Effort: Pulmonary effort is normal.   Skin:     Capillary Refill: Capillary refill takes less than 2 seconds.   Neurological:      General: No focal deficit present.      Mental Status: She is alert.   Psychiatric:         Mood and Affect: Mood normal.       Diagnostic Test Results:  No results found for any visits on 03/03/21.    ASSESSMENT/PLAN:       1. Upper respiratory tract infection, unspecified type  With asthma history and persistence of symptoms at day 8; will treat as a bronchitis/developing sinusitis contributing to her symptoms.  Discussed likely viral and abx may not make significant difference until infection clears on it's own; and patient is in understanding of this.  Supportive cares.  Rx for doxycycline 100mg BID x 10 days.  Albuterol prn.  Note provided for work.  - doxycycline hyclate (VIBRAMYCIN) 100 MG capsule; Take 1 capsule (100 mg) by mouth 2 times daily for 10 days  Dispense: 20 capsule; Refill: 0  - albuterol (PROAIR HFA/PROVENTIL HFA/VENTOLIN HFA) 108 (90 Base) MCG/ACT inhaler; Inhale 2 puffs into the lungs every 4 hours as needed for shortness of breath / dyspnea or wheezing  Dispense: 1 Inhaler; Refill: 2      Lyla Hendrix DO  [facility] AND [facility]

## 2021-03-03 NOTE — NURSING NOTE
"Chief Complaint   Patient presents with     Fever       Initial /78   Pulse 88   Temp 97.8  F (36.6  C) (Tympanic)   Resp 20   Wt 110.8 kg (244 lb 4 oz)   SpO2 99%   BMI 41.93 kg/m   Estimated body mass index is 41.93 kg/m  as calculated from the following:    Height as of 12/17/20: 1.626 m (5' 4\").    Weight as of this encounter: 110.8 kg (244 lb 4 oz).  Medication Reconciliation: complete    Mila Garcia LPN  "

## 2021-03-03 NOTE — PATIENT INSTRUCTIONS
"Patient Education     Viral Syndrome (Adult)  A viral illness may cause a number of symptoms such as fever. Other symptoms depend on the part of the body that the virus affects. If it settles in your nose, throat, and lungs, it may cause cough, sore throat, congestion, runny nose, headache, earache and other ear symptoms, or shortness of breath. If it settles in your stomach and intestinal tract, it may cause nausea, vomiting, cramping, and diarrhea. Sometimes it causes generalized symptoms like \"aching all over,\" feeling tired, loss of energy, or loss of appetite.  A viral illness usually lasts anywhere from several days to several weeks, but sometimes it lasts longer. In some cases, a more serious infection can look like a viral syndrome in the first few days of the illness. You may need another exam and additional tests to know the difference. Watch for the warning signs listed below for when to seek medical advice.  Home care  Follow these guidelines for taking care of yourself at home:    If symptoms are severe, rest at home for the first 2 to 3 days.    Stay away from cigarette smoke - both your smoke and the smoke from others.    You may use over-the-counter acetaminophen or ibuprofen for fever, muscle aching, and headache, unless another medicine was prescribed for this. If you have chronic liver or kidney disease or ever had a stomach ulcer or gastrointestinal bleeding, talk with your healthcare provider before using these medicines. No one who is younger than 18 and ill with a fever should take aspirin. It may cause severe disease or death.    Your appetite may be poor, so a light diet is fine. Avoid dehydration by drinking 8 to 12, 8-ounce glasses of fluids each day. This may include water; orange juice; lemonade; apple, grape, and cranberry juice; clear fruit drinks; electrolyte replacement and sports drinks; and decaffeinated teas and coffee. If you have been diagnosed with a kidney disease, ask your " healthcare provider how much and what types of fluids you should drink to prevent dehydration. If you have kidney disease, drinking too much fluid can cause it build up in the your body and be dangerous to your health.    Over-the-counter remedies won't shorten the length of the illness but may be helpful for symptoms such as cough, sore throat, nasal and sinus congestion, or diarrhea. Don't use decongestants if you have high blood pressure.  Follow-up care  Follow up with your healthcare provider if you do not improve over the next week.  Call 911  Call 911 if any of the following occur:    Convulsion    Feeling weak, dizzy, or like you are going to faint    Chest pain, or more than mild shortness of breath  When to seek medical advice  Call your healthcare provider right away if any of these occur:    Cough with lots of colored sputum (mucus) or blood in your sputum    Chest pain, shortness of breath, wheezing, or trouble breathing    Severe headache; face, neck, or ear pain    Severe, constant pain in the lower right side of your belly (abdominal)    Continued vomiting (can t keep liquids down)    Frequent diarrhea (more than 5 times a day); blood (red or black color) or mucus in diarrhea    Feeling weak, dizzy, or like you are going to faint    Extreme thirst    Fever of 100.4 F (38 C) or higher, or as directed by your healthcare provider  Mamie last reviewed this educational content on 4/1/2018 2000-2020 [employer]. All rights reserved. This information is not intended as a substitute for professional medical care. Always follow your healthcare professional's instructions.         Patient Education     Viral Upper Respiratory Illness (Adult)    You have a viral upper respiratory illness (URI), which is another term for the common cold. This illness is contagious during the first few days. It is spread through the air by coughing and sneezing. It may also be spread by direct contact  (touching the sick person and then touching your own eyes, nose, or mouth). Frequent handwashing will decrease risk of spread. Most viral illnesses go away within 7 to 10 days with rest and simple home remedies. Sometimes the illness may last for several weeks. Antibiotics will not kill a virus, and they are generally not prescribed for this condition.  Home care    If symptoms are severe, rest at home for the first 2 to 3 days. When you resume activity, don't let yourself get too tired.    Don't smoke. If you need help stopping, talk with your healthcare provider.    Avoid being exposed to cigarette smoke (yours or others ).    You may use acetaminophen or ibuprofen to control pain and fever, unless another medicine was prescribed. If you have chronic liver or kidney disease, have ever had a stomach ulcer or gastrointestinal bleeding, or are taking blood-thinning medicines, talk with your healthcare provider before using these medicines. Aspirin should never be given to anyone under 18 years of age who is ill with a viral infection or fever. It may cause severe liver or brain damage.    Your appetite may be poor, so a light diet is fine. Stay well hydrated by drinking 6 to 8 glasses of fluids per day (water, soft drinks, juices, tea, or soup). Extra fluids will help loosen secretions in the nose and lungs.    Over-the-counter cold medicines will not shorten the length of time you re sick, but they may be helpful for the following symptoms: cough, sore throat, and nasal and sinus congestion. If you take prescription medicines, ask your healthcare provider or pharmacist which over-the-counter medicines are safe to use. (Note: Don't use decongestants if you have high blood pressure.)  Follow-up care  Follow up with your healthcare provider, or as advised.  When to seek medical advice  Call your healthcare provider right away if any of these occur:    Cough with lots of colored sputum (mucus)    Severe headache; face,  neck, or ear pain    Difficulty swallowing due to throat pain    Fever of 100.4 F (38 C) or higher, or as directed by your healthcare provider  Call 911  Call 911 if any of these occur:    Chest pain, shortness of breath, wheezing, or difficulty breathing    Coughing up blood    Very severe pain with swallowing, especially if it goes along with a muffled voice   Mamie last reviewed this educational content on 6/1/2018 2000-2020 [employer]. All rights reserved. This information is not intended as a substitute for professional medical care. Always follow your healthcare professional's instructions.

## 2021-03-03 NOTE — LETTER
My Asthma Action Plan    Name: Nohelia Crockett   YOB: 1980  Date: 3/3/2021   My doctor: Lyla Hendrix, DO   My clinic: [facility]        My Rescue Medicine:   Albuterol inhaler (Proair/Ventolin/Proventil HFA)  2-4 puffs EVERY 4 HOURS as needed. Use a spacer if recommended by your provider.   My Asthma Severity:   Intermittent / Exercise Induced  Know your asthma triggers: Patient is unaware of triggers and feels like she doesn't have asthma anymore since quiting smoking              GREEN ZONE   Good Control    I feel good    No cough or wheeze    Can work, sleep and play without asthma symptoms       Take your asthma control medicine every day.     1. If exercise triggers your asthma, take your rescue medication    15 minutes before exercise or sports, and    During exercise if you have asthma symptoms  2. Spacer to use with inhaler: If you have a spacer, make sure to use it with your inhaler             YELLOW ZONE Getting Worse  I have ANY of these:    I do not feel good    Cough or wheeze    Chest feels tight    Wake up at night   1. Keep taking your Green Zone medications  2. Start taking your rescue medicine:    every 20 minutes for up to 1 hour. Then every 4 hours for 24-48 hours.  3. If you stay in the Yellow Zone for more than 12-24 hours, contact your doctor.  4. If you do not return to the Green Zone in 12-24 hours or you get worse, start taking your oral steroid medicine if prescribed by your provider.           RED ZONE Medical Alert - Get Help  I have ANY of these:    I feel awful    Medicine is not helping    Breathing getting harder    Trouble walking or talking    Nose opens wide to breathe       1. Take your rescue medicine NOW  2. If your provider has prescribed an oral steroid medicine, start taking it NOW  3. Call your doctor NOW  4. If you are still in the Red Zone after 20 minutes and you have not reached your doctor:    Take your rescue medicine  again and    Call 911 or go to the emergency room right away    See your regular doctor within 2 weeks of an Emergency Room or Urgent Care visit for follow-up treatment.          Annual Reminders:  Meet with Asthma Educator,  Flu Shot in the Fall, consider Pneumonia Vaccination for patients with asthma (aged 19 and older).    Pharmacy: [facility] [address]    Electronically signed by Lyla Hendrix, DO   Date: 03/03/21                    Asthma Triggers  How To Control Things That Make Your Asthma Worse    Triggers are things that make your asthma worse.  Look at the list below to help you find your triggers and   what you can do about them. You can help prevent asthma flare-ups by staying away from your triggers.      Trigger                                                          What you can do   Cigarette Smoke  Tobacco smoke can make asthma worse. Do not allow smoking in your home, car or around you.  Be sure no one smokes at a child s day care or school.  If you smoke, ask your health care provider for ways to help you quit.  Ask family members to quit too.  Ask your health care provider for a referral to Quit Plan to help you quit smoking, or call 0-549-489-PLAN.     Colds, Flu, Bronchitis  These are common triggers of asthma. Wash your hands often.  Don t touch your eyes, nose or mouth.  Get a flu shot every year.     Dust Mites  These are tiny bugs that live in cloth or carpet. They are too small to see. Wash sheets and blankets in hot water every week.   Encase pillows and mattress in dust mite proof covers.  Avoid having carpet if you can. If you have carpet, vacuum weekly.   Use a dust mask and HEPA vacuum.   Pollen and Outdoor Mold  Some people are allergic to trees, grass, or weed pollen, or molds. Try to keep your windows closed.  Limit time out doors when pollen count is high.   Ask you health care provider about taking medicine during allergy season.      Animal Dander  Some people are allergic to skin flakes, urine or saliva from pets with fur or feathers. Keep pets with fur or feathers out of your home.    If you can t keep the pet outdoors, then keep the pet out of your bedroom.  Keep the bedroom door closed.  Keep pets off cloth furniture and away from stuffed toys.     Mice, Rats, and Cockroaches  Some people are allergic to the waste from these pests.   Cover food and garbage.  Clean up spills and food crumbs.  Store grease in the refrigerator.   Keep food out of the bedroom.   Indoor Mold  This can be a trigger if your home has high moisture. Fix leaking faucets, pipes, or other sources of water.   Clean moldy surfaces.  Dehumidify basement if it is damp and smelly.   Smoke, Strong Odors, and Sprays  These can reduce air quality. Stay away from strong odors and sprays, such as perfume, powder, hair spray, paints, smoke incense, paint, cleaning products, candles and new carpet.   Exercise or Sports  Some people with asthma have this trigger. Be active!  Ask your doctor about taking medicine before sports or exercise to prevent symptoms.    Warm up for 5-10 minutes before and after sports or exercise.     Other Triggers of Asthma  Cold air:  Cover your nose and mouth with a scarf.  Sometimes laughing or crying can be a trigger.  Some medicines and food can trigger asthma.

## 2021-03-03 NOTE — LETTER
March 3, 2021      Nohelia Crockett  [address] [address]        To Whom It May Concern,     Nohelia was seen in the office today; and treated for an acute bronchitis infection.  She had negative Covid testing; and would be okay to return to work with her normally scheduled shift on Wednesday, 3/10/2021.  Please excuse her absences: 3/1/2021, 3/2/2021, 3/5/2021, 3/6/2021, 3/7/2021.      Sincerely,            Lyla Hendrix, DO  Family Practice

## 2021-03-04 ASSESSMENT — ASTHMA QUESTIONNAIRES: ACT_TOTALSCORE: 25

## 2021-05-07 ENCOUNTER — TELEPHONE (OUTPATIENT)
Dept: FAMILY MEDICINE | Facility: OTHER | Age: 41
End: 2021-05-07

## 2021-05-07 NOTE — TELEPHONE ENCOUNTER
Patient would like a call to discuss which COVID vaccine is best for her to get due to her allergies.     Please advise.     Thank you    Luiza Varner on 5/7/2021 at 1:22 PM

## 2021-05-07 NOTE — TELEPHONE ENCOUNTER
Call to patient, verified name/.   Pt aware provider is out of office and will wait for provider return for recommendations.   Reba Canas RN ,....................  2021   1:45 PM

## 2021-05-10 NOTE — TELEPHONE ENCOUNTER
Called and verified patient full name and . Notified patient of SMS note. She will call back to schedule COVID vaccine.     Talya Brooks LPN on 5/10/2021 at 3:33 PM

## 2021-05-10 NOTE — TELEPHONE ENCOUNTER
Her allergies shouldn't interact with any version of the vaccine; whatever is the easiest for her to sign up and get would be my recommendation.  We have Pfizer at the clinic and has done well for those with multiple allergies.  Please help her schedule if she desires.  Lyla Hendrix, DO on 5/10/2021 at 12:04 PM

## 2021-05-12 ENCOUNTER — IMMUNIZATION (OUTPATIENT)
Dept: FAMILY MEDICINE | Facility: OTHER | Age: 41
End: 2021-05-12
Attending: FAMILY MEDICINE
Payer: COMMERCIAL

## 2021-05-12 PROCEDURE — 91300 PR COVID VAC PFIZER DIL RECON 30 MCG/0.3 ML IM: CPT

## 2021-05-27 ENCOUNTER — OFFICE VISIT (OUTPATIENT)
Dept: FAMILY MEDICINE | Facility: OTHER | Age: 41
End: 2021-05-27
Attending: FAMILY MEDICINE
Payer: COMMERCIAL

## 2021-05-27 VITALS
SYSTOLIC BLOOD PRESSURE: 112 MMHG | WEIGHT: 239 LBS | TEMPERATURE: 97.6 F | BODY MASS INDEX: 41.02 KG/M2 | OXYGEN SATURATION: 98 % | RESPIRATION RATE: 20 BRPM | HEART RATE: 94 BPM | DIASTOLIC BLOOD PRESSURE: 66 MMHG

## 2021-05-27 DIAGNOSIS — L02.211 CUTANEOUS ABSCESS OF ABDOMINAL WALL: Primary | ICD-10-CM

## 2021-05-27 PROCEDURE — 99213 OFFICE O/P EST LOW 20 MIN: CPT | Performed by: FAMILY MEDICINE

## 2021-05-27 PROCEDURE — 87070 CULTURE OTHR SPECIMN AEROBIC: CPT | Mod: ZL | Performed by: FAMILY MEDICINE

## 2021-05-27 PROCEDURE — G0463 HOSPITAL OUTPT CLINIC VISIT: HCPCS

## 2021-05-27 RX ORDER — CLINDAMYCIN HCL 300 MG
300 CAPSULE ORAL 3 TIMES DAILY
Qty: 21 CAPSULE | Refills: 0 | Status: SHIPPED | OUTPATIENT
Start: 2021-05-27 | End: 2023-03-28

## 2021-05-27 SDOH — HEALTH STABILITY: MENTAL HEALTH: HOW OFTEN DO YOU HAVE 6 OR MORE DRINKS ON ONE OCCASION?: NOT ASKED

## 2021-05-27 SDOH — HEALTH STABILITY: MENTAL HEALTH: HOW OFTEN DO YOU HAVE A DRINK CONTAINING ALCOHOL?: NOT ASKED

## 2021-05-27 SDOH — HEALTH STABILITY: MENTAL HEALTH: HOW MANY STANDARD DRINKS CONTAINING ALCOHOL DO YOU HAVE ON A TYPICAL DAY?: NOT ASKED

## 2021-05-27 ASSESSMENT — PAIN SCALES - GENERAL: PAINLEVEL: NO PAIN (0)

## 2021-05-27 NOTE — PROGRESS NOTES
"  SUBJECTIVE:   Nohelia Crockett is a 41 year old female who presents to clinic today for the following health issues:    HPI  Nohelia is here for concerns of a possible infected cyst on her abdomen.  This has been going on and off for years.  Has chronic recurrent issues with this same area.  Getting slightly bigger within the last week or so.  Painful: slightly  Has tried: soaks, \"bandaid\"      Patient Active Problem List    Diagnosis Date Noted     Suspected sleep apnea 05/08/2019     Priority: Medium     Asthma 02/19/2018     Priority: Medium     Overview:   intermittent       Attention deficit disorder 02/19/2018     Priority: Medium     Knee pain 02/19/2018     Priority: Medium     Overview:   secondary to patellofemoral dysfunction       Learning disability 02/19/2018     Priority: Medium     Migraine headache 02/19/2018     Priority: Medium     Overview:   Occasional severe HA, does not require daily prophylaxis       Obesity (BMI 35.0-39.9 without comorbidity) 12/14/2016     Priority: Medium     Adhesive capsulitis of right shoulder 06/06/2016     Priority: Medium     S/P shoulder surgery 06/06/2016     Priority: Medium     Gastroesophageal reflux disease without esophagitis 05/26/2016     Priority: Medium     Pain of multiple sites 10/19/2015     Priority: Medium     History of arthroscopy of right knee 07/02/2015     Priority: Medium     Plantar fasciitis 11/14/2014     Priority: Medium     H/O excision of mass 09/03/2014     Priority: Medium     Genital herpes 12/07/2009     Priority: Medium     Overview:   Has had only one outbreak       Past Medical History:   Diagnosis Date     Adhesive capsulitis of right shoulder     6/6/2016     Cyst of ovary 2006    Left     Localized swelling, mass, or lump of upper extremity 08/26/2014     Migraine without status migrainosus, not intractable     Occasional severe HA, does not require daily prophylaxis     Other developmental disorders of scholastic skills  "     Other specified behavioral and emotional disorders with onset usually occurring in childhood and adolescence      Other specified postprocedural states 2014     Other specified postprocedural states 2016     Peptic ulcer without hemorrhage or perforation      Personal history of urinary calculi     Right     Plantar fascial fibromatosis 2014     Uncomplicated asthma     intermittent      Past Surgical History:   Procedure Laterality Date     APPENDECTOMY OPEN       ARTHROSCOPY SHOULDER      10/05,debridement and subacromial decompression of right shoulder.     ARTHROSCOPY SHOULDER  2007    acromioplasty distal clavicle excision, and extensive debridement of the bursa of the right shoulder by Dr. Villa.  Also left?     CHOLECYSTECTOMY       CYSTOSCOPY      with retrogrades     DILATION AND CURETTAGE  2007    Ablation     LAPAROSCOPIC TUBAL LIGATION       LITHOTRIPSY  2004    extracorporeal shockwave     OTHER SURGICAL HISTORY  2012     OTHER SURGICAL HISTORY Right 2014    Pathology showed epidermal inclusion cysts     RELEASE CARPAL TUNNEL  2012     Family History   Problem Relation Age of Onset     Cancer Mother 46        Cancer,Lung cancer     Blood Disease Mother         Blood Disease     Asthma Father         Asthma     Diabetes Father         Diabetes,2     Hypertension Father         Hypertension     Heart Disease Father         Heart Disease,CHF     Other - See Comments Father          at age 65 from complications of a motor vehicle accident.     Family History Negative Sister         Good Health     Family History Negative Sister         Good Health     Breast Cancer No family hx of         Cancer-breast     Colon Cancer No family hx of         Cancer-colon     Prostate Cancer No family hx of         Cancer-prostate     Ovarian Cancer No family hx of         Cancer-ovarian     Anesthesia Reaction No family hx of         Anesthesia  Problem     Social History     Tobacco Use     Smoking status: Former Smoker     Packs/day: 0.50     Years: 16.00     Pack years: 8.00     Types: Cigarettes     Quit date: 2016     Years since quittin.4     Smokeless tobacco: Never Used   Substance Use Topics     Alcohol use: Yes     Alcohol/week: 0.0 standard drinks     Comment: Alcoholic Drinks/day: rarely     Social History     Social History Narrative    Patient is  (2018).  Lives with her son (Herman); daughter (Jluis) lives on her own.  Dog ( animal) - Moises, ~40# Brindle, mixed breed.    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     Current Outpatient Medications   Medication Sig Dispense Refill     albuterol (PROAIR HFA/PROVENTIL HFA/VENTOLIN HFA) 108 (90 Base) MCG/ACT inhaler Inhale 2 puffs into the lungs every 4 hours as needed for shortness of breath / dyspnea or wheezing 1 Inhaler 2     Allergies   Allergen Reactions     Penicillins Anaphylaxis     Aspirin Hives     Oxycodone-Acetaminophen Other (See Comments)     Can't take too high of a dose/ causes nausea     OBJECTIVE:     There were no vitals taken for this visit.  There is no height or weight on file to calculate BMI.  Physical Exam  Vitals signs and nursing note reviewed.   Constitutional:       Appearance: Normal appearance. She is obese.   HENT:      Head: Normocephalic and atraumatic.   Pulmonary:      Effort: Pulmonary effort is normal.   Abdominal:      General: Abdomen is flat.      Comments: + pannus; marble sized erythematous skin/subq nodule.  No fluctuance or active drainage.  Small pinhole opening at superior margin swells with purulent fluid, but unable to express any further contents.  Surrounding skin normal color but with some pox/dimpling.   Neurological:      Mental Status: She is alert.     Diagnostic Test Results:  No results found for  any visits on 05/27/21.    ASSESSMENT/PLAN:     1. Cutaneous abscess of abdominal wall  Mild confined to superficial layers and would normally prevent worsening with hot soaks, topical bacitracin or neosporin and monitoring - however due to chronicity of lesion and compromise/risk of tracks to surrounding skin, electing to cover with clindamycin TID x 7 days.  Continue other supportive measures.  Consideration for surgery referral for cyst excision vs consideration for panniculectomy due to pannus/excess skin.  Patient will notify me if desires referral to discuss further.  - Wound Culture  - clindamycin (CLEOCIN) 300 MG capsule; Take 1 capsule (300 mg) by mouth 3 times daily for 7 days  Dispense: 21 capsule; Refill: 0      DO ZANDRA [facility]

## 2021-05-27 NOTE — NURSING NOTE
"Chief Complaint   Patient presents with     Derm Problem     cyst on abdomen       Initial /66   Pulse 94   Temp 97.6  F (36.4  C) (Tympanic)   Resp 20   Wt 108.4 kg (239 lb)   SpO2 98%   BMI 41.02 kg/m   Estimated body mass index is 41.02 kg/m  as calculated from the following:    Height as of 12/17/20: 1.626 m (5' 4\").    Weight as of this encounter: 108.4 kg (239 lb).  Medication Reconciliation: complete    Mila Garcia LPN  "

## 2021-05-29 LAB
BACTERIA SPEC CULT: NORMAL
SPECIMEN SOURCE: NORMAL

## 2021-06-04 ENCOUNTER — IMMUNIZATION (OUTPATIENT)
Dept: FAMILY MEDICINE | Facility: OTHER | Age: 41
End: 2021-06-04
Attending: FAMILY MEDICINE
Payer: COMMERCIAL

## 2021-06-04 PROCEDURE — 91300 PR COVID VAC PFIZER DIL RECON 30 MCG/0.3 ML IM: CPT

## 2021-08-19 ENCOUNTER — MYC MEDICAL ADVICE (OUTPATIENT)
Dept: FAMILY MEDICINE | Facility: OTHER | Age: 41
End: 2021-08-19

## 2021-08-20 ENCOUNTER — OFFICE VISIT (OUTPATIENT)
Dept: FAMILY MEDICINE | Facility: OTHER | Age: 41
End: 2021-08-20
Attending: NURSE PRACTITIONER
Payer: COMMERCIAL

## 2021-08-20 VITALS
TEMPERATURE: 98 F | HEART RATE: 94 BPM | RESPIRATION RATE: 18 BRPM | DIASTOLIC BLOOD PRESSURE: 80 MMHG | BODY MASS INDEX: 40.16 KG/M2 | SYSTOLIC BLOOD PRESSURE: 134 MMHG | HEIGHT: 64 IN | WEIGHT: 235.25 LBS | OXYGEN SATURATION: 98 %

## 2021-08-20 DIAGNOSIS — E66.01 MORBID OBESITY (H): ICD-10-CM

## 2021-08-20 DIAGNOSIS — N76.4 VULVAR ABSCESS: Primary | ICD-10-CM

## 2021-08-20 PROCEDURE — 99213 OFFICE O/P EST LOW 20 MIN: CPT | Performed by: NURSE PRACTITIONER

## 2021-08-20 PROCEDURE — G0463 HOSPITAL OUTPT CLINIC VISIT: HCPCS

## 2021-08-20 RX ORDER — METRONIDAZOLE 500 MG/1
500 TABLET ORAL 3 TIMES DAILY
Qty: 21 TABLET | Refills: 0 | Status: SHIPPED | OUTPATIENT
Start: 2021-08-20 | End: 2021-08-27

## 2021-08-20 RX ORDER — SULFAMETHOXAZOLE/TRIMETHOPRIM 800-160 MG
1 TABLET ORAL 2 TIMES DAILY
Qty: 14 TABLET | Refills: 0 | Status: SHIPPED | OUTPATIENT
Start: 2021-08-20 | End: 2021-08-27

## 2021-08-20 ASSESSMENT — PAIN SCALES - GENERAL: PAINLEVEL: EXTREME PAIN (8)

## 2021-08-20 ASSESSMENT — MIFFLIN-ST. JEOR: SCORE: 1707.09

## 2021-08-20 NOTE — PATIENT INSTRUCTIONS
Follow up on Monday 8/23 with [facility] OB team for possible I & D.     Take Flagyl and Bactrim as prescribed for vulvar abscess.   Follow up in the ED if you develop worsening pain, fever, or concerns over the weekend.

## 2021-08-20 NOTE — PROGRESS NOTES
ASSESSMENT/PLAN:    I have reviewed the nursing notes.  I have reviewed the findings, diagnosis, plan and need for follow up with the patient.    1. Vulvar abscess  There is an abscess present on left labia majora that measures approximately 1.5 cm in diameter. It is firm without fluctuance, erythematous, and significantly tender to touch. No drainage is observed, thus no culture obtained. Afebrile. Per Up To Date recommendations, flagyl and bactrim prescribed. OB referral also placed and discussed with patient for possible I & D.   - metroNIDAZOLE (FLAGYL) 500 MG tablet; Take 1 tablet (500 mg) by mouth 3 times daily for 7 days  Dispense: 21 tablet; Refill: 0  - sulfamethoxazole-trimethoprim (BACTRIM DS) 800-160 MG tablet; Take 1 tablet by mouth 2 times daily for 7 days  Dispense: 14 tablet; Refill: 0  -May use over-the-counter Tylenol or ibuprofen PRN pain  -recommend moist warm packs 4-6x per day for 15-20 minute periods as able.     Discussed warning signs/symptoms indicative of need to f/u    Follow up if symptoms persist or worsen or concerns    I explained my diagnostic considerations and recommendations to the patient, who voiced understanding and agreement with the treatment plan. All questions were answered. We discussed potential side effects of any prescribed or recommended therapies, as well as expectations for response to treatments.    Talya Simms NP  8/20/2021  5:23 PM    HPI:  Nohelia Crockett is a 41 year old female who [facility] for concerns of redness, swelling, and pain to her vaginal area rated 8/10 from what she believes is an ingrown hair. This started on Wednesday 2 days ago. Has been using moist warm packs with no relief. Has had a history of this happening before, requiring antibiotics. Has not had to have them lanced before. Shaves the area, has been advised not to do that but continues as it is habit/routine. Thinks this is part of the problem. No fever or  chills. Does not feel generally ill.       Past Medical History:   Diagnosis Date     Adhesive capsulitis of right shoulder     2016     Cyst of ovary 2006    Left     Localized swelling, mass, or lump of upper extremity 2014     Migraine without status migrainosus, not intractable     Occasional severe HA, does not require daily prophylaxis     Other developmental disorders of scholastic skills      Other specified behavioral and emotional disorders with onset usually occurring in childhood and adolescence      Other specified postprocedural states 2014     Other specified postprocedural states 2016     Peptic ulcer without hemorrhage or perforation      Personal history of urinary calculi 2004    Right     Plantar fascial fibromatosis 2014     Uncomplicated asthma     intermittent     Past Surgical History:   Procedure Laterality Date     APPENDECTOMY OPEN       ARTHROSCOPY SHOULDER      10/05,debridement and subacromial decompression of right shoulder.     ARTHROSCOPY SHOULDER  2007    acromioplasty distal clavicle excision, and extensive debridement of the bursa of the right shoulder by Dr. Villa.  Also left?     CHOLECYSTECTOMY       CYSTOSCOPY      with retrogrades     DILATION AND CURETTAGE  2007    Ablation     LAPAROSCOPIC TUBAL LIGATION       LITHOTRIPSY  2004    extracorporeal shockwave     OTHER SURGICAL HISTORY  2012     OTHER SURGICAL HISTORY Right 2014    Pathology showed epidermal inclusion cysts     RELEASE CARPAL TUNNEL       Social History     Tobacco Use     Smoking status: Former Smoker     Packs/day: 0.50     Years: 16.00     Pack years: 8.00     Types: Cigarettes     Quit date: 2016     Years since quittin.6     Smokeless tobacco: Never Used   Substance Use Topics     Alcohol use: Yes     Comment: Alcoholic Drinks/day: occasionally     Current Outpatient Medications   Medication Sig Dispense Refill     albuterol  "(PROAIR HFA/PROVENTIL HFA/VENTOLIN HFA) 108 (90 Base) MCG/ACT inhaler Inhale 2 puffs into the lungs every 4 hours as needed for shortness of breath / dyspnea or wheezing 1 Inhaler 2     metroNIDAZOLE (FLAGYL) 500 MG tablet Take 1 tablet (500 mg) by mouth 3 times daily for 7 days 21 tablet 0     sulfamethoxazole-trimethoprim (BACTRIM DS) 800-160 MG tablet Take 1 tablet by mouth 2 times daily for 7 days 14 tablet 0     Allergies   Allergen Reactions     Penicillins Anaphylaxis     Aspirin Hives     Oxycodone-Acetaminophen Other (See Comments)     Can't take too high of a dose/ causes nausea     Past medical history, past surgical history, current medications and allergies reviewed and accurate to the best of my knowledge.      ROS:  Refer to HPI    /80 (BP Location: Left arm, Patient Position: Sitting, Cuff Size: Adult Large)   Pulse 94   Temp 98  F (36.7  C) (Tympanic)   Resp 18   Ht 1.626 m (5' 4\")   Wt 106.7 kg (235 lb 4 oz)   LMP  (LMP Unknown)   SpO2 98%   Breastfeeding No   BMI 40.38 kg/m      EXAM:  General Appearance: Well appearing 41 year old female, appropriate appearance for age. No acute distress  Respiratory: non labored respirations  : left labia majora: there is an erythematous, firm abscess measuring approximately 1.5 cm in diameter. No drainage observed. Significantly tender to touch.   Psychological: normal affect, alert, oriented, and pleasant.     "

## 2021-08-20 NOTE — TELEPHONE ENCOUNTER
May need puncture/drainage if it is enlarging; otherwise antibiotics will not clear it up.  I would recommend EVERT Hendrix, DO

## 2021-08-20 NOTE — NURSING NOTE
Patient presents to clinic experiencing redness, pain rated 8 from ingrown hair to pubic area.  Medication Reconciliation: complete    Maia Avila LPN

## 2021-08-21 ASSESSMENT — ASTHMA QUESTIONNAIRES: ACT_TOTALSCORE: 22

## 2021-09-01 ENCOUNTER — OFFICE VISIT (OUTPATIENT)
Dept: OBGYN | Facility: OTHER | Age: 41
End: 2021-09-01
Attending: NURSE PRACTITIONER
Payer: COMMERCIAL

## 2021-09-01 VITALS
BODY MASS INDEX: 39.12 KG/M2 | DIASTOLIC BLOOD PRESSURE: 84 MMHG | WEIGHT: 227.9 LBS | HEART RATE: 92 BPM | SYSTOLIC BLOOD PRESSURE: 132 MMHG

## 2021-09-01 DIAGNOSIS — N76.4 VULVAR ABSCESS: ICD-10-CM

## 2021-09-01 PROCEDURE — 99214 OFFICE O/P EST MOD 30 MIN: CPT | Performed by: STUDENT IN AN ORGANIZED HEALTH CARE EDUCATION/TRAINING PROGRAM

## 2021-09-01 PROCEDURE — G0463 HOSPITAL OUTPT CLINIC VISIT: HCPCS

## 2021-09-01 ASSESSMENT — PAIN SCALES - GENERAL: PAINLEVEL: NO PAIN (0)

## 2021-09-01 NOTE — PROGRESS NOTES
GYN clinic visit     S:         Ms. Crockett is a 40yo here as a follow up to a recent [facility] visit for a vulvar/right mons abscess. She first noticed the symptoms earlier this week and it had become painful. [facility] and was started on antibiotics she completed the courses of flagyl and bactrim. At that time there was no area of fluctuance and drainage was not performed as there was not region to drain yet.      She follows up today and notes that it is feeling much better. It has started to drain spontaneously on its own and the pain has subsided. She denies any fevers, chills, worsening of the redness area. She denies any other symptoms as well and feels as though it is getting better.     O:  /84   Pulse 92   Wt 103.4 kg (227 lb 14.4 oz)   LMP  (LMP Unknown)   Breastfeeding No   BMI 39.12 kg/m      Gen:    Well-appearing, NAD  Pulm:   nonlabored  Abd:     Soft, non-tender, non-distended  Ext:      No LE edema, extremities warm and well perfused     Pelvic/Mons: Area of approximately 1 cm in size located on the superior region of left labia minora.There is a small opening to the lesion and scant blood-tinged drainage noted. This region was washed and infiltrated with a small amount of local anesthetic. After she was numb, a small incision was made to further open this draining opening. A small Q-tip was used to gently clean out the small region of the abscess (approixmately 2 mm in depth) It was noted to be too shallow for packing. Silver nitrate was used to provide hemostasis.       A/P:  Ms. Crockett is a 40 yo here for follow up of a vulvar abscess which is healing well. She is s/p course of flagyl and bactrim. Now s/p drainage   -She will continue with warm compresses, antibiotics to complete the course and packing changes until the pocket is too small to hold it.  -Return precautions discussed. Will follow up if any new concerns arise    Total amount of time spent  during today's encounter was 30 minutes  ROOPA MCKEE MD on 9/1/2021 at 12:54 PM

## 2021-09-01 NOTE — NURSING NOTE
Pt presents to clinic today for consult on vulvar abscess on left labia that she states has drained  but is still present.    Medication Reconciliation: complete  Kenia Avilez LPN

## 2021-09-28 ENCOUNTER — HOSPITAL ENCOUNTER (OUTPATIENT)
Dept: MRI IMAGING | Facility: OTHER | Age: 41
Discharge: HOME | End: 2021-09-28
Attending: PSYCHIATRY & NEUROLOGY | Admitting: PSYCHIATRY & NEUROLOGY
Payer: COMMERCIAL

## 2021-09-28 DIAGNOSIS — G93.9 CHRONIC NON-SPECIFIC WHITE MATTER LESIONS ON MRI: ICD-10-CM

## 2021-09-28 PROCEDURE — A9575 INJ GADOTERATE MEGLUMI 0.1ML: HCPCS | Performed by: PSYCHIATRY & NEUROLOGY

## 2021-09-28 PROCEDURE — 70553 MRI BRAIN STEM W/O & W/DYE: CPT

## 2021-09-28 PROCEDURE — 255N000002 HC RX 255 OP 636: Performed by: PSYCHIATRY & NEUROLOGY

## 2021-09-28 RX ADMIN — GADOTERATE MEGLUMINE 20 ML: 376.9 INJECTION INTRAVENOUS at 17:20

## 2021-09-29 ENCOUNTER — OFFICE VISIT (OUTPATIENT)
Dept: FAMILY MEDICINE | Facility: OTHER | Age: 41
End: 2021-09-29
Attending: NURSE PRACTITIONER
Payer: COMMERCIAL

## 2021-09-29 VITALS
BODY MASS INDEX: 38.1 KG/M2 | SYSTOLIC BLOOD PRESSURE: 110 MMHG | HEIGHT: 65 IN | RESPIRATION RATE: 20 BRPM | TEMPERATURE: 98.6 F | WEIGHT: 228.7 LBS | DIASTOLIC BLOOD PRESSURE: 76 MMHG | HEART RATE: 92 BPM

## 2021-09-29 DIAGNOSIS — J02.9 SORE THROAT: Primary | ICD-10-CM

## 2021-09-29 LAB — GROUP A STREP BY PCR: NOT DETECTED

## 2021-09-29 PROCEDURE — 99213 OFFICE O/P EST LOW 20 MIN: CPT | Performed by: NURSE PRACTITIONER

## 2021-09-29 PROCEDURE — 87651 STREP A DNA AMP PROBE: CPT | Mod: ZL | Performed by: NURSE PRACTITIONER

## 2021-09-29 PROCEDURE — G0463 HOSPITAL OUTPT CLINIC VISIT: HCPCS

## 2021-09-29 ASSESSMENT — MIFFLIN-ST. JEOR: SCORE: 1693.26

## 2021-09-29 ASSESSMENT — PAIN SCALES - GENERAL: PAINLEVEL: MILD PAIN (2)

## 2021-09-29 NOTE — NURSING NOTE
"Chief Complaint   Patient presents with     Throat Problem     Started yesterday around 8pm yesterday. Had MRI a few hours before that     Patient stated her throat is scratchy.    Initial /76 (BP Location: Right arm, Patient Position: Sitting, Cuff Size: Adult Large)   Pulse 92   Temp 98.6  F (37  C) (Tympanic)   Resp 20   Ht 1.651 m (5' 5\")   Wt 103.7 kg (228 lb 11.2 oz)   Breastfeeding No   BMI 38.06 kg/m   Estimated body mass index is 38.06 kg/m  as calculated from the following:    Height as of this encounter: 1.651 m (5' 5\").    Weight as of this encounter: 103.7 kg (228 lb 11.2 oz).  Medication Reconciliation: Completed     Advanced Care Directive Reviewed    Irineo Palma LPN  "

## 2021-09-29 NOTE — PROGRESS NOTES
ASSESSMENT/PLAN:    I have reviewed the nursing notes.  I have reviewed the findings, diagnosis, plan and need for follow up with the patient.    1. Sore throat  Afebrile with Normal HEENT exam  - Group A Streptococcus PCR Throat Swab  Negative strep swab   Recommended covid swab which Nohelia declined at this time.   No antibiotics are indicated. Symptomatic treatment.   - Symptomatic treatment - Encouraged fluids, salt water gargles, honey (only if greater than 1 year in age due to risk of botulism), elevation, humidifier, sinus rinse/netti pot, lozenges, tea, topical vapor rub, popsicles, rest, etc   - May use over-the-counter Tylenol or ibuprofen PRN    Discussed warning signs/symptoms indicative of need to f/u    Follow up if symptoms persist or worsen or concerns    I explained my diagnostic considerations and recommendations to the patient, who voiced understanding and agreement with the treatment plan. All questions were answered. We discussed potential side effects of any prescribed or recommended therapies, as well as expectations for response to treatments.    Talya Simms NP  9/29/2021  4:49 PM    HPI:  Nohelia Crockett is a 41 year old female who presents [facility] today for concerns of dry, scratchy throat that started around 8 PM yesterday. She had an MRI yesterday a few hours prior. She denies fever/chills. No coughing, shortness of breath, chest pain. Tried honey with hot tea, cough drops, nothing is helping.     ROS otherwise negative.  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 Is vaccinated for covid. Declines covid positive exposure. Declines covid testing right now. States she is very cautious to avoid.         Past Medical History:   Diagnosis Date     Adhesive capsulitis of right shoulder     6/6/2016     Cyst of ovary 2006    Left     Localized swelling, mass, or lump of upper extremity 08/26/2014     Migraine without status migrainosus, not intractable     Occasional severe HA,  does not require daily prophylaxis     Other developmental disorders of scholastic skills      Other specified behavioral and emotional disorders with onset usually occurring in childhood and adolescence      Other specified postprocedural states 2014     Other specified postprocedural states 2016     Peptic ulcer without hemorrhage or perforation      Personal history of urinary calculi 2004    Right     Plantar fascial fibromatosis 2014     Uncomplicated asthma     intermittent     Past Surgical History:   Procedure Laterality Date     APPENDECTOMY OPEN       ARTHROSCOPY SHOULDER      10/05,debridement and subacromial decompression of right shoulder.     ARTHROSCOPY SHOULDER  2007    acromioplasty distal clavicle excision, and extensive debridement of the bursa of the right shoulder by Dr. Villa.  Also left?     CHOLECYSTECTOMY       CYSTOSCOPY      with retrogrades     DILATION AND CURETTAGE  2007    Ablation     LAPAROSCOPIC TUBAL LIGATION       LITHOTRIPSY  2004    extracorporeal shockwave     OTHER SURGICAL HISTORY  2012     OTHER SURGICAL HISTORY Right 2014    Pathology showed epidermal inclusion cysts     RELEASE CARPAL TUNNEL       Social History     Tobacco Use     Smoking status: Former Smoker     Packs/day: 0.50     Years: 16.00     Pack years: 8.00     Types: Cigarettes     Quit date: 2016     Years since quittin.7     Smokeless tobacco: Never Used   Substance Use Topics     Alcohol use: Yes     Comment: Alcoholic Drinks/day: occasionally     Current Outpatient Medications   Medication Sig Dispense Refill     albuterol (PROAIR HFA/PROVENTIL HFA/VENTOLIN HFA) 108 (90 Base) MCG/ACT inhaler Inhale 2 puffs into the lungs every 4 hours as needed for shortness of breath / dyspnea or wheezing 1 Inhaler 2     Allergies   Allergen Reactions     Penicillins Anaphylaxis     Aspirin Hives     Past medical history, past surgical history, current  "medications and allergies reviewed and accurate to the best of my knowledge.      ROS:  Refer to HPI    /76 (BP Location: Right arm, Patient Position: Sitting, Cuff Size: Adult Large)   Pulse 92   Temp 98.6  F (37  C) (Tympanic)   Resp 20   Ht 1.651 m (5' 5\")   Wt 103.7 kg (228 lb 11.2 oz)   Breastfeeding No   BMI 38.06 kg/m      EXAM:  General Appearance: Well appearing 41 year old female, appropriate appearance for age. No acute distress  Ears: Left TM intact, translucent with bony landmarks appreciated, no erythema, no effusion, no bulging, no purulence.  Right TM intact, translucent with bony landmarks appreciated, no erythema, no effusion, no bulging, no purulence.  Left auditory canal clear.  Right auditory canal clear.  Normal external ears, non tender.  Eyes: conjunctivae normal without erythema or irritation, corneas clear, no drainage or crusting, no eyelid swelling, pupils equal   Orophayrnx: moist mucous membranes, posterior pharynx without erythema, tonsils without hypertrophy, no erythema, no exudates or petechiae, no post nasal drip seen, no trismus, voice clear.    Sinuses:  No sinus tenderness upon palpation of the frontal or maxillary sinuses  Nose:  Bilateral nares: no erythema, no edema, no drainage or congestion   Neck: supple without adenopathy  Respiratory: normal chest wall and respirations.  Normal effort.  Clear to auscultation bilaterally, no wheezing, crackles or rhonchi.  No increased work of breathing.  No cough appreciated.  Cardiac: RRR with no murmurs  Psychological: normal affect, alert, oriented, and pleasant.     "

## 2021-10-09 ENCOUNTER — HEALTH MAINTENANCE LETTER (OUTPATIENT)
Age: 41
End: 2021-10-09

## 2021-10-31 ENCOUNTER — ALLIED HEALTH/NURSE VISIT (OUTPATIENT)
Dept: FAMILY MEDICINE | Facility: OTHER | Age: 41
End: 2021-10-31
Attending: FAMILY MEDICINE
Payer: COMMERCIAL

## 2021-10-31 DIAGNOSIS — Z20.822 EXPOSURE TO 2019 NOVEL CORONAVIRUS: Primary | ICD-10-CM

## 2021-10-31 DIAGNOSIS — Z20.822 COVID-19 RULED OUT: ICD-10-CM

## 2021-10-31 PROCEDURE — U0003 INFECTIOUS AGENT DETECTION BY NUCLEIC ACID (DNA OR RNA); SEVERE ACUTE RESPIRATORY SYNDROME CORONAVIRUS 2 (SARS-COV-2) (CORONAVIRUS DISEASE [COVID-19]), AMPLIFIED PROBE TECHNIQUE, MAKING USE OF HIGH THROUGHPUT TECHNOLOGIES AS DESCRIBED BY CMS-2020-01-R: HCPCS | Mod: ZL

## 2021-10-31 PROCEDURE — C9803 HOPD COVID-19 SPEC COLLECT: HCPCS

## 2021-11-01 LAB — SARS-COV-2 RNA RESP QL NAA+PROBE: NEGATIVE

## 2021-11-09 ENCOUNTER — ALLIED HEALTH/NURSE VISIT (OUTPATIENT)
Dept: FAMILY MEDICINE | Facility: OTHER | Age: 41
End: 2021-11-09
Attending: FAMILY MEDICINE
Payer: COMMERCIAL

## 2021-11-09 DIAGNOSIS — Z20.822 COVID-19 RULED OUT: Primary | ICD-10-CM

## 2021-11-09 PROCEDURE — C9803 HOPD COVID-19 SPEC COLLECT: HCPCS

## 2021-11-09 PROCEDURE — U0003 INFECTIOUS AGENT DETECTION BY NUCLEIC ACID (DNA OR RNA); SEVERE ACUTE RESPIRATORY SYNDROME CORONAVIRUS 2 (SARS-COV-2) (CORONAVIRUS DISEASE [COVID-19]), AMPLIFIED PROBE TECHNIQUE, MAKING USE OF HIGH THROUGHPUT TECHNOLOGIES AS DESCRIBED BY CMS-2020-01-R: HCPCS | Mod: ZL

## 2021-11-11 LAB — SARS-COV-2 RNA RESP QL NAA+PROBE: NEGATIVE

## 2022-01-29 ENCOUNTER — HEALTH MAINTENANCE LETTER (OUTPATIENT)
Age: 42
End: 2022-01-29

## 2022-03-02 ENCOUNTER — HOSPITAL ENCOUNTER (OUTPATIENT)
Dept: MAMMOGRAPHY | Facility: OTHER | Age: 42
End: 2022-03-02
Attending: FAMILY MEDICINE
Payer: COMMERCIAL

## 2022-03-02 ENCOUNTER — HOSPITAL ENCOUNTER (OUTPATIENT)
Dept: GENERAL RADIOLOGY | Facility: OTHER | Age: 42
End: 2022-03-02
Attending: PHYSICIAN ASSISTANT
Payer: COMMERCIAL

## 2022-03-02 ENCOUNTER — OFFICE VISIT (OUTPATIENT)
Dept: FAMILY MEDICINE | Facility: OTHER | Age: 42
End: 2022-03-02
Attending: PHYSICIAN ASSISTANT
Payer: COMMERCIAL

## 2022-03-02 VITALS
RESPIRATION RATE: 20 BRPM | OXYGEN SATURATION: 96 % | TEMPERATURE: 97.7 F | DIASTOLIC BLOOD PRESSURE: 83 MMHG | HEART RATE: 93 BPM | BODY MASS INDEX: 38.67 KG/M2 | SYSTOLIC BLOOD PRESSURE: 129 MMHG | WEIGHT: 232.4 LBS

## 2022-03-02 DIAGNOSIS — R10.2 PELVIC PAIN IN FEMALE: ICD-10-CM

## 2022-03-02 DIAGNOSIS — M25.562 ACUTE PAIN OF LEFT KNEE: ICD-10-CM

## 2022-03-02 DIAGNOSIS — Z11.3 SCREEN FOR STD (SEXUALLY TRANSMITTED DISEASE): ICD-10-CM

## 2022-03-02 DIAGNOSIS — Z01.419 PAP TEST, AS PART OF ROUTINE GYNECOLOGICAL EXAMINATION: ICD-10-CM

## 2022-03-02 DIAGNOSIS — Z12.31 VISIT FOR SCREENING MAMMOGRAM: ICD-10-CM

## 2022-03-02 DIAGNOSIS — R31.29 MICROSCOPIC HEMATURIA: ICD-10-CM

## 2022-03-02 DIAGNOSIS — M25.562 ACUTE PAIN OF LEFT KNEE: Primary | ICD-10-CM

## 2022-03-02 LAB
ALBUMIN UR-MCNC: 20 MG/DL
APPEARANCE UR: CLEAR
BILIRUB UR QL STRIP: NEGATIVE
C TRACH DNA SPEC QL PROBE+SIG AMP: NEGATIVE
CLUE CELLS: ABNORMAL
COLOR UR AUTO: ABNORMAL
GLUCOSE UR STRIP-MCNC: NEGATIVE MG/DL
HGB UR QL STRIP: ABNORMAL
KETONES UR STRIP-MCNC: NEGATIVE MG/DL
LEUKOCYTE ESTERASE UR QL STRIP: ABNORMAL
MUCOUS THREADS #/AREA URNS LPF: PRESENT /LPF
N GONORRHOEA DNA SPEC QL NAA+PROBE: NEGATIVE
NITRATE UR QL: NEGATIVE
PH UR STRIP: 6 [PH] (ref 5–9)
RBC URINE: 11 /HPF
SP GR UR STRIP: 1.02 (ref 1–1.03)
TRICHOMONAS, WET PREP: ABNORMAL
UROBILINOGEN UR STRIP-MCNC: NORMAL MG/DL
WBC URINE: 41 /HPF
WBC'S/HIGH POWER FIELD, WET PREP: ABNORMAL
YEAST, WET PREP: ABNORMAL

## 2022-03-02 PROCEDURE — 99214 OFFICE O/P EST MOD 30 MIN: CPT | Performed by: PHYSICIAN ASSISTANT

## 2022-03-02 PROCEDURE — 87210 SMEAR WET MOUNT SALINE/INK: CPT | Mod: ZL | Performed by: PHYSICIAN ASSISTANT

## 2022-03-02 PROCEDURE — 87491 CHLMYD TRACH DNA AMP PROBE: CPT | Mod: ZL | Performed by: PHYSICIAN ASSISTANT

## 2022-03-02 PROCEDURE — 87591 N.GONORRHOEAE DNA AMP PROB: CPT | Mod: ZL | Performed by: PHYSICIAN ASSISTANT

## 2022-03-02 PROCEDURE — G0463 HOSPITAL OUTPT CLINIC VISIT: HCPCS | Mod: 25

## 2022-03-02 PROCEDURE — 77067 SCR MAMMO BI INCL CAD: CPT

## 2022-03-02 PROCEDURE — G0123 SCREEN CERV/VAG THIN LAYER: HCPCS | Performed by: PHYSICIAN ASSISTANT

## 2022-03-02 PROCEDURE — G0463 HOSPITAL OUTPT CLINIC VISIT: HCPCS

## 2022-03-02 PROCEDURE — 73560 X-RAY EXAM OF KNEE 1 OR 2: CPT | Mod: RT

## 2022-03-02 PROCEDURE — 87624 HPV HI-RISK TYP POOLED RSLT: CPT | Mod: ZL | Performed by: PHYSICIAN ASSISTANT

## 2022-03-02 PROCEDURE — 81001 URINALYSIS AUTO W/SCOPE: CPT | Mod: ZL | Performed by: PHYSICIAN ASSISTANT

## 2022-03-02 PROCEDURE — 87086 URINE CULTURE/COLONY COUNT: CPT | Mod: ZL | Performed by: PHYSICIAN ASSISTANT

## 2022-03-02 ASSESSMENT — PATIENT HEALTH QUESTIONNAIRE - PHQ9
10. IF YOU CHECKED OFF ANY PROBLEMS, HOW DIFFICULT HAVE THESE PROBLEMS MADE IT FOR YOU TO DO YOUR WORK, TAKE CARE OF THINGS AT HOME, OR GET ALONG WITH OTHER PEOPLE: SOMEWHAT DIFFICULT
SUM OF ALL RESPONSES TO PHQ QUESTIONS 1-9: 2
SUM OF ALL RESPONSES TO PHQ QUESTIONS 1-9: 2

## 2022-03-02 ASSESSMENT — ENCOUNTER SYMPTOMS
DIFFICULTY URINATING: 0
ABDOMINAL PAIN: 0
DIARRHEA: 0
PSYCHIATRIC NEGATIVE: 1
FLANK PAIN: 0
BACK PAIN: 0
HEMATURIA: 0
CONSTIPATION: 0

## 2022-03-02 ASSESSMENT — PAIN SCALES - GENERAL: PAINLEVEL: MILD PAIN (3)

## 2022-03-02 NOTE — NURSING NOTE
Pt presents to clinic today for Left hip,knee and pelvic pain that has been happening for a couple months now. She states it feels like somebody is kicking her in the genitals or tearing the muscles apart from the inside.     Patient is okay with student coming in.   FOOD SECURITY SCREENING QUESTIONS:    The next two questions are to help us understand your food security.  If you are feeling you need any assistance in this area, we have resources available to support you today.    Hunger Vital Signs:  Within the past 12 months we worried whether our food would run out before we got money to buy more. Never  Within the past 12 months the food we bought just didn't last and we didn't have money to get more. Never        Advance care directive on file? no  Advance care directive provided to patient? Yes, taking form home      Medication Reconciliation: complete  Bruan Tsai LPN,LPN on 3/2/2022 at 3:27 PM

## 2022-03-02 NOTE — PROGRESS NOTES
Nursing Notes:   Hansa Mcfarland LPN  3/2/2022  3:30 PM  Signed  Pt presents to clinic today for Left hip,knee and pelvic pain that has been happening for a couple months now. She states it feels like somebody is kicking her in the genitals or tearing the muscles apart from the inside.     Patient is okay with student coming in.   FOOD SECURITY SCREENING QUESTIONS:    The next two questions are to help us understand your food security.  If you are feeling you need any assistance in this area, we have resources available to support you today.    Hunger Vital Signs:  Within the past 12 months we worried whether our food would run out before we got money to buy more. Never  Within the past 12 months the food we bought just didn't last and we didn't have money to get more. Never        Advance care directive on file? no  Advance care directive provided to patient? Yes, taking form home      Medication Reconciliation: complete  Bruna Tsai LPN,LPN on 3/2/2022 at 3:27 PM       HPI:    Nohelia Crockett is a 42 year old female who presents for Left hip,knee and pelvic pain that has been happening for a couple months now. She states it feels like somebody is kicking her in the genitals or tearing the muscles apart from the inside.      Past Medical History:   Diagnosis Date     Adhesive capsulitis of right shoulder     6/6/2016     Cyst of ovary 2006    Left     Localized swelling, mass, or lump of upper extremity 08/26/2014     Migraine without status migrainosus, not intractable     Occasional severe HA, does not require daily prophylaxis     Other developmental disorders of scholastic skills      Other specified behavioral and emotional disorders with onset usually occurring in childhood and adolescence      Other specified postprocedural states 09/03/2014     Other specified postprocedural states 06/06/2016     Peptic ulcer without hemorrhage or perforation 1999     Personal history of urinary calculi  2004    Right     Plantar fascial fibromatosis 2014     Uncomplicated asthma     intermittent       Past Surgical History:   Procedure Laterality Date     APPENDECTOMY OPEN       ARTHROSCOPY SHOULDER      10/05,debridement and subacromial decompression of right shoulder.     ARTHROSCOPY SHOULDER  2007    acromioplasty distal clavicle excision, and extensive debridement of the bursa of the right shoulder by Dr. Villa.  Also left?     CHOLECYSTECTOMY       CYSTOSCOPY      with retrogrades     DILATION AND CURETTAGE  2007    Ablation     LAPAROSCOPIC TUBAL LIGATION       LITHOTRIPSY  2004    extracorporeal shockwave     OTHER SURGICAL HISTORY  2012     OTHER SURGICAL HISTORY Right 2014    Pathology showed epidermal inclusion cysts     RELEASE CARPAL TUNNEL         Family History   Problem Relation Age of Onset     Cancer Mother 46        Cancer,Lung cancer     Blood Disease Mother         Blood Disease     Asthma Father         Asthma     Diabetes Father         Diabetes,2     Hypertension Father         Hypertension     Heart Disease Father         Heart Disease,CHF     Other - See Comments Father          at age 65 from complications of a motor vehicle accident.     Family History Negative Sister         Good Health     Family History Negative Sister         Good Health     Breast Cancer No family hx of         Cancer-breast     Colon Cancer No family hx of         Cancer-colon     Prostate Cancer No family hx of         Cancer-prostate     Ovarian Cancer No family hx of         Cancer-ovarian     Anesthesia Reaction No family hx of         Anesthesia Problem       Social History     Tobacco Use     Smoking status: Former Smoker     Packs/day: 0.50     Years: 16.00     Pack years: 8.00     Types: Cigarettes     Quit date: 2016     Years since quittin.1     Smokeless tobacco: Never Used   Substance Use Topics     Alcohol use: Yes     Comment: Alcoholic  Drinks/day: occasionally       Current Outpatient Medications   Medication Sig Dispense Refill     albuterol (PROAIR HFA/PROVENTIL HFA/VENTOLIN HFA) 108 (90 Base) MCG/ACT inhaler Inhale 2 puffs into the lungs every 4 hours as needed for shortness of breath / dyspnea or wheezing 1 Inhaler 2       Allergies   Allergen Reactions     Penicillins Anaphylaxis     Aspirin Hives       REVIEW OF SYSTEMS:  Refer to HPI.    EXAM:   Vitals:    /83 (BP Location: Right arm, Patient Position: Sitting, Cuff Size: Adult Large)   Pulse 93   Temp 97.7  F (36.5  C) (Tympanic)   Resp 20   Wt 105.4 kg (232 lb 6.4 oz)   LMP  (LMP Unknown)   SpO2 96%   Breastfeeding No   BMI 38.67 kg/m      General Appearance: Pleasant, alert, appropriate appearance for age. No acute distress  Head Exam: Normal. Normocephalic, atraumatic.  Eye Exam:  Normal external eye, conjunctiva, lids,cornea. SOFÍA.  Ear Exam: Normal TM's bilaterally, normal grey, and translucent. Normal auditory canals and external ears. Non-tender.   Nose Exam: Normal external nose, mucus membranes, and septum.  OroPharynx Exam: Dental hygiene adequate. Normal buccal mucosa. Normal pharynx.  Neck Exam:  Supple, no masses or nodes.  Thyroid Exam: No nodules or enlargement.  Chest/Respiratory Exam: Normal chest wall and respirations. Clear to auscultation.  Breast Exam: No dimpling, nipple retraction or discharge. No masses or nodes.  Cardiovascular Exam: Regular rate and rhythm. S1, S2, no murmur, click, gallop, or rubs.  Gastrointestinal Exam: Soft, non-tender, no masses or organomegaly. Normal BS x 4.  Rectal Exam: Normal sphincter tone. No masses noted.  Genitourinary Exam Female: External genitalia, vulva and vagina appear normal. Bimanual exam reveals normal uterus and adnexa, nontender urethra and bladder. No cervical motion tenderness.  Lymphatic Exam: Non-palpable nodes in neck, clavicular, axillary, or inguinal regions.  Musculoskeletal Exam: Back is straight and  non-tender, full ROM of upper and lower extremities.  Foot Exam: Left and right foot: good pedal pulses, no lesions, nail hygiene good.  Skin: no rash or abnormalities  Neurologic Exam: Nonfocal, symmetric DTRs, normal gross motor, tone coordination and no tremor.  Psychiatric Exam: Alert and oriented - appropriate affect.    PHQ Depression Screen  PHQ-9 SCORE 2/21/2020 5/28/2020 3/2/2022   PHQ-9 Total Score MyChart - - 2 (Minimal depression)   PHQ-9 Total Score 17 4 2       ASSESSMENT AND PLAN:    No diagnosis found.      ***    There are no Patient Instructions on file for this visit.     Jamaica Andres PA-C ..................3/2/2022 3:42 PM           Answers for HPI/ROS submitted by the patient on 3/2/2022  If you checked off any problems, how difficult have these problems made it for you to do your work, take care of things at home, or get along with other people?: Somewhat difficult  PHQ9 TOTAL SCORE: 2  How many servings of fruits and vegetables do you eat daily?: 2-3  On average, how many sweetened beverages do you drink each day (Examples: soda, juice, sweet tea, etc.  Do NOT count diet or artificially sweetened beverages)?: 4  How many minutes a day do you exercise enough to make your heart beat faster?: 10 to 19  How many days a week do you exercise enough to make your heart beat faster?: 3 or less  How many days per week do you miss taking your medication?: 0  What is the reason for your visit today?: On and off pain outside left knee. And unexplained pain of the pelvic area  When did your symptoms begin?: More than a month  What are your symptoms?: Twingeing pain to diblitating when walking  How would you describe these symptoms?: Moderate  Are your symptoms:: Staying the same  Have you had these symptoms before?: No  Is there anything that makes you feel worse?: Walking  Is there anything that makes you feel better?: Sitting down

## 2022-03-02 NOTE — PROGRESS NOTES
Assessment & Plan   Problem List Items Addressed This Visit        Nervous and Auditory    Knee pain - Primary    Relevant Orders    XR Knee Standing 2v  Bilateral & 2v Left (Completed)    Physical Therapy Referral      Other Visit Diagnoses     Pelvic pain in female        Relevant Orders    UA reflex to Microscopic (Completed)    US Pelvic Complete with Transvaginal    Ob/Gyn Referral    Urine Culture Aerobic Bacterial    Pap test, as part of routine gynecological examination        Relevant Orders    Pap Screen with HPV - recommended age 30 - 65 years    HPV High Risk Types DNA Cervical    Screen for STD (sexually transmitted disease)        Relevant Orders    Wet Prep, Genital (Completed)    GC/Chlamydia by PCR (Completed)    Microscopic hematuria        Relevant Orders    Urine Culture Aerobic Bacterial      Left knee pain:   - Physical therapy referral made- continue conservative therapies at home with knee brace, ibuprofen, Tylenol, heat and cold therapy    - Return back to clinic if symptoms worsen or do not improve    Encouraged to take ibuprofen for relief up to 4 times per day.  Encouraged rest and elevation.  Encouraged to use ice or heat 15 minutes at a time several times per day to decrease pain. Return to clinic in 1-2 weeks as necessary for persistent pain. Return to clinic with any change or worsening of symptoms.     Completed left knee xray.  I personally reviewed the xray. I found no fracture appreciated upon initial read of xray.  Final read pending by radiology.      Pelvic Pain:  - UA sent for culture- will treat accordingly if indicated  - G&C and wet prep performed- will treat accordingly  Pap test completed-results pending.  - Pelvic ultrasound ordered  -Referral to Ob/GYN for pelvic pain to rule out a gynecological etiology.     Continue to use for ultrasound.  Encouraged to use warm compresses.  Gave warning signs and symptoms.  Increase fluids and rest.    .    30 minutes spent on the  "date of the encounter doing chart review, history and exam, documentation and further activities per the note       BMI:   Estimated body mass index is 38.67 kg/m  as calculated from the following:    Height as of 9/29/21: 1.651 m (5' 5\").    Weight as of this encounter: 105.4 kg (232 lb 6.4 oz).   Weight management plan: Discussed healthy diet and exercise guidelines        Return if symptoms worsen or fail to improve.      Jamaica Andres PA-C on 3/2/2022 at 5:16 PM    I was present with the student who participated in the service and in the documentation of this note.  I have verified the history and personally performed a physical exam and medical decision making, and have verified the content of the note, which accurately reflects my assessment of the patient and the plan of care.      Jamaica Andres PA-C  [facility] AND [facility]    Anthony Pozo is a 42 year old who presents with left knee and pelvic pain. She states both her knee and pelvic pain started 1 month ago. She says her pelvic area is bothering her more than her knee pain. She does not feel like her knee and pelvic pain are related. Her pelvic pain started as a dull twinge and has slowly increased in severity and lasts 10 seconds to one minute. She states her pain happens a couple times per week to a couple times per day. She denies any dysuria, urinary frequency, vaginal discharge, abnormal vaginal odor or itching. No STI/STD exposures.  She has a hard time stating if it is external or internal vaginal pain.  No external genital irritation, swelling, redness, rashes.  She states it feels like someone \"kicked her in the groin\".  She states that when the pain comes on it feels like she is being pulled apart in the genital region.  She has tried ibuprofen and Tylenol for the pain without relief. She states her pain is intermittent and comes on suddenly.  Patient is history of having an ablation in 2007.  She states after the ablation she " did not have her menstrual cycle for approximately 6 years.  She then started having menstrual cycles however they have not been regular.  She attributes her return to menstrual cycle to a female family member and her exposure to her hormones that started living with her in the house.      She states her knee pain is left medial area and has been going on for a month and a half and she has not had a injury to the area. She states it aches when she walks. She states her knee has occasional popping to her left knee. She has tried ice, heat, tylenol, ibuprofen, knee brace with minimal relief.      History of Present Illness     Reason for visit:  On and off pain outside left knee. And unexplained pain of the pelvic area  Symptom onset:  More than a month  Symptoms include:  Twingeing pain to diblitating when walking  Symptom intensity:  Moderate  Symptom progression:  Staying the same  Had these symptoms before:  No  What makes it worse:  Walking  What makes it better:  Sitting down    She eats 2-3 servings of fruits and vegetables daily.She consumes 4 sweetened beverage(s) daily.She exercises with enough effort to increase her heart rate 10 to 19 minutes per day.  She exercises with enough effort to increase her heart rate 3 or less days per week.   She is taking medications regularly.         Review of Systems   Constitutional: Negative for chills and fever.   HENT: Negative for ear pain and sore throat.    Respiratory: Negative for cough, shortness of breath and wheezing.    Cardiovascular: Negative for chest pain and palpitations.   Gastrointestinal: Negative for abdominal pain, constipation, diarrhea, heartburn, hematochezia, nausea and vomiting.   Genitourinary: Positive for menstrual problem and pelvic pain. Negative for difficulty urinating, dysuria, flank pain, frequency, hematuria, urgency, vaginal bleeding, vaginal discharge and vaginal pain.        External labial area   Musculoskeletal: Negative for back  pain.        Left knee pain     Skin: Negative.  Negative for rash.   Neurological: Negative for dizziness.   Psychiatric/Behavioral: Negative.  Negative for mood changes.   Answers for HPI/ROS submitted by the patient on 3/2/2022  If you checked off any problems, how difficult have these problems made it for you to do your work, take care of things at home, or get along with other people?: Somewhat difficult  PHQ9 TOTAL SCORE: 2             Objective    /83 (BP Location: Right arm, Patient Position: Sitting, Cuff Size: Adult Large)   Pulse 93   Temp 97.7  F (36.5  C) (Tympanic)   Resp 20   Wt 105.4 kg (232 lb 6.4 oz)   LMP  (LMP Unknown)   SpO2 96%   Breastfeeding No   BMI 38.67 kg/m    Body mass index is 38.67 kg/m .  Physical Exam  Vitals and nursing note reviewed. Exam conducted with a chaperone present.   Constitutional:       General: She is not in acute distress.     Appearance: She is obese.   Cardiovascular:      Rate and Rhythm: Normal rate and regular rhythm.      Pulses: Normal pulses.      Heart sounds: Normal heart sounds.   Pulmonary:      Effort: Pulmonary effort is normal.      Breath sounds: Normal breath sounds.   Abdominal:      General: Abdomen is flat. Bowel sounds are normal. There is no distension.      Palpations: Abdomen is soft. There is no mass.      Tenderness: There is no abdominal tenderness. There is no right CVA tenderness, left CVA tenderness, guarding or rebound.      Hernia: No hernia is present.   Genitourinary:     General: Normal vulva.      Pubic Area: No rash.       Labia:         Right: No rash, tenderness, lesion or injury.         Left: No rash, tenderness, lesion or injury.       Urethra: No prolapse, urethral pain, urethral swelling or urethral lesion.      Vagina: Normal. No signs of injury. No vaginal discharge, erythema, tenderness, bleeding or lesions.      Cervix: No cervical motion tenderness, discharge, lesion, erythema or cervical bleeding.       Uterus: Normal. Not enlarged and not tender.       Adnexa: Right adnexa normal and left adnexa normal.        Right: No mass or tenderness.          Left: No mass or tenderness.        Rectum: Normal. No external hemorrhoid.      Comments: Pap test completed: Yes  Musculoskeletal:         General: Tenderness present. No swelling, deformity or signs of injury. Normal range of motion.      Right lower leg: No edema.      Left lower leg: No edema.      Comments: Tenderness to left medial knee to palpation no bruising or swelling.  No calf pain to palpation.  No pain with knee flexion, internal or external rotation.   Skin:     General: Skin is warm and dry.   Neurological:      General: No focal deficit present.      Mental Status: She is alert and oriented to person, place, and time.   Psychiatric:         Mood and Affect: Mood normal.         Behavior: Behavior normal.         Thought Content: Thought content normal.         Judgment: Judgment normal.

## 2022-03-03 ASSESSMENT — ENCOUNTER SYMPTOMS
SORE THROAT: 0
DYSURIA: 0
PALPITATIONS: 0
HEARTBURN: 0
HEMATOCHEZIA: 0
SHORTNESS OF BREATH: 0
FEVER: 0
COUGH: 0
CHILLS: 0
FREQUENCY: 0
DIZZINESS: 0
WHEEZING: 0
NAUSEA: 0
VOMITING: 0

## 2022-03-04 DIAGNOSIS — R31.29 MICROSCOPIC HEMATURIA: Primary | ICD-10-CM

## 2022-03-04 LAB — BACTERIA UR CULT: NORMAL

## 2022-03-08 ENCOUNTER — HOSPITAL ENCOUNTER (OUTPATIENT)
Dept: PHYSICAL THERAPY | Facility: OTHER | Age: 42
Setting detail: THERAPIES SERIES
End: 2022-03-08
Attending: PHYSICIAN ASSISTANT
Payer: COMMERCIAL

## 2022-03-08 DIAGNOSIS — M25.562 ACUTE PAIN OF LEFT KNEE: ICD-10-CM

## 2022-03-08 LAB
HUMAN PAPILLOMA VIRUS 16 DNA: NEGATIVE
HUMAN PAPILLOMA VIRUS 18 DNA: NEGATIVE
HUMAN PAPILLOMA VIRUS FINAL DIAGNOSIS: NORMAL
HUMAN PAPILLOMA VIRUS OTHER HR: NEGATIVE

## 2022-03-08 PROCEDURE — 97161 PT EVAL LOW COMPLEX 20 MIN: CPT | Mod: GP

## 2022-03-08 PROCEDURE — 97110 THERAPEUTIC EXERCISES: CPT | Mod: GP

## 2022-03-16 ENCOUNTER — HOSPITAL ENCOUNTER (OUTPATIENT)
Dept: PHYSICAL THERAPY | Facility: OTHER | Age: 42
Setting detail: THERAPIES SERIES
Discharge: HOME | End: 2022-03-16
Attending: PHYSICIAN ASSISTANT
Payer: COMMERCIAL

## 2022-03-16 LAB
BKR LAB AP GYN ADEQUACY: NORMAL
BKR LAB AP GYN INTERPRETATION: NORMAL
BKR LAB AP HPV REFLEX: NORMAL
BKR LAB AP PREVIOUS ABNORMAL: NORMAL
PATH REPORT.COMMENTS IMP SPEC: NORMAL
PATH REPORT.COMMENTS IMP SPEC: NORMAL
PATH REPORT.RELEVANT HX SPEC: NORMAL

## 2022-03-16 PROCEDURE — 97110 THERAPEUTIC EXERCISES: CPT | Mod: GP

## 2022-03-17 NOTE — PROGRESS NOTES
03/08/22 1700   General Information   Type of Visit Initial OP Ortho PT Evaluation   Start of Care Date 03/08/22   Referring Physician Concepcion Andres PA-C   Patient/Family Goals Statement improve her knee pain   Orders Evaluate and Treat   Date of Order 03/02/22   Certification Required? Yes   Medical Diagnosis Acute pain of L knee   Weight-Bearing Status - LLE full weight-bearing   General Information Comments Nohelia is a very pleasant 42-year old female that comes to therapy with recent onset of L knee pain. She does not recall a SUNDEEP and cant remember when it started. She feels like her it is on the inside of her knee with certain activities. She does have a brace that she wears but does not feel like it helps much. 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    Body Part(s)   Body Part(s) Knee   Presentation and Etiology   Impairments A. Pain;B. Decreased WB tolerance;H. Impaired gait   Functional Limitations perform activities of daily living;perform required work activities   Symptom Location medial left knee   How/Where did it occur From insidious onset   Chronicity New   Pain rating (0-10 point scale) Best (/10);Worst (/10)   Best (/10) 1   Worst (/10) 8   Pain quality A. Sharp;B. Dull;C. Aching;E. Shooting;F. Stabbing   Frequency of pain/symptoms C. With activity   Pain/symptoms exacerbated by B. Walking;C. Lifting;D. Carrying;I. Bending;J. ADL;K. Home tasks;L. Work tasks   Pain/symptoms eased by A. Sitting   Progression of symptoms since onset: Unchanged   Prior Level of Function   Prior Level of Function-Mobility independent   Prior Level of Function-ADLs independent   Current Level of Function   Patient role/employment history A. Employed   Current equipment-Gait/Locomotion None   Current equipment-ADL None   Fall Risk Screen   Fall screen completed by PT   Have you fallen 2 or more times in the past year? No   Have you  fallen and had an injury in the past year? No   Is patient a fall risk? Yes   Abuse Screen (yes response referral indicated)   Feels Unsafe at Home or Work/School no   Feels Threatened by Someone no   Does Anyone Try to Keep You From Having Contact with Others or Doing Things Outside Your Home? no   Physical Signs of Abuse Present no   Knee Objective Findings   Side (if bilateral, select both right and left) Left   Integumentary  No significant signs of soreness   Posture ER of B LEs at rest and in WB position   Gait/Locomotion ER of LEs with ambulation. Limited knee flexion   Knee ROM Comment Pt does not demonstrate any loss of motion at her knee joint.   Lachmans Test negative   Anterior Drawer Test negative   Posterior Drawer Test negative   Varus Stress Test negative   Valgus Stress Test negative   Marcie's Test negative   Apley's Test negative   Knee Special Test Comments negative Thessalys   Palpation tender with palpation to Medial joint line and pes anserine bursa, Tender with palpation of medial hamstring tendons   Left Knee Extension AROM WNLs   Left Knee Flexion AROM WNLs   Left Knee Flexion Strength 5/5   Left Knee Extension Strength 4+/5   Left Gastrocnemius Flexibility limited   Left Hamstring Flexibility mild limitation   Planned Therapy Interventions   Planned Therapy Interventions balance training;gait training;joint mobilization;manual therapy;neuromuscular re-education;ROM;strengthening;stretching   Planned Modality Interventions   Planned Modality Interventions Cryotherapy;Ultrasound   Clinical Impression   Criteria for Skilled Therapeutic Interventions Met yes, treatment indicated   PT Diagnosis impaired mobility   Influenced by the following impairments weakness, pain,   Functional limitations due to impairments limited activity tolerance, impaired ambulation   Clinical Presentation Stable/Uncomplicated   Clinical Decision Making (Complexity) Low complexity   Therapy Frequency 2 times/Week    Predicted Duration of Therapy Intervention (days/wks) 15 visits   Risk & Benefits of therapy have been explained Yes   Patient, Family & other staff in agreement with plan of care Yes   Clinical Impression Comments Pt has weakness, pain, and mobility deficits that suggest possible tendinopathy vs pes anserine bursitis. Pts pain location and symptoms can be challenging to treat but likely pt would benefit from skilled services to help calm down her pain and certainly strengthen her muscles around the knee and hip.   ORTHO GOALS   PT Ortho Eval Goals 1;2;3   Ortho Goal 1   Goal Identifier Pain    Goal Description Pt will report a decrease of pain to max levels of 5/10 down from 8/10 in order to improve her mobility and comfort levels   Target Date 04/14/22   Ortho Goal 2   Goal Identifier Tenderness   Goal Description Pt will not report any tenderness to medial hamstring tendons or medial joint line for decreased irritability of tissues.   Target Date 04/21/22   Ortho Goal 3   Goal Identifier Slump test   Goal Description Pt will demonstrate negative slump test for improved dural mobility and comfort levels   Target Date 04/21/22   Total Evaluation Time   PT Eval, Low Complexity Minutes (22332) 15   Therapy Certification   Certification date from 03/08/22   Certification date to 05/05/22   Medical Diagnosis Acute pain of L knee

## 2022-03-17 NOTE — PROGRESS NOTES
[facility]    OUTPATIENT PHYSICAL THERAPY ORTHOPEDIC EVALUATION  PLAN OF TREATMENT FOR OUTPATIENT REHABILITATION  (COMPLETE FOR INITIAL CLAIMS ONLY)  Patient's Last Name, First Name, M.I.  YOB: 1980  Nohelia Crockett    Provider s Name:  [facility]   Medical Record No.  [id number]   Start of Care Date:  03/08/22   Onset Date:   unknown   Type:     _X__PT   ___OT   ___SLP Medical Diagnosis:  Acute pain of L knee     PT Diagnosis:  impaired mobility   Visits from SOC:  1      _________________________________________________________________________________  Plan of Treatment/Functional Goals:  balance training, gait training, joint mobilization, manual therapy, neuromuscular re-education, ROM, strengthening, stretching     Cryotherapy, Ultrasound     Goals  Goal Identifier: Pain   Goal Description: Pt will report a decrease of pain to max levels of 5/10 down from 8/10 in order to improve her mobility and comfort levels  Target Date: 04/14/22    Goal Identifier: Tenderness  Goal Description: Pt will not report any tenderness to medial hamstring tendons or medial joint line for decreased irritability of tissues.  Target Date: 04/21/22    Goal Identifier: Slump test  Goal Description: Pt will demonstrate negative slump test for improved dural mobility and comfort levels  Target Date: 04/21/22          Therapy Frequency:  2 times/Week  Predicted Duration of Therapy Intervention:  15 visits    Gary Gamino PT                 I CERTIFY THE NEED FOR THESE SERVICES FURNISHED UNDER        THIS PLAN OF TREATMENT AND WHILE UNDER MY CARE     (Physician co-signature of this document indicates review and certification of the therapy plan).                       Certification Date From:  03/08/22   Certification Date To:  05/05/22    Referring Provider:  Jamaica Andres  PA-C    Initial Assessment        See Epic Evaluation Start of Care Date: 03/08/22

## 2022-03-21 ENCOUNTER — HOSPITAL ENCOUNTER (OUTPATIENT)
Dept: PHYSICAL THERAPY | Facility: OTHER | Age: 42
Setting detail: THERAPIES SERIES
Discharge: HOME | End: 2022-03-21
Attending: PHYSICIAN ASSISTANT
Payer: COMMERCIAL

## 2022-03-21 PROCEDURE — 97110 THERAPEUTIC EXERCISES: CPT | Mod: GP

## 2022-03-23 ENCOUNTER — HOSPITAL ENCOUNTER (OUTPATIENT)
Dept: PHYSICAL THERAPY | Facility: OTHER | Age: 42
Setting detail: THERAPIES SERIES
Discharge: HOME | End: 2022-03-23
Attending: PHYSICIAN ASSISTANT
Payer: COMMERCIAL

## 2022-03-23 PROCEDURE — 97110 THERAPEUTIC EXERCISES: CPT | Mod: GP

## 2022-03-23 PROCEDURE — 97140 MANUAL THERAPY 1/> REGIONS: CPT | Mod: GP

## 2022-03-30 ENCOUNTER — HOSPITAL ENCOUNTER (OUTPATIENT)
Dept: PHYSICAL THERAPY | Facility: OTHER | Age: 42
Setting detail: THERAPIES SERIES
Discharge: HOME | End: 2022-03-30
Attending: PHYSICIAN ASSISTANT
Payer: COMMERCIAL

## 2022-03-30 ENCOUNTER — HOSPITAL ENCOUNTER (OUTPATIENT)
Dept: ULTRASOUND IMAGING | Facility: OTHER | Age: 42
Discharge: HOME | End: 2022-03-30
Attending: PHYSICIAN ASSISTANT
Payer: COMMERCIAL

## 2022-03-30 ENCOUNTER — OFFICE VISIT (OUTPATIENT)
Dept: OBGYN | Facility: OTHER | Age: 42
End: 2022-03-30
Attending: PHYSICIAN ASSISTANT
Payer: COMMERCIAL

## 2022-03-30 ENCOUNTER — MYC MEDICAL ADVICE (OUTPATIENT)
Dept: OBGYN | Facility: OTHER | Age: 42
End: 2022-03-30

## 2022-03-30 VITALS
SYSTOLIC BLOOD PRESSURE: 130 MMHG | WEIGHT: 232.1 LBS | DIASTOLIC BLOOD PRESSURE: 76 MMHG | HEART RATE: 96 BPM | BODY MASS INDEX: 38.62 KG/M2

## 2022-03-30 DIAGNOSIS — R10.2 PELVIC PAIN IN FEMALE: ICD-10-CM

## 2022-03-30 DIAGNOSIS — R10.2 PELVIC PAIN IN FEMALE: Primary | ICD-10-CM

## 2022-03-30 LAB
CLUE CELLS: NORMAL
TRICHOMONAS, WET PREP: NORMAL
WBC'S/HIGH POWER FIELD, WET PREP: NORMAL
YEAST, WET PREP: NORMAL

## 2022-03-30 PROCEDURE — 97140 MANUAL THERAPY 1/> REGIONS: CPT | Mod: GP

## 2022-03-30 PROCEDURE — 99214 OFFICE O/P EST MOD 30 MIN: CPT | Performed by: STUDENT IN AN ORGANIZED HEALTH CARE EDUCATION/TRAINING PROGRAM

## 2022-03-30 PROCEDURE — 97110 THERAPEUTIC EXERCISES: CPT | Mod: GP

## 2022-03-30 PROCEDURE — G0463 HOSPITAL OUTPT CLINIC VISIT: HCPCS

## 2022-03-30 PROCEDURE — 87210 SMEAR WET MOUNT SALINE/INK: CPT | Mod: ZL | Performed by: STUDENT IN AN ORGANIZED HEALTH CARE EDUCATION/TRAINING PROGRAM

## 2022-03-30 PROCEDURE — 76830 TRANSVAGINAL US NON-OB: CPT

## 2022-03-30 PROCEDURE — G0463 HOSPITAL OUTPT CLINIC VISIT: HCPCS | Mod: 25

## 2022-03-30 RX ORDER — MULTIVIT-MIN/IRON/FOLIC ACID/K 18-600-40
CAPSULE ORAL
COMMUNITY

## 2022-03-30 ASSESSMENT — PAIN SCALES - GENERAL: PAINLEVEL: MILD PAIN (3)

## 2022-03-30 NOTE — PROGRESS NOTES
Gynecology Office Visit    Chief Complaint: Pelvic Pain    HPI:    Nohelia Crockett is a 42 year old , here for pelvic pain. She notes the pain feels more external. She feels like it is musculoskeletal.  The pain is worse when she walks. It lasts for short while, ranging from 1 second to 1 minute. Then it self- resolves with different positions. This has been going on for 2 months. It does not feel like it's related to periods.     She has had a prior endometrial ablation and D&C, as well as tubal procedure in . She gets intermittent periods, but they are light and rarely occur.    Pelvic US was performed which revealed mostly normal pelvic anatomy. There was a 2.4 cm posterior fibroid noted on the uterine body.     OBHx  OB History    Para Term  AB Living   3 2 2 0 1 2   SAB IAB Ectopic Multiple Live Births   0 0 0 0 0     Pelvis proven to 8 lb 5 oz    GYN history:   No history of STIs  Last pap smear 2022 NIL, HPV negative, No history of abnormal pap smears  Menses occur q 2-3 months and are very light since her ablation procedure in .    She denies any hot flashes or vaginal dryness.     Past medical history:  Past Medical History:   Diagnosis Date     Adhesive capsulitis of right shoulder     2016     Cyst of ovary 2006    Left     Localized swelling, mass, or lump of upper extremity 2014     Migraine without status migrainosus, not intractable     Occasional severe HA, does not require daily prophylaxis     Other developmental disorders of scholastic skills      Other specified behavioral and emotional disorders with onset usually occurring in childhood and adolescence      Other specified postprocedural states 2014     Other specified postprocedural states 2016     Peptic ulcer without hemorrhage or perforation      Personal history of urinary calculi 2004    Right     Plantar fascial fibromatosis 2014     Uncomplicated asthma      intermittent       Past Surgical History:  Past Surgical History:   Procedure Laterality Date     APPENDECTOMY OPEN       ARTHROSCOPY SHOULDER      10/05,debridement and subacromial decompression of right shoulder.     ARTHROSCOPY SHOULDER  2007    acromioplasty distal clavicle excision, and extensive debridement of the bursa of the right shoulder by Dr. Villa.  Also left?     CHOLECYSTECTOMY       CYSTOSCOPY      with retrogrades     DILATION AND CURETTAGE  2007    Ablation     LAPAROSCOPIC TUBAL LIGATION       LITHOTRIPSY  2004    extracorporeal shockwave     OTHER SURGICAL HISTORY  2012     OTHER SURGICAL HISTORY Right 2014    Pathology showed epidermal inclusion cysts     RELEASE CARPAL TUNNEL       Medications:  Current Outpatient Medications   Medication     Vitamin D, Cholecalciferol, 25 MCG (1000 UT) TABS     No current facility-administered medications for this visit.       Allergies:       Allergies   Allergen Reactions     Penicillins Anaphylaxis     Aspirin Hives       Social History:  Social History     Tobacco Use     Smoking status: Former Smoker     Packs/day: 0.50     Years: 16.00     Pack years: 8.00     Types: Cigarettes     Quit date: 2016     Years since quittin.2     Smokeless tobacco: Never Used   Vaping Use     Vaping Use: Never used   Substance Use Topics     Alcohol use: Yes     Comment: Alcoholic Drinks/day: occasionally     Drug use: No     5 years ago she quitted smoking with the use of Chantix    Family History:  Family History   Problem Relation Age of Onset     Cancer Mother 46        Cancer,Lung cancer     Blood Disease Mother         Blood Disease     Asthma Father         Asthma     Diabetes Father         Diabetes,2     Hypertension Father         Hypertension     Heart Disease Father         Heart Disease,CHF     Other - See Comments Father          at age 65 from complications of a motor vehicle accident.     Family History  "Negative Sister         Good Health     Family History Negative Sister         Good Health     Breast Cancer No family hx of         Cancer-breast     Colon Cancer No family hx of         Cancer-colon     Prostate Cancer No family hx of         Cancer-prostate     Ovarian Cancer No family hx of         Cancer-ovarian     Anesthesia Reaction No family hx of         Anesthesia Problem     Specifically denies VTE, known familial thrombophilias and coagulopathies    ROS:   Respiratory: No shortness of breath, dyspnea on exertion, cough, or hemoptysis  Cardiovascular: negative for palpitations, chest pain, lower extremity edema and syncope or near-syncope  Gastrointestinal: negative for, nausea, vomiting and hematemesis  Genitourinary: negative for, dysuria, frequency and urgency  Musculoskeletal: negative for, back pain and muscular weakness  Psychiatric: negative for, anxiety, depression and hallucinations  Hematologic/Lymphatic/Immunologic: negative for, anemia, chills and fever      Physical Exam  /76   Pulse 96   Wt 105.3 kg (232 lb 1.6 oz)   LMP  (LMP Unknown)   BMI 38.62 kg/m    Gen: Well-appearing, no acute distressed, well-groomed, alert  HEENT: Normocephalic, atraumatic  Cardiovascular: Regular rate. No peripheral edema, normal peripheral circulation  Pulm: Symmetric chest rise, non-labored respirations  Abd: Soft, non-tender, non-distended  Ext: No LE edema, extremities warm and well perfused  Pelvic:  Normal appearing external female genitalia. Normal hair distribution. Vagina is without lesions with moist, pink ruggae. Scant white vaginal discharge. Cervix parous, no lesions, no cervical motion tenderness. Uterus is approximately 9cm, mobile, non-tender. No adnexal tenderness or masses      Assessment/Plan  Nohelia Callejast is a 42 year old  female here for pelvic pain    # Pelvic pain  Pain is described as positional and \"feels like a pulled muscle near the outside\", it is not cyclical " with menses, she has not had any increased discharge, fevers, chills  -- wet prep sent today  -- US shows 1.5 cm posterior wall fibroid, otherwise normal pelvic anatomy  -- Pelvic Floor PT planned    Total amount of time spent during today's encounter was 30 minutes including chart prep, review of images, discussion, exam and documentation.    ROOPA MCKEE MD on 3/30/2022 at 9:27 AM

## 2022-04-04 ENCOUNTER — HOSPITAL ENCOUNTER (OUTPATIENT)
Dept: PHYSICAL THERAPY | Facility: OTHER | Age: 42
Setting detail: THERAPIES SERIES
Discharge: HOME | End: 2022-04-04
Attending: PHYSICIAN ASSISTANT
Payer: COMMERCIAL

## 2022-04-04 PROCEDURE — 97140 MANUAL THERAPY 1/> REGIONS: CPT | Mod: GP

## 2022-04-04 PROCEDURE — 97110 THERAPEUTIC EXERCISES: CPT | Mod: GP

## 2022-04-06 ENCOUNTER — HOSPITAL ENCOUNTER (OUTPATIENT)
Dept: PHYSICAL THERAPY | Facility: OTHER | Age: 42
Setting detail: THERAPIES SERIES
Discharge: HOME | End: 2022-04-06
Attending: PHYSICIAN ASSISTANT
Payer: COMMERCIAL

## 2022-04-06 PROCEDURE — 97110 THERAPEUTIC EXERCISES: CPT | Mod: GP

## 2022-04-27 ENCOUNTER — HOSPITAL ENCOUNTER (OUTPATIENT)
Dept: PHYSICAL THERAPY | Facility: OTHER | Age: 42
Setting detail: THERAPIES SERIES
Discharge: HOME | End: 2022-04-27
Attending: PHYSICIAN ASSISTANT
Payer: COMMERCIAL

## 2022-04-27 PROCEDURE — 97110 THERAPEUTIC EXERCISES: CPT | Mod: GP

## 2022-05-02 ENCOUNTER — HOSPITAL ENCOUNTER (OUTPATIENT)
Dept: PHYSICAL THERAPY | Facility: OTHER | Age: 42
Setting detail: THERAPIES SERIES
Discharge: HOME | End: 2022-05-02
Attending: PHYSICIAN ASSISTANT
Payer: COMMERCIAL

## 2022-05-02 PROCEDURE — 97110 THERAPEUTIC EXERCISES: CPT | Mod: GP

## 2022-05-05 ENCOUNTER — OFFICE VISIT (OUTPATIENT)
Dept: FAMILY MEDICINE | Facility: OTHER | Age: 42
End: 2022-05-05
Attending: PHYSICIAN ASSISTANT
Payer: COMMERCIAL

## 2022-05-05 VITALS
TEMPERATURE: 97.7 F | SYSTOLIC BLOOD PRESSURE: 130 MMHG | HEART RATE: 93 BPM | RESPIRATION RATE: 15 BRPM | OXYGEN SATURATION: 97 % | DIASTOLIC BLOOD PRESSURE: 78 MMHG | WEIGHT: 231.9 LBS | BODY MASS INDEX: 38.59 KG/M2

## 2022-05-05 DIAGNOSIS — J02.9 SORE THROAT: Primary | ICD-10-CM

## 2022-05-05 LAB — GROUP A STREP BY PCR: NOT DETECTED

## 2022-05-05 PROCEDURE — G0463 HOSPITAL OUTPT CLINIC VISIT: HCPCS

## 2022-05-05 PROCEDURE — 99213 OFFICE O/P EST LOW 20 MIN: CPT | Performed by: PHYSICIAN ASSISTANT

## 2022-05-05 PROCEDURE — 87651 STREP A DNA AMP PROBE: CPT | Mod: ZL | Performed by: PHYSICIAN ASSISTANT

## 2022-05-05 ASSESSMENT — PAIN SCALES - GENERAL: PAINLEVEL: MODERATE PAIN (5)

## 2022-05-05 NOTE — NURSING NOTE
Chief Complaint   Patient presents with     Pharyngitis         Medication Reconciliation: cinthia High

## 2022-05-05 NOTE — LETTER
[facility]  [address]  [place] [address]  Phone: [phone]  Fax: [phone]    May 5, 2022        Nohelia Crockett  [address]  [place] [id number]          To whom it may concern:    RE: Nohelia Crockett    Patient was seen and treated today at our clinic and missed work.    Testing in process.      ** Must also be fever free for 24 hours without fever reducers (Tylenol or Ibuprofen).     If strep positive, out x 1 day to allow full day on antibiotics. If strep negative, OK to return to work.    Please contact me for questions or concerns.      Sincerely,        Donya Aguayo PA-C

## 2022-05-05 NOTE — PROGRESS NOTES
ASSESSMENT/PLAN:    I have reviewed the nursing notes.  I have reviewed the findings, diagnosis, plan and need for follow up with the patient.    1. Sore throat  - Group A Streptococcus PCR Throat Swab  - Vital signs stable. PE notable for posterior pharyngeal erythema and tonsil findings include: symmetric. Strep test negative. Discussed with patient viral versus bacterial pharyngitis and rationale for use of antibiotics only for bacterial pathology, otherwise, can increase rate of antimicrobial resistance, as well, antibiotics not helpful for viral pharyngitis. Discussed that viral pharyngitis is the most common type of pharyngitis and antibiotics are not effective against this and that viral pharyngitis typically clears up on it's own in 7-14 days. Recommend salt water gargles. Alternative ibuprofen and tylenol as needed every 4-6 hours (do not exceed 4000 mg of Tylenol and 3200 mg of Ibuprofen daily), rest/relaxation and keeping hydrated with clear liquids (ie: water or gatorade), using a humidifier may be beneficial as well. Return to ER/UC or your PCP for changing or worsening symptoms such as worsening fevers, chills, pain, inability to handle own secretions, etc. Patient is in agreement and understanding of the above treatment plan. All questions and concerns were addressed and answered to patient's satisfaction. AVS reviewed with patient.      Discussed warning signs/symptoms indicative of need to f/u    Follow up if symptoms persist or worsen or concerns    I explained my diagnostic considerations and recommendations to the patient, who voiced understanding and agreement with the treatment plan. All questions were answered. We discussed potential side effects of any prescribed or recommended therapies, as well as expectations for response to treatments.    Donya Aguayo PA-C  5/5/2022  10:33 AM    HPI:    Nohelia GAYATRI Crockett is a 42 year old female  who presents to [facility] today for concerns of URI,  x 1 day    Symptoms:  No fevers or chills  Yes sore throat/pharyngitis/tonsillitis.   Yes allergy/URI Symptoms  No Muffled Sounds/Change in Hearing  No Sensation of Fullness in Ear(s)  No Ringing in Ears/Tinnitus  No Balance Changes  No Dizziness  No Congestion (head/nasal/chest)  No Cough/Productive Cough  Yes Post Nasal Drip - color: clear  No Headache  No Sinus Pain/Pressure  No Myalgias  No Otalgia  Activity Level Changes: Yes: tired  Appetite/Liquid Intake Changes: Yes: decreased  Changes to Bowel Habits: No  Changes to Bladder Habits: No  Additional Symptoms to Report: No  History of similar symptoms: No  Prior workup: No    Treatments tried: Tylenol/Ibuprofen, Fluids and Rest, Antihistamine, Honey    Site of exposure: son   Type of exposure: stomach bug    Vaccination status:   - Influenza: not immunized at this time   - COVID: 5/12/21, 6/4/21    Allergies: PCN, ASA    PCP: DO Simeon    Past Medical History:   Diagnosis Date     Adhesive capsulitis of right shoulder     6/6/2016     Cyst of ovary 2006    Left     Localized swelling, mass, or lump of upper extremity 08/26/2014     Migraine without status migrainosus, not intractable     Occasional severe HA, does not require daily prophylaxis     Other developmental disorders of scholastic skills      Other specified behavioral and emotional disorders with onset usually occurring in childhood and adolescence      Other specified postprocedural states 09/03/2014     Other specified postprocedural states 06/06/2016     Peptic ulcer without hemorrhage or perforation 1999     Personal history of urinary calculi 2004    Right     Plantar fascial fibromatosis 11/14/2014     Uncomplicated asthma     intermittent     Past Surgical History:   Procedure Laterality Date     APPENDECTOMY OPEN  1999     ARTHROSCOPY SHOULDER      10/05,debridement and subacromial decompression of right shoulder.     ARTHROSCOPY SHOULDER  08/2007    acromioplasty distal clavicle excision,  and extensive debridement of the bursa of the right shoulder by Dr. Villa.  Also left?     CHOLECYSTECTOMY       CYSTOSCOPY      with retrogrades     DILATION AND CURETTAGE  2007    Ablation     LAPAROSCOPIC TUBAL LIGATION       LITHOTRIPSY  2004    extracorporeal shockwave     OTHER SURGICAL HISTORY  2012     OTHER SURGICAL HISTORY Right 2014    Pathology showed epidermal inclusion cysts     RELEASE CARPAL TUNNEL       Social History     Tobacco Use     Smoking status: Former Smoker     Packs/day: 0.50     Years: 16.00     Pack years: 8.00     Types: Cigarettes     Quit date: 2016     Years since quittin.3     Smokeless tobacco: Never Used   Substance Use Topics     Alcohol use: Yes     Comment: Alcoholic Drinks/day: occasionally     Current Outpatient Medications   Medication Sig Dispense Refill     Vitamin D, Cholecalciferol, 25 MCG (1000 UT) TABS        Allergies   Allergen Reactions     Penicillins Anaphylaxis     Aspirin Hives     Past medical history, past surgical history, current medications and allergies reviewed and accurate to the best of my knowledge.      ROS:  Refer to HPI    /78   Pulse 93   Temp 97.7  F (36.5  C) (Tympanic)   Resp 15   Wt 105.2 kg (231 lb 14.4 oz)   SpO2 97%   Breastfeeding No   BMI 38.59 kg/m      EXAM:  General Appearance: Well appearing 42 year old female, appropriate appearance for age. No acute distress   Ears: Left TM intact, translucent with bony landmarks appreciated, no erythema, no effusion, no bulging, no purulence.  Right TM intact, translucent with bony landmarks appreciated, no erythema, no effusion, no bulging, no purulence.  Left auditory canal clear.  Right auditory canal clear.  Normal external ears, non tender.  Eyes: conjunctivae normal without erythema or irritation, corneas clear, no drainage or crusting, no eyelid swelling, pupils equal   Oropharynx: moist mucous membranes, posterior pharynx without  erythema, tonsils symmetric, no erythema, no exudates or petechiae, no post nasal drip seen, no trismus, voice clear.    Sinuses:  No sinus tenderness upon palpation of the frontal or maxillary sinuses  Nose:  Bilateral nares: no erythema, no edema, no drainage or congestion   Neck: supple without adenopathy  Respiratory: normal chest wall and respirations.  Normal effort.  Clear to auscultation bilaterally, no wheezing, crackles or rhonchi.  No increased work of breathing.  No cough appreciated.  Cardiac: RRR with no murmurs   Dermatological: no rashes noted of exposed skin  Psychological: normal affect, alert, oriented, and pleasant.     Labs:  Results for orders placed or performed in visit on 05/05/22   Group A Streptococcus PCR Throat Swab     Status: Normal    Specimen: Throat; Swab   Result Value Ref Range    Group A strep by PCR Not Detected Not Detected    Narrative    The Xpert Xpress Strep A test, performed on the [employer]  Instrument Systems, is a rapid, qualitative in vitro diagnostic test for the detection of Streptococcus pyogenes (Group A ß-hemolytic Streptococcus, Strep A) in throat swab specimens from patients with signs and symptoms of pharyngitis. The Xpert Xpress Strep A test can be used as an aid in the diagnosis of Group A Streptococcal pharyngitis. The assay is not intended to monitor treatment for Group A Streptococcus infections. The Xpert Xpress Strep A test utilizes an automated real-time polymerase chain reaction (PCR) to detect Streptococcus pyogenes DNA.     Xray:  None

## 2022-05-11 ENCOUNTER — HOSPITAL ENCOUNTER (OUTPATIENT)
Dept: PHYSICAL THERAPY | Facility: OTHER | Age: 42
Setting detail: THERAPIES SERIES
Discharge: HOME | End: 2022-05-11
Attending: PHYSICIAN ASSISTANT
Payer: COMMERCIAL

## 2022-05-11 PROCEDURE — 97110 THERAPEUTIC EXERCISES: CPT | Mod: GP

## 2022-05-11 NOTE — PROGRESS NOTES
[facility]    OUTPATIENT PHYSICAL THERAPY  PLAN OF TREATMENT FOR OUTPATIENT REHABILITATION AND PROGRESS NOTE           Patient's Last Name, First Name, Nohelia Wang Date of Birth  1980   Provider's Name  [facility] Medical Record No.  [id number]    Onset Date  3/2/22 Start of Care Date  3/8/22   Type:     _X_PT   ___OT   ___SLP Medical Diagnosis  Knee pain   PT Diagnosis  Impaired mobility, impaired function Plan of Treatment  Frequency/Duration: 1x.week for 2 weeks  Certification date from 5/6/22 to 5/20/22     Goals:  Goal Identifier Pain    Goal Description Pt will report a decrease of pain to max levels of 5/10 down from 8/10 in order to improve her mobility and comfort levels   Target Date 04/14/22   Date Met  05/11/22   Progress (detail required for progress note):       Goal Identifier Tenderness   Goal Description Pt will not report any tenderness to medial hamstring tendons or medial joint line for decreased irritability of tissues.   Target Date 04/21/22   Date Met      Progress (detail required for progress note): Only tender with PT pressure to medial knee.     Goal Identifier Slump test   Goal Description Pt will demonstrate negative slump test for improved dural mobility and comfort levels   Target Date 04/21/22   Date Met  05/11/22   Progress (detail required for progress note):                I CERTIFY THE NEED FOR THESE SERVICES FURNISHED UNDER        THIS PLAN OF TREATMENT AND WHILE UNDER MY CARE     (Physician co-signature of this document indicates review and certification of the therapy plan).                Referring Provider: DELFINO Mays, PT

## 2022-05-16 ENCOUNTER — HOSPITAL ENCOUNTER (OUTPATIENT)
Dept: PHYSICAL THERAPY | Facility: OTHER | Age: 42
Setting detail: THERAPIES SERIES
Discharge: HOME | End: 2022-05-16
Attending: STUDENT IN AN ORGANIZED HEALTH CARE EDUCATION/TRAINING PROGRAM
Payer: COMMERCIAL

## 2022-05-16 DIAGNOSIS — R10.2 PELVIC PAIN IN FEMALE: ICD-10-CM

## 2022-05-16 PROCEDURE — 97112 NEUROMUSCULAR REEDUCATION: CPT | Mod: GP

## 2022-05-16 PROCEDURE — 97140 MANUAL THERAPY 1/> REGIONS: CPT | Mod: GP

## 2022-05-16 PROCEDURE — 97161 PT EVAL LOW COMPLEX 20 MIN: CPT | Mod: GP

## 2022-05-16 NOTE — PROGRESS NOTES
[facility]    OUTPATIENT PHYSICAL THERAPY ORTHOPEDIC EVALUATION  PLAN OF TREATMENT FOR OUTPATIENT REHABILITATION  (COMPLETE FOR INITIAL CLAIMS ONLY)  Patient's Last Name, First Name, M.I.  YOB: 1980  Nohelia Crockett    Provider s Name:  [facility]   Medical Record No.  [id number]   Start of Care Date:  05/16/22   Onset Date:  03/30/22   Type:     _X__PT   ___OT   ___SLP Medical Diagnosis:  pelvic pain in female     PT Diagnosis:  pelvic pain, myofascial tightness   Visits from SOC:  1      _________________________________________________________________________________  Plan of Treatment/Functional Goals:  joint mobilization, manual therapy, neuromuscular re-education, strengthening, stretching     Cryotherapy, Hot packs     Goals         Goal Identifier: posture  Goal Description: Patient to have improved posture with equal loading in to head shoulders and pelvis to allow even weight bearing during gait and stance.  Target Date: 08/16/22    Goal Identifier: lift/carry  Goal Description: Patient to report the ability to lift and carry objects with out left pelvic and hip pain.  Target Date: 08/16/22    Goal Identifier: house hold tasks  Goal Description: Patient to complete house hold tasks without limitation from pelvic pain.  Target Date: 08/16/22                          Therapy Frequency:  2 times/Week  Predicted Duration of Therapy Intervention:  3 months    Brandy German, PT                 I CERTIFY THE NEED FOR THESE SERVICES FURNISHED UNDER        THIS PLAN OF TREATMENT AND WHILE UNDER MY CARE     (Physician co-signature of this document indicates review and certification of the therapy plan).                     Certification Date From:  05/16/22   Certification Date To:  08/16/22    Referring Provider:  Lina Banks MD    Initial  Assessment        See Epic Evaluation Start of Care Date: 05/16/22

## 2022-05-16 NOTE — PROGRESS NOTES
05/16/22 0900   General Information   Type of Visit Initial OP Ortho PT Evaluation   Start of Care Date 05/16/22   Referring Physician Lina Banks MD   Patient/Family Goals Statement reduce pain   Orders Evaluate and Treat   Date of Order 03/30/22   Certification Required? Yes   Medical Diagnosis pelvic pain in female   Surgical/Medical history reviewed Yes   Body Part(s)   Body Part(s) Lumbar Spine/SI   Presentation and Etiology   Pertinent history of current problem (include personal factors and/or comorbidities that impact the POC) Reports history of vulvar pain but pain also feels deep like it is her pelvic bones (points to pubic bones). No specific injury. Just completed PT for left knee pain, will still get knee pain. Reiviewed medical history.Denies issues with bowel and bladder use, not pain with intercourse. Pain is very random   Impairments A. Pain;H. Impaired gait   Functional Limitations perform activities of daily living   Onset date of current episode/exacerbation 03/30/22   Chronicity Chronic   Pain rating (0-10 point scale) Best (/10);Worst (/10)   Best (/10) 0   Worst (/10) 9.5   Pain quality A. Sharp;E. Shooting;F. Stabbing;G. Cramping   Frequency of pain/symptoms B. Intermittent   Pain/symptoms exacerbated by B. Walking;C. Lifting;D. Carrying;F. Nothing;J. ADL;K. Home tasks;L. Work tasks   Pain/symptoms eased by D. Nothing   Progression of symptoms since onset: Worsened   Prior Level of Function   Prior Level of Function-Mobility prior left hip pain, neck pain, headaches   Current Level of Function   Patient role/employment history A. 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 No   Have you fallen and had an injury in the past year? No   Is patient a fall risk? No   Abuse Screen (yes response referral indicated)   Feels Unsafe at Home or Work/School no   Feels Threatened by Someone no    Does Anyone Try to Keep You From Having Contact with Others or Doing Things Outside Your Home? no   Physical Signs of Abuse Present no   Lumbar Spine/SI Objective Findings   Hip Flexor Flexibility tightness left greater than right   Pelvic Screen + FFT left, left superior pubic hsear   Hip Screen + FADIR left, noted diastasis and expulsatory hip left   Lumbar/Hip/Knee/Foot Strength Comments to be assessed at later date if indicated   Palpation Postural loading limited head L/L R/L shoulder L/L R/L L/R pelvic L/L; general listneing to left anterior chest wall and anterior left pelvis; local listening to left pubic bone and left superior pleura. Myofascial tightness with left superior pleura, left costopleural ligament. Cranial screen at vertex: listening to left short duration. restricted left coronal suture, left occipitomastoid suture.   Posture left shoulder superior , left iliac crest inferior, head side bent right. minimal trunk rotation with gait.   Planned Therapy Interventions   Planned Therapy Interventions joint mobilization;manual therapy;neuromuscular re-education;strengthening;stretching   Planned Modality Interventions   Planned Modality Interventions Cryotherapy;Hot packs   Clinical Impression   Criteria for Skilled Therapeutic Interventions Met yes, treatment indicated   PT Diagnosis pelvic pain, myofascial tightness   Influenced by the following impairments pain, limited gait   Functional limitations due to impairments sitting, walking, lifting carrying, home tasks, shoveling   Clinical Presentation Stable/Uncomplicated   Clinical Decision Making (Complexity) Low complexity   Therapy Frequency 2 times/Week   Predicted Duration of Therapy Intervention (days/wks) 3 months   Risk & Benefits of therapy have been explained Yes   Patient, Family & other staff in agreement with plan of care Yes   Clinical Impression Comments Patient with chronic history of pelvic pain with left knee and anterior hip pain  due to pubic shear, myofascial tightness, posture dysfunction, poor trunk and neck mobility.   ORTHO GOALS   PT Ortho Eval Goals 4;5;6;7   Ortho Goal 4   Goal Identifier posture   Goal Description Patient to have improved posture with equal loading in to head shoulders and pelvis to allow even weight bearing during gait and stance.   Target Date 08/16/22   Ortho Goal 5   Goal Identifier lift/carry   Goal Description Patient to report the ability to lift and carry objects with out left pelvic and hip pain.   Target Date 08/16/22   Ortho Goal 6   Goal Identifier house hold tasks   Goal Description Patient to complete house hold tasks without limitation from pelvic pain.   Target Date 08/16/22   Total Evaluation Time   PT Eval, Low Complexity Minutes (28740) 20   Therapy Certification   Certification date from 05/16/22   Certification date to 08/16/22   Medical Diagnosis pelvic pain in female

## 2022-05-18 ENCOUNTER — HOSPITAL ENCOUNTER (OUTPATIENT)
Dept: PHYSICAL THERAPY | Facility: OTHER | Age: 42
Setting detail: THERAPIES SERIES
Discharge: HOME | End: 2022-05-18
Attending: FAMILY MEDICINE
Payer: COMMERCIAL

## 2022-05-18 PROCEDURE — 97140 MANUAL THERAPY 1/> REGIONS: CPT | Mod: GP

## 2022-05-19 ENCOUNTER — HOSPITAL ENCOUNTER (EMERGENCY)
Facility: OTHER | Age: 42
Discharge: HOME | End: 2022-05-19
Attending: FAMILY MEDICINE | Admitting: FAMILY MEDICINE
Payer: COMMERCIAL

## 2022-05-19 VITALS
SYSTOLIC BLOOD PRESSURE: 133 MMHG | TEMPERATURE: 97.9 F | HEART RATE: 97 BPM | OXYGEN SATURATION: 97 % | RESPIRATION RATE: 18 BRPM | DIASTOLIC BLOOD PRESSURE: 78 MMHG

## 2022-05-19 DIAGNOSIS — L08.9 SKIN INFECTION: ICD-10-CM

## 2022-05-19 PROCEDURE — 99282 EMERGENCY DEPT VISIT SF MDM: CPT | Performed by: FAMILY MEDICINE

## 2022-05-19 PROCEDURE — 99283 EMERGENCY DEPT VISIT LOW MDM: CPT | Performed by: FAMILY MEDICINE

## 2022-05-19 RX ORDER — DOXYCYCLINE 100 MG/1
100 CAPSULE ORAL 2 TIMES DAILY
Qty: 28 CAPSULE | Refills: 0 | Status: SHIPPED | OUTPATIENT
Start: 2022-05-19 | End: 2022-05-29

## 2022-05-19 ASSESSMENT — ENCOUNTER SYMPTOMS: WOUND: 1

## 2022-05-20 ENCOUNTER — TELEPHONE (OUTPATIENT)
Dept: FAMILY MEDICINE | Facility: OTHER | Age: 42
End: 2022-05-20
Payer: COMMERCIAL

## 2022-05-20 DIAGNOSIS — L08.9 LOCAL INFECTION OF SKIN AND SUBCUTANEOUS TISSUE: Primary | ICD-10-CM

## 2022-05-20 RX ORDER — DOXYCYCLINE 100 MG/1
100 CAPSULE ORAL 2 TIMES DAILY
Qty: 20 CAPSULE | Refills: 0 | Status: SHIPPED | OUTPATIENT
Start: 2022-05-20 | End: 2022-06-21

## 2022-05-20 NOTE — TELEPHONE ENCOUNTER
Patient was seen in ER for an infected tattoo yesterday. Given Doxycycline Monohydrate to take BID for 10 days. Insurance will not cover but will cover Doxycycline Hyclate. Order pending.     Talya Jung CMA on 5/20/2022 at 12:02 PM

## 2022-05-20 NOTE — ED PROVIDER NOTES
History     Chief Complaint   Patient presents with     Infected tattoo     The history is provided by the patient.     Nohelia Crockett is a 42 year old female who is concerned that her new tattoo on the left forearm is infected. She has redness around it as well as some drainage. No fever, no tenderness up the arm.     Allergies:  Allergies   Allergen Reactions     Penicillins Anaphylaxis     Aspirin Hives       Problem List:    Patient Active Problem List    Diagnosis Date Noted     Morbid obesity (H) 08/20/2021     Priority: Medium     Suspected sleep apnea 05/08/2019     Priority: Medium     Asthma 02/19/2018     Priority: Medium     Overview:   intermittent       Attention deficit disorder 02/19/2018     Priority: Medium     Knee pain 02/19/2018     Priority: Medium     Overview:   secondary to patellofemoral dysfunction       Learning disability 02/19/2018     Priority: Medium     Migraine headache 02/19/2018     Priority: Medium     Overview:   Occasional severe HA, does not require daily prophylaxis       Obesity (BMI 35.0-39.9 without comorbidity) 12/14/2016     Priority: Medium     Adhesive capsulitis of right shoulder 06/06/2016     Priority: Medium     S/P shoulder surgery 06/06/2016     Priority: Medium     Gastroesophageal reflux disease without esophagitis 05/26/2016     Priority: Medium     Pain of multiple sites 10/19/2015     Priority: Medium     History of arthroscopy of right knee 07/02/2015     Priority: Medium     Plantar fasciitis 11/14/2014     Priority: Medium     H/O excision of mass 09/03/2014     Priority: Medium     Genital herpes 12/07/2009     Priority: Medium     Overview:   Has had only one outbreak          Past Medical History:    Past Medical History:   Diagnosis Date     Adhesive capsulitis of right shoulder      Cyst of ovary 2006     Localized swelling, mass, or lump of upper extremity 08/26/2014     Migraine without status migrainosus, not intractable      Other  developmental disorders of scholastic skills      Other specified behavioral and emotional disorders with onset usually occurring in childhood and adolescence      Other specified postprocedural states 2014     Other specified postprocedural states 2016     Peptic ulcer without hemorrhage or perforation      Personal history of urinary calculi 2004     Plantar fascial fibromatosis 2014     Uncomplicated asthma        Past Surgical History:    Past Surgical History:   Procedure Laterality Date     APPENDECTOMY OPEN       ARTHROSCOPY SHOULDER      10/05,debridement and subacromial decompression of right shoulder.     ARTHROSCOPY SHOULDER  2007    acromioplasty distal clavicle excision, and extensive debridement of the bursa of the right shoulder by Dr. Villa.  Also left?     CHOLECYSTECTOMY       CYSTOSCOPY      with retrogrades     DILATION AND CURETTAGE  2007    Ablation     LAPAROSCOPIC TUBAL LIGATION  2004     LITHOTRIPSY  2004    extracorporeal shockwave     OTHER SURGICAL HISTORY  2012     OTHER SURGICAL HISTORY Right 2014    Pathology showed epidermal inclusion cysts     RELEASE CARPAL TUNNEL  2012       Family History:    Family History   Problem Relation Age of Onset     Cancer Mother 46        Cancer,Lung cancer     Blood Disease Mother         Blood Disease     Asthma Father         Asthma     Diabetes Father         Diabetes,2     Hypertension Father         Hypertension     Heart Disease Father         Heart Disease,CHF     Other - See Comments Father          at age 65 from complications of a motor vehicle accident.     Family History Negative Sister         Good Health     Family History Negative Sister         Good Health     Breast Cancer No family hx of         Cancer-breast     Colon Cancer No family hx of         Cancer-colon     Prostate Cancer No family hx of         Cancer-prostate     Ovarian Cancer No family hx of          Cancer-ovarian     Anesthesia Reaction No family hx of         Anesthesia Problem       Social History:  Marital Status:   [4]  Social History     Tobacco Use     Smoking status: Former Smoker     Packs/day: 0.50     Years: 16.00     Pack years: 8.00     Types: Cigarettes     Quit date: 2016     Years since quittin.3     Smokeless tobacco: Never Used   Vaping Use     Vaping Use: Never used   Substance Use Topics     Alcohol use: Yes     Comment: Alcoholic Drinks/day: occasionally     Drug use: No        Medications:    doxycycline monohydrate (MONODOX) 100 MG capsule  Vitamin D, Cholecalciferol, 25 MCG (1000 UT) TABS          Review of Systems   Skin: Positive for rash and wound.   All other systems reviewed and are negative.      Physical Exam   BP: 133/78  Pulse: 97  Temp: 97.9  F (36.6  C)  Resp: 18  SpO2: 97 %      Physical Exam  Vitals and nursing note reviewed.   Constitutional:       General: She is not in acute distress.     Appearance: Normal appearance. She is normal weight. She is not ill-appearing.   Skin:     Comments: She has redness and some drainage from around the tattoo.  No bleeding. No axillary tenderness.   Neurological:      Mental Status: She is alert.           Assessments & Plan (with Medical Decision Making)  Nohelia Crockett is a 42 year old female who is concerned that her new tattoo on the left forearm is infected. She has redness around it as well as some drainage. No fever, no tenderness up the arm. VS in the ED /78   Pulse 97   Temp 97.9  F (36.6  C)   Resp 18   SpO2 97%   Exam shows likely skin infection. She has anaphylaxis to PCN so we will use doxycycline.      I have reviewed the nursing notes.    I have reviewed the findings, diagnosis, plan and need for follow up with the patient.       New Prescriptions    DOXYCYCLINE MONOHYDRATE (MONODOX) 100 MG CAPSULE    Take 1 capsule (100 mg) by mouth 2 times daily for 10 days You will have some antibiotics left  over after this.       Final diagnoses:   Skin infection       5/19/2022   [facility] AND [facility], Brooks Singh MD  05/19/22 [id number]

## 2022-05-20 NOTE — DISCHARGE INSTRUCTIONS
Nohelia    I agree that you need to be on antibiotics for this.    I will send you home with doxycycline. The bottle has 28 tablets in it but you will only need 20 of them.  Take this twice a day for ten days.    Thank you for choosing our Emergency Department for your care.     You may receive a phone call or letter for a survey about your care in our ED.  Please complete this as this is how we improve care for our patients.     If you have any questions after leaving the ED you can call me at [phone] during hours that I am working. I am working tonight until 7 AM.    Sincerely,    Dr Jesse Ordoñez M.D.

## 2022-05-20 NOTE — TELEPHONE ENCOUNTER
[facility] states that they received an order over from the ED last night for MONODOX and insurance will not cover that for patient. Insurance will only cover Hyclate. Is this something that [facility] is able to send?    Dori Michelle on 5/20/2022 at 10:26 AM

## 2022-05-20 NOTE — ED TRIAGE NOTES
Arrived from home via private vehicle.  CC of infected tattoo.  Tattoo was done on Friday, raised erythema beyond normal for a fresh tattoo was noted approximately two days ago.  Pain and some drainage associated.  The area surrounding the tattoo does appear to have some mild infective process going on currently.      Triage Assessment     Row Name 05/19/22 [id number]       Triage Assessment (Adult)    Airway WDL WDL       Respiratory WDL    Respiratory WDL WDL       Skin Circulation/Temperature WDL    Skin Circulation/Temperature WDL X       Cardiac WDL    Cardiac WDL WDL       Peripheral/Neurovascular WDL    Peripheral Neurovascular WDL WDL       Cognitive/Neuro/Behavioral WDL    Cognitive/Neuro/Behavioral WDL WDL

## 2022-05-25 ENCOUNTER — HOSPITAL ENCOUNTER (OUTPATIENT)
Dept: PHYSICAL THERAPY | Facility: OTHER | Age: 42
Setting detail: THERAPIES SERIES
Discharge: HOME | End: 2022-05-25
Attending: FAMILY MEDICINE
Payer: COMMERCIAL

## 2022-05-25 PROCEDURE — 97112 NEUROMUSCULAR REEDUCATION: CPT | Mod: GP

## 2022-05-25 PROCEDURE — 97140 MANUAL THERAPY 1/> REGIONS: CPT | Mod: GP

## 2022-05-31 ENCOUNTER — HOSPITAL ENCOUNTER (OUTPATIENT)
Dept: PHYSICAL THERAPY | Facility: OTHER | Age: 42
Setting detail: THERAPIES SERIES
Discharge: HOME | End: 2022-05-31
Attending: FAMILY MEDICINE
Payer: COMMERCIAL

## 2022-05-31 PROCEDURE — 97112 NEUROMUSCULAR REEDUCATION: CPT | Mod: GP

## 2022-05-31 PROCEDURE — 97140 MANUAL THERAPY 1/> REGIONS: CPT | Mod: GP

## 2022-06-13 ENCOUNTER — HOSPITAL ENCOUNTER (OUTPATIENT)
Dept: PHYSICAL THERAPY | Facility: OTHER | Age: 42
Setting detail: THERAPIES SERIES
Discharge: HOME | End: 2022-06-13
Attending: FAMILY MEDICINE
Payer: COMMERCIAL

## 2022-06-13 ENCOUNTER — ALLIED HEALTH/NURSE VISIT (OUTPATIENT)
Dept: FAMILY MEDICINE | Facility: OTHER | Age: 42
End: 2022-06-13
Attending: PHYSICIAN ASSISTANT
Payer: COMMERCIAL

## 2022-06-13 DIAGNOSIS — Z20.822 COVID-19 RULED OUT: Primary | ICD-10-CM

## 2022-06-13 PROCEDURE — 97112 NEUROMUSCULAR REEDUCATION: CPT | Mod: GP

## 2022-06-13 PROCEDURE — U0003 INFECTIOUS AGENT DETECTION BY NUCLEIC ACID (DNA OR RNA); SEVERE ACUTE RESPIRATORY SYNDROME CORONAVIRUS 2 (SARS-COV-2) (CORONAVIRUS DISEASE [COVID-19]), AMPLIFIED PROBE TECHNIQUE, MAKING USE OF HIGH THROUGHPUT TECHNOLOGIES AS DESCRIBED BY CMS-2020-01-R: HCPCS | Mod: ZL

## 2022-06-13 PROCEDURE — 97140 MANUAL THERAPY 1/> REGIONS: CPT | Mod: GP

## 2022-06-13 PROCEDURE — C9803 HOPD COVID-19 SPEC COLLECT: HCPCS

## 2022-06-13 NOTE — PROGRESS NOTES
Patient here for Covid Testing. Rule out, positive home test.    Skye Umaña MA on 6/13/2022 at 3:08 PM

## 2022-06-14 LAB — SARS-COV-2 RNA RESP QL NAA+PROBE: POSITIVE

## 2022-06-15 ENCOUNTER — TELEPHONE (OUTPATIENT)
Dept: NURSING | Facility: CLINIC | Age: 42
End: 2022-06-15
Payer: COMMERCIAL

## 2022-06-15 NOTE — TELEPHONE ENCOUNTER
Coronavirus (COVID-19) Notification    Caller Name (Patient, parent, daughter/son, grandparent, etc)  Nohelia    Reason for call  Notify of Positive Coronavirus (COVID-19) lab results, assess symptoms,  review [facility] recommendations    Lab Result    Lab test:  2019-nCoV rRt-PCR or SARS-CoV-2 PCR    Oropharyngeal AND/OR nasopharyngeal swabs is POSITIVE for 2019-nCoV RNA/SARS-COV-2 PCR (COVID-19 virus)      Gather patient reported symptoms   Assessment   Current Symptoms at time of phone call, reported by patient: (if no symptoms, document: No symptoms] Yes   Date of symptom(s) onset (if applicable) 6/13/22     If at time of call, Patients symptoms have worsened, the Patient should contact 911 or have someone drive them to Emergency Dept promptly:      If Patient calling 911, inform 911 personal that you have tested positive for the Coronavirus (COVID-19).  Place mask on and await 911 to arrive.    If Emergency Dept, If possible, please have another adult drive you to the Emergency Dept but you need to wear mask when in contact with other people.      Treatment Options:   Patient classified as COVID treatment eligible by Epic high risk algorithm: Yes  Is the patient symptomatic at the time of result notification? Yes. Was the onset of symptoms within the last 5 days? Yes.   There are now oral medications available for the treatment of COVID-19.  Taking one of these medications within the first five days of symptoms (when people may not yet feel severely ill) has been shown to make people feel better, prevent them from getting sicker, and preventing hospitalization and death.   Does the patient agree to have a visit with a provider to discuss treatment options? Yes. Is the patient seen at [facility]?  No: Warm transfer caller to [phone] to be scheduled with a virtual urgent provider.  During transfer, instruct  on appropriate time frame for visit     Review information with  Patient    Your result was positive. This means you have COVID-19 (coronavirus).    How can I protect others?    These guidelines are for isolating before returning to work, school or .    If you DO have symptoms    Stay home and away from others     For at least 5 days after your symptoms started, AND    You are fever free for 24 hours (with no medicine that reduces fever), AND    Your other symptoms are better    Wear a mask for 10 full days anytime you are around others    If you DON'T have symptoms    Stay home and away from others for at least 5 days after your positive test    Wear a mask for 10 full days anytime you are around others    There may be different guidelines for healthcare facilities.  Please check with the specific sites before arriving.    If you have been told by a doctor that you were severely ill with COVID-19 or are immunocompromised, you should isolate for at least 10 days.    You should not go back to work until you meet the guidelines above for ending your home isolation. You don't need to be retested for COVID-19 before going back to work--studies show that you won't spread the virus if it's been at least 10 days since your symptoms started (or 20 days, if you have a weak immune system).    Employers, schools, and daycares: This is an official notice for this person's medical guidelines for returning in-person.  They must meet the above guidelines before going back to work, school or  in person.    You will receive a positive COVID-19 letter via [employer] or the mail soon with additional self-care information (exception, no letters will be sent to presurgical/preprocedure patients).    Would you like me to review some of that information with you now?  No    If you were tested for an upcoming procedure, please contact your provider for next steps.    Mariluz Adam

## 2022-06-19 ENCOUNTER — APPOINTMENT (OUTPATIENT)
Dept: GENERAL RADIOLOGY | Facility: OTHER | Age: 42
End: 2022-06-19
Attending: EMERGENCY MEDICINE
Payer: COMMERCIAL

## 2022-06-19 ENCOUNTER — HOSPITAL ENCOUNTER (EMERGENCY)
Facility: OTHER | Age: 42
Discharge: HOME | End: 2022-06-19
Attending: EMERGENCY MEDICINE | Admitting: EMERGENCY MEDICINE
Payer: COMMERCIAL

## 2022-06-19 ENCOUNTER — NURSE TRIAGE (OUTPATIENT)
Dept: NURSING | Facility: CLINIC | Age: 42
End: 2022-06-19
Payer: COMMERCIAL

## 2022-06-19 VITALS
WEIGHT: 233 LBS | RESPIRATION RATE: 16 BRPM | BODY MASS INDEX: 38.82 KG/M2 | DIASTOLIC BLOOD PRESSURE: 71 MMHG | TEMPERATURE: 97.7 F | HEIGHT: 65 IN | HEART RATE: 75 BPM | OXYGEN SATURATION: 99 % | SYSTOLIC BLOOD PRESSURE: 126 MMHG

## 2022-06-19 DIAGNOSIS — U07.1 INFECTION DUE TO 2019 NOVEL CORONAVIRUS: ICD-10-CM

## 2022-06-19 DIAGNOSIS — R07.89 CHEST TIGHTNESS: ICD-10-CM

## 2022-06-19 LAB
ANION GAP SERPL CALCULATED.3IONS-SCNC: 9 MMOL/L (ref 3–14)
BASOPHILS # BLD AUTO: 0 10E3/UL (ref 0–0.2)
BASOPHILS NFR BLD AUTO: 0 %
BUN SERPL-MCNC: 13 MG/DL (ref 7–25)
CALCIUM SERPL-MCNC: 9.1 MG/DL (ref 8.6–10.3)
CHLORIDE BLD-SCNC: 105 MMOL/L (ref 98–107)
CO2 SERPL-SCNC: 25 MMOL/L (ref 21–31)
CREAT SERPL-MCNC: 0.64 MG/DL (ref 0.6–1.2)
D DIMER PPP FEU-MCNC: 0.44 UG/ML FEU (ref 0–0.5)
EOSINOPHIL # BLD AUTO: 0.1 10E3/UL (ref 0–0.7)
EOSINOPHIL NFR BLD AUTO: 1 %
ERYTHROCYTE [DISTWIDTH] IN BLOOD BY AUTOMATED COUNT: 11.9 % (ref 10–15)
GFR SERPL CREATININE-BSD FRML MDRD: >90 ML/MIN/1.73M2
GLUCOSE BLD-MCNC: 110 MG/DL (ref 70–105)
HCT VFR BLD AUTO: 40.1 % (ref 35–47)
HGB BLD-MCNC: 14.2 G/DL (ref 11.7–15.7)
IMM GRANULOCYTES # BLD: 0 10E3/UL
IMM GRANULOCYTES NFR BLD: 0 %
LYMPHOCYTES # BLD AUTO: 1.9 10E3/UL (ref 0.8–5.3)
LYMPHOCYTES NFR BLD AUTO: 23 %
MCH RBC QN AUTO: 32.7 PG (ref 26.5–33)
MCHC RBC AUTO-ENTMCNC: 35.4 G/DL (ref 31.5–36.5)
MCV RBC AUTO: 92 FL (ref 78–100)
MONOCYTES # BLD AUTO: 0.5 10E3/UL (ref 0–1.3)
MONOCYTES NFR BLD AUTO: 7 %
NEUTROPHILS # BLD AUTO: 5.5 10E3/UL (ref 1.6–8.3)
NEUTROPHILS NFR BLD AUTO: 69 %
NRBC # BLD AUTO: 0 10E3/UL
NRBC BLD AUTO-RTO: 0 /100
PLATELET # BLD AUTO: 304 10E3/UL (ref 150–450)
POTASSIUM BLD-SCNC: 3.5 MMOL/L (ref 3.5–5.1)
RBC # BLD AUTO: 4.34 10E6/UL (ref 3.8–5.2)
SODIUM SERPL-SCNC: 139 MMOL/L (ref 134–144)
TROPONIN I SERPL-MCNC: 3.8 PG/ML (ref 0–34)
WBC # BLD AUTO: 8 10E3/UL (ref 4–11)

## 2022-06-19 PROCEDURE — 99284 EMERGENCY DEPT VISIT MOD MDM: CPT | Performed by: EMERGENCY MEDICINE

## 2022-06-19 PROCEDURE — 85379 FIBRIN DEGRADATION QUANT: CPT | Performed by: EMERGENCY MEDICINE

## 2022-06-19 PROCEDURE — 93010 ELECTROCARDIOGRAM REPORT: CPT | Performed by: INTERNAL MEDICINE

## 2022-06-19 PROCEDURE — 84484 ASSAY OF TROPONIN QUANT: CPT | Performed by: EMERGENCY MEDICINE

## 2022-06-19 PROCEDURE — 85025 COMPLETE CBC W/AUTO DIFF WBC: CPT | Performed by: EMERGENCY MEDICINE

## 2022-06-19 PROCEDURE — 93005 ELECTROCARDIOGRAM TRACING: CPT | Performed by: EMERGENCY MEDICINE

## 2022-06-19 PROCEDURE — 36415 COLL VENOUS BLD VENIPUNCTURE: CPT | Performed by: EMERGENCY MEDICINE

## 2022-06-19 PROCEDURE — 80048 BASIC METABOLIC PNL TOTAL CA: CPT | Performed by: EMERGENCY MEDICINE

## 2022-06-19 PROCEDURE — 71045 X-RAY EXAM CHEST 1 VIEW: CPT | Mod: TC

## 2022-06-19 PROCEDURE — 99285 EMERGENCY DEPT VISIT HI MDM: CPT | Mod: 25 | Performed by: EMERGENCY MEDICINE

## 2022-06-19 NOTE — ED PROVIDER NOTES
"Nohelia Crockett  : 1980 Age: 42 year old Sex: female MRN: [id number]    CC: Chest pain and tightness    HPI: Nohelia Crockett is a 42 year old female with PMH significant for diagnosis of COVID about 8 days ago, vaccinated x2 who presents with chest tightness.  She states she originally just lost her sense of taste and smell and was feeling well, but yesterday after about 6 days since her COVID diagnosis she woke up with tightness in her chest associated with some shortness of breath, no cough, no leg swelling, no nausea vomiting or diarrhea.  No history of similar    ED Course and MDM:  Chest pain: In setting of COVID there is a somewhat increased risk of both blood clots and ACS as well as myocarditis so I think we need to rule out cardiac injury and blood clot.  EKG is nonischemic.  Chest x-ray is negative.  Dimer is negative, troponin is negative.  Overall work-up is reassuring, I suspect that her chest tightness is simply related to her COVID infection.  I discussed this with her, discussed return precautions, discharged in stable condition    Final Clinical Impression:  Chest pain    Brooks Anthony MD  Internal Medicine and Emergency Medicine  6:28 PM 22      Physical Exam:  /71   Pulse 75   Temp 97.7  F (36.5  C) (Tympanic)   Resp 16   Ht 1.651 m (5' 5\")   Wt 105.7 kg (233 lb)   SpO2 99%   BMI 38.77 kg/m      ENT: MMM  Eye: EOMI, PERRL  CV: RRR, no murmur  Pulm: CTAB no wheezes  Abd: Soft, nontender throughout  MSK: No LE edema  Integ: No lesions  Neuro: AOx3, no focal deficit  Psych: Appropriate affect, cognition intact        ROS:  Complete review of systems performed and negative except as noted in HPI.    Past Medical History:  Past Medical History:   Diagnosis Date     Adhesive capsulitis of right shoulder     2016     Cyst of ovary 2006    Left     Localized swelling, mass, or lump of upper extremity 2014     Migraine without status migrainosus, not " intractable     Occasional severe HA, does not require daily prophylaxis     Other developmental disorders of scholastic skills      Other specified behavioral and emotional disorders with onset usually occurring in childhood and adolescence      Other specified postprocedural states 2014     Other specified postprocedural states 2016     Peptic ulcer without hemorrhage or perforation 1999     Personal history of urinary calculi 2004    Right     Plantar fascial fibromatosis 2014     Uncomplicated asthma     intermittent         Family history:  Family History   Problem Relation Age of Onset     Cancer Mother 46        Cancer,Lung cancer     Blood Disease Mother         Blood Disease     Asthma Father         Asthma     Diabetes Father         Diabetes,2     Hypertension Father         Hypertension     Heart Disease Father         Heart Disease,CHF     Other - See Comments Father          at age 65 from complications of a motor vehicle accident.     Family History Negative Sister         Good Health     Family History Negative Sister         Good Health     Breast Cancer No family hx of         Cancer-breast     Colon Cancer No family hx of         Cancer-colon     Prostate Cancer No family hx of         Cancer-prostate     Ovarian Cancer No family hx of         Cancer-ovarian     Anesthesia Reaction No family hx of         Anesthesia Problem         Social History:   Social History     Socioeconomic History     Marital status:      Spouse name: Not on file     Number of children: Not on file     Years of education: Not on file     Highest education level: Not on file   Occupational History     Employer: [employer]   Tobacco Use     Smoking status: Former Smoker     Packs/day: 0.50     Years: 16.00     Pack years: 8.00     Types: Cigarettes     Quit date: 2016     Years since quittin.4     Smokeless tobacco: Never Used   Vaping Use     Vaping Use: Never used    Substance and Sexual Activity     Alcohol use: Yes     Comment: Alcoholic Drinks/day: occasionally     Drug use: No     Sexual activity: Yes     Partners: Male     Birth control/protection: Surgical   Other Topics Concern     Parent/sibling w/ CABG, MI or angioplasty before 65F 55M? Not Asked   Social History Narrative    Patient is  (8/2018).  Lives with her son (Herman); daughter (Jluis) lives on her own.  Dog ( animal) - Moises, ~40# Brindle, mixed breed.    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     Social Determinants of Health     Financial Resource Strain: Not on file   Food Insecurity: Not on file   Transportation Needs: Not on file   Physical Activity: Not on file   Stress: Not on file   Social Connections: Not on file   Intimate Partner Violence: Not on file   Housing Stability: Not on file         Surgical History:  Past Surgical History:   Procedure Laterality Date     APPENDECTOMY OPEN  1999     ARTHROSCOPY SHOULDER      10/05,debridement and subacromial decompression of right shoulder.     ARTHROSCOPY SHOULDER  08/2007    acromioplasty distal clavicle excision, and extensive debridement of the bursa of the right shoulder by Dr. Villa.  Also left?     CHOLECYSTECTOMY  1999     CYSTOSCOPY  2004    with retrogrades     DILATION AND CURETTAGE  11/2007    Ablation     LAPAROSCOPIC TUBAL LIGATION  2004     LITHOTRIPSY  12/2004    extracorporeal shockwave     OTHER SURGICAL HISTORY  08/13/2012     OTHER SURGICAL HISTORY Right 09/03/2014    Pathology showed epidermal inclusion cysts     RELEASE CARPAL TUNNEL  2012                Brooks Anthony MD  06/19/22 8958

## 2022-06-19 NOTE — ED TRIAGE NOTES
"Pt tested positive on 6/13 for covid. Pt started to have chest pressure yesterday and was encouraged by the triage line to be seen in the ER. Pt states, \"It feels like something is sitting on my chest.\" BP (!) 146/89   Pulse 89   Temp 97.9  F (36.6  C) (Tympanic)   Resp 18   Ht 1.651 m (5' 5\")   Wt 105.7 kg (233 lb)   SpO2 99% room air.       Triage Assessment     Row Name 06/19/22 0948       Triage Assessment (Adult)    Airway WDL WDL       Respiratory WDL    Respiratory WDL WDL       Skin Circulation/Temperature WDL    Skin Circulation/Temperature WDL WDL       Cardiac WDL    Cardiac WDL X;chest pain       Chest Pain Assessment    Chest Pain Location midsternal    Character pressure       Peripheral/Neurovascular WDL    Peripheral Neurovascular WDL WDL       Cognitive/Neuro/Behavioral WDL    Cognitive/Neuro/Behavioral WDL WDL              "

## 2022-06-19 NOTE — TELEPHONE ENCOUNTER
Triage call    Patient calling to report she is having pressure on her chest feels like something is sitting on her chest.she also feels marlen her heart is racing but could not tell me how fast. She is short of breath and being active mskes it worse.    Per care advise see HCP in 4 hours. Care advice given.  Verbalizes understanding and agrees with plan. She is going to the ED in [facility].    Saima Coffman RN   [facility] Nurse Advisor  9:33 AM 6/19/2022    COVID 19 Nurse Triage Plan/Patient Instructions    Please be aware that novel coronavirus (COVID-19) may be circulating in the community. If you develop symptoms such as fever, cough, or SOB or if you have concerns about the presence of another infection including coronavirus (COVID-19), please contact your health care provider or visit [email or web address].     Disposition/Instructions    In-Person Visit with provider recommended. Reference Visit Selection Guide.    Thank you for taking steps to prevent the spread of this virus.  o Limit your contact with others.  o Wear a simple mask to cover your cough.  o Wash your hands well and often.    Resources    [facility]: About COVID-19: [email or web address]    CDC: What to Do If You're Sick: www.cdc.gov/coronavirus/2019-ncov/about/steps-when-sick.html    CDC: Ending Home Isolation: www.cdc.gov/coronavirus/2019-ncov/hcp/disposition-in-home-patients.html     CDC: Caring for Someone: www.cdc.gov/coronavirus/2019-ncov/if-you-are-sick/care-for-someone.html     [facility]: Interim Guidance for Hospital Discharge to Home: www.health.[facility]/diseases/coronavirus/hcp/hospdischarge.pdf    [facility] clinical trials (COVID-19 research studies): [email or web address]/umn-clinical-trials     Below are the COVID-19 hotlines at the [place] Department of [facility] ([facility]). Interpreters are available.   o For health questions: Call 205-363-6134 or 1-708.354.6662 (7 a.m. to 7  "p.m.)  o For questions about schools and childcare: Call 412-411-5844 or 1-757.972.8988 (7 a.m. to 7 p.m.)     Reason for Disposition    [1] MILD difficulty breathing (e.g., minimal/no SOB at rest, SOB with walking, pulse <100) AND [2] NEW-onset or WORSE than normal    Additional Information    Negative: SEVERE difficulty breathing (e.g., struggling for each breath, speaks in single words)    Negative: Difficult to awaken or acting confused (e.g., disoriented, slurred speech)    Negative: Shock suspected (e.g., cold/pale/clammy skin, too weak to stand, low BP, rapid pulse)    Negative: Passed out (i.e., fainted, collapsed and was not responding)    Negative: [1] Chest pain lasts > 5 minutes AND [2] age > 44    Negative: [1] Chest pain lasts > 5 minutes AND [2] age > 30 AND [3] one or more cardiac risk factors (e.g., diabetes, high blood pressure, high cholesterol, smoker, or strong family history of heart disease)    Negative: [1] Chest pain lasts > 5 minutes AND [2] history of heart disease (i.e., angina, heart attack, heart failure, bypass surgery, takes nitroglycerin)    Negative: [1] Chest pain lasts > 5 minutes AND [2] described as crushing, pressure-like, or heavy    Negative: Heart beating < 50 beats per minute OR > 140 beats per minute    Negative: Visible sweat on face or sweat dripping down face    Negative: Sounds like a life-threatening emergency to the triager    Negative: Followed a chest injury    Negative: SEVERE chest pain    Negative: [1] Chest pain (or \"angina\") comes and goes AND [2] is happening more often (increasing in frequency) or getting worse (increasing in severity) (Exception: chest pains that last only a few seconds)    Negative: Pain also in shoulder(s) or arm(s) or jaw (Exception: pain is clearly made worse by movement)    Negative: Difficulty breathing    Negative: Dizziness or lightheadedness    Negative: Coughing up blood    Negative: Cocaine use within last 3 days    Negative: " "Major surgery in past month    Negative: Hip or leg fracture (broken bone) in past month (or had cast on leg or ankle in past month)    Negative: Illness requiring prolonged bedrest in past month (e.g., immobilization, long hospital stay)    Negative: Long-distance travel in past month (e.g., car, bus, train, plane; with trip lasting 6 or more hours)    Negative: History of prior \"blood clot\" in leg or lungs (i.e., deep vein thrombosis, pulmonary embolism)    Negative: History of inherited increased risk of blood clots (e.g., Factor 5 Leiden, Anti-thrombin 3, Protein C or Protein S deficiency, Prothrombin mutation)    Negative: Cancer treatment in past six months (or has cancer now)    Negative: [1] Breathing stopped AND [2] hasn't returned    Negative: Choking on something    Negative: Severe difficulty breathing (e.g., struggling for each breath, speaks in single words)    Negative: Bluish (or gray) lips or face now    Negative: Difficult to awaken or acting confused (e.g., disoriented, slurred speech)    Negative: Passed out (i.e., lost consciousness, collapsed and was not responding)    Negative: Wheezing started suddenly after medicine, an allergic food or bee sting    Negative: Stridor    Negative: Slow, shallow and weak breathing    Negative: Sounds like a life-threatening emergency to the triager    Negative: Chest pain    Negative: [1] Wheezing (high pitched whistling sound) AND [2] previous asthma attacks or use of asthma medicines    Negative: [1] Difficulty breathing AND [2] only present when coughing    Negative: [1] Difficulty breathing AND [2] only from stuffy or runny nose    Negative: [1] Difficulty breathing AND [2] within 14 days of COVID-19 Exposure    Negative: [1] MODERATE difficulty breathing (e.g., speaks in phrases, SOB even at rest, pulse 100-120) AND [2] NEW-onset or WORSE than normal    Negative: Wheezing can be heard across the room    Negative: Drooling or spitting out saliva (because " "can't swallow)    Negative: History of prior \"blood clot\" in leg or lungs (i.e., deep vein thrombosis, pulmonary embolism)    Negative: History of inherited increased risk of blood clots (e.g., Factor 5 Leiden, Anti-thrombin 3, Protein C or Protein S deficiency, Prothrombin mutation)    Negative: Major surgery in the past month    Negative: Hip or leg fracture (broken bone) in past month (or had cast on leg or ankle in past month)    Negative: Illness requiring prolonged bedrest in past month (e.g., immobilization, long hospital stay)    Negative: Long-distance travel in past month (e.g., car, bus, train, plane; with trip lasting 6 or more hours)    Negative: Extra heart beats OR irregular heart beating   (i.e., \"palpitations\")    Negative: Fever > 103 F (39.4 C)    Negative: [1] Fever > 101 F (38.3 C) AND [2] age > 60    Negative: [1] Fever > 100.0 F (37.8 C) AND [2] bedridden (e.g., nursing home patient, CVA, chronic illness, recovering from surgery)    Negative: [1] Fever > 100.0 F (37.8 C) AND [2] diabetes mellitus or weak immune system (e.g., HIV positive, cancer chemo, splenectomy, organ transplant, chronic steroids)    Negative: [1] Periods where breathing stops and then resumes normally AND [2] bedridden (e.g., nursing home patient, CVA)    Negative: Pregnant or postpartum (< 1 month since delivery)    Negative: Patient sounds very sick or weak to the triager    Negative: [1] Chest pain lasts > 5 minutes AND [2] occurred > 3 days ago (72 hours) AND [3] NO chest pain or cardiac symptoms now    Negative: [1] Chest pain lasting < 5 minutes AND [2] NO chest pain or cardiac symptoms (e.g., breathing difficulty, sweating) now (Exception: chest pains that last only a few seconds)    Negative: Fever > 100.4 F (38.0 C)    Negative: Rash in same area as pain (may be described as \"small blisters\")    Negative: [1] Chest pain lasts > 5 minutes AND [2] occurred in past 3 days (72 hours) (Exception: feels exactly the same " "as previously diagnosed heartburn and has accompanying sour taste in mouth)    Negative: Taking a deep breath makes pain worse    Negative: Patient sounds very sick or weak to the triager    Negative: [1] Patient says chest pain feels exactly the same as previously diagnosed \"heartburn\" AND [2] describes burning in chest AND [3] accompanying sour taste in mouth    Negative: Chest pain(s) lasting a few seconds from coughing    Negative: Chest pain(s) lasting a few seconds    Negative: [1] Chest pain lasting < 5 minutes AND [2] has not taken prescribed nitroglycerin    Negative: [1] Chest pain lasting < 5 minutes AND [2] completely gone after taking nitroglycerin    Negative: [1] Chest pain from known angina comes and goes AND [2] is NOT happening more often (increasing in frequency) or getting worse (increasing in severity)    Negative: [1] Chest pain(s) lasting a few seconds from coughing AND [2] persists > 3 days    Negative: [1] Chest pain(s) lasting a few seconds AND [2] persists > 3 days    Protocols used: BREATHING DIFFICULTY-A-AH, CHEST PAIN-A-AH      "

## 2022-06-19 NOTE — PROGRESS NOTES
"Patient states she is still feeling a little \"heaviness\" over her chest but otherwise no complaints. Vital signs and lab work stable.    "

## 2022-06-20 ENCOUNTER — MYC REFILL (OUTPATIENT)
Dept: FAMILY MEDICINE | Facility: OTHER | Age: 42
End: 2022-06-20
Payer: COMMERCIAL

## 2022-06-20 ENCOUNTER — MYC MEDICAL ADVICE (OUTPATIENT)
Dept: FAMILY MEDICINE | Facility: OTHER | Age: 42
End: 2022-06-20
Payer: COMMERCIAL

## 2022-06-20 DIAGNOSIS — L08.9 LOCAL INFECTION OF SKIN AND SUBCUTANEOUS TISSUE: Primary | ICD-10-CM

## 2022-06-20 DIAGNOSIS — L08.9 LOCAL INFECTION OF SKIN AND SUBCUTANEOUS TISSUE: ICD-10-CM

## 2022-06-20 LAB
ATRIAL RATE - MUSE: 82 BPM
DIASTOLIC BLOOD PRESSURE - MUSE: NORMAL MMHG
INTERPRETATION ECG - MUSE: NORMAL
P AXIS - MUSE: 29 DEGREES
PR INTERVAL - MUSE: 144 MS
QRS DURATION - MUSE: 84 MS
QT - MUSE: 376 MS
QTC - MUSE: 439 MS
R AXIS - MUSE: 54 DEGREES
SYSTOLIC BLOOD PRESSURE - MUSE: NORMAL MMHG
T AXIS - MUSE: 31 DEGREES
VENTRICULAR RATE- MUSE: 82 BPM

## 2022-06-20 RX ORDER — DOXYCYCLINE 100 MG/1
100 CAPSULE ORAL 2 TIMES DAILY
Qty: 20 CAPSULE | Refills: 0 | Status: CANCELLED | OUTPATIENT
Start: 2022-06-20

## 2022-06-21 ENCOUNTER — TELEPHONE (OUTPATIENT)
Dept: FAMILY MEDICINE | Facility: OTHER | Age: 42
End: 2022-06-21

## 2022-06-21 DIAGNOSIS — L08.9 LOCAL INFECTION OF SKIN AND SUBCUTANEOUS TISSUE: ICD-10-CM

## 2022-06-21 RX ORDER — DOXYCYCLINE 100 MG/1
100 CAPSULE ORAL 2 TIMES DAILY
Qty: 20 CAPSULE | Refills: 0 | Status: SHIPPED | OUTPATIENT
Start: 2022-06-21 | End: 2022-06-21

## 2022-06-21 RX ORDER — DOXYCYCLINE 100 MG/1
100 CAPSULE ORAL 2 TIMES DAILY
Qty: 20 CAPSULE | Refills: 0 | Status: SHIPPED | OUTPATIENT
Start: 2022-06-21 | End: 2022-07-18

## 2022-06-21 NOTE — TELEPHONE ENCOUNTER
Note from pharmacy:  Not covered. Doxycycline Hyclate is covered. Please re-send. Thanks!    doxycycline monohydrate (MONODOX) 100 MG capsule 20 capsule 0 6/21/2022  --   Sig - Route: Take 1 capsule (100 mg) by mouth 2 times daily - Oral     Maia Raines RN .............. 6/21/2022  11:10 AM

## 2022-06-23 ENCOUNTER — HOSPITAL ENCOUNTER (OUTPATIENT)
Dept: PHYSICAL THERAPY | Facility: OTHER | Age: 42
Setting detail: THERAPIES SERIES
Discharge: HOME | End: 2022-06-23
Attending: FAMILY MEDICINE
Payer: COMMERCIAL

## 2022-06-23 PROCEDURE — 97140 MANUAL THERAPY 1/> REGIONS: CPT | Mod: GP

## 2022-07-06 NOTE — PROGRESS NOTES
[facility]    Outpatient Physical Therapy Discharge Note  Patient: Nohelia Crockett  : 1980    Beginning/End Dates of Reporting Period:  22 to 22    Referring Provider: Lina Banks MD    Therapy Diagnosis:   pelvic pain, myofascial tightness         Client Self Report: Worked late last night. Less pain,a few tiems here and there. More rest due to covid. Patient cancelled remaining PT visits via [employer] on 22 stating she was no longer having issues and can be discharged from PT.     Objective Measurements:  Objective Measure: Postural loading  Details: equal  Objective Measure: myofascial  Details: bladder wall  Objective Measure: joint        Goals:      Goal Identifier posture   Goal Description Patient to have improved posture with equal loading in to head shoulders and pelvis to allow even weight bearing during gait and stance.   Target Date 22   Date Met   22   Progress (detail required for progress note):       Goal Identifier lift/carry   Goal Description Patient to report the ability to lift and carry objects with out left pelvic and hip pain.   Target Date 22   Date Met   22   Progress (detail required for progress note):       Goal Identifier house hold tasks   Goal Description Patient to complete house hold tasks without limitation from pelvic pain.   Target Date 22   Date Met   22   Progress (detail required for progress note):           Plan:  Discharge from therapy.    Discharge:    Reason for Discharge: Patient has met all goals.    Equipment Issued: NA    Discharge Plan: Patient to continue home program.

## 2022-07-17 ENCOUNTER — APPOINTMENT (OUTPATIENT)
Dept: CT IMAGING | Facility: OTHER | Age: 42
End: 2022-07-17
Attending: PHYSICIAN ASSISTANT
Payer: COMMERCIAL

## 2022-07-17 ENCOUNTER — HOSPITAL ENCOUNTER (EMERGENCY)
Facility: OTHER | Age: 42
Discharge: HOME | End: 2022-07-17
Attending: PHYSICIAN ASSISTANT | Admitting: PHYSICIAN ASSISTANT
Payer: COMMERCIAL

## 2022-07-17 VITALS
RESPIRATION RATE: 18 BRPM | WEIGHT: 235 LBS | BODY MASS INDEX: 39.11 KG/M2 | TEMPERATURE: 98.2 F | HEART RATE: 88 BPM | SYSTOLIC BLOOD PRESSURE: 150 MMHG | OXYGEN SATURATION: 99 % | DIASTOLIC BLOOD PRESSURE: 81 MMHG

## 2022-07-17 DIAGNOSIS — N20.0 LEFT RENAL STONE: ICD-10-CM

## 2022-07-17 LAB
ALBUMIN SERPL-MCNC: 4.2 G/DL (ref 3.5–5.7)
ALBUMIN UR-MCNC: 200 MG/DL
ALP SERPL-CCNC: 80 U/L (ref 34–104)
ALT SERPL W P-5'-P-CCNC: 26 U/L (ref 7–52)
ANION GAP SERPL CALCULATED.3IONS-SCNC: 8 MMOL/L (ref 3–14)
APPEARANCE UR: ABNORMAL
AST SERPL W P-5'-P-CCNC: 23 U/L (ref 13–39)
BASOPHILS # BLD AUTO: 0 10E3/UL (ref 0–0.2)
BASOPHILS NFR BLD AUTO: 0 %
BILIRUB SERPL-MCNC: 0.4 MG/DL (ref 0.3–1)
BILIRUB UR QL STRIP: NEGATIVE
BUN SERPL-MCNC: 10 MG/DL (ref 7–25)
CALCIUM SERPL-MCNC: 9.2 MG/DL (ref 8.6–10.3)
CHLORIDE BLD-SCNC: 104 MMOL/L (ref 98–107)
CO2 SERPL-SCNC: 26 MMOL/L (ref 21–31)
COLOR UR AUTO: YELLOW
CREAT SERPL-MCNC: 0.64 MG/DL (ref 0.6–1.2)
EOSINOPHIL # BLD AUTO: 0.1 10E3/UL (ref 0–0.7)
EOSINOPHIL NFR BLD AUTO: 2 %
ERYTHROCYTE [DISTWIDTH] IN BLOOD BY AUTOMATED COUNT: 12.4 % (ref 10–15)
GFR SERPL CREATININE-BSD FRML MDRD: >90 ML/MIN/1.73M2
GLUCOSE BLD-MCNC: 89 MG/DL (ref 70–105)
GLUCOSE UR STRIP-MCNC: NEGATIVE MG/DL
HCG UR QL: NEGATIVE
HCT VFR BLD AUTO: 39.9 % (ref 35–47)
HGB BLD-MCNC: 13.8 G/DL (ref 11.7–15.7)
HGB UR QL STRIP: ABNORMAL
IMM GRANULOCYTES # BLD: 0 10E3/UL
IMM GRANULOCYTES NFR BLD: 0 %
KETONES UR STRIP-MCNC: ABNORMAL MG/DL
LACTATE SERPL-SCNC: 1.2 MMOL/L (ref 0.7–2)
LEUKOCYTE ESTERASE UR QL STRIP: ABNORMAL
LIPASE SERPL-CCNC: 21 U/L (ref 11–82)
LYMPHOCYTES # BLD AUTO: 2.9 10E3/UL (ref 0.8–5.3)
LYMPHOCYTES NFR BLD AUTO: 30 %
MCH RBC QN AUTO: 32.2 PG (ref 26.5–33)
MCHC RBC AUTO-ENTMCNC: 34.6 G/DL (ref 31.5–36.5)
MCV RBC AUTO: 93 FL (ref 78–100)
MONOCYTES # BLD AUTO: 0.6 10E3/UL (ref 0–1.3)
MONOCYTES NFR BLD AUTO: 6 %
MUCOUS THREADS #/AREA URNS LPF: PRESENT /LPF
NEUTROPHILS # BLD AUTO: 5.9 10E3/UL (ref 1.6–8.3)
NEUTROPHILS NFR BLD AUTO: 62 %
NITRATE UR QL: NEGATIVE
NRBC # BLD AUTO: 0 10E3/UL
NRBC BLD AUTO-RTO: 0 /100
PH UR STRIP: 6 [PH] (ref 5–9)
PLATELET # BLD AUTO: 295 10E3/UL (ref 150–450)
POTASSIUM BLD-SCNC: 3.5 MMOL/L (ref 3.5–5.1)
PROT SERPL-MCNC: 7.5 G/DL (ref 6.4–8.9)
RBC # BLD AUTO: 4.29 10E6/UL (ref 3.8–5.2)
RBC URINE: >182 /HPF
SODIUM SERPL-SCNC: 138 MMOL/L (ref 134–144)
SP GR UR STRIP: 1.04 (ref 1–1.03)
SQUAMOUS EPITHELIAL: 10 /HPF
UROBILINOGEN UR STRIP-MCNC: NORMAL MG/DL
WBC # BLD AUTO: 9.6 10E3/UL (ref 4–11)
WBC URINE: 17 /HPF

## 2022-07-17 PROCEDURE — 87086 URINE CULTURE/COLONY COUNT: CPT | Performed by: PHYSICIAN ASSISTANT

## 2022-07-17 PROCEDURE — 99284 EMERGENCY DEPT VISIT MOD MDM: CPT | Performed by: PHYSICIAN ASSISTANT

## 2022-07-17 PROCEDURE — 99284 EMERGENCY DEPT VISIT MOD MDM: CPT | Mod: 25 | Performed by: PHYSICIAN ASSISTANT

## 2022-07-17 PROCEDURE — 83690 ASSAY OF LIPASE: CPT | Performed by: PHYSICIAN ASSISTANT

## 2022-07-17 PROCEDURE — 81001 URINALYSIS AUTO W/SCOPE: CPT | Performed by: PHYSICIAN ASSISTANT

## 2022-07-17 PROCEDURE — 36415 COLL VENOUS BLD VENIPUNCTURE: CPT | Performed by: PHYSICIAN ASSISTANT

## 2022-07-17 PROCEDURE — 80053 COMPREHEN METABOLIC PANEL: CPT | Performed by: PHYSICIAN ASSISTANT

## 2022-07-17 PROCEDURE — 81025 URINE PREGNANCY TEST: CPT | Performed by: PHYSICIAN ASSISTANT

## 2022-07-17 PROCEDURE — 85025 COMPLETE CBC W/AUTO DIFF WBC: CPT | Performed by: PHYSICIAN ASSISTANT

## 2022-07-17 PROCEDURE — 83605 ASSAY OF LACTIC ACID: CPT | Performed by: PHYSICIAN ASSISTANT

## 2022-07-17 PROCEDURE — 74176 CT ABD & PELVIS W/O CONTRAST: CPT | Mod: TC

## 2022-07-17 RX ORDER — OXYCODONE HYDROCHLORIDE 5 MG/1
5 TABLET ORAL EVERY 6 HOURS PRN
Qty: 20 TABLET | Refills: 0 | Status: SHIPPED | OUTPATIENT
Start: 2022-07-17 | End: 2022-11-17

## 2022-07-17 NOTE — ED TRIAGE NOTES
Pt presents to ED with c/o lower back pain, more on the left side. Pt states pain comes and goes, is dull in nature. Denies dysuria. Pt alert, rates pain 6/10.  BP (!) 150/81   Pulse 88   Temp 98.2  F (36.8  C) (Tympanic)   Resp 18   Wt 106.6 kg (235 lb)   SpO2 99%   BMI 39.11 kg/m         Triage Assessment     Row Name 07/17/22 1222       Triage Assessment (Adult)    Airway WDL WDL       Respiratory WDL    Respiratory WDL WDL       Skin Circulation/Temperature WDL    Skin Circulation/Temperature WDL WDL       Cardiac WDL    Cardiac WDL WDL       Peripheral/Neurovascular WDL    Peripheral Neurovascular WDL WDL       Cognitive/Neuro/Behavioral WDL    Cognitive/Neuro/Behavioral WDL WDL

## 2022-07-17 NOTE — DISCHARGE INSTRUCTIONS
Get plenty of fluids and rest.  As discussed, you have a large stone in your left kidney.  This will need to be removed by urology.  I called her urologist, Dr. Pantoja and a referral was placed for him he will expect to see you in the clinic this coming week for reassessment.  Continue alternating Tylenol and ibuprofen every 4 hours and I did give you some pain medication to take as needed.  Return if there is intractable pain, increased fevers, painful urination etc.

## 2022-07-18 ENCOUNTER — OFFICE VISIT (OUTPATIENT)
Dept: UROLOGY | Facility: OTHER | Age: 42
End: 2022-07-18
Attending: PHYSICIAN ASSISTANT
Payer: COMMERCIAL

## 2022-07-18 ENCOUNTER — ANESTHESIA EVENT (OUTPATIENT)
Dept: SURGERY | Facility: OTHER | Age: 42
End: 2022-07-18
Payer: COMMERCIAL

## 2022-07-18 VITALS
BODY MASS INDEX: 38.94 KG/M2 | HEART RATE: 94 BPM | WEIGHT: 234 LBS | DIASTOLIC BLOOD PRESSURE: 74 MMHG | SYSTOLIC BLOOD PRESSURE: 124 MMHG | OXYGEN SATURATION: 97 % | RESPIRATION RATE: 16 BRPM

## 2022-07-18 DIAGNOSIS — N20.0 LEFT RENAL STONE: ICD-10-CM

## 2022-07-18 LAB — SARS-COV-2 RNA RESP QL NAA+PROBE: NEGATIVE

## 2022-07-18 PROCEDURE — U0003 INFECTIOUS AGENT DETECTION BY NUCLEIC ACID (DNA OR RNA); SEVERE ACUTE RESPIRATORY SYNDROME CORONAVIRUS 2 (SARS-COV-2) (CORONAVIRUS DISEASE [COVID-19]), AMPLIFIED PROBE TECHNIQUE, MAKING USE OF HIGH THROUGHPUT TECHNOLOGIES AS DESCRIBED BY CMS-2020-01-R: HCPCS | Mod: ZL | Performed by: UROLOGY

## 2022-07-18 PROCEDURE — C9803 HOPD COVID-19 SPEC COLLECT: HCPCS | Performed by: UROLOGY

## 2022-07-18 PROCEDURE — G0463 HOSPITAL OUTPT CLINIC VISIT: HCPCS

## 2022-07-18 PROCEDURE — 99204 OFFICE O/P NEW MOD 45 MIN: CPT | Performed by: UROLOGY

## 2022-07-18 RX ORDER — CIPROFLOXACIN 2 MG/ML
400 INJECTION, SOLUTION INTRAVENOUS
Status: DISCONTINUED | OUTPATIENT
Start: 2022-07-18 | End: 2022-11-17 | Stop reason: CLARIF

## 2022-07-18 RX ORDER — LORATADINE 10 MG/1
10 TABLET ORAL
COMMUNITY

## 2022-07-18 RX ORDER — HYDROCODONE BITARTRATE AND ACETAMINOPHEN 5; 325 MG/1; MG/1
1-2 TABLET ORAL EVERY 4 HOURS PRN
Status: CANCELLED | OUTPATIENT
Start: 2022-07-18

## 2022-07-18 ASSESSMENT — ENCOUNTER SYMPTOMS
FLANK PAIN: 1
SHORTNESS OF BREATH: 0
NAUSEA: 0
ABDOMINAL PAIN: 0
DYSURIA: 0
VOMITING: 0
CONFUSION: 0
FEVER: 0

## 2022-07-18 ASSESSMENT — PAIN SCALES - GENERAL: PAINLEVEL: SEVERE PAIN (7)

## 2022-07-18 NOTE — ED PROVIDER NOTES
History     Chief Complaint   Patient presents with     Back Pain     Flank Pain     HPI  Nohelia Crockett is a 42 year old female who presents to the ED for evaluation of back/flank pain. Pt presents to ED with c/o lower back pain, more on the left side. Pt states pain comes and goes, is dull in nature. Denies dysuria. Pt alert, rates pain 6/10.    Allergies:  Allergies   Allergen Reactions     Penicillins Anaphylaxis     Aspirin Hives       Problem List:    Patient Active Problem List    Diagnosis Date Noted     Morbid obesity (H) 08/20/2021     Priority: Medium     Suspected sleep apnea 05/08/2019     Priority: Medium     Asthma 02/19/2018     Priority: Medium     Overview:   intermittent       Attention deficit disorder 02/19/2018     Priority: Medium     Knee pain 02/19/2018     Priority: Medium     Overview:   secondary to patellofemoral dysfunction       Learning disability 02/19/2018     Priority: Medium     Migraine headache 02/19/2018     Priority: Medium     Overview:   Occasional severe HA, does not require daily prophylaxis       Obesity (BMI 35.0-39.9 without comorbidity) 12/14/2016     Priority: Medium     Adhesive capsulitis of right shoulder 06/06/2016     Priority: Medium     S/P shoulder surgery 06/06/2016     Priority: Medium     Gastroesophageal reflux disease without esophagitis 05/26/2016     Priority: Medium     Pain of multiple sites 10/19/2015     Priority: Medium     History of arthroscopy of right knee 07/02/2015     Priority: Medium     Plantar fasciitis 11/14/2014     Priority: Medium     H/O excision of mass 09/03/2014     Priority: Medium     Genital herpes 12/07/2009     Priority: Medium     Overview:   Has had only one outbreak          Past Medical History:    Past Medical History:   Diagnosis Date     Adhesive capsulitis of right shoulder      Cyst of ovary 2006     Localized swelling, mass, or lump of upper extremity 08/26/2014     Migraine without status migrainosus, not  intractable      Other developmental disorders of scholastic skills      Other specified behavioral and emotional disorders with onset usually occurring in childhood and adolescence      Other specified postprocedural states 2014     Other specified postprocedural states 2016     Peptic ulcer without hemorrhage or perforation      Personal history of urinary calculi 2004     Plantar fascial fibromatosis 2014     Uncomplicated asthma        Past Surgical History:    Past Surgical History:   Procedure Laterality Date     APPENDECTOMY OPEN       ARTHROSCOPY SHOULDER      10/05,debridement and subacromial decompression of right shoulder.     ARTHROSCOPY SHOULDER  2007    acromioplasty distal clavicle excision, and extensive debridement of the bursa of the right shoulder by Dr. Villa.  Also left?     CHOLECYSTECTOMY       CYSTOSCOPY      with retrogrades     DILATION AND CURETTAGE  2007    Ablation     LAPAROSCOPIC TUBAL LIGATION       LITHOTRIPSY  2004    extracorporeal shockwave     OTHER SURGICAL HISTORY  2012     OTHER SURGICAL HISTORY Right 2014    Pathology showed epidermal inclusion cysts     RELEASE CARPAL TUNNEL  2012       Family History:    Family History   Problem Relation Age of Onset     Cancer Mother 46        Cancer,Lung cancer     Blood Disease Mother         Blood Disease     Asthma Father         Asthma     Diabetes Father         Diabetes,2     Hypertension Father         Hypertension     Heart Disease Father         Heart Disease,CHF     Other - See Comments Father          at age 65 from complications of a motor vehicle accident.     Family History Negative Sister         Good Health     Family History Negative Sister         Good Health     Breast Cancer No family hx of         Cancer-breast     Colon Cancer No family hx of         Cancer-colon     Prostate Cancer No family hx of         Cancer-prostate     Ovarian Cancer No family hx  of         Cancer-ovarian     Anesthesia Reaction No family hx of         Anesthesia Problem       Social History:  Marital Status:   [4]  Social History     Tobacco Use     Smoking status: Former Smoker     Packs/day: 0.50     Years: 16.00     Pack years: 8.00     Types: Cigarettes     Quit date: 2016     Years since quittin.5     Smokeless tobacco: Never Used   Vaping Use     Vaping Use: Never used   Substance Use Topics     Alcohol use: Yes     Comment: Alcoholic Drinks/day: occasionally     Drug use: No        Medications:    oxyCODONE (ROXICODONE) 5 MG tablet  doxycycline hyclate (VIBRAMYCIN) 100 MG capsule  Vitamin D, Cholecalciferol, 25 MCG (1000 UT) TABS          Review of Systems   Constitutional: Negative for fever.   HENT: Negative for congestion.    Eyes: Negative for visual disturbance.   Respiratory: Negative for shortness of breath.    Cardiovascular: Negative for chest pain.   Gastrointestinal: Negative for abdominal pain, nausea and vomiting.   Genitourinary: Positive for flank pain. Negative for dysuria.   Psychiatric/Behavioral: Negative for confusion.       Physical Exam   BP: (!) 150/81  Pulse: 88  Temp: 98.2  F (36.8  C)  Resp: 18  Weight: 106.6 kg (235 lb)  SpO2: 99 %      Physical Exam  Constitutional:       General: She is not in acute distress.     Appearance: She is well-developed. She is not diaphoretic.   HENT:      Head: Normocephalic and atraumatic.   Eyes:      General: No scleral icterus.     Conjunctiva/sclera: Conjunctivae normal.   Cardiovascular:      Rate and Rhythm: Normal rate and regular rhythm.   Pulmonary:      Effort: Pulmonary effort is normal.      Breath sounds: Normal breath sounds.   Abdominal:      Palpations: Abdomen is soft.      Tenderness: There is no abdominal tenderness. There is left CVA tenderness.   Musculoskeletal:         General: No deformity.      Cervical back: Neck supple.   Lymphadenopathy:      Cervical: No cervical adenopathy.    Skin:     General: Skin is warm and dry.      Coloration: Skin is not jaundiced.      Findings: No rash.   Neurological:      Mental Status: She is alert and oriented to person, place, and time. Mental status is at baseline.   Psychiatric:         Mood and Affect: Mood normal.         Behavior: Behavior normal.         Thought Content: Thought content normal.         Judgment: Judgment normal.         ED Course                 Procedures              Critical Care time:  none               Results for orders placed or performed during the hospital encounter of 07/17/22 (from the past 24 hour(s))   UA with Microscopic reflex to Culture    Specimen: Urine, Midstream   Result Value Ref Range    Color Urine Yellow Colorless, Straw, Light Yellow, Yellow    Appearance Urine Slightly Cloudy (A) Clear    Glucose Urine Negative Negative mg/dL    Bilirubin Urine Negative Negative    Ketones Urine Trace (A) Negative mg/dL    Specific Gravity Urine 1.036 (H) 1.000 - 1.030    Blood Urine Large (A) Negative    pH Urine 6.0 5.0 - 9.0    Protein Albumin Urine 200  (A) Negative mg/dL    Urobilinogen Urine Normal Normal, 2.0 mg/dL    Nitrite Urine Negative Negative    Leukocyte Esterase Urine Moderate (A) Negative    Mucus Urine Present (A) None Seen /LPF    RBC Urine >182 (H) <=2 /HPF    WBC Urine 17 (H) <=5 /HPF    Squamous Epithelials Urine 10 (H) <=1 /HPF    Narrative    Urine Culture ordered based on laboratory criteria   HCG qualitative urine (UPT)   Result Value Ref Range    hCG Urine Qualitative Negative Negative   CBC with platelets differential    Narrative    The following orders were created for panel order CBC with platelets differential.  Procedure                               Abnormality         Status                     ---------                               -----------         ------                     CBC with platelets and d...[[id number]]                      Final result                 Please view results  for these tests on the individual orders.   Comprehensive metabolic panel   Result Value Ref Range    Sodium 138 134 - 144 mmol/L    Potassium 3.5 3.5 - 5.1 mmol/L    Chloride 104 98 - 107 mmol/L    Carbon Dioxide (CO2) 26 21 - 31 mmol/L    Anion Gap 8 3 - 14 mmol/L    Urea Nitrogen 10 7 - 25 mg/dL    Creatinine 0.64 0.60 - 1.20 mg/dL    Calcium 9.2 8.6 - 10.3 mg/dL    Glucose 89 70 - 105 mg/dL    Alkaline Phosphatase 80 34 - 104 U/L    AST 23 13 - 39 U/L    ALT 26 7 - 52 U/L    Protein Total 7.5 6.4 - 8.9 g/dL    Albumin 4.2 3.5 - 5.7 g/dL    Bilirubin Total 0.4 0.3 - 1.0 mg/dL    GFR Estimate >90 >60 mL/min/1.73m2   Lipase   Result Value Ref Range    Lipase 21 11 - 82 U/L   Lactic acid whole blood   Result Value Ref Range    Lactic Acid 1.2 0.7 - 2.0 mmol/L   CBC with platelets and differential   Result Value Ref Range    WBC Count 9.6 4.0 - 11.0 10e3/uL    RBC Count 4.29 3.80 - 5.20 10e6/uL    Hemoglobin 13.8 11.7 - 15.7 g/dL    Hematocrit 39.9 35.0 - 47.0 %    MCV 93 78 - 100 fL    MCH 32.2 26.5 - 33.0 pg    MCHC 34.6 31.5 - 36.5 g/dL    RDW 12.4 10.0 - 15.0 %    Platelet Count 295 150 - 450 10e3/uL    % Neutrophils 62 %    % Lymphocytes 30 %    % Monocytes 6 %    % Eosinophils 2 %    % Basophils 0 %    % Immature Granulocytes 0 %    NRBCs per 100 WBC 0 <1 /100    Absolute Neutrophils 5.9 1.6 - 8.3 10e3/uL    Absolute Lymphocytes 2.9 0.8 - 5.3 10e3/uL    Absolute Monocytes 0.6 0.0 - 1.3 10e3/uL    Absolute Eosinophils 0.1 0.0 - 0.7 10e3/uL    Absolute Basophils 0.0 0.0 - 0.2 10e3/uL    Absolute Immature Granulocytes 0.0 <=0.4 10e3/uL    Absolute NRBCs 0.0 10e3/uL   CT Abdomen Pelvis w/o Contrast    Narrative    PROCEDURE INFORMATION:   Exam: CT Abdomen And Pelvis Without Contrast   Exam date and time: 7/17/2022 2:44 PM   Age: 42 years old   Clinical indication: Abdominal pain; Flank; Left; Additional info: Left sided   flank pain. Hematuria. Stone?     TECHNIQUE:   Imaging protocol: Computed tomography of the  abdomen and pelvis without   contrast.   Radiation optimization: All CT scans at this facility use at least one of these   dose optimization techniques: automated exposure control; mA and/or kV   adjustment per patient size (includes targeted exams where dose is matched to   clinical indication); or iterative reconstruction.     COMPARISON:   CT ABD AND PELVIS W O CONTRAST 1/18/2012 11:21 PM     FINDINGS:   Heart: No calcified coronary artery disease.     Liver: Normal. No mass.   Gallbladder and bile ducts: Cholecystectomy.   Pancreas: Normal. No ductal dilation.   Spleen: Normal. No splenomegaly.   Adrenal glands: Normal. No mass.   Kidneys and ureters: 14 mm stone present in the left renal pelvis. Medial right   renal cyst measuring 3.7 cm and 11 Hounsfield units. Mild left hydronephrosis.   Stomach and bowel: Sigmoid colon diverticulosis   Appendix: No evidence of appendicitis.     Intraperitoneal space: Unremarkable. No free air. No significant fluid   collection.   Vasculature: Unremarkable. No abdominal aortic aneurysm.   Lymph nodes: Unremarkable. No enlarged lymph nodes.   Urinary bladder: No urinary bladder calculi.   Reproductive: Midline uterus. No adnexal mass.   Bones/joints: Unremarkable. No acute fracture.   Soft tissues: Fat containing umbilical hernia.       Impression    IMPRESSION:   1. Left nephrolithiasis. 14 mm stone present in the left renal pelvis.   Mild-to-moderate hydronephrosis. Recommend urology consultation.   2. Benign-appearing right renal cyst.     COMMENTS:   Consistent with the American College of Radiology's Incidental Findings   Committee white paper (J Am Maximus Radiol 2018): Any incidental renal lesion less   than 1 cm or classified as too small to characterize, or any incidental cystic   renal lesion characterized as simple-appearing, is likely benign. No follow-up   imaging is recommended for these lesions per consensus recommendations based on   imaging criteria.     THIS  DOCUMENT HAS BEEN ELECTRONICALLY SIGNED BY ABHINAV GONZALEZ MD       Medications - No data to display    Assessments & Plan (with Medical Decision Making)   Pt nontoxic in NAD. Heart, lung, bowel sounds normal, abd soft, nontender to palpation, nondistended. She does have some left flank pain. VSS, afebrile.     She has grossly normal lab work.    She does have large blood in her urine 17 white cells.    CT abdomen read as:   1. Left nephrolithiasis. 14 mm stone present in the left renal pelvis.   Mild-to-moderate hydronephrosis. Recommend urology consultation.   2. Benign-appearing right renal cyst.     I did discuss with Dr. Pantoja, urology. It is not felt that she needs to be treated with abx at this time. He does recommend that she have close f/u with him in the clinic for stone removal.     She otherwise seems to be doing well, her pain is very well controlled since her nausea.    We will send her home with some pain medication to take as needed and referrals placed for urology follow-up.    Strict return precautions are given to the pt, they will return if symptoms are worsening or concerning. The pt understands and agrees with the plan and they are discharged.     Castillo Cunningham PA-C      I have reviewed the nursing notes.    I have reviewed the findings, diagnosis, plan and need for follow up with the patient.       Discharge Medication List as of 7/17/2022  4:04 PM      START taking these medications    Details   oxyCODONE (ROXICODONE) 5 MG tablet Take 1 tablet (5 mg) by mouth every 6 hours as needed for severe pain, Disp-20 tablet, R-0, [facility]             Final diagnoses:   Left renal stone       7/17/2022   [facility] AND [facility]     Castillo Cunningham PA  07/18/22 0107

## 2022-07-18 NOTE — H&P (VIEW-ONLY)
Type of Visit  NPV     Chief Complaint  Ureteral stone    HPI  Ms. Crockett is a 42 year old female who presents with left ureteral stone.  The patient initially presented to the ED yesterday days ago.  At that time the patient underwent a CT scan which revealed a 14 mm obstructing stone.  The patient currently denies fevers or chills.  The patient currently denies nausea or vomiting.  She has undergone surgery in the past for stones.    Pain ROS  Location:  Left flank  Quality:    Sharp  Provocative factors:  Nothing makes it worse  Palliative factors:   Nothing makes it better  Radiation:   None  Severity:   6/10 currently  Time:     Pain started 3 days ago      Past Medical History  She  has a past medical history of Adhesive capsulitis of right shoulder, Cyst of ovary (2006), Localized swelling, mass, or lump of upper extremity (08/26/2014), Migraine without status migrainosus, not intractable, Other developmental disorders of scholastic skills, Other specified behavioral and emotional disorders with onset usually occurring in childhood and adolescence, Other specified postprocedural states (09/03/2014), Other specified postprocedural states (06/06/2016), Peptic ulcer without hemorrhage or perforation (1999), Personal history of urinary calculi (2004), Plantar fascial fibromatosis (11/14/2014), and Uncomplicated asthma.  Patient Active Problem List   Diagnosis     Adhesive capsulitis of right shoulder     Asthma     Attention deficit disorder     Gastroesophageal reflux disease without esophagitis     Genital herpes     H/O excision of mass     History of arthroscopy of right knee     Knee pain     Learning disability     Migraine headache     Obesity (BMI 35.0-39.9 without comorbidity)     Pain of multiple sites     Plantar fasciitis     S/P shoulder surgery     Suspected sleep apnea     Morbid obesity (H)       Past Surgical History  She  has a past surgical history that includes Appendectomy open (1999);  Cholecystectomy (); Laparoscopic tubal ligation (); Cystoscopy (); Arthroscopy shoulder; Arthroscopy shoulder (2007); Dilation and curettage (2007); Release carpal tunnel (); lithotripsy (2004); OTHER SURGICAL HISTORY (2012); and OTHER SURGICAL HISTORY (Right, 2014).    Medications  She has a current medication list which includes the following prescription(s): doxycycline hyclate, oxycodone, and vitamin d (cholecalciferol).    Allergies  Allergies   Allergen Reactions     Penicillins Anaphylaxis     Aspirin Hives       Social History  She  reports that she quit smoking about 6 years ago. Her smoking use included cigarettes. She has a 8.00 pack-year smoking history. She has never used smokeless tobacco. She reports current alcohol use. She reports that she does not use drugs.  No drug abuse.    Family History  Family History   Problem Relation Age of Onset     Cancer Mother 46        Cancer,Lung cancer     Blood Disease Mother         Blood Disease     Asthma Father         Asthma     Diabetes Father         Diabetes,2     Hypertension Father         Hypertension     Heart Disease Father         Heart Disease,CHF     Other - See Comments Father          at age 65 from complications of a motor vehicle accident.     Family History Negative Sister         Good Health     Family History Negative Sister         Good Health     Breast Cancer No family hx of         Cancer-breast     Colon Cancer No family hx of         Cancer-colon     Prostate Cancer No family hx of         Cancer-prostate     Ovarian Cancer No family hx of         Cancer-ovarian     Anesthesia Reaction No family hx of         Anesthesia Problem       Review of Systems  I personally reviewed the ROS with the patient.    Nursing Notes:   Yolanda Sutton LPN  2022  9:06 AM  Addendum  Chief Complaint   Patient presents with     Consult     Kidney stone        Patient presents to the clinic for aq consult for  Kidney stone     Review of Systems:    Weight loss:    No     Recent fever/chills:  No   Night sweats:   No  Current skin rash:  No   Recent hair loss:  No  Heat intolerance:  No   Cold intolerance:  No  Chest pain:   No   Palpitations:   No  Shortness of breath:  No   Wheezing:   No  Constipation:    No   Diarrhea:   No   Nausea:   No   Vomiting:   No   Kidney/side pain:  Yes   Back pain:   Yes  Frequent headaches:  No   Dizziness:     No  Leg swelling:   No   Calf pain:    No    Parents, brothers or sisters with history of kidney cancer:   No  Parents, brothers or sisters with history of bladder cancer: No    Medication Reconciliation: completed   Yolanda Sutton LPN  7/18/2022 9:02 AM       Physical Exam  Vitals:    07/18/22 0904   BP: 124/74   BP Location: Right arm   Patient Position: Sitting   Cuff Size: Adult Large   Pulse: 94   Resp: 16   SpO2: 97%   Weight: 106.1 kg (234 lb)     Constitutional: NAD, WDWN  Head: NCAT  Eyes: Conjunctivae normal  Cardiovascular: Regular rate  Pulmonary/Chest: Respirations are even and non-labored bilaterally  Abdominal: Soft with no distension, tenderness, masses, guarding.    + left CVA tenderness    - right CVA tenderness  Extremities: MERA x 4, warm, no clubbing, no cyanosis  Skin: Pink, warm, dry with no rash  Psychiatric:  Normal mood and affect  Genitourinary: Nonpalpable bladder    Labs      Imaging  I personally reviewed and interpreted the images and report.  CT a/p   7/17/2022  IMPRESSION:   1. Left nephrolithiasis. 14 mm stone present in the left renal pelvis.   Mild-to-moderate hydronephrosis. Recommend urology consultation.   2. Benign-appearing right renal cyst.     Assessment  Ms. Crockett is a 42 year old female who presents with left ureteral stone.    Discussed the treatment options for the ureteral stone including ureteroscopy with laser lithotripsy.    After explaining the risks, benefits, and alternatives a decision was made to proceed with ureteroscopy/laser  "lithotripsy.    The patient was explained the specific risks of bleeding, pain, infection, and ureteral injury.    In addition, the patient was told a stent may need to be placed which could result in urinary frequency, urgency, dysuria, and flank pain with voiding.    It would require an office based procedure to remove it at a later date.    Finally, the patient was told if the stone was very impacted, that we would place a stent and return at a later date to treat the stone.      Plan  The patient was scheduled and consented for \"left ureteroscopy with holmium laser lithotripsy and stent placement\" in the OR (LMA general anesthesia).  "

## 2022-07-19 ENCOUNTER — ANESTHESIA (OUTPATIENT)
Dept: SURGERY | Facility: OTHER | Age: 42
End: 2022-07-19
Payer: COMMERCIAL

## 2022-07-19 ENCOUNTER — HOSPITAL ENCOUNTER (OUTPATIENT)
Facility: OTHER | Age: 42
Discharge: HOME | End: 2022-07-19
Attending: UROLOGY | Admitting: UROLOGY
Payer: COMMERCIAL

## 2022-07-19 ENCOUNTER — HOSPITAL ENCOUNTER (OUTPATIENT)
Dept: GENERAL RADIOLOGY | Facility: OTHER | Age: 42
Discharge: HOME | End: 2022-07-19
Attending: UROLOGY | Admitting: UROLOGY
Payer: COMMERCIAL

## 2022-07-19 VITALS
WEIGHT: 234 LBS | TEMPERATURE: 96.3 F | RESPIRATION RATE: 18 BRPM | BODY MASS INDEX: 38.94 KG/M2 | HEART RATE: 90 BPM | SYSTOLIC BLOOD PRESSURE: 136 MMHG | DIASTOLIC BLOOD PRESSURE: 92 MMHG | OXYGEN SATURATION: 100 %

## 2022-07-19 DIAGNOSIS — N20.9 STONES, URINARY TRACT: ICD-10-CM

## 2022-07-19 DIAGNOSIS — N20.0 KIDNEY STONE ON LEFT SIDE: Primary | ICD-10-CM

## 2022-07-19 LAB
BACTERIA UR CULT: NORMAL
GLUCOSE BLDC GLUCOMTR-MCNC: 93 MG/DL (ref 70–99)

## 2022-07-19 PROCEDURE — 258N000001 HC RX 258: Performed by: UROLOGY

## 2022-07-19 PROCEDURE — 250N000011 HC RX IP 250 OP 636: Performed by: UROLOGY

## 2022-07-19 PROCEDURE — 999N000141 HC STATISTIC PRE-PROCEDURE NURSING ASSESSMENT: Performed by: UROLOGY

## 2022-07-19 PROCEDURE — 360N000083 HC SURGERY LEVEL 3 W/ FLUORO, PER MIN: Performed by: UROLOGY

## 2022-07-19 PROCEDURE — C1769 GUIDE WIRE: HCPCS | Performed by: UROLOGY

## 2022-07-19 PROCEDURE — 999N000180 XR SURGERY CARM FLUORO LESS THAN 5 MIN: Mod: TC

## 2022-07-19 PROCEDURE — 52356 CYSTO/URETERO W/LITHOTRIPSY: CPT | Performed by: NURSE ANESTHETIST, CERTIFIED REGISTERED

## 2022-07-19 PROCEDURE — 82365 CALCULUS SPECTROSCOPY: CPT | Performed by: UROLOGY

## 2022-07-19 PROCEDURE — 370N000017 HC ANESTHESIA TECHNICAL FEE, PER MIN: Performed by: UROLOGY

## 2022-07-19 PROCEDURE — C1758 CATHETER, URETERAL: HCPCS | Performed by: UROLOGY

## 2022-07-19 PROCEDURE — C1894 INTRO/SHEATH, NON-LASER: HCPCS | Performed by: UROLOGY

## 2022-07-19 PROCEDURE — 710N000012 HC RECOVERY PHASE 2, PER MINUTE: Performed by: UROLOGY

## 2022-07-19 PROCEDURE — 272N000002 HC OR SUPPLY OTHER OPNP: Performed by: UROLOGY

## 2022-07-19 PROCEDURE — 52356 CYSTO/URETERO W/LITHOTRIPSY: CPT | Performed by: UROLOGY

## 2022-07-19 PROCEDURE — 258N000003 HC RX IP 258 OP 636: Performed by: NURSE ANESTHETIST, CERTIFIED REGISTERED

## 2022-07-19 PROCEDURE — 74420 UROGRAPHY RTRGR +-KUB: CPT | Mod: 26 | Performed by: UROLOGY

## 2022-07-19 PROCEDURE — 250N000009 HC RX 250: Performed by: NURSE ANESTHETIST, CERTIFIED REGISTERED

## 2022-07-19 PROCEDURE — 82962 GLUCOSE BLOOD TEST: CPT

## 2022-07-19 PROCEDURE — 250N000011 HC RX IP 250 OP 636: Performed by: NURSE ANESTHETIST, CERTIFIED REGISTERED

## 2022-07-19 PROCEDURE — 710N000010 HC RECOVERY PHASE 1, LEVEL 2, PER MIN: Performed by: UROLOGY

## 2022-07-19 PROCEDURE — 250N000013 HC RX MED GY IP 250 OP 250 PS 637: Performed by: UROLOGY

## 2022-07-19 PROCEDURE — 272N000001 HC OR GENERAL SUPPLY STERILE: Performed by: UROLOGY

## 2022-07-19 PROCEDURE — C2617 STENT, NON-COR, TEM W/O DEL: HCPCS | Performed by: UROLOGY

## 2022-07-19 DEVICE — URETERAL STENT
Type: IMPLANTABLE DEVICE | Site: URETHRA | Status: FUNCTIONAL
Brand: CONTOUR™

## 2022-07-19 RX ORDER — FENTANYL CITRATE 50 UG/ML
50 INJECTION, SOLUTION INTRAMUSCULAR; INTRAVENOUS EVERY 5 MIN PRN
Status: DISCONTINUED | OUTPATIENT
Start: 2022-07-19 | End: 2022-07-19 | Stop reason: HOSPADM

## 2022-07-19 RX ORDER — CIPROFLOXACIN 2 MG/ML
400 INJECTION, SOLUTION INTRAVENOUS
Status: COMPLETED | OUTPATIENT
Start: 2022-07-19 | End: 2022-07-19

## 2022-07-19 RX ORDER — SCOLOPAMINE TRANSDERMAL SYSTEM 1 MG/1
1 PATCH, EXTENDED RELEASE TRANSDERMAL
Status: DISCONTINUED | OUTPATIENT
Start: 2022-07-19 | End: 2022-07-19 | Stop reason: HOSPADM

## 2022-07-19 RX ORDER — HYDROCODONE BITARTRATE AND ACETAMINOPHEN 5; 325 MG/1; MG/1
1-2 TABLET ORAL EVERY 6 HOURS PRN
Qty: 20 TABLET | Refills: 0 | Status: SHIPPED | OUTPATIENT
Start: 2022-07-19 | End: 2022-07-27

## 2022-07-19 RX ORDER — LIDOCAINE 40 MG/G
CREAM TOPICAL
Status: DISCONTINUED | OUTPATIENT
Start: 2022-07-19 | End: 2022-07-19 | Stop reason: HOSPADM

## 2022-07-19 RX ORDER — ONDANSETRON 4 MG/1
4 TABLET, ORALLY DISINTEGRATING ORAL EVERY 30 MIN PRN
Status: DISCONTINUED | OUTPATIENT
Start: 2022-07-19 | End: 2022-07-19 | Stop reason: HOSPADM

## 2022-07-19 RX ORDER — MEPERIDINE HYDROCHLORIDE 50 MG/ML
12.5 INJECTION INTRAMUSCULAR; INTRAVENOUS; SUBCUTANEOUS
Status: DISCONTINUED | OUTPATIENT
Start: 2022-07-19 | End: 2022-07-19 | Stop reason: HOSPADM

## 2022-07-19 RX ORDER — OXYCODONE HYDROCHLORIDE 5 MG/1
5 TABLET ORAL EVERY 4 HOURS PRN
Status: DISCONTINUED | OUTPATIENT
Start: 2022-07-19 | End: 2022-07-19 | Stop reason: HOSPADM

## 2022-07-19 RX ORDER — FENTANYL CITRATE 50 UG/ML
50 INJECTION, SOLUTION INTRAMUSCULAR; INTRAVENOUS
Status: DISCONTINUED | OUTPATIENT
Start: 2022-07-19 | End: 2022-07-19 | Stop reason: HOSPADM

## 2022-07-19 RX ORDER — NALOXONE HYDROCHLORIDE 0.4 MG/ML
0.4 INJECTION, SOLUTION INTRAMUSCULAR; INTRAVENOUS; SUBCUTANEOUS
Status: DISCONTINUED | OUTPATIENT
Start: 2022-07-19 | End: 2022-07-19 | Stop reason: HOSPADM

## 2022-07-19 RX ORDER — LIDOCAINE HYDROCHLORIDE 20 MG/ML
INJECTION, SOLUTION INFILTRATION; PERINEURAL PRN
Status: DISCONTINUED | OUTPATIENT
Start: 2022-07-19 | End: 2022-07-19

## 2022-07-19 RX ORDER — HYDROMORPHONE HYDROCHLORIDE 1 MG/ML
0.4 INJECTION, SOLUTION INTRAMUSCULAR; INTRAVENOUS; SUBCUTANEOUS EVERY 5 MIN PRN
Status: DISCONTINUED | OUTPATIENT
Start: 2022-07-19 | End: 2022-07-19 | Stop reason: HOSPADM

## 2022-07-19 RX ORDER — NALOXONE HYDROCHLORIDE 0.4 MG/ML
0.2 INJECTION, SOLUTION INTRAMUSCULAR; INTRAVENOUS; SUBCUTANEOUS
Status: DISCONTINUED | OUTPATIENT
Start: 2022-07-19 | End: 2022-07-19 | Stop reason: HOSPADM

## 2022-07-19 RX ORDER — FENTANYL CITRATE 50 UG/ML
INJECTION, SOLUTION INTRAMUSCULAR; INTRAVENOUS PRN
Status: DISCONTINUED | OUTPATIENT
Start: 2022-07-19 | End: 2022-07-19

## 2022-07-19 RX ORDER — ONDANSETRON 2 MG/ML
4 INJECTION INTRAMUSCULAR; INTRAVENOUS EVERY 30 MIN PRN
Status: DISCONTINUED | OUTPATIENT
Start: 2022-07-19 | End: 2022-07-19 | Stop reason: HOSPADM

## 2022-07-19 RX ORDER — PROPOFOL 10 MG/ML
INJECTION, EMULSION INTRAVENOUS CONTINUOUS PRN
Status: DISCONTINUED | OUTPATIENT
Start: 2022-07-19 | End: 2022-07-19

## 2022-07-19 RX ORDER — IOPAMIDOL 755 MG/ML
INJECTION, SOLUTION INTRAVASCULAR PRN
Status: DISCONTINUED | OUTPATIENT
Start: 2022-07-19 | End: 2022-07-19 | Stop reason: HOSPADM

## 2022-07-19 RX ORDER — SODIUM CHLORIDE 9 MG/ML
INJECTION, SOLUTION INTRAVENOUS CONTINUOUS
Status: DISCONTINUED | OUTPATIENT
Start: 2022-07-19 | End: 2022-07-19 | Stop reason: HOSPADM

## 2022-07-19 RX ORDER — PROPOFOL 10 MG/ML
INJECTION, EMULSION INTRAVENOUS PRN
Status: DISCONTINUED | OUTPATIENT
Start: 2022-07-19 | End: 2022-07-19

## 2022-07-19 RX ORDER — ONDANSETRON 2 MG/ML
INJECTION INTRAMUSCULAR; INTRAVENOUS PRN
Status: DISCONTINUED | OUTPATIENT
Start: 2022-07-19 | End: 2022-07-19

## 2022-07-19 RX ORDER — HYDROCODONE BITARTRATE AND ACETAMINOPHEN 5; 325 MG/1; MG/1
1-2 TABLET ORAL EVERY 4 HOURS PRN
Status: DISCONTINUED | OUTPATIENT
Start: 2022-07-19 | End: 2022-07-19 | Stop reason: HOSPADM

## 2022-07-19 RX ORDER — DEXAMETHASONE SODIUM PHOSPHATE 4 MG/ML
INJECTION, SOLUTION INTRA-ARTICULAR; INTRALESIONAL; INTRAMUSCULAR; INTRAVENOUS; SOFT TISSUE PRN
Status: DISCONTINUED | OUTPATIENT
Start: 2022-07-19 | End: 2022-07-19

## 2022-07-19 RX ADMIN — SCOPALAMINE 1 PATCH: 1 PATCH, EXTENDED RELEASE TRANSDERMAL at 12:34

## 2022-07-19 RX ADMIN — PROPOFOL 180 MCG/KG/MIN: 10 INJECTION, EMULSION INTRAVENOUS at 13:15

## 2022-07-19 RX ADMIN — ONDANSETRON HYDROCHLORIDE 4 MG: 2 SOLUTION INTRAMUSCULAR; INTRAVENOUS at 13:15

## 2022-07-19 RX ADMIN — LIDOCAINE HYDROCHLORIDE 40 MG: 20 INJECTION, SOLUTION INFILTRATION; PERINEURAL at 13:15

## 2022-07-19 RX ADMIN — PROPOFOL 200 MG: 10 INJECTION, EMULSION INTRAVENOUS at 13:15

## 2022-07-19 RX ADMIN — FENTANYL CITRATE 50 MCG: 50 INJECTION, SOLUTION INTRAMUSCULAR; INTRAVENOUS at 13:32

## 2022-07-19 RX ADMIN — CIPROFLOXACIN 400 MG: 2 INJECTION, SOLUTION INTRAVENOUS at 12:45

## 2022-07-19 RX ADMIN — FENTANYL CITRATE 50 MCG: 50 INJECTION, SOLUTION INTRAMUSCULAR; INTRAVENOUS at 13:19

## 2022-07-19 RX ADMIN — MIDAZOLAM HYDROCHLORIDE 2 MG: 1 INJECTION, SOLUTION INTRAMUSCULAR; INTRAVENOUS at 13:13

## 2022-07-19 RX ADMIN — DEXAMETHASONE SODIUM PHOSPHATE 4 MG: 4 INJECTION, SOLUTION INTRAMUSCULAR; INTRAVENOUS at 13:30

## 2022-07-19 RX ADMIN — HYDROCODONE BITARTRATE AND ACETAMINOPHEN 1 TABLET: 5; 325 TABLET ORAL at 14:45

## 2022-07-19 RX ADMIN — SODIUM CHLORIDE: 9 INJECTION, SOLUTION INTRAVENOUS at 12:26

## 2022-07-19 ASSESSMENT — LIFESTYLE VARIABLES: TOBACCO_USE: 1

## 2022-07-19 NOTE — ANESTHESIA PROCEDURE NOTES
Airway       Patient location during procedure: OR       Procedure Start/Stop Times: 7/19/2022 1:16 PM  Staff -        CRNA: Akua Garibay APRN CRNA       Performed By: CRNA  Consent for Airway        Urgency: elective  Indications and Patient Condition       Indications for airway management: satinder-procedural       Induction type:intravenous       Mask difficulty assessment: 0 - not attempted    Final Airway Details       Final airway type: supraglottic airway    Supraglottic Airway Details        Type: LMA       Brand: I-Gel       LMA size: 4    Post intubation assessment        Placement verified by: capnometry, equal breath sounds and chest rise        Number of attempts at approach: 1       Number of other approaches attempted: 0       Secured with: silk tape       Ease of procedure: easy       Dentition: Intact and Unchanged

## 2022-07-19 NOTE — ANESTHESIA CARE TRANSFER NOTE
Patient: Nohelia Crockett    Procedure: Procedure(s):  Left ureteroscopy with laser lithotripsy and stent placement       Diagnosis: Left renal stone [N20.0]  Diagnosis Additional Information: No value filed.    Anesthesia Type:   General     Note:    Oropharynx: oropharynx clear of all foreign objects  Level of Consciousness: awake  Oxygen Supplementation: face mask  Level of Supplemental Oxygen (L/min / FiO2): 8  Independent Airway: airway patency satisfactory and stable  Dentition: dentition unchanged  Vital Signs Stable: post-procedure vital signs reviewed and stable  Report to RN Given: handoff report given  Patient transferred to: PACU    Handoff Report: Identifed the Patient, Identified the Reponsible Provider, Reviewed the pertinent medical history, Discussed the surgical course, Reviewed Intra-OP anesthesia mangement and issues during anesthesia, Set expectations for post-procedure period and Allowed opportunity for questions and acknowledgement of understanding      Vitals:  Vitals Value Taken Time   BP     Temp     Pulse     Resp     SpO2         Electronically Signed By: MILLICENT MARCOS CRNA  July 19, 2022  2:06 PM

## 2022-07-19 NOTE — DISCHARGE INSTRUCTIONS
[facility]  Adult Discharge Orders & Instructions      For 24 hours after surgery:  Get plenty of rest.  A responsible adult must stay with you for at least 24 hours after you leave the hospital.   You may feel lightheaded.  IF so, sit for a few minutes before standing.  Have someone help you get up.   You may have a slight fever. Call the doctor if your fever is over 101 F (38.3 C) (taken under the tongue) or lasts longer than 24 hours.  You may have a dry mouth, a sore throat, muscle aches or trouble sleeping.  These should go away after 24 hours.  Do not make important or legal decisions.  6.   Do not drive or use heavy equipment.  If you have weakness or tingling, don't drive or use heavy equipment until this feeling goes away.                                                                                                                                                                         To contact a doctor, call    [phone]______________

## 2022-07-19 NOTE — ANESTHESIA POSTPROCEDURE EVALUATION
Patient: Nohelia Crockett    Procedure: Procedure(s):  Left ureteroscopy with laser lithotripsy and stent placement       Anesthesia Type:  General    Note:  Disposition: Outpatient   Postop Pain Control: Uneventful            Sign Out: Well controlled pain   PONV: No   Neuro/Psych: Uneventful            Sign Out: Acceptable/Baseline neuro status   Airway/Respiratory: Uneventful            Sign Out: Acceptable/Baseline resp. status   CV/Hemodynamics: Uneventful            Sign Out: Acceptable CV status; No obvious hypovolemia; No obvious fluid overload   Other NRE: NONE   DID A NON-ROUTINE EVENT OCCUR? No           Last vitals:  Vitals Value Taken Time   /85 07/19/22 1425   Temp 96.3  F (35.7  C) 07/19/22 1423   Pulse 92 07/19/22 1425   Resp 16 07/19/22 1426   SpO2 95 % 07/19/22 1426   Vitals shown include unvalidated device data.    Electronically Signed By: MILLICENT MARTIN CRNA  July 19, 2022  2:37 PM

## 2022-07-19 NOTE — RESULT ENCOUNTER NOTE
Final urine culture report is negative.  Adult Negative Urine culture parameters per protocol: Any # Urogenital single or mixed organism, <10,000 col/ml single organism (cath/midstream), and > 3 organisms (No susceptibilities performed).  [facility] Emergency Dept discharge antibiotic prescribed (If applicable): None  Treatment recommendations per [facility] ED Lab Result Urine Culture protocol.

## 2022-07-19 NOTE — OP NOTE
Preoperative diagnosis  Left kidney stone    Postoperative diagnosis  Left kidney stone    Procedure performed  Left ureteroscopy with holmium-YAG laser lithotripsy and basket extraction of stone fragments  Cystoscopy with left retrograde pyelogram and ureteral stent placement  Interpretation of retrograde    Surgeon  Melecio Pantoja MD    Surgeon(s)/Proceduralist(s) and Assistants (if any)  Surgeon(s):  Melecio Pantoja MD  Circulator: Ernesto Turpin RN; Marci Chinchilla RN  Scrub Person: Xi Coffey; Bernice Avilez  First Assistant: Ingrid Urbano RN  Special : Joy Melendez    Specimen(s)  Stone analysis?  Yes    (EBL) Estimated blood loss (ml)  5    Anesthesia  General    Complications  None    Findings  Urethroscopy revealed no strictures or other abnormalities.  Cystoscopy revealed no tumors, stones or other mucosal abnormalities.    Left retrograde pyelogram revealed a delicate system with identification of the stone seen on pre-op imaging.  No other filling defects and caliber of ureter was smooth and normal.    Indications  42 year old female agreed to undergo the above named procedure after discussion of the alternatives, risks and benefits.  Informed consent was obtained.      Procedure  The patient was taken to the operating room and placed supine on the operating table.  Pre-operative antibiotics were administered.  Bilateral lower extremity SCDs were placed.  After induction of general anesthesia the patient was positioned in dorsal lithotomy, prepped and draped in a sterile fashion.  A time-out was performed.      A 14-Uzbek flexible cystoscope was passed carefully via urethra into the bladder.  The left ureteral orifice was identified and a Sensor wire was passed retrograde to the level of the kidney and confirmed by fluoroscopy.  The flexible scope was off-loaded and the bladder emptied with a straight catheter.  A 5-Uzbek open-ended was passed over the wire and the wire  removed.  A retrograde pyelogram was performed by slowly injecting 5 mL of 50% Omnipaque contrast via the 5-Gambian catheter with findings described above.  A sensor wire was replaced and the 5-Gambian removed. An 8-10 coaxial dilator was passed without difficulty.  The 8-Gambian portion was removed and an Amplatz super-stiff was placed to the level of the renal pelvis confirmed by fluoroscopy. The 10-Gambian dilator was then withdrawn. The Sensor wire was clipped to the drape as a safety wire.  A 12-14, 36-cm access sheath was advanced over the super-stiff wire to level of the UPJ under direct fluoroscopic guidance. The inner stylet and super-stiff wire were removed.  The kidney was entered with the Olympus URF-P6 flexible ureteroscope.  The kidney stone was identified and fragmented with a thulium laser fiber. The fragments were basket extracted. At the completion of the procedure, all clinically significant fragments were removed and only dust-like fragments remained.  The access sheath was removed under direct vision.  Ureteral edema but no obvious obstruction was present.  A 5-Gambian catheter was passed over the safety wire and the wire withdrawn.   Contrast was injected into the renal pelvis via the 5-Gambian open-ended catheter.  A super-stiff wire was placed and confirmed by fluoroscopy.  A 6 x 24 Gambian stent was positioned with the upper end in the upper pole and the lower in the bladder confirmed by fluoroscopy. The bladder was emptied and the procedure was complete. The patient tolerated the procedure well and was stable throughout.    Plan  Follow up in clinic for stent pull in the next week or so.

## 2022-07-19 NOTE — OR NURSING
PACU Transfer Note    Nohelia Crockett was transferred to [facility] via bed.  Equipment used for transport:  cart.  Accompanied by:  Sohail   Prescriptions were: hand copy     PACU Respiratory Event Documentation     1) Episodes of Apnea greater than or equal to 10 seconds: no    2) Bradypnea - less than 8 breaths per minute: no    3) Pain score on 0 to 10 scale: no    4) Pain-sedation mismatch (yes or no): no    5) Repeated 02 desaturation less than 90% (yes or no): no    Anesthesia notified? (yes or no): no    Any of the above events occuring repeatedly in separate 30 minute intervals may be considered recurrent PACU respiratory events.    Patient stable and meets phase 1 discharge criteria for transport from PACU.    Report to marquise suggs

## 2022-07-19 NOTE — ANESTHESIA PREPROCEDURE EVALUATION
Anesthesia Pre-Procedure Evaluation    Patient: Nohelia Crockett   MRN: [id number] : 1980        Procedure : Procedure(s):  Left ureteroscopy with laser lithotripsy and stent placement          Past Medical History:   Diagnosis Date     Adhesive capsulitis of right shoulder     2016     Cyst of ovary 2006    Left     Localized swelling, mass, or lump of upper extremity 2014     Migraine without status migrainosus, not intractable     Occasional severe HA, does not require daily prophylaxis     Other developmental disorders of scholastic skills      Other specified behavioral and emotional disorders with onset usually occurring in childhood and adolescence      Other specified postprocedural states 2014     Other specified postprocedural states 2016     Peptic ulcer without hemorrhage or perforation      Personal history of urinary calculi     Right     Plantar fascial fibromatosis 2014     Uncomplicated asthma     intermittent      Past Surgical History:   Procedure Laterality Date     APPENDECTOMY OPEN       ARTHROSCOPY SHOULDER      10/05,debridement and subacromial decompression of right shoulder.     ARTHROSCOPY SHOULDER  2007    acromioplasty distal clavicle excision, and extensive debridement of the bursa of the right shoulder by Dr. Villa.  Also left?     CHOLECYSTECTOMY       CYSTOSCOPY      with retrogrades     DILATION AND CURETTAGE  2007    Ablation     LAPAROSCOPIC TUBAL LIGATION  2004     LITHOTRIPSY  2004    extracorporeal shockwave     OTHER SURGICAL HISTORY  2012     OTHER SURGICAL HISTORY Right 2014    Pathology showed epidermal inclusion cysts     RELEASE CARPAL TUNNEL        Allergies   Allergen Reactions     Penicillins Anaphylaxis     Aspirin Hives      Social History     Tobacco Use     Smoking status: Former Smoker     Packs/day: 0.50     Years: 16.00     Pack years: 8.00     Types: Cigarettes     Quit date:  2016     Years since quittin.5     Smokeless tobacco: Never Used   Substance Use Topics     Alcohol use: Yes     Comment: Alcoholic Drinks/day: occasionally      Wt Readings from Last 1 Encounters:   22 106.1 kg (234 lb)        Anesthesia Evaluation   Pt has had prior anesthetic.     History of anesthetic complications  - PONV.      ROS/MED HX  ENT/Pulmonary: Comment: Well controlled asthma-no use of inhalers in a very long time    (+) NOMAN risk factors, snores loudly, obese, tobacco use, Past use, Intermittent, asthma Treatment: Inhaler prn,      Neurologic: Comment: Learning disability, ADHD    (+) migraines,     Cardiovascular:  - neg cardiovascular ROS     METS/Exercise Tolerance: 4 - Raking leaves, gardening    Hematologic:  - neg hematologic  ROS     Musculoskeletal:  - neg musculoskeletal ROS     GI/Hepatic:  - neg GI/hepatic ROS     Renal/Genitourinary:     (+) Nephrolithiasis ,     Endo:  - neg endo ROS   (+) Obesity,     Psychiatric/Substance Use:       Infectious Disease: Comment: COVID 1 month ago-asymptomatic      Malignancy:  - neg malignancy ROS     Other:            Physical Exam    Airway        Mallampati: I   TM distance: > 3 FB   Neck ROM: full   Mouth opening: > 3 cm    Respiratory Devices and Support         Dental     Comment: All teeth in poor condition-pt seeing oral surgeon soon. Informed her that they could break off during anesthesia        Cardiovascular   cardiovascular exam normal          Pulmonary   pulmonary exam normal                OUTSIDE LABS:  CBC:   Lab Results   Component Value Date    WBC 9.6 2022    WBC 8.0 2022    HGB 13.8 2022    HGB 14.2 2022    HCT 39.9 2022    HCT 40.1 2022     2022     2022     BMP:   Lab Results   Component Value Date     2022     2022    POTASSIUM 3.5 2022    POTASSIUM 3.5 2022    CHLORIDE 104 2022    CHLORIDE 105 2022    CO2 26  07/17/2022    CO2 25 06/19/2022    BUN 10 07/17/2022    BUN 13 06/19/2022    CR 0.64 07/17/2022    CR 0.64 06/19/2022    GLC 89 07/17/2022     (H) 06/19/2022     COAGS: No results found for: PTT, INR, FIBR  POC:   Lab Results   Component Value Date    HCG Negative 07/17/2022     HEPATIC:   Lab Results   Component Value Date    ALBUMIN 4.2 07/17/2022    PROTTOTAL 7.5 07/17/2022    ALT 26 07/17/2022    AST 23 07/17/2022    ALKPHOS 80 07/17/2022    BILITOTAL 0.4 07/17/2022     OTHER:   Lab Results   Component Value Date    LACT 1.2 07/17/2022    A1C 5.3 02/20/2019    VERA 9.2 07/17/2022    LIPASE 21 07/17/2022    CRP 0.9 (H) 02/20/2019    SED 14 02/20/2019       Anesthesia Plan    ASA Status:  3   NPO Status:  NPO Appropriate    Anesthesia Type: General.     - Airway: LMA      Maintenance: TIVA.        Consents    Anesthesia Plan(s) and associated risks, benefits, and realistic alternatives discussed. Questions answered and patient/representative(s) expressed understanding.     - Discussed: Risks, Benefits and Alternatives for BOTH SEDATION and the PROCEDURE were discussed     - Discussed with:  Patient      - Extended Intubation/Ventilatory Support Discussed: No.      - Patient is DNR/DNI Status: No    Use of blood products discussed: Yes.     - Discussed with: Patient.     - Consented: consented to blood products            Reason for refusal: other.     Postoperative Care    Pain management: IV analgesics, Multi-modal analgesia.   PONV prophylaxis: Ondansetron (or other 5HT-3), Dexamethasone or Solumedrol, Scopolamine patch     Comments:                MILLICENT MARTIN CRNA

## 2022-07-24 LAB
APPEARANCE STONE: NORMAL
COMPN STONE: NORMAL
SPECIMEN WT: 93 MG

## 2022-07-25 ENCOUNTER — OFFICE VISIT (OUTPATIENT)
Dept: UROLOGY | Facility: OTHER | Age: 42
End: 2022-07-25
Attending: UROLOGY
Payer: COMMERCIAL

## 2022-07-25 VITALS
WEIGHT: 231.8 LBS | HEART RATE: 86 BPM | RESPIRATION RATE: 16 BRPM | OXYGEN SATURATION: 97 % | TEMPERATURE: 97.7 F | BODY MASS INDEX: 38.57 KG/M2

## 2022-07-25 DIAGNOSIS — N20.1 LEFT URETERAL STONE: Primary | ICD-10-CM

## 2022-07-25 PROCEDURE — G0463 HOSPITAL OUTPT CLINIC VISIT: HCPCS | Mod: 25

## 2022-07-25 PROCEDURE — 52310 CYSTOSCOPY AND TREATMENT: CPT | Performed by: UROLOGY

## 2022-07-25 ASSESSMENT — PAIN SCALES - GENERAL: PAINLEVEL: NO PAIN (1)

## 2022-07-25 NOTE — PATIENT INSTRUCTIONS
Stone Composition  See Note     Comment: Calculi composed primarily of:   10% calcium oxalate monohydrate,   60% calcium oxalate dihydrate, and   30% calcium phosphate (hydroxy- and carbonate- apatite).    Home Care after Cystoscopy  Follow these guidelines for your care after your procedure.    Activity  No limitations    Bathing or showering  No limitations    Symptoms  You may notice some burning with urination but this usually resolves after 1-2 days.  You may also notice small amounts of blood in your urine.  Please increase water intake for the next few days to help with these symptoms.    Contacts  General Questions: [phone]  Appointments:  [phone]  Emergencies:  [phone]    When to call the clinic  If you develop any of the following symptoms please call the clinic immediately.  If the clinic is closed please be seen at an urgent care clinic or the Emergency Department.  - Burning with urination that worsens after 2 days  - Unable to urinate causing severe pelvic pain  - Fevers of greater than 101 degrees F  - Flank pain that is not responding to pain medication    Follow up  Please follow up as discussed at the appointment.  Please follow the below generic recommendations to help prevent stones.    If you are interested in undergoing a work up (including blood and urine tests) to determine your patient specific factors for why you form stones and ways to specifically prevent stones in the future, ask Dr Pantoja if he hasn't already discussed this with you.      Fluids (Increase)  Please increase your fluid intake   Recommend about ten 10-ounce glasses of fluid per day (avoid dark tu).  Please try and keep your urine clear or pale yellow.    If it is dark yellow or cloudy it is concentrated and you need to drink more fluid.  Try drinking a tall glass of water prior to every snack/meal.    Citrate (Increase)  Citrate prevents stone formation and is naturally found in urine.    It can be increased by  adding concentrated lemon juice (4 ounces daily) and/or drinking diluted orange juice (50/50 with water).    Both MinuteMaid Light and Crystal Light also contain citrate and can be helpful for stone prevention.    If you plan to drink sodas, I would recommend Diet/Sunkist and/or Diet/7UP as they contain the most citrate (which is good) compared to the tu such as Coke/Pepsi.      Salt (Decrease)  Try to limit salt intake.  Total daily sodium intake should be less than 1500mg.  If you use salt with cooking don't add any additional salt at the table.    Protein  Limit protein intake to small to moderate portions.  For instance, a steak should be no larger than about the size of a deck of cards.  High protein intake leads to stone formation.

## 2022-07-25 NOTE — NURSING NOTE
Chief Complaint   Patient presents with     Procedure     Cystoscopy Left stent Pull     Patient positioned in frog leg position prior to Melecio Pantoja MD prepping patient with chlorhexidine gluconate cleanser.    [place] Protocol    A. Pre-procedure verification complete Yes   1-relevant information / documentation available, reviewed and properly matched to the patient; 2-consent accurate and complete, 3-equipment and supplies available    B. Site marking complete N/A  Site marked if not in continuous attendance with patient    C. TIME OUT completed Yes  Time Out was conducted just prior to starting procedure to verify the eight required elements: 1-patient identity, 2-consent accurate and complete, 3-position, 4-correct side/site marked (if applicable), 5-procedure, 6-relevant images / results properly labeled and displayed (if applicable), 7-antibiotics / irrigation fluids (if applicable), 8-safety precautions.    After procedure perineum area rinsed. Discharge instructions reviewed with patient. Patient verbalized understanding of discharge instructions and discharged ambulatory.  Yolanda Sutton LPN..................7/25/2022  10:49 AM      Medication Reconciliation: completed   Yolanda Sutton LPN  7/25/2022 10:49 AM

## 2022-07-25 NOTE — PROGRESS NOTES
Preoperative diagnosis  Nephrolithiasis    Postoperative diagnosis  Nephrolithiasis    Procedure  Flexible cystourethroscopy with stent removal    Surgeon  Melecio Pantoja MD    Anesthesia  2% lidocaine jelly intraurethrally    Complications  None    Indications  42 year old female who is status post ureteroscopy with holmium laser lithotripsy who presents for stent removal.    Procedure  The patient was given one dose of antibiotics. The patient was placed in supine position and was prepped and draped in sterile fashion.  2% lidocaine jelly was bluntly injected per urethra without difficulty. I passed the 14 Monegasque flexible cystoscope through the urethra and into the bladder.  With the aid of a stent grasper I grasped and removed the stent in its entirety.  The patient tolerated the procedure well.    Plan  Discussed generic dietary approach to stone prevention- provided hand out  [employer]        I spent 10 minutes on this patient's visit (exclusive of separately billed services/procedures) and over half of this time was spent in face-to-face counseling regarding stone prevention:  Prevention strategies with fluids and diet, rationale for a metabolic work up, prognosis and importance of compliance.

## 2022-07-27 ENCOUNTER — HOSPITAL ENCOUNTER (EMERGENCY)
Facility: OTHER | Age: 42
Discharge: HOME | End: 2022-07-27
Attending: PHYSICIAN ASSISTANT | Admitting: FAMILY MEDICINE
Payer: COMMERCIAL

## 2022-07-27 ENCOUNTER — MYC MEDICAL ADVICE (OUTPATIENT)
Dept: UROLOGY | Facility: OTHER | Age: 42
End: 2022-07-27

## 2022-07-27 VITALS
SYSTOLIC BLOOD PRESSURE: 133 MMHG | OXYGEN SATURATION: 98 % | BODY MASS INDEX: 38.82 KG/M2 | RESPIRATION RATE: 17 BRPM | WEIGHT: 233 LBS | TEMPERATURE: 97.5 F | HEIGHT: 65 IN | DIASTOLIC BLOOD PRESSURE: 87 MMHG | HEART RATE: 72 BPM

## 2022-07-27 DIAGNOSIS — R10.9 FLANK PAIN: ICD-10-CM

## 2022-07-27 LAB
ALBUMIN UR-MCNC: 30 MG/DL
ANION GAP SERPL CALCULATED.3IONS-SCNC: 10 MMOL/L (ref 3–14)
APPEARANCE UR: CLEAR
BASOPHILS # BLD AUTO: 0.1 10E3/UL (ref 0–0.2)
BASOPHILS NFR BLD AUTO: 1 %
BILIRUB UR QL STRIP: NEGATIVE
BUN SERPL-MCNC: 12 MG/DL (ref 7–25)
CALCIUM SERPL-MCNC: 9.2 MG/DL (ref 8.6–10.3)
CHLORIDE BLD-SCNC: 107 MMOL/L (ref 98–107)
CO2 SERPL-SCNC: 21 MMOL/L (ref 21–31)
COLOR UR AUTO: ABNORMAL
CREAT SERPL-MCNC: 0.77 MG/DL (ref 0.6–1.2)
EOSINOPHIL # BLD AUTO: 0.2 10E3/UL (ref 0–0.7)
EOSINOPHIL NFR BLD AUTO: 2 %
ERYTHROCYTE [DISTWIDTH] IN BLOOD BY AUTOMATED COUNT: 12.5 % (ref 10–15)
GFR SERPL CREATININE-BSD FRML MDRD: >90 ML/MIN/1.73M2
GLUCOSE BLD-MCNC: 100 MG/DL (ref 70–105)
GLUCOSE UR STRIP-MCNC: NEGATIVE MG/DL
HCT VFR BLD AUTO: 38.7 % (ref 35–47)
HGB BLD-MCNC: 13.5 G/DL (ref 11.7–15.7)
HGB UR QL STRIP: ABNORMAL
IMM GRANULOCYTES # BLD: 0 10E3/UL
IMM GRANULOCYTES NFR BLD: 0 %
KETONES UR STRIP-MCNC: NEGATIVE MG/DL
LEUKOCYTE ESTERASE UR QL STRIP: ABNORMAL
LYMPHOCYTES # BLD AUTO: 2.5 10E3/UL (ref 0.8–5.3)
LYMPHOCYTES NFR BLD AUTO: 26 %
MCH RBC QN AUTO: 32.5 PG (ref 26.5–33)
MCHC RBC AUTO-ENTMCNC: 34.9 G/DL (ref 31.5–36.5)
MCV RBC AUTO: 93 FL (ref 78–100)
MONOCYTES # BLD AUTO: 0.8 10E3/UL (ref 0–1.3)
MONOCYTES NFR BLD AUTO: 9 %
MUCOUS THREADS #/AREA URNS LPF: PRESENT /LPF
NEUTROPHILS # BLD AUTO: 6 10E3/UL (ref 1.6–8.3)
NEUTROPHILS NFR BLD AUTO: 62 %
NITRATE UR QL: NEGATIVE
NRBC # BLD AUTO: 0 10E3/UL
NRBC BLD AUTO-RTO: 0 /100
PH UR STRIP: 6 [PH] (ref 5–9)
PLATELET # BLD AUTO: 287 10E3/UL (ref 150–450)
POTASSIUM BLD-SCNC: 3.6 MMOL/L (ref 3.5–5.1)
RBC # BLD AUTO: 4.16 10E6/UL (ref 3.8–5.2)
RBC URINE: 68 /HPF
SODIUM SERPL-SCNC: 138 MMOL/L (ref 134–144)
SP GR UR STRIP: 1.02 (ref 1–1.03)
SQUAMOUS EPITHELIAL: 2 /HPF
UROBILINOGEN UR STRIP-MCNC: NORMAL MG/DL
WBC # BLD AUTO: 9.6 10E3/UL (ref 4–11)
WBC URINE: 11 /HPF

## 2022-07-27 PROCEDURE — 99284 EMERGENCY DEPT VISIT MOD MDM: CPT | Mod: 25 | Performed by: FAMILY MEDICINE

## 2022-07-27 PROCEDURE — 85004 AUTOMATED DIFF WBC COUNT: CPT | Performed by: FAMILY MEDICINE

## 2022-07-27 PROCEDURE — 99283 EMERGENCY DEPT VISIT LOW MDM: CPT | Performed by: FAMILY MEDICINE

## 2022-07-27 PROCEDURE — 36415 COLL VENOUS BLD VENIPUNCTURE: CPT | Performed by: FAMILY MEDICINE

## 2022-07-27 PROCEDURE — 81001 URINALYSIS AUTO W/SCOPE: CPT | Performed by: FAMILY MEDICINE

## 2022-07-27 PROCEDURE — 250N000011 HC RX IP 250 OP 636: Performed by: FAMILY MEDICINE

## 2022-07-27 PROCEDURE — 80048 BASIC METABOLIC PNL TOTAL CA: CPT | Performed by: FAMILY MEDICINE

## 2022-07-27 PROCEDURE — 87086 URINE CULTURE/COLONY COUNT: CPT | Performed by: FAMILY MEDICINE

## 2022-07-27 PROCEDURE — 258N000003 HC RX IP 258 OP 636: Performed by: FAMILY MEDICINE

## 2022-07-27 RX ORDER — SODIUM CHLORIDE 9 MG/ML
INJECTION, SOLUTION INTRAVENOUS CONTINUOUS
Status: DISCONTINUED | OUTPATIENT
Start: 2022-07-27 | End: 2022-07-27 | Stop reason: HOSPADM

## 2022-07-27 RX ORDER — HYDROMORPHONE HYDROCHLORIDE 1 MG/ML
0.5 INJECTION, SOLUTION INTRAMUSCULAR; INTRAVENOUS; SUBCUTANEOUS ONCE
Status: COMPLETED | OUTPATIENT
Start: 2022-07-27 | End: 2022-07-27

## 2022-07-27 RX ORDER — HYDROCODONE BITARTRATE AND ACETAMINOPHEN 5; 325 MG/1; MG/1
1 TABLET ORAL EVERY 6 HOURS PRN
Qty: 10 TABLET | Refills: 0 | Status: SHIPPED | OUTPATIENT
Start: 2022-07-27 | End: 2022-07-30

## 2022-07-27 RX ADMIN — SODIUM CHLORIDE: 900 INJECTION, SOLUTION INTRAVENOUS at 13:32

## 2022-07-27 RX ADMIN — HYDROMORPHONE HYDROCHLORIDE 0.5 MG: 1 INJECTION, SOLUTION INTRAMUSCULAR; INTRAVENOUS; SUBCUTANEOUS at 13:29

## 2022-07-27 RX ADMIN — SODIUM CHLORIDE 1000 ML: 900 INJECTION, SOLUTION INTRAVENOUS at 11:45

## 2022-07-27 RX ADMIN — HYDROMORPHONE HYDROCHLORIDE 0.5 MG: 1 INJECTION, SOLUTION INTRAMUSCULAR; INTRAVENOUS; SUBCUTANEOUS at 11:48

## 2022-07-27 NOTE — TELEPHONE ENCOUNTER
After proper verification, patient stated that she was having increased pain. We are in [place] today and provider is out of office the rest of week, patient was advised to go to ER if she couldn't wait for a work in for next week.  Yolanda Sutton LPN on 7/27/2022 at 11:04 AM

## 2022-07-27 NOTE — ED TRIAGE NOTES
"ED Nursing Triage Note (General)   ________________________________    Nohelia Crockett is a 42 year old Female that presents to triage private car  With history of  Flank pain post stent placement for kidney stone that was removed on monday reported by patient   Significant symptoms had onset 0900   /84   Pulse 91   Temp 97.5  F (36.4  C) (Tympanic)   Resp 17   Ht 1.651 m (5' 5\")   Wt 105.7 kg (233 lb)   SpO2 99%   BMI 38.77 kg/m  t  Patient appears alert , in severe distress., and cooperative behavior.    GCS Total = 15  Airway: intact  Breathing noted as Normal  Circulation Normal  Skin:  Normal  Action taken:  Triage to critical care immediately      PRE HOSPITAL PRIOR LIVING SITUATION Children Only     Triage Assessment     Row Name 07/27/22 1124       Triage Assessment (Adult)    Airway WDL WDL       Respiratory WDL    Respiratory WDL WDL       Skin Circulation/Temperature WDL    Skin Circulation/Temperature WDL WDL       Cardiac WDL    Cardiac WDL WDL       Peripheral/Neurovascular WDL    Peripheral Neurovascular WDL WDL       Cognitive/Neuro/Behavioral WDL    Cognitive/Neuro/Behavioral WDL WDL              "

## 2022-07-27 NOTE — ED PROVIDER NOTES
History   No chief complaint on file.    HPI  Nohelia Crockett is a 42 year old pleasant female with history of asthma, ADD, GERD, migraines who presents to the emergency department with flank pain, history of stent placement recently.  Patient states that she called urology who recommended an ED visit.  No fevers or chills.    Reviewed nurses notes below  Nohelia Crockett is a 42 year old Female that presents to triage private car  With history of  Flank pain post stent placement for kidney stone that was removed on monday reported by patient   Significant symptoms had onset 0900   Allergies:  Allergies   Allergen Reactions     Penicillins Anaphylaxis     Aspirin Hives       Problem List:    Patient Active Problem List    Diagnosis Date Noted     Morbid obesity (H) 08/20/2021     Priority: Medium     Suspected sleep apnea 05/08/2019     Priority: Medium     Asthma 02/19/2018     Priority: Medium     Overview:   intermittent       Attention deficit disorder 02/19/2018     Priority: Medium     Knee pain 02/19/2018     Priority: Medium     Overview:   secondary to patellofemoral dysfunction       Learning disability 02/19/2018     Priority: Medium     Migraine headache 02/19/2018     Priority: Medium     Overview:   Occasional severe HA, does not require daily prophylaxis       Obesity (BMI 35.0-39.9 without comorbidity) 12/14/2016     Priority: Medium     Adhesive capsulitis of right shoulder 06/06/2016     Priority: Medium     S/P shoulder surgery 06/06/2016     Priority: Medium     Gastroesophageal reflux disease without esophagitis 05/26/2016     Priority: Medium     Pain of multiple sites 10/19/2015     Priority: Medium     History of arthroscopy of right knee 07/02/2015     Priority: Medium     Plantar fasciitis 11/14/2014     Priority: Medium     H/O excision of mass 09/03/2014     Priority: Medium     Genital herpes 12/07/2009     Priority: Medium     Overview:   Has had only one outbreak          Past  Medical History:    Past Medical History:   Diagnosis Date     Adhesive capsulitis of right shoulder      Cyst of ovary 2006     Localized swelling, mass, or lump of upper extremity 08/26/2014     Migraine without status migrainosus, not intractable      Other developmental disorders of scholastic skills      Other specified behavioral and emotional disorders with onset usually occurring in childhood and adolescence      Other specified postprocedural states 09/03/2014     Other specified postprocedural states 06/06/2016     Peptic ulcer without hemorrhage or perforation 1999     Personal history of urinary calculi 2004     Plantar fascial fibromatosis 11/14/2014     Uncomplicated asthma        Past Surgical History:    Past Surgical History:   Procedure Laterality Date     APPENDECTOMY OPEN  1999     ARTHROSCOPY SHOULDER      10/05,debridement and subacromial decompression of right shoulder.     ARTHROSCOPY SHOULDER  08/2007    acromioplasty distal clavicle excision, and extensive debridement of the bursa of the right shoulder by Dr. Villa.  Also left?     CHOLECYSTECTOMY  1999     COMBINED CYSTOSCOPY, URETEROSCOPY, LASER HOLMIUM LITHOTRIPSY URETER(S) Left 7/19/2022    Procedure: Left ureteroscopy with laser lithotripsy and stent placement;  Surgeon: Melecio Pantoja MD;  Location: [facility] OR     CYSTOSCOPY  2004    with retrogrades     DILATION AND CURETTAGE  11/2007    Ablation     LAPAROSCOPIC TUBAL LIGATION  2004     LITHOTRIPSY  12/2004    extracorporeal shockwave     OTHER SURGICAL HISTORY  08/13/2012     OTHER SURGICAL HISTORY Right 09/03/2014    Pathology showed epidermal inclusion cysts     RELEASE CARPAL TUNNEL  2012       Family History:    Family History   Problem Relation Age of Onset     Cancer Mother 46        Cancer,Lung cancer     Blood Disease Mother         Blood Disease     Asthma Father         Asthma     Diabetes Father         Diabetes,2     Hypertension Father         Hypertension     Heart  "Disease Father         Heart Disease,CHF     Other - See Comments Father          at age 65 from complications of a motor vehicle accident.     Family History Negative Sister         Good Health     Family History Negative Sister         Good Health     Breast Cancer No family hx of         Cancer-breast     Colon Cancer No family hx of         Cancer-colon     Prostate Cancer No family hx of         Cancer-prostate     Ovarian Cancer No family hx of         Cancer-ovarian     Anesthesia Reaction No family hx of         Anesthesia Problem       Social History:  Marital Status:   [4]  Social History     Tobacco Use     Smoking status: Former Smoker     Packs/day: 0.50     Years: 16.00     Pack years: 8.00     Types: Cigarettes     Quit date: 2016     Years since quittin.5     Smokeless tobacco: Never Used   Vaping Use     Vaping Use: Never used   Substance Use Topics     Alcohol use: Yes     Comment: Alcoholic Drinks/day: occasionally     Drug use: No        Medications:    HYDROcodone-acetaminophen (NORCO) 5-325 MG tablet  loratadine (CLARITIN) 10 MG tablet  oxyCODONE (ROXICODONE) 5 MG tablet  Vitamin D, Cholecalciferol, 25 MCG (1000 UT) TABS          Review of Systems   Constitutional: Negative for chills and fever.   Respiratory: Negative for chest tightness and shortness of breath.    Genitourinary: Positive for flank pain. Negative for pelvic pain.   Musculoskeletal: Negative for neck pain.       Physical Exam   BP: 125/84  Pulse: 91  Temp: 97.5  F (36.4  C)  Resp: 17  Height: 165.1 cm (5' 5\")  Weight: 105.7 kg (233 lb)  SpO2: 99 %      Physical Exam  Vitals and nursing note reviewed.   Constitutional:       General: She is not in acute distress.     Appearance: She is not diaphoretic.   HENT:      Head: Atraumatic.      Mouth/Throat:      Pharynx: No oropharyngeal exudate.   Eyes:      General: No scleral icterus.     Pupils: Pupils are equal, round, and reactive to light.   Cardiovascular: "      Heart sounds: Normal heart sounds.   Pulmonary:      Effort: No respiratory distress.      Breath sounds: Normal breath sounds.   Abdominal:      General: Bowel sounds are normal.      Palpations: Abdomen is soft.      Tenderness: There is no abdominal tenderness. There is no right CVA tenderness or left CVA tenderness.   Musculoskeletal:         General: No tenderness.   Skin:     General: Skin is warm.      Findings: No rash.     Benign clinical exam benign abdomen, no true CVA tenderness.  Nontoxic in appearance.    ED Course     Results for orders placed or performed during the hospital encounter of 07/27/22 (from the past 24 hour(s))   CBC with platelets differential    Narrative    The following orders were created for panel order CBC with platelets differential.  Procedure                               Abnormality         Status                     ---------                               -----------         ------                     CBC with platelets and d...[[id number]]                      Final result                 Please view results for these tests on the individual orders.   Basic metabolic panel   Result Value Ref Range    Sodium 138 134 - 144 mmol/L    Potassium 3.6 3.5 - 5.1 mmol/L    Chloride 107 98 - 107 mmol/L    Carbon Dioxide (CO2) 21 21 - 31 mmol/L    Anion Gap 10 3 - 14 mmol/L    Urea Nitrogen 12 7 - 25 mg/dL    Creatinine 0.77 0.60 - 1.20 mg/dL    Calcium 9.2 8.6 - 10.3 mg/dL    Glucose 100 70 - 105 mg/dL    GFR Estimate >90 >60 mL/min/1.73m2   CBC with platelets and differential   Result Value Ref Range    WBC Count 9.6 4.0 - 11.0 10e3/uL    RBC Count 4.16 3.80 - 5.20 10e6/uL    Hemoglobin 13.5 11.7 - 15.7 g/dL    Hematocrit 38.7 35.0 - 47.0 %    MCV 93 78 - 100 fL    MCH 32.5 26.5 - 33.0 pg    MCHC 34.9 31.5 - 36.5 g/dL    RDW 12.5 10.0 - 15.0 %    Platelet Count 287 150 - 450 10e3/uL    % Neutrophils 62 %    % Lymphocytes 26 %    % Monocytes 9 %    % Eosinophils 2 %    % Basophils  1 %    % Immature Granulocytes 0 %    NRBCs per 100 WBC 0 <1 /100    Absolute Neutrophils 6.0 1.6 - 8.3 10e3/uL    Absolute Lymphocytes 2.5 0.8 - 5.3 10e3/uL    Absolute Monocytes 0.8 0.0 - 1.3 10e3/uL    Absolute Eosinophils 0.2 0.0 - 0.7 10e3/uL    Absolute Basophils 0.1 0.0 - 0.2 10e3/uL    Absolute Immature Granulocytes 0.0 <=0.4 10e3/uL    Absolute NRBCs 0.0 10e3/uL   UA with Microscopic reflex to Culture    Specimen: Urine, Midstream   Result Value Ref Range    Color Urine Light Yellow Colorless, Straw, Light Yellow, Yellow    Appearance Urine Clear Clear    Glucose Urine Negative Negative mg/dL    Bilirubin Urine Negative Negative    Ketones Urine Negative Negative mg/dL    Specific Gravity Urine 1.016 1.000 - 1.030    Blood Urine Large (A) Negative    pH Urine 6.0 5.0 - 9.0    Protein Albumin Urine 30  (A) Negative mg/dL    Urobilinogen Urine Normal Normal, 2.0 mg/dL    Nitrite Urine Negative Negative    Leukocyte Esterase Urine Moderate (A) Negative    Mucus Urine Present (A) None Seen /LPF    RBC Urine 68 (H) <=2 /HPF    WBC Urine 11 (H) <=5 /HPF    Squamous Epithelials Urine 2 (H) <=1 /HPF    Narrative    Urine Culture ordered based on laboratory criteria       Medications   0.9% sodium chloride BOLUS (0 mLs Intravenous Stopped 7/27/22 1329)     Followed by   sodium chloride 0.9% infusion ( Intravenous New Bag 7/27/22 1332)   HYDROmorphone (PF) (DILAUDID) injection 0.5 mg (0.5 mg Intravenous Given 7/27/22 1148)   HYDROmorphone (PF) (DILAUDID) injection 0.5 mg (0.5 mg Intravenous Given 7/27/22 1329)       Assessments & Plan (with Medical Decision Making)     I have reviewed the nursing notes.    I have reviewed the findings, diagnosis, plan and need for follow up with the patient.    New Prescriptions    HYDROCODONE-ACETAMINOPHEN (NORCO) 5-325 MG TABLET    Take 1 tablet by mouth every 6 hours as needed for severe pain     Discussed with Dr. Pantoja, at this time he agreed with no antibiotics and just await  culture.  Patient pain control adequate here in the ED.  Quantity #10 hydrocodone for home, return to ED with fever or uncontrolled pain.  Patient verbalized understanding plans agreement left ED improving condition.  Final diagnoses:   Flank pain       7/27/2022   [facility] AND \[facility] [place] [facility]\""Nico morel MD  08/01/22 2005

## 2022-07-27 NOTE — ED NOTES
Pt verbalizes discharge instructions pt to follow up with Urologist as needed pt verbalizes understanding

## 2022-07-29 LAB — BACTERIA UR CULT: NO GROWTH

## 2022-07-29 NOTE — RESULT ENCOUNTER NOTE
"Final urine culture report shows \"NO GROWTH\" and is NEGATIVE.  [facility] Emergency Dept discharge antibiotic: None  Recommendations in treatment per [facility] ED Lab result Urine culture protocol.  "

## 2022-08-01 ASSESSMENT — ENCOUNTER SYMPTOMS
NECK PAIN: 0
CHEST TIGHTNESS: 0
FEVER: 0
FLANK PAIN: 1
SHORTNESS OF BREATH: 0
CHILLS: 0

## 2022-09-17 ENCOUNTER — HEALTH MAINTENANCE LETTER (OUTPATIENT)
Age: 42
End: 2022-09-17

## 2022-10-17 ENCOUNTER — OFFICE VISIT (OUTPATIENT)
Dept: UROLOGY | Facility: OTHER | Age: 42
End: 2022-10-17
Attending: UROLOGY
Payer: COMMERCIAL

## 2022-10-17 VITALS
BODY MASS INDEX: 38.77 KG/M2 | DIASTOLIC BLOOD PRESSURE: 62 MMHG | SYSTOLIC BLOOD PRESSURE: 110 MMHG | HEART RATE: 80 BPM | WEIGHT: 233 LBS

## 2022-10-17 DIAGNOSIS — R82.994 HYPERCALCIURIA: ICD-10-CM

## 2022-10-17 DIAGNOSIS — Z87.442 HISTORY OF KIDNEY STONES: Primary | ICD-10-CM

## 2022-10-17 PROCEDURE — G0463 HOSPITAL OUTPT CLINIC VISIT: HCPCS | Performed by: UROLOGY

## 2022-10-17 PROCEDURE — 99214 OFFICE O/P EST MOD 30 MIN: CPT | Performed by: UROLOGY

## 2022-10-17 RX ORDER — CHLORTHALIDONE 25 MG/1
25 TABLET ORAL DAILY
Qty: 90 TABLET | Refills: 3 | Status: SHIPPED | OUTPATIENT
Start: 2022-10-17 | End: 2023-10-13

## 2022-10-17 ASSESSMENT — PAIN SCALES - GENERAL: PAINLEVEL: NO PAIN (0)

## 2022-10-17 NOTE — PROGRESS NOTES
Type of Visit  Established    Chief Complaint  History of recurrent kidney stones  Hypercalciuria    HPI  Ms. Crockett is a 42 year old female with a history of recurrent kidney stones.  Overall she has had 3 stone events.  She has undergone surgery for stones twice.  In addition she passed a stone successfully.  She takes Vit D supplementation daily, 25mcg tabs.  Because of the multiple events she underwent a 24 urine Litholink.  She follows up today to discuss the results of the collection.  She describes routine diet and activity on the day of collection.  She is not currently taking any medication affecting stone production or prevention.  She does focus on daily hydration but no other dietary changes.      Review of Systems  I reviewed the ROS with the patient.    Nursing Notes:   Verena Avilez LPN  10/17/2022  1:18 PM  Signed  Pt presents to clinic today for litholink results  Review of Systems:    Weight loss:   No     Recent fever/chills:  No   Night sweats:   No  Current skin rash:  No   Recent hair loss:  No  Heat intolerance:  No   Cold intolerance:  No  Chest pain:   No   Palpitations:   No  Shortness of breath:  No   Wheezing:   No  Constipation:    No   Diarrhea:   No   Nausea:   No   Vomiting:   No   Kidney/side pain:  No   Back pain:   No  Frequent headaches:  No   Dizziness:     yes  Leg swelling:   No   Calf pain:    No  Medication Reconciliation: complete  Verena Avilez LPN, LPN on 10/17/2022 at 1:17 PM       Physical Exam  Vitals:    10/17/22 1313   BP: 110/62   Pulse: 80   Weight: 105.7 kg (233 lb)   Constitutional: NAD, WDWN.   Head: NCAT  Eyes: Conjunctivae normal  Pulmonary/Chest: Respirations are even and non-labored bilaterally.  Abdominal: Soft. No distension. No CVA tenderness.  Extremities: MERA x 4, Warm. No clubbing.  No cyanosis.    Skin: Pink, warm and dry.  No rashes noted.  Genitourinary:  Nonpalpable bladder    Labs   07/27/22 11:49   Sodium 138   Potassium 3.6   Chloride 107   Carbon  Dioxide 21   Urea Nitrogen 12   Creatinine 0.77   GFR Estimate >90   Calcium 9.2   Anion Gap 10     Litholink  8/28/2022 & 8/29/2022  Volume (L)  1.6-1.9   SS CaOx  7.8-12.2 (6-10)  Urine Calcium 513-525 (female<200)  Urine Oxalate  25-32  (20-40)  Urine Citrate  6791-9244 (female>550)  SS CaP  2.4-4.5  (0.5-2)  24 hr Urine pH  6-6.4  (5.8-6.2)  SS Uric Acid  0.5-0.8  (0-1)  Urine uric acid  0.86-0.87 (<0.8)    (collected while on treatment:  None)    Stone Analysis  10% calcium oxalate monohydrate,   60% calcium oxalate dihydrate, and   30% calcium phosphate (hydroxy- and carbonate- apatite).     Assessment  Ms. Crockett is a 42 year old female with a history of recurrent kidney stones who underwent a metabolic work-up including a 24 urine testing revealing primary risk factors of primarily significant hypercalciuria with lower urine volume contributing.  Reviewed her results in detail.  She has fairly profound hypercalciuria.  I am recommending a short-term follow-up with labs after thiazide diuretic management combined with low-salt diet.  In addition I recommended a parathyroid level.    Plan  Start chlorthalidone 25 mg once daily combined with low-salt diet  Increase hydration by 1 L daily of water  Follow up BMP and PTH in 6 weeks

## 2022-10-17 NOTE — NURSING NOTE
Pt presents to clinic today for litholink results  Review of Systems:    Weight loss:   No     Recent fever/chills:  No   Night sweats:   No  Current skin rash:  No   Recent hair loss:  No  Heat intolerance:  No   Cold intolerance:  No  Chest pain:   No   Palpitations:   No  Shortness of breath:  No   Wheezing:   No  Constipation:    No   Diarrhea:   No   Nausea:   No   Vomiting:   No   Kidney/side pain:  No   Back pain:   No  Frequent headaches:  No   Dizziness:     yes  Leg swelling:   No   Calf pain:    No  Medication Reconciliation: complete  Verena Avilez LPN, LPN on 10/17/2022 at 1:17 PM

## 2022-11-16 NOTE — PROGRESS NOTES
"  Assessment & Plan     1. Cervical disc disorder at C4-C5 level with radiculopathy  Suspect twitching from cervical radiculopathy/nerve involvement.  Plan to repeat MRI and consider steroid epidural injection.  No other vision changes, migratory paresthesias to think MS.  Overall well preserved muscle strength to rule out MD conditions.   Other differential including: CTS, muscle contusion, AML, myasthenia gravis, electrolyte abnormality, more.  - MR Cervical Spine w/o Contrast; Future  - XR Cervical/Thoracic Epidural Inj Incl Imaging; Future  - sulfamethoxazole-trimethoprim (BACTRIM DS) 800-160 MG tablet; Take 1 tablet by mouth 2 times daily for 7 days  Dispense: 14 tablet; Refill: 0    2. Seborrheic dermatitis  Rx for ketoconazole cream BID x 1-2 months.  - ketoconazole (NIZORAL) 2 % external cream; Apply topically 2 times daily  Dispense: 60 g; Refill: 2    3. Cutaneous abscess of abdominal wall  Recurrent; with active inflammation noted today.  Rx for Bactrim DS BID x 7 days.  - sulfamethoxazole-trimethoprim (BACTRIM DS) 800-160 MG tablet; Take 1 tablet by mouth 2 times daily for 7 days  Dispense: 14 tablet; Refill: 0    MDM:  Ordering of each unique test  Prescription drug management      BMI:   Estimated body mass index is 36.99 kg/m  as calculated from the following:    Height as of this encounter: 1.657 m (5' 5.25\").    Weight as of this encounter: 101.6 kg (224 lb).   Weight management plan: Discussed healthy diet and exercise guidelines    Lyla Hendrix, [facility]      Anthony Pozo is a 42 year old, presenting for the following health issues:  Hand Problem    History of Present Illness       Reason for visit:  Uncontrollable hand twiching,dropping of things,tingling,ect  Symptom onset:  3-4 weeks ago    She eats 2-3 servings of fruits and vegetables daily.She consumes 3 sweetened beverage(s) daily.She exercises with enough effort to increase her heart rate 20 to 29 " "minutes per day.  She exercises with enough effort to increase her heart rate 4 days per week.   She is taking medications regularly.    Today's PHQ-9         PHQ-9 Total Score: 3    PHQ-9 Q9 Thoughts of better off dead/self-harm past 2 weeks :   Not at all    How difficult have these problems made it for you to do your work, take care of things at home, or get along with other people: Not difficult at all  Today's DARA-7 Score: 2     Continued twitching of L thumb and first finger.  Has worsened, to more constant now.   Hand can rest into a claw like positioning at times.  NO pain, but can feel pins/needles/numb like feeling.  Has noticed some weakness; and dropping items intermittently.  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    Has some red, flaky skin mainly over glabella.  Has been using some lotions without significant improvement.    Also has recurrent boils on underside of pannus.         Objective    /78   Pulse 94   Temp 98.4  F (36.9  C) (Tympanic)   Resp 20   Ht 1.657 m (5' 5.25\")   Wt 101.6 kg (224 lb)   LMP  (LMP Unknown)   SpO2 97%   BMI 36.99 kg/m    Body mass index is 36.99 kg/m .  Physical Exam   GENERAL: healthy, alert and no distress  MS: appearance normal without deformity, joint swelling.  Full ROM.  Rhythmic twitching noted of thumb. Video of twitching also reviewed on patient's cell phone, involving the thumb and first finger.  Negative Tinel or Phalen test.  Neg Finkelstein.  No muscle atrophy noted.  Strength normal, besides empty can 4/5.  SKIN: no skin lesions over LUE.  Some patchy erythematous skin over glabella and extending onto forehead (slightly more to R side than L).  Two indurated violaceous skin lesions noted on underside of pannus with some TTP.  NEURO: sensory exam grossly normal and cranial nerves 2-12 intact    No results found for any visits on 11/17/22.  "

## 2022-11-17 ENCOUNTER — OFFICE VISIT (OUTPATIENT)
Dept: FAMILY MEDICINE | Facility: OTHER | Age: 42
End: 2022-11-17
Attending: FAMILY MEDICINE
Payer: COMMERCIAL

## 2022-11-17 VITALS
OXYGEN SATURATION: 97 % | BODY MASS INDEX: 37.32 KG/M2 | WEIGHT: 224 LBS | RESPIRATION RATE: 20 BRPM | SYSTOLIC BLOOD PRESSURE: 124 MMHG | HEART RATE: 94 BPM | HEIGHT: 65 IN | TEMPERATURE: 98.4 F | DIASTOLIC BLOOD PRESSURE: 78 MMHG

## 2022-11-17 DIAGNOSIS — L02.211 CUTANEOUS ABSCESS OF ABDOMINAL WALL: ICD-10-CM

## 2022-11-17 DIAGNOSIS — L21.9 SEBORRHEIC DERMATITIS: ICD-10-CM

## 2022-11-17 DIAGNOSIS — M50.121 CERVICAL DISC DISORDER AT C4-C5 LEVEL WITH RADICULOPATHY: Primary | ICD-10-CM

## 2022-11-17 PROBLEM — J45.909 ASTHMA: Status: RESOLVED | Noted: 2018-02-19 | Resolved: 2022-11-17

## 2022-11-17 PROCEDURE — 99213 OFFICE O/P EST LOW 20 MIN: CPT | Performed by: FAMILY MEDICINE

## 2022-11-17 PROCEDURE — G0463 HOSPITAL OUTPT CLINIC VISIT: HCPCS

## 2022-11-17 RX ORDER — SULFAMETHOXAZOLE/TRIMETHOPRIM 800-160 MG
1 TABLET ORAL 2 TIMES DAILY
Qty: 14 TABLET | Refills: 0 | Status: SHIPPED | OUTPATIENT
Start: 2022-11-17 | End: 2022-11-24

## 2022-11-17 RX ORDER — KETOCONAZOLE 20 MG/G
CREAM TOPICAL 2 TIMES DAILY
Qty: 60 G | Refills: 2 | Status: SHIPPED | OUTPATIENT
Start: 2022-11-17 | End: 2024-09-14

## 2022-11-17 ASSESSMENT — ASTHMA QUESTIONNAIRES
ACT_TOTALSCORE: 25
QUESTION_5 LAST FOUR WEEKS HOW WOULD YOU RATE YOUR ASTHMA CONTROL: COMPLETELY CONTROLLED
ACT_TOTALSCORE: 25
QUESTION_3 LAST FOUR WEEKS HOW OFTEN DID YOUR ASTHMA SYMPTOMS (WHEEZING, COUGHING, SHORTNESS OF BREATH, CHEST TIGHTNESS OR PAIN) WAKE YOU UP AT NIGHT OR EARLIER THAN USUAL IN THE MORNING: NOT AT ALL
QUESTION_2 LAST FOUR WEEKS HOW OFTEN HAVE YOU HAD SHORTNESS OF BREATH: NOT AT ALL
QUESTION_1 LAST FOUR WEEKS HOW MUCH OF THE TIME DID YOUR ASTHMA KEEP YOU FROM GETTING AS MUCH DONE AT WORK, SCHOOL OR AT HOME: NONE OF THE TIME
QUESTION_4 LAST FOUR WEEKS HOW OFTEN HAVE YOU USED YOUR RESCUE INHALER OR NEBULIZER MEDICATION (SUCH AS ALBUTEROL): NOT AT ALL

## 2022-11-17 ASSESSMENT — ANXIETY QUESTIONNAIRES
6. BECOMING EASILY ANNOYED OR IRRITABLE: NOT AT ALL
1. FEELING NERVOUS, ANXIOUS, OR ON EDGE: NOT AT ALL
7. FEELING AFRAID AS IF SOMETHING AWFUL MIGHT HAPPEN: NOT AT ALL
IF YOU CHECKED OFF ANY PROBLEMS ON THIS QUESTIONNAIRE, HOW DIFFICULT HAVE THESE PROBLEMS MADE IT FOR YOU TO DO YOUR WORK, TAKE CARE OF THINGS AT HOME, OR GET ALONG WITH OTHER PEOPLE: NOT DIFFICULT AT ALL
7. FEELING AFRAID AS IF SOMETHING AWFUL MIGHT HAPPEN: NOT AT ALL
8. IF YOU CHECKED OFF ANY PROBLEMS, HOW DIFFICULT HAVE THESE MADE IT FOR YOU TO DO YOUR WORK, TAKE CARE OF THINGS AT HOME, OR GET ALONG WITH OTHER PEOPLE?: NOT DIFFICULT AT ALL
2. NOT BEING ABLE TO STOP OR CONTROL WORRYING: NOT AT ALL
5. BEING SO RESTLESS THAT IT IS HARD TO SIT STILL: SEVERAL DAYS
4. TROUBLE RELAXING: SEVERAL DAYS
GAD7 TOTAL SCORE: 2
GAD7 TOTAL SCORE: 2
3. WORRYING TOO MUCH ABOUT DIFFERENT THINGS: NOT AT ALL
GAD7 TOTAL SCORE: 2

## 2022-11-17 ASSESSMENT — PATIENT HEALTH QUESTIONNAIRE - PHQ9
SUM OF ALL RESPONSES TO PHQ QUESTIONS 1-9: 3
SUM OF ALL RESPONSES TO PHQ QUESTIONS 1-9: 3
10. IF YOU CHECKED OFF ANY PROBLEMS, HOW DIFFICULT HAVE THESE PROBLEMS MADE IT FOR YOU TO DO YOUR WORK, TAKE CARE OF THINGS AT HOME, OR GET ALONG WITH OTHER PEOPLE: NOT DIFFICULT AT ALL

## 2022-11-17 ASSESSMENT — PAIN SCALES - GENERAL: PAINLEVEL: NO PAIN (0)

## 2022-11-17 NOTE — NURSING NOTE
"Chief Complaint   Patient presents with     Hand Problem       Initial /78   Pulse 94   Temp 98.4  F (36.9  C) (Tympanic)   Resp 20   Ht 1.657 m (5' 5.25\")   Wt 101.6 kg (224 lb)   LMP  (LMP Unknown)   SpO2 97%   BMI 36.99 kg/m   Estimated body mass index is 36.99 kg/m  as calculated from the following:    Height as of this encounter: 1.657 m (5' 5.25\").    Weight as of this encounter: 101.6 kg (224 lb).  Medication Reconciliation: complete    Mila Garcia LPN     [facility] Directive reviewed.     "

## 2022-11-23 ENCOUNTER — HOSPITAL ENCOUNTER (OUTPATIENT)
Dept: MRI IMAGING | Facility: OTHER | Age: 42
Discharge: HOME | End: 2022-11-23
Attending: FAMILY MEDICINE | Admitting: FAMILY MEDICINE
Payer: COMMERCIAL

## 2022-11-23 DIAGNOSIS — M50.121 CERVICAL DISC DISORDER AT C4-C5 LEVEL WITH RADICULOPATHY: ICD-10-CM

## 2022-11-23 PROCEDURE — 72141 MRI NECK SPINE W/O DYE: CPT

## 2022-11-25 ENCOUNTER — LAB (OUTPATIENT)
Dept: LAB | Facility: OTHER | Age: 42
End: 2022-11-25
Attending: UROLOGY
Payer: COMMERCIAL

## 2022-11-25 DIAGNOSIS — Z87.442 HISTORY OF KIDNEY STONES: ICD-10-CM

## 2022-11-25 DIAGNOSIS — R82.994 HYPERCALCIURIA: ICD-10-CM

## 2022-11-25 LAB
ANION GAP SERPL CALCULATED.3IONS-SCNC: 11 MMOL/L (ref 7–15)
BUN SERPL-MCNC: 9.8 MG/DL (ref 6–20)
CALCIUM SERPL-MCNC: 9 MG/DL (ref 8.6–10)
CHLORIDE SERPL-SCNC: 102 MMOL/L (ref 98–107)
CREAT SERPL-MCNC: 0.69 MG/DL (ref 0.51–0.95)
DEPRECATED HCO3 PLAS-SCNC: 28 MMOL/L (ref 22–29)
GFR SERPL CREATININE-BSD FRML MDRD: >90 ML/MIN/1.73M2
GLUCOSE SERPL-MCNC: 136 MG/DL (ref 70–99)
POTASSIUM SERPL-SCNC: 2.8 MMOL/L (ref 3.4–5.3)
PTH-INTACT SERPL-MCNC: 31 PG/ML (ref 15–65)
SODIUM SERPL-SCNC: 141 MMOL/L (ref 136–145)

## 2022-11-25 PROCEDURE — 36415 COLL VENOUS BLD VENIPUNCTURE: CPT | Mod: ZL

## 2022-11-25 PROCEDURE — 80048 BASIC METABOLIC PNL TOTAL CA: CPT | Mod: ZL

## 2022-11-25 PROCEDURE — 83970 ASSAY OF PARATHORMONE: CPT | Mod: ZL

## 2022-11-28 ENCOUNTER — OFFICE VISIT (OUTPATIENT)
Dept: UROLOGY | Facility: OTHER | Age: 42
End: 2022-11-28
Attending: UROLOGY
Payer: COMMERCIAL

## 2022-11-28 ENCOUNTER — MYC MEDICAL ADVICE (OUTPATIENT)
Dept: FAMILY MEDICINE | Facility: OTHER | Age: 42
End: 2022-11-28

## 2022-11-28 VITALS
DIASTOLIC BLOOD PRESSURE: 70 MMHG | OXYGEN SATURATION: 97 % | SYSTOLIC BLOOD PRESSURE: 118 MMHG | RESPIRATION RATE: 16 BRPM | BODY MASS INDEX: 36.5 KG/M2 | HEART RATE: 93 BPM | WEIGHT: 221 LBS

## 2022-11-28 DIAGNOSIS — Z87.442 HISTORY OF KIDNEY STONES: ICD-10-CM

## 2022-11-28 DIAGNOSIS — R82.994 HYPERCALCIURIA: Primary | ICD-10-CM

## 2022-11-28 PROCEDURE — 99213 OFFICE O/P EST LOW 20 MIN: CPT | Performed by: UROLOGY

## 2022-11-28 PROCEDURE — G0463 HOSPITAL OUTPT CLINIC VISIT: HCPCS

## 2022-11-28 ASSESSMENT — PAIN SCALES - GENERAL: PAINLEVEL: NO PAIN (0)

## 2022-11-28 NOTE — NURSING NOTE
Chief Complaint   Patient presents with     Follow Up     6 week follow up for kidney stone      Patient presents to the clinic today for a 6 week follow up for kidney stone     Review of Systems:    Weight loss:    Yes     Recent fever/chills:  No   Night sweats:   No  Current skin rash:  No   Recent hair loss:  No  Heat intolerance:  No   Cold intolerance:  No  Chest pain:   No   Palpitations:   No  Shortness of breath:  No   Wheezing:   No  Constipation:    No   Diarrhea:   No   Nausea:   No   Vomiting:   No   Kidney/side pain:  No   Back pain:   No  Frequent headaches:  No   Dizziness:     Yes  Leg swelling:   No   Calf pain:    No      Medication Reconciliation: completed   Yolanda Sutton LPN  11/28/2022 10:09 AM

## 2022-11-28 NOTE — PROGRESS NOTES
Type of Visit  Established    Chief Complaint  History of recurrent kidney stones  Hypercalciuria    HPI  Ms. Crockett is a 42 year old female with a history of recurrent kidney stones.  Overall she has had 3 stone events.  She has undergone surgery for stones twice.  In addition she passed a stone successfully.  She takes Vit D supplementation daily, 25mcg tabs.  Because of the multiple events she underwent a 24 urine Litholink.  Following this testing she was found to have fairly significant hypercalciuria.  Because of this she started chlorthalidone 25 mg once daily.  She follows up 6 weeks later with BMP.  She tolerates the medication and denies any side effects.      Review of Systems  I reviewed the ROS with the patient.    Nursing Notes:   Yolanda Sutton LPN  11/28/2022 10:14 AM  Addendum  Chief Complaint   Patient presents with     Follow Up     6 week follow up for kidney stone      Patient presents to the clinic today for a 6 week follow up for kidney stone     Review of Systems:    Weight loss:    Yes     Recent fever/chills:  No   Night sweats:   No  Current skin rash:  No   Recent hair loss:  No  Heat intolerance:  No   Cold intolerance:  No  Chest pain:   No   Palpitations:   No  Shortness of breath:  No   Wheezing:   No  Constipation:    No   Diarrhea:   No   Nausea:   No   Vomiting:   No   Kidney/side pain:  No   Back pain:   No  Frequent headaches:  No   Dizziness:     Yes  Leg swelling:   No   Calf pain:    No      Medication Reconciliation: completed   Yolanda Sutton LPN  11/28/2022 10:09 AM     Physical Exam  Vitals:    11/28/22 1013   BP: 118/70   BP Location: Right arm   Patient Position: Sitting   Cuff Size: Adult Large   Pulse: 93   Resp: 16   SpO2: 97%   Weight: 100.2 kg (221 lb)   Constitutional: NAD, WDWN.   Head: NCAT  Eyes: Conjunctivae normal  Pulmonary/Chest: Respirations are even and non-labored bilaterally.  Abdominal: Soft. No distension. No CVA tenderness.  Extremities: MERA x 4,  Warm. No clubbing.  No cyanosis.    Skin: Pink, warm and dry.  No rashes noted.  Genitourinary:  Nonpalpable bladder    Labs   11/25/22 09:49   Sodium 141   Potassium 2.8 (L)   Chloride 102   Carbon Dioxide (CO2) 28   Urea Nitrogen 9.8   Creatinine 0.69   GFR Estimate >90   Calcium 9.0   Anion Gap 11     Litholink  8/28/2022 & 8/29/2022  Volume (L)  1.6-1.9   SS CaOx  7.8-12.2 (6-10)  Urine Calcium 513-525 (female<200)  Urine Oxalate  25-32  (20-40)  Urine Citrate  2019-9184 (female>550)  SS CaP  2.4-4.5  (0.5-2)  24 hr Urine pH  6-6.4  (5.8-6.2)  SS Uric Acid  0.5-0.8  (0-1)  Urine uric acid  0.86-0.87 (<0.8)    (collected while on treatment:  None)    Stone Analysis  10% calcium oxalate monohydrate,   60% calcium oxalate dihydrate, and   30% calcium phosphate (hydroxy- and carbonate- apatite).     Assessment  Ms. Crockett is a 42 year old female with a history of recurrent kidney stones (hypercalciuria) who follows up with labs.  We reviewed her labs and her potassium is low.  We discussed replacement however we elected to hold at this time given is a single value collected at 1 single time.  If she undergoes additional BMP checks in the future and her potassium consistently is low I would recommend starting potassium citrate 10-20mEq BID given the added benefit of stone prevention rather than potassium chloride.  Potassium chloride does not reduce kidney stone risk.    Plan  Continue chlorthalidone 25 mg once daily combined with low-salt diet  Increase hydration by 1 L daily of water  Follow up 1 year with BMP

## 2022-11-28 NOTE — Clinical Note
Just an FYI for the assessment/plan regarding chlorthalidone and her potassium level.  If you disagree please let me know

## 2022-11-29 DIAGNOSIS — M50.121 CERVICAL DISC DISORDER AT C4-C5 LEVEL WITH RADICULOPATHY: Primary | ICD-10-CM

## 2022-11-29 DIAGNOSIS — M47.22 OSTEOARTHRITIS OF SPINE WITH RADICULOPATHY, CERVICAL REGION: ICD-10-CM

## 2022-12-09 NOTE — NURSING NOTE
"Chief Complaint   Patient presents with     Musculoskeletal Problem     Dizziness       Initial /76   Pulse 88   Temp 97.8  F (36.6  C) (Tympanic)   Resp 16   Wt 110.3 kg (243 lb 2 oz)   SpO2 97%   BMI 40.46 kg/m   Estimated body mass index is 40.46 kg/m  as calculated from the following:    Height as of 9/4/19: 1.651 m (5' 5\").    Weight as of this encounter: 110.3 kg (243 lb 2 oz).  Medication Reconciliation: complete    Mila Garcia LPN  " Report called x1 unsuccessful

## 2023-01-12 ENCOUNTER — HOSPITAL ENCOUNTER (OUTPATIENT)
Dept: GENERAL RADIOLOGY | Facility: OTHER | Age: 43
Discharge: HOME | End: 2023-01-12
Attending: FAMILY MEDICINE | Admitting: FAMILY MEDICINE
Payer: COMMERCIAL

## 2023-01-12 DIAGNOSIS — M50.121 CERVICAL DISC DISORDER AT C4-C5 LEVEL WITH RADICULOPATHY: ICD-10-CM

## 2023-01-12 PROCEDURE — 250N000009 HC RX 250: Performed by: RADIOLOGY

## 2023-01-12 PROCEDURE — 96372 THER/PROPH/DIAG INJ SC/IM: CPT | Performed by: RADIOLOGY

## 2023-01-12 PROCEDURE — 62321 NJX INTERLAMINAR CRV/THRC: CPT

## 2023-01-12 PROCEDURE — 250N000011 HC RX IP 250 OP 636: Performed by: RADIOLOGY

## 2023-01-12 PROCEDURE — 255N000002 HC RX 255 OP 636: Performed by: RADIOLOGY

## 2023-01-12 RX ORDER — BETAMETHASONE SODIUM PHOSPHATE AND BETAMETHASONE ACETATE 3; 3 MG/ML; MG/ML
5 INJECTION, SUSPENSION INTRA-ARTICULAR; INTRALESIONAL; INTRAMUSCULAR; SOFT TISSUE ONCE
Status: COMPLETED | OUTPATIENT
Start: 2023-01-12 | End: 2023-01-12

## 2023-01-12 RX ORDER — LIDOCAINE HYDROCHLORIDE 10 MG/ML
2 INJECTION, SOLUTION INFILTRATION; PERINEURAL ONCE
Status: COMPLETED | OUTPATIENT
Start: 2023-01-12 | End: 2023-01-12

## 2023-01-12 RX ADMIN — BETAMETHASONE SODIUM PHOSPHATE AND BETAMETHASONE ACETATE 2 ML: 3; 3 INJECTION, SUSPENSION INTRA-ARTICULAR; INTRALESIONAL; INTRAMUSCULAR at 14:36

## 2023-01-12 RX ADMIN — IOHEXOL 1 ML: 240 INJECTION, SOLUTION INTRATHECAL; INTRAVASCULAR; INTRAVENOUS; ORAL at 14:37

## 2023-01-12 RX ADMIN — LIDOCAINE HYDROCHLORIDE 1 ML: 10 INJECTION, SOLUTION INFILTRATION; PERINEURAL at 14:37

## 2023-01-21 ENCOUNTER — APPOINTMENT (OUTPATIENT)
Dept: CT IMAGING | Facility: OTHER | Age: 43
End: 2023-01-21
Attending: FAMILY MEDICINE
Payer: COMMERCIAL

## 2023-01-21 ENCOUNTER — HOSPITAL ENCOUNTER (EMERGENCY)
Facility: OTHER | Age: 43
Discharge: HOME | End: 2023-01-22
Attending: FAMILY MEDICINE | Admitting: FAMILY MEDICINE
Payer: COMMERCIAL

## 2023-01-21 VITALS
OXYGEN SATURATION: 100 % | RESPIRATION RATE: 16 BRPM | SYSTOLIC BLOOD PRESSURE: 130 MMHG | BODY MASS INDEX: 35.99 KG/M2 | WEIGHT: 216 LBS | HEART RATE: 96 BPM | TEMPERATURE: 97 F | HEIGHT: 65 IN | DIASTOLIC BLOOD PRESSURE: 71 MMHG

## 2023-01-21 DIAGNOSIS — R10.9 FLANK PAIN: ICD-10-CM

## 2023-01-21 PROCEDURE — 85025 COMPLETE CBC W/AUTO DIFF WBC: CPT | Performed by: FAMILY MEDICINE

## 2023-01-21 PROCEDURE — 74176 CT ABD & PELVIS W/O CONTRAST: CPT | Mod: TC

## 2023-01-21 PROCEDURE — 80048 BASIC METABOLIC PNL TOTAL CA: CPT | Performed by: FAMILY MEDICINE

## 2023-01-21 PROCEDURE — 250N000011 HC RX IP 250 OP 636: Performed by: FAMILY MEDICINE

## 2023-01-21 PROCEDURE — 36415 COLL VENOUS BLD VENIPUNCTURE: CPT | Performed by: FAMILY MEDICINE

## 2023-01-21 PROCEDURE — 99284 EMERGENCY DEPT VISIT MOD MDM: CPT | Performed by: FAMILY MEDICINE

## 2023-01-21 PROCEDURE — 96372 THER/PROPH/DIAG INJ SC/IM: CPT | Performed by: FAMILY MEDICINE

## 2023-01-21 PROCEDURE — 81001 URINALYSIS AUTO W/SCOPE: CPT | Performed by: FAMILY MEDICINE

## 2023-01-21 PROCEDURE — 99285 EMERGENCY DEPT VISIT HI MDM: CPT | Mod: 25

## 2023-01-21 RX ORDER — KETOROLAC TROMETHAMINE 30 MG/ML
30 INJECTION, SOLUTION INTRAMUSCULAR; INTRAVENOUS ONCE
Status: COMPLETED | OUTPATIENT
Start: 2023-01-21 | End: 2023-01-21

## 2023-01-21 RX ADMIN — KETOROLAC TROMETHAMINE 30 MG: 30 INJECTION, SOLUTION INTRAMUSCULAR; INTRAVENOUS at 23:57

## 2023-01-22 LAB
ALBUMIN UR-MCNC: 20 MG/DL
ANION GAP SERPL CALCULATED.3IONS-SCNC: 11 MMOL/L (ref 7–15)
APPEARANCE UR: ABNORMAL
BACTERIA #/AREA URNS HPF: ABNORMAL /HPF
BASOPHILS # BLD AUTO: 0.1 10E3/UL (ref 0–0.2)
BASOPHILS NFR BLD AUTO: 1 %
BILIRUB UR QL STRIP: NEGATIVE
BUN SERPL-MCNC: 13.8 MG/DL (ref 6–20)
CALCIUM SERPL-MCNC: 9.2 MG/DL (ref 8.6–10)
CHLORIDE SERPL-SCNC: 99 MMOL/L (ref 98–107)
COLOR UR AUTO: YELLOW
CREAT SERPL-MCNC: 0.71 MG/DL (ref 0.51–0.95)
DEPRECATED HCO3 PLAS-SCNC: 28 MMOL/L (ref 22–29)
EOSINOPHIL # BLD AUTO: 0.1 10E3/UL (ref 0–0.7)
EOSINOPHIL NFR BLD AUTO: 1 %
ERYTHROCYTE [DISTWIDTH] IN BLOOD BY AUTOMATED COUNT: 12.7 % (ref 10–15)
GFR SERPL CREATININE-BSD FRML MDRD: >90 ML/MIN/1.73M2
GLUCOSE SERPL-MCNC: 140 MG/DL (ref 70–99)
GLUCOSE UR STRIP-MCNC: NEGATIVE MG/DL
HCT VFR BLD AUTO: 37.4 % (ref 35–47)
HGB BLD-MCNC: 13 G/DL (ref 11.7–15.7)
HGB UR QL STRIP: NEGATIVE
IMM GRANULOCYTES # BLD: 0 10E3/UL
IMM GRANULOCYTES NFR BLD: 0 %
KETONES UR STRIP-MCNC: NEGATIVE MG/DL
LEUKOCYTE ESTERASE UR QL STRIP: NEGATIVE
LYMPHOCYTES # BLD AUTO: 3.8 10E3/UL (ref 0.8–5.3)
LYMPHOCYTES NFR BLD AUTO: 37 %
MCH RBC QN AUTO: 32.3 PG (ref 26.5–33)
MCHC RBC AUTO-ENTMCNC: 34.8 G/DL (ref 31.5–36.5)
MCV RBC AUTO: 93 FL (ref 78–100)
MONOCYTES # BLD AUTO: 0.9 10E3/UL (ref 0–1.3)
MONOCYTES NFR BLD AUTO: 9 %
MUCOUS THREADS #/AREA URNS LPF: PRESENT /LPF
NEUTROPHILS # BLD AUTO: 5.4 10E3/UL (ref 1.6–8.3)
NEUTROPHILS NFR BLD AUTO: 52 %
NITRATE UR QL: NEGATIVE
NRBC # BLD AUTO: 0 10E3/UL
NRBC BLD AUTO-RTO: 0 /100
PH UR STRIP: 7 [PH] (ref 5–9)
PLATELET # BLD AUTO: 321 10E3/UL (ref 150–450)
POTASSIUM SERPL-SCNC: 3.1 MMOL/L (ref 3.4–5.3)
RBC # BLD AUTO: 4.02 10E6/UL (ref 3.8–5.2)
RBC URINE: 2 /HPF
SODIUM SERPL-SCNC: 138 MMOL/L (ref 136–145)
SP GR UR STRIP: 1.03 (ref 1–1.03)
SQUAMOUS EPITHELIAL: 23 /HPF
UROBILINOGEN UR STRIP-MCNC: 2 MG/DL
WBC # BLD AUTO: 10.3 10E3/UL (ref 4–11)
WBC URINE: 7 /HPF

## 2023-01-22 RX ORDER — CYCLOBENZAPRINE HCL 10 MG
10 TABLET ORAL 3 TIMES DAILY PRN
Qty: 30 TABLET | Refills: 0 | Status: SHIPPED | OUTPATIENT
Start: 2023-01-22 | End: 2023-04-14

## 2023-01-22 NOTE — ED TRIAGE NOTES
"Pt presents to ED from home for c/o left flank pain x 3 days. States pain is worse tonight. Does have hx kidney stones. Denies N/V or dysuria.   /71   Pulse 96   Temp 97  F (36.1  C) (Tympanic)   Resp 16   Ht 1.651 m (5' 5\")   Wt 98 kg (216 lb)   SpO2 100%   BMI 35.94 kg/m         Triage Assessment     Row Name 01/21/23 0079       Triage Assessment (Adult)    Airway WDL WDL       Respiratory WDL    Respiratory WDL WDL       Skin Circulation/Temperature WDL    Skin Circulation/Temperature WDL WDL       Cardiac WDL    Cardiac WDL WDL       Peripheral/Neurovascular WDL    Peripheral Neurovascular WDL WDL       Cognitive/Neuro/Behavioral WDL    Cognitive/Neuro/Behavioral WDL WDL              "

## 2023-01-22 NOTE — ED PROVIDER NOTES
History     Chief Complaint   Patient presents with     Flank Pain     HPI  Nohelia Crockett is a 42 year old female who presents to the emergency department left flank pain for the last 3 days.  Patient states that her pain increased this evening.  Does have history of kidney stones.  No current nausea vomiting or painful urination.  No blood in her urine that she knows of.  No chest pain or shortness of breath.    Reviewed nurses notes below, similar history is related to me.  Pt presents to ED from home for c/o left flank pain x 3 days. States pain is worse tonight. Does have hx kidney stones. Denies N/V or dysuria.   Allergies:  Allergies   Allergen Reactions     Penicillins Anaphylaxis     Aspirin Hives       Problem List:    Patient Active Problem List    Diagnosis Date Noted     Hypercalciuria 10/17/2022     Priority: Medium     History of kidney stones, hypercalciuria on chlorthalidone 10/17/2022     Priority: Medium     Morbid obesity (H) 08/20/2021     Priority: Medium     Suspected sleep apnea 05/08/2019     Priority: Medium     Attention deficit disorder 02/19/2018     Priority: Medium     Knee pain 02/19/2018     Priority: Medium     Overview:   secondary to patellofemoral dysfunction       Learning disability 02/19/2018     Priority: Medium     Migraine headache 02/19/2018     Priority: Medium     Overview:   Occasional severe HA, does not require daily prophylaxis       Obesity (BMI 35.0-39.9 without comorbidity) 12/14/2016     Priority: Medium     Adhesive capsulitis of right shoulder 06/06/2016     Priority: Medium     S/P shoulder surgery 06/06/2016     Priority: Medium     Gastroesophageal reflux disease without esophagitis 05/26/2016     Priority: Medium     Pain of multiple sites 10/19/2015     Priority: Medium     History of arthroscopy of right knee 07/02/2015     Priority: Medium     Plantar fasciitis 11/14/2014     Priority: Medium     H/O excision of mass 09/03/2014     Priority: Medium      Genital herpes 12/07/2009     Priority: Medium     Overview:   Has had only one outbreak          Past Medical History:    Past Medical History:   Diagnosis Date     Adhesive capsulitis of right shoulder      Asthma 2/19/2018     Cyst of ovary 2006     Localized swelling, mass, or lump of upper extremity 08/26/2014     Migraine without status migrainosus, not intractable      Other developmental disorders of scholastic skills      Other specified behavioral and emotional disorders with onset usually occurring in childhood and adolescence      Other specified postprocedural states 09/03/2014     Other specified postprocedural states 06/06/2016     Peptic ulcer without hemorrhage or perforation 1999     Personal history of urinary calculi 2004     Plantar fascial fibromatosis 11/14/2014     Uncomplicated asthma        Past Surgical History:    Past Surgical History:   Procedure Laterality Date     APPENDECTOMY OPEN  1999     ARTHROSCOPY SHOULDER      10/05,debridement and subacromial decompression of right shoulder.     ARTHROSCOPY SHOULDER  08/2007    acromioplasty distal clavicle excision, and extensive debridement of the bursa of the right shoulder by Dr. Villa.  Also left?     CHOLECYSTECTOMY  1999     COMBINED CYSTOSCOPY, URETEROSCOPY, LASER HOLMIUM LITHOTRIPSY URETER(S) Left 7/19/2022    Procedure: Left ureteroscopy with laser lithotripsy and stent placement;  Surgeon: Meelcio Pantoja MD;  Location: [facility] OR     CYSTOSCOPY  2004    with retrogrades     DILATION AND CURETTAGE  11/2007    Ablation     LAPAROSCOPIC TUBAL LIGATION  2004     LITHOTRIPSY  12/2004    extracorporeal shockwave     OTHER SURGICAL HISTORY  08/13/2012     OTHER SURGICAL HISTORY Right 09/03/2014    Pathology showed epidermal inclusion cysts     RELEASE CARPAL TUNNEL  2012       Family History:    Family History   Problem Relation Age of Onset     Cancer Mother 46        Cancer,Lung cancer     Blood Disease Mother         Blood Disease      "Asthma Father         Asthma     Diabetes Father         Diabetes,2     Hypertension Father         Hypertension     Heart Disease Father         Heart Disease,CHF     Other - See Comments Father          at age 65 from complications of a motor vehicle accident.     Family History Negative Sister         Good Health     Family History Negative Sister         Good Health     Breast Cancer No family hx of         Cancer-breast     Colon Cancer No family hx of         Cancer-colon     Prostate Cancer No family hx of         Cancer-prostate     Ovarian Cancer No family hx of         Cancer-ovarian     Anesthesia Reaction No family hx of         Anesthesia Problem       Social History:  Marital Status:   [4]  Social History     Tobacco Use     Smoking status: Former     Packs/day: 0.50     Years: 16.00     Pack years: 8.00     Types: Cigarettes     Quit date: 2016     Years since quittin.0     Smokeless tobacco: Never   Vaping Use     Vaping Use: Never used   Substance Use Topics     Alcohol use: Yes     Comment: Alcoholic Drinks/day: occasionally     Drug use: No        Medications:    chlorthalidone (HYGROTON) 25 MG tablet  cyclobenzaprine (FLEXERIL) 10 MG tablet  loratadine (CLARITIN) 10 MG tablet  Vitamin D, Cholecalciferol, 25 MCG (1000 UT) TABS  ketoconazole (NIZORAL) 2 % external cream          Review of Systems   Constitutional: Negative for chills and fever.   Genitourinary: Negative for dysuria.       Physical Exam   BP: 130/71  Pulse: 96  Temp: 97  F (36.1  C)  Resp: 16  Height: 165.1 cm (5' 5\")  Weight: 98 kg (216 lb)  SpO2: 100 %      Physical Exam  Vitals and nursing note reviewed.   Constitutional:       General: She is not in acute distress.     Appearance: She is not diaphoretic.   HENT:      Head: Atraumatic.      Mouth/Throat:      Pharynx: No oropharyngeal exudate.   Eyes:      General: No scleral icterus.     Pupils: Pupils are equal, round, and reactive to light.   Cardiovascular: "      Heart sounds: Normal heart sounds.   Pulmonary:      Effort: No respiratory distress.      Breath sounds: Normal breath sounds.   Abdominal:      General: Bowel sounds are normal.      Palpations: Abdomen is soft.      Tenderness: There is no abdominal tenderness. There is no right CVA tenderness or left CVA tenderness.   Musculoskeletal:         General: No tenderness.   Skin:     General: Skin is warm.      Findings: No rash.         ED Course                 Results for orders placed or performed during the hospital encounter of 01/21/23 (from the past 24 hour(s))   UA reflex to Microscopic   Result Value Ref Range    Color Urine Yellow Colorless, Straw, Light Yellow, Yellow    Appearance Urine Slightly Cloudy (A) Clear    Glucose Urine Negative Negative mg/dL    Bilirubin Urine Negative Negative    Ketones Urine Negative Negative mg/dL    Specific Gravity Urine 1.028 1.000 - 1.030    Blood Urine Negative Negative    pH Urine 7.0 5.0 - 9.0    Protein Albumin Urine 20 (A) Negative mg/dL    Urobilinogen Urine 2.0 Normal, 2.0 mg/dL    Nitrite Urine Negative Negative    Leukocyte Esterase Urine Negative Negative    Bacteria Urine Few (A) None Seen /HPF    RBC Urine 2 <=2 /HPF    WBC Urine 7 (H) <=5 /HPF    Squamous Epithelials Urine 23 (H) <=1 /HPF    Mucus Urine Present (A) None Seen /LPF   CBC with platelets differential    Narrative    The following orders were created for panel order CBC with platelets differential.  Procedure                               Abnormality         Status                     ---------                               -----------         ------                     CBC with platelets and d...[[id number]]                      Final result                 Please view results for these tests on the individual orders.   Basic metabolic panel   Result Value Ref Range    Sodium 138 136 - 145 mmol/L    Potassium 3.1 (L) 3.4 - 5.3 mmol/L    Chloride 99 98 - 107 mmol/L    Carbon Dioxide (CO2) 28  22 - 29 mmol/L    Anion Gap 11 7 - 15 mmol/L    Urea Nitrogen 13.8 6.0 - 20.0 mg/dL    Creatinine 0.71 0.51 - 0.95 mg/dL    Calcium 9.2 8.6 - 10.0 mg/dL    Glucose 140 (H) 70 - 99 mg/dL    GFR Estimate >90 >60 mL/min/1.73m2   CBC with platelets and differential   Result Value Ref Range    WBC Count 10.3 4.0 - 11.0 10e3/uL    RBC Count 4.02 3.80 - 5.20 10e6/uL    Hemoglobin 13.0 11.7 - 15.7 g/dL    Hematocrit 37.4 35.0 - 47.0 %    MCV 93 78 - 100 fL    MCH 32.3 26.5 - 33.0 pg    MCHC 34.8 31.5 - 36.5 g/dL    RDW 12.7 10.0 - 15.0 %    Platelet Count 321 150 - 450 10e3/uL    % Neutrophils 52 %    % Lymphocytes 37 %    % Monocytes 9 %    % Eosinophils 1 %    % Basophils 1 %    % Immature Granulocytes 0 %    NRBCs per 100 WBC 0 <1 /100    Absolute Neutrophils 5.4 1.6 - 8.3 10e3/uL    Absolute Lymphocytes 3.8 0.8 - 5.3 10e3/uL    Absolute Monocytes 0.9 0.0 - 1.3 10e3/uL    Absolute Eosinophils 0.1 0.0 - 0.7 10e3/uL    Absolute Basophils 0.1 0.0 - 0.2 10e3/uL    Absolute Immature Granulocytes 0.0 <=0.4 10e3/uL    Absolute NRBCs 0.0 10e3/uL   CT Abdomen Pelvis w/o Contrast    Narrative    PROCEDURE INFORMATION:   Exam: CT Abdomen And Pelvis Without Contrast   Exam date and time: 1/22/2023 12:04 AM   Age: 42 years old   Clinical indication: Abdominal pain; Flank; Left; Prior surgery; Surgery date:   1-6 months; Surgery type: Kidney stone removal; Additional info: Flank pain h/o   stones     TECHNIQUE:   Imaging protocol: Computed tomography of the abdomen and pelvis without   contrast.   Radiation optimization: All CT scans at this facility use at least one of these   dose optimization techniques: automated exposure control; mA and/or kV   adjustment per patient size (includes targeted exams where dose is matched to   clinical indication); or iterative reconstruction.     COMPARISON:   CT ABDOMEN PELVIS W/O CONTRAST 7/17/2022 2:44 PM     FINDINGS:   Liver: Unremarkable.   Gallbladder and bile ducts: Cholecystectomy.    Pancreas: Unremarkable.   Spleen: Unremarkable.   Adrenal glands: Normal. No mass.   Kidneys and ureters: No hydronephrosis. Stable right renal cyst.   Stomach and bowel: No obstruction. Loading.   Appendix: Normal appendix.     Intraperitoneal space: No free air. No abscess. No ascites.   Vasculature: Unremarkable.   Lymph nodes: No significant adenopathy.   Urinary bladder: Unremarkable as visualized.   Reproductive: Small left adnexal cyst.   Bones/joints: No acute fracture. DJD in the spine.   Soft tissues: Umbilical hernia.       Impression    IMPRESSION:   1. No evidence of obstructive uropathy.   2. Small left adnexal cyst     THIS DOCUMENT HAS BEEN ELECTRONICALLY SIGNED BY NIMESH GARCIA MD       Medications   ketorolac (TORADOL) injection 30 mg (30 mg Intramuscular Given 1/21/23 6206)       Assessments & Plan (with Medical Decision Making)     I have reviewed the nursing notes.    I have reviewed the findings, diagnosis, plan and need for follow up with the patient.    Medical Decision Making  Flank pain: Differential diagnosis includes but not limited to pyelonephritis, ureterolithiasis, renal infarct, GI distress, musculoskeletal pain.  Urinalysis is equivocal, with absence of urinary symptoms will not treat at this point but rather wait for culture.  Patient is in agreement with this after shared decision-making conversation.  For now we will treat as musculoskeletal pain with a trial of Flexeril.  Return to ED with worsening symptoms such as fever, without nausea vomiting or worsening pain.  Patient verbalized understanding plan is in agreement she left the ED in improving condition.    New Prescriptions    CYCLOBENZAPRINE (FLEXERIL) 10 MG TABLET    Take 1 tablet (10 mg) by mouth 3 times daily as needed for muscle spasms       Final diagnoses:   Flank pain       1/21/2023   [facility] [place]     Nico Forbes MD  01/25/23 3272

## 2023-01-25 ASSESSMENT — ENCOUNTER SYMPTOMS
FEVER: 0
CHILLS: 0
DYSURIA: 0

## 2023-02-03 ENCOUNTER — OFFICE VISIT (OUTPATIENT)
Dept: FAMILY MEDICINE | Facility: OTHER | Age: 43
End: 2023-02-03
Attending: FAMILY MEDICINE
Payer: COMMERCIAL

## 2023-02-03 VITALS
SYSTOLIC BLOOD PRESSURE: 124 MMHG | DIASTOLIC BLOOD PRESSURE: 80 MMHG | HEART RATE: 105 BPM | WEIGHT: 221 LBS | OXYGEN SATURATION: 98 % | RESPIRATION RATE: 16 BRPM | BODY MASS INDEX: 36.78 KG/M2 | TEMPERATURE: 97.8 F

## 2023-02-03 DIAGNOSIS — M50.121 CERVICAL DISC DISORDER AT C4-C5 LEVEL WITH RADICULOPATHY: Primary | ICD-10-CM

## 2023-02-03 DIAGNOSIS — E87.1 HYPONATREMIA: ICD-10-CM

## 2023-02-03 PROCEDURE — 99213 OFFICE O/P EST LOW 20 MIN: CPT | Performed by: FAMILY MEDICINE

## 2023-02-03 PROCEDURE — G0463 HOSPITAL OUTPT CLINIC VISIT: HCPCS

## 2023-02-03 RX ORDER — POTASSIUM CHLORIDE 750 MG/1
10 TABLET, EXTENDED RELEASE ORAL 2 TIMES DAILY
Qty: 90 TABLET | Refills: 1 | Status: SHIPPED | OUTPATIENT
Start: 2023-02-03 | End: 2023-02-03

## 2023-02-03 RX ORDER — POTASSIUM CITRATE 10 MEQ/1
10 TABLET, EXTENDED RELEASE ORAL
Qty: 90 TABLET | Refills: 1 | Status: SHIPPED | OUTPATIENT
Start: 2023-02-03 | End: 2023-04-14

## 2023-02-03 ASSESSMENT — PAIN SCALES - GENERAL: PAINLEVEL: EXTREME PAIN (8)

## 2023-02-03 NOTE — NURSING NOTE
"Chief Complaint   Patient presents with     RECHECK     After neck injections       Initial /80   Pulse 105   Temp 97.8  F (36.6  C) (Temporal)   Resp 16   Wt 100.2 kg (221 lb)   LMP 12/14/2022 (Approximate)   SpO2 98%   Breastfeeding No   BMI 36.78 kg/m   Estimated body mass index is 36.78 kg/m  as calculated from the following:    Height as of 1/21/23: 1.651 m (5' 5\").    Weight as of this encounter: 100.2 kg (221 lb).  Medication Reconciliation: complete    FOOD SECURITY SCREENING QUESTIONS  Hunger Vital Signs:  Within the past 12 months we worried whether our food would run out before we got money to buy more. Never  Within the past 12 months the food we bought just didn't last and we didn't have money to get more. Never        Advance care directive on file? no  Advance care directive provided to patient? declined     Gircelda Cristina, ESSENCE  "

## 2023-02-03 NOTE — PROGRESS NOTES
{PROVIDER CHARTING PREFERENCE:408385}    Anthony Pozo is a 42 year old, presenting for the following health issues:  RECHECK (After neck injections)      History of Present Illness       Reason for visit:  Neck pain& other issues    She eats 0-1 servings of fruits and vegetables daily.She consumes 4 sweetened beverage(s) daily.She exercises with enough effort to increase her heart rate 20 to 29 minutes per day.  She exercises with enough effort to increase her heart rate 4 days per week.   She is taking medications regularly.       {SUPERLIST (Optional):534559}  {additonal problems for provider to add (Optional):915210}    Review of Systems   {ROS COMP (Optional):651714}      Objective    /80   Pulse 105   Temp 97.8  F (36.6  C) (Temporal)   Resp 16   Wt 100.2 kg (221 lb)   LMP 12/14/2022 (Approximate)   SpO2 98%   Breastfeeding No   BMI 36.78 kg/m    Body mass index is 36.78 kg/m .  Physical Exam   {Exam List (Optional):383870}    {Diagnostic Test Results (Optional):551286}    {AMBULATORY ATTESTATION (Optional):437796}

## 2023-02-03 NOTE — PROGRESS NOTES
Assessment & Plan     1. Cervical disc disorder at C4-C5 level with radiculopathy  With no improvement upon recent epidural injection.  Referral to spine specialist.  - Spine  Referral; Future    2. Hyponatremia  Chronic, since starting diuretic for renal stones.  Rx for potassium citrate sent.   Recheck BMP in 2-4 weeks.  - Basic Metabolic Panel; Future  - potassium citrate (UROCIT-K) 10 MEQ (1080 MG) CR tablet; Take 1 tablet (10 mEq) by mouth 3 times daily (with meals)  Dispense: 90 tablet; Refill: 1    MDM:  Ordering of each unique test  Prescription drug management      No follow-ups on file.    Lyla Hendrix, DO  [facility]      Subjective   Nohelia is a 42 year old, presenting for the following health issues:  RECHECK (After neck injections)      History of Present Illness       Reason for visit:  Neck pain& other issues    She eats 0-1 servings of fruits and vegetables daily.She consumes 4 sweetened beverage(s) daily.She exercises with enough effort to increase her heart rate 20 to 29 minutes per day.  She exercises with enough effort to increase her heart rate 4 days per week.   She is taking medications regularly.       Nohelia had a successful intralaminar epidural steroid injection performed at C[facility]-T[facility] on 1/12/2023.  She rated her pain at a 7/10 initially; and no significant immediate response was noted due to no deep anesthesia administered.    Since the injection: had pain resolution in the neck for 4-6 days; but no improvement in hand numbness/tingling as she has had previously.  Involving both sides now instead of L only.    Is not wanting to repeat injection.  Has completed PT.        Objective    /80   Pulse 105   Temp 97.8  F (36.6  C) (Temporal)   Resp 16   Wt 100.2 kg (221 lb)   LMP 12/14/2022 (Approximate)   SpO2 98%   Breastfeeding No   BMI 36.78 kg/m    Body mass index is 36.78 kg/m .  Physical Exam   GENERAL: healthy, alert  and no distress  NECK: no adenopathy, no asymmetry, masses, or scars and thyroid normal to palpation  MS: ttp posterior mid-neck; limited ROM in extremes of rotation L>R  SKIN: no suspicious lesions or rashes    No results found for any visits on 02/03/23.

## 2023-02-16 ENCOUNTER — LAB (OUTPATIENT)
Dept: LAB | Facility: OTHER | Age: 43
End: 2023-02-16
Attending: FAMILY MEDICINE
Payer: COMMERCIAL

## 2023-02-16 DIAGNOSIS — E87.1 HYPONATREMIA: ICD-10-CM

## 2023-02-16 LAB
ANION GAP SERPL CALCULATED.3IONS-SCNC: 8 MMOL/L (ref 7–15)
BUN SERPL-MCNC: 13.5 MG/DL (ref 6–20)
CALCIUM SERPL-MCNC: 9.4 MG/DL (ref 8.6–10)
CHLORIDE SERPL-SCNC: 100 MMOL/L (ref 98–107)
CREAT SERPL-MCNC: 0.68 MG/DL (ref 0.51–0.95)
DEPRECATED HCO3 PLAS-SCNC: 32 MMOL/L (ref 22–29)
GFR SERPL CREATININE-BSD FRML MDRD: >90 ML/MIN/1.73M2
GLUCOSE SERPL-MCNC: 122 MG/DL (ref 70–99)
POTASSIUM SERPL-SCNC: 3.3 MMOL/L (ref 3.4–5.3)
SODIUM SERPL-SCNC: 140 MMOL/L (ref 136–145)

## 2023-02-16 PROCEDURE — 36415 COLL VENOUS BLD VENIPUNCTURE: CPT | Mod: ZL

## 2023-02-16 PROCEDURE — 80048 BASIC METABOLIC PNL TOTAL CA: CPT | Mod: ZL

## 2023-03-02 ENCOUNTER — OFFICE VISIT (OUTPATIENT)
Dept: ORTHOPEDICS | Facility: OTHER | Age: 43
End: 2023-03-02
Attending: STUDENT IN AN ORGANIZED HEALTH CARE EDUCATION/TRAINING PROGRAM
Payer: COMMERCIAL

## 2023-03-02 VITALS
HEART RATE: 86 BPM | HEIGHT: 65 IN | WEIGHT: 222 LBS | SYSTOLIC BLOOD PRESSURE: 106 MMHG | TEMPERATURE: 97.3 F | DIASTOLIC BLOOD PRESSURE: 80 MMHG | BODY MASS INDEX: 36.99 KG/M2 | OXYGEN SATURATION: 98 % | RESPIRATION RATE: 16 BRPM

## 2023-03-02 DIAGNOSIS — M50.121 CERVICAL DISC DISORDER AT C4-C5 LEVEL WITH RADICULOPATHY: ICD-10-CM

## 2023-03-02 DIAGNOSIS — M47.812 FACET ARTHROPATHY, CERVICAL: Primary | ICD-10-CM

## 2023-03-02 PROCEDURE — G0463 HOSPITAL OUTPT CLINIC VISIT: HCPCS

## 2023-03-02 PROCEDURE — 99203 OFFICE O/P NEW LOW 30 MIN: CPT | Performed by: STUDENT IN AN ORGANIZED HEALTH CARE EDUCATION/TRAINING PROGRAM

## 2023-03-02 ASSESSMENT — PAIN SCALES - GENERAL: PAINLEVEL: SEVERE PAIN (6)

## 2023-03-02 NOTE — NURSING NOTE
"Chief Complaint   Patient presents with     Consult     cervical         Initial /80   Pulse 86   Temp 97.3  F (36.3  C) (Tympanic)   Resp 16   Ht 1.651 m (5' 5\")   Wt 100.7 kg (222 lb)   LMP 12/14/2022 (Approximate)   SpO2 98%   Breastfeeding No   BMI 36.94 kg/m   Estimated body mass index is 36.94 kg/m  as calculated from the following:    Height as of this encounter: 1.651 m (5' 5\").    Weight as of this encounter: 100.7 kg (222 lb).         Norma J. Gosselin, LPN   "

## 2023-03-02 NOTE — PROGRESS NOTES
Visit Date: 2023    HISTORY OF PRESENT ILLNESS:  Nohelia is a 43-year-old female who presents for evaluation of left-sided neck pain and upper extremity tingling and paresthesias.  She has been dealing with this off and on for quite some time.  She has tried physical therapy, which exacerbated her neck pain and nothing has significantly changed in her left hand numbness.  She has a remote history of right carpal tunnel surgery, but never on the left.    PHYSICAL EXAMINATION:    GENERAL:  Well-developed, well-nourished.    MUSCULOSKELETAL NEUROLOGIC:  She has 5/5 strength in bilateral deltoid, biceps, triceps, wrist extension, flexion, hand  and hand intrinsics.  She has normal sensation to light touch throughout bilateral upper extremities, aside for some patchy numbness to light touch throughout her left hand.    IMAGING:  Available for review today includes an MRI of the cervical spine that shows multilevel degenerative changes, worse on the right hand side at C3 to C4 and C4 to C5 with a moderate amount of lateral recess stenosis.  She has significant facet arthropathy, predominantly on the left at C4 to C5 and C5 to C6.  She has AP, lateral, flexion, extension films of the cervical spine that showed no gross abnormal motion in flexed or extended position.    ASSESSMENT AND PLAN:  Nohelia is a 43-year-old female with left-sided neck pain in the setting of facet arthropathy and left hand numbness consistent possibly with carpal tunnel syndrome.  She will be referred to Dr. David for left upper extremity EMG and Dr. Gates for a left-sided C4 to C5 and C5 to C6 medial branch blocks.  She will follow up with me on an as-needed basis.    This clinic visit took 30 minutes.    Prince Seymour MD        D: 2023   T: 2023   MT: PARESH    Name:     NOHELIA GOODSON  MRN:      [id number]        Account:    [id number]   :      1980           Visit Date: 2023     Document:  [id number]

## 2023-04-10 ENCOUNTER — HOSPITAL ENCOUNTER (OUTPATIENT)
Dept: GENERAL RADIOLOGY | Facility: OTHER | Age: 43
Discharge: HOME | End: 2023-04-10
Attending: STUDENT IN AN ORGANIZED HEALTH CARE EDUCATION/TRAINING PROGRAM | Admitting: STUDENT IN AN ORGANIZED HEALTH CARE EDUCATION/TRAINING PROGRAM
Payer: COMMERCIAL

## 2023-04-10 DIAGNOSIS — M50.121 CERVICAL DISC DISORDER AT C4-C5 LEVEL WITH RADICULOPATHY: ICD-10-CM

## 2023-04-10 DIAGNOSIS — M47.812 FACET ARTHROPATHY, CERVICAL: ICD-10-CM

## 2023-04-10 PROCEDURE — 64490 INJ PARAVERT F JNT C/T 1 LEV: CPT | Mod: LT

## 2023-04-10 PROCEDURE — 255N000002 HC RX 255 OP 636: Performed by: RADIOLOGY

## 2023-04-10 PROCEDURE — 250N000009 HC RX 250: Performed by: RADIOLOGY

## 2023-04-10 RX ORDER — BUPIVACAINE HYDROCHLORIDE 5 MG/ML
3 INJECTION, SOLUTION EPIDURAL; INTRACAUDAL ONCE
Status: COMPLETED | OUTPATIENT
Start: 2023-04-10 | End: 2023-04-10

## 2023-04-10 RX ORDER — LIDOCAINE HYDROCHLORIDE 10 MG/ML
2 INJECTION, SOLUTION INFILTRATION; PERINEURAL ONCE
Status: COMPLETED | OUTPATIENT
Start: 2023-04-10 | End: 2023-04-10

## 2023-04-10 RX ADMIN — LIDOCAINE HYDROCHLORIDE 3 ML: 10 INJECTION, SOLUTION INFILTRATION; PERINEURAL at 11:29

## 2023-04-10 RX ADMIN — BUPIVACAINE HYDROCHLORIDE 20 MG: 5 INJECTION, SOLUTION EPIDURAL; INTRACAUDAL; PERINEURAL at 11:27

## 2023-04-10 RX ADMIN — IOHEXOL 3 ML: 240 INJECTION, SOLUTION INTRATHECAL; INTRAVASCULAR; INTRAVENOUS; ORAL at 11:27

## 2023-04-14 ENCOUNTER — MYC MEDICAL ADVICE (OUTPATIENT)
Dept: FAMILY MEDICINE | Facility: OTHER | Age: 43
End: 2023-04-14
Payer: MEDICAID

## 2023-04-14 DIAGNOSIS — E87.1 HYPONATREMIA: ICD-10-CM

## 2023-04-14 DIAGNOSIS — E87.6 HYPOKALEMIA: Primary | ICD-10-CM

## 2023-04-14 RX ORDER — POTASSIUM CITRATE 10 MEQ/1
10 TABLET, EXTENDED RELEASE ORAL
Qty: 90 TABLET | Refills: 1 | Status: SHIPPED | OUTPATIENT
Start: 2023-04-14 | End: 2023-07-05

## 2023-04-21 ENCOUNTER — LAB (OUTPATIENT)
Dept: LAB | Facility: OTHER | Age: 43
End: 2023-04-21
Attending: FAMILY MEDICINE
Payer: COMMERCIAL

## 2023-04-21 DIAGNOSIS — E87.6 HYPOKALEMIA: ICD-10-CM

## 2023-04-21 LAB
ANION GAP SERPL CALCULATED.3IONS-SCNC: 9 MMOL/L (ref 7–15)
BUN SERPL-MCNC: 10.8 MG/DL (ref 6–20)
CALCIUM SERPL-MCNC: 9.6 MG/DL (ref 8.6–10)
CHLORIDE SERPL-SCNC: 100 MMOL/L (ref 98–107)
CREAT SERPL-MCNC: 0.7 MG/DL (ref 0.51–0.95)
DEPRECATED HCO3 PLAS-SCNC: 31 MMOL/L (ref 22–29)
GFR SERPL CREATININE-BSD FRML MDRD: >90 ML/MIN/1.73M2
GLUCOSE SERPL-MCNC: 115 MG/DL (ref 70–99)
POTASSIUM SERPL-SCNC: 3.4 MMOL/L (ref 3.4–5.3)
SODIUM SERPL-SCNC: 140 MMOL/L (ref 136–145)

## 2023-04-21 PROCEDURE — 80048 BASIC METABOLIC PNL TOTAL CA: CPT | Mod: ZL

## 2023-04-21 PROCEDURE — 36415 COLL VENOUS BLD VENIPUNCTURE: CPT | Mod: ZL

## 2023-05-06 ENCOUNTER — HEALTH MAINTENANCE LETTER (OUTPATIENT)
Age: 43
End: 2023-05-06

## 2023-06-27 NOTE — PROGRESS NOTES
"  Assessment & Plan     1. Cervical disc disorder at C4-C5 level with radiculopathy  Chronic, worsening.  With radiculopathy of hands (L>R).  EMG study ordered, needs to be set up for this.  Re-ordered today.  Start gabapentin 300mg at bedtime; with ability to increase up to BID prn if tolerated well without side effects.  - gabapentin (NEURONTIN) 300 MG capsule; Take 1 capsule (300 mg) by mouth At Bedtime  Dispense: 90 capsule; Refill: 1    2. Obesity (BMI 30.0-34.9)  Recommended reduction of weight to help with overall OA symptoms.    3. Chronic pain of right knee  Chronic, likely progression of OA.  RICE with flares; NSAID, activity modification while staying active.  Monitor how gabapentin improves symptoms.  Can consider injection there, but she is hesitant for this at this time.    MDM:  Ordering of each unique test  Prescription drug management     BMI:   Estimated body mass index is 34.95 kg/m  as calculated from the following:    Height as of this encounter: 1.651 m (5' 5\").    Weight as of this encounter: 95.3 kg (210 lb).   Weight management plan: Discussed healthy diet and exercise guidelines    Lyla Hendrix, [facility]      Subjective   Nohelia is a 43 year old, presenting for the following health issues:  Neck Pain, Headache, and Dizziness    History of Present Illness       Reason for visit:  Knee pain and swelling,  hand, neck and arm issues, vertigo spells and ect    She eats 0-1 servings of fruits and vegetables daily.She consumes 4 sweetened beverage(s) daily.She exercises with enough effort to increase her heart rate 20 to 29 minutes per day.  She exercises with enough effort to increase her heart rate 6 days per week.   She is taking medications regularly.     Neck symptoms: known OA.  Vertigo worse.  On night shift, uncertain if plays a role.  Had injection 1/2023; pain worse after without any improvement.  Not willing to repeat injections.    Had MRI brain in " "9/2021 (Dr. Manriquez) that showed minimal progression of an abnormal burden of T2/FLAIR hyperintense lesions primarily within the supratentorial deep and periventricular white matter.  Did not feel this was MS related; as she had previously had CSF sampling without oligoclonal bands.  Has followed with NSG in re: to left neck pain, OA seen on cervical spine imaging.   Has recently been referred for EMG of LUE to evaluate for a complex CTS - has not been completed.    Knee: R knee.  Had prior imaging 3/2022 showing mild OA of both knees.  Not stopping her from activities.         Objective    /78   Pulse (!) 130   Temp 98.2  F (36.8  C) (Tympanic)   Resp 16   Ht 1.651 m (5' 5\")   Wt 95.3 kg (210 lb)   LMP 11/01/2007   SpO2 98%   BMI 34.95 kg/m    Body mass index is 34.95 kg/m .  Physical Exam   GENERAL: healthy, alert and no distress  EYES: Eyes grossly normal to inspection, PERRL and conjunctivae and sclerae normal  HENT: ear canals and TM's normal, nose and mouth without ulcers or lesions  HEART: tachycardic, regular.  No M.  NECK: no adenopathy, no asymmetry, masses, or scars and thyroid normal to palpation  MS: no gross musculoskeletal defects noted, no edema  SKIN: no suspicious lesions or rashes  NEURO: Normal strength and tone, mentation intact and speech normal  PSYCH: mentation appears normal, tangential and speech pressured    No results found for any visits on 06/28/23.    "

## 2023-06-28 ENCOUNTER — OFFICE VISIT (OUTPATIENT)
Dept: FAMILY MEDICINE | Facility: OTHER | Age: 43
End: 2023-06-28
Attending: FAMILY MEDICINE
Payer: COMMERCIAL

## 2023-06-28 VITALS
OXYGEN SATURATION: 98 % | HEIGHT: 65 IN | SYSTOLIC BLOOD PRESSURE: 112 MMHG | BODY MASS INDEX: 34.99 KG/M2 | WEIGHT: 210 LBS | RESPIRATION RATE: 16 BRPM | HEART RATE: 130 BPM | TEMPERATURE: 98.2 F | DIASTOLIC BLOOD PRESSURE: 78 MMHG

## 2023-06-28 DIAGNOSIS — G89.29 CHRONIC PAIN OF RIGHT KNEE: ICD-10-CM

## 2023-06-28 DIAGNOSIS — R20.2 PARESTHESIA OF UPPER EXTREMITY: ICD-10-CM

## 2023-06-28 DIAGNOSIS — M25.561 CHRONIC PAIN OF RIGHT KNEE: ICD-10-CM

## 2023-06-28 DIAGNOSIS — M50.121 CERVICAL DISC DISORDER AT C4-C5 LEVEL WITH RADICULOPATHY: Primary | ICD-10-CM

## 2023-06-28 DIAGNOSIS — E66.811 OBESITY (BMI 30.0-34.9): ICD-10-CM

## 2023-06-28 PROCEDURE — G0463 HOSPITAL OUTPT CLINIC VISIT: HCPCS

## 2023-06-28 PROCEDURE — 99214 OFFICE O/P EST MOD 30 MIN: CPT | Performed by: FAMILY MEDICINE

## 2023-06-28 RX ORDER — GABAPENTIN 300 MG/1
300 CAPSULE ORAL AT BEDTIME
Qty: 90 CAPSULE | Refills: 1 | Status: SHIPPED | OUTPATIENT
Start: 2023-06-28 | End: 2023-08-24

## 2023-06-28 ASSESSMENT — PAIN SCALES - GENERAL
PAINLEVEL: MODERATE PAIN (5)
PAINLEVEL: EXTREME PAIN (8)

## 2023-06-28 NOTE — NURSING NOTE
"Chief Complaint   Patient presents with     Neck Pain     Headache     Dizziness       Initial /78   Pulse (!) 130   Temp 98.2  F (36.8  C) (Tympanic)   Resp 16   Ht 1.651 m (5' 5\")   Wt 95.3 kg (210 lb)   LMP 11/01/2007   SpO2 98%   BMI 34.95 kg/m   Estimated body mass index is 34.95 kg/m  as calculated from the following:    Height as of this encounter: 1.651 m (5' 5\").    Weight as of this encounter: 95.3 kg (210 lb).  Medication Reconciliation: complete    Mila Garcia LPN     [facility] [employer] Directive reviewed.     "

## 2023-07-05 DIAGNOSIS — E87.1 HYPONATREMIA: ICD-10-CM

## 2023-07-05 RX ORDER — POTASSIUM CITRATE 10 MEQ/1
TABLET, EXTENDED RELEASE ORAL
Qty: 90 TABLET | Refills: 0 | Status: SHIPPED | OUTPATIENT
Start: 2023-07-05 | End: 2023-08-14

## 2023-07-07 ENCOUNTER — MYC MEDICAL ADVICE (OUTPATIENT)
Dept: FAMILY MEDICINE | Facility: OTHER | Age: 43
End: 2023-07-07
Payer: MEDICAID

## 2023-07-14 ENCOUNTER — HOSPITAL ENCOUNTER (OUTPATIENT)
Dept: MAMMOGRAPHY | Facility: OTHER | Age: 43
Discharge: HOME | End: 2023-07-14
Attending: FAMILY MEDICINE | Admitting: FAMILY MEDICINE
Payer: COMMERCIAL

## 2023-07-14 DIAGNOSIS — Z12.31 VISIT FOR SCREENING MAMMOGRAM: ICD-10-CM

## 2023-07-14 PROCEDURE — 77067 SCR MAMMO BI INCL CAD: CPT

## 2023-07-17 NOTE — TELEPHONE ENCOUNTER
EMG ordered but still pending auth.  Reached out to [employer] to run through insurance.      Should be ok to call and schedule.    Patient update on [employer].    Rosalba Ghotra RN on 7/17/2023 at 1:36 PM

## 2023-08-14 ENCOUNTER — MYC REFILL (OUTPATIENT)
Dept: FAMILY MEDICINE | Facility: OTHER | Age: 43
End: 2023-08-14
Payer: MEDICAID

## 2023-08-14 DIAGNOSIS — E87.1 HYPONATREMIA: ICD-10-CM

## 2023-08-15 ENCOUNTER — HOSPITAL ENCOUNTER (EMERGENCY)
Facility: OTHER | Age: 43
Discharge: HOME | End: 2023-08-15
Attending: STUDENT IN AN ORGANIZED HEALTH CARE EDUCATION/TRAINING PROGRAM | Admitting: STUDENT IN AN ORGANIZED HEALTH CARE EDUCATION/TRAINING PROGRAM
Payer: COMMERCIAL

## 2023-08-15 VITALS
RESPIRATION RATE: 18 BRPM | BODY MASS INDEX: 34.95 KG/M2 | DIASTOLIC BLOOD PRESSURE: 91 MMHG | TEMPERATURE: 96.6 F | WEIGHT: 210 LBS | SYSTOLIC BLOOD PRESSURE: 117 MMHG | HEART RATE: 89 BPM | OXYGEN SATURATION: 98 %

## 2023-08-15 DIAGNOSIS — R00.0 SINUS TACHYCARDIA: ICD-10-CM

## 2023-08-15 DIAGNOSIS — E87.6 HYPOKALEMIA: ICD-10-CM

## 2023-08-15 DIAGNOSIS — E83.42 HYPOMAGNESEMIA: ICD-10-CM

## 2023-08-15 LAB
ALBUMIN UR-MCNC: 20 MG/DL
ANION GAP SERPL CALCULATED.3IONS-SCNC: 11 MMOL/L (ref 7–15)
ANION GAP SERPL CALCULATED.3IONS-SCNC: 13 MMOL/L (ref 7–15)
APPEARANCE UR: CLEAR
BACTERIA #/AREA URNS HPF: ABNORMAL /HPF
BASOPHILS # BLD AUTO: 0 10E3/UL (ref 0–0.2)
BASOPHILS NFR BLD AUTO: 0 %
BILIRUB UR QL STRIP: NEGATIVE
BUN SERPL-MCNC: 12.3 MG/DL (ref 6–20)
BUN SERPL-MCNC: 14.7 MG/DL (ref 6–20)
CALCIUM SERPL-MCNC: 9.1 MG/DL (ref 8.6–10)
CALCIUM SERPL-MCNC: 9.3 MG/DL (ref 8.6–10)
CHLORIDE SERPL-SCNC: 101 MMOL/L (ref 98–107)
CHLORIDE SERPL-SCNC: 103 MMOL/L (ref 98–107)
COLOR UR AUTO: YELLOW
CREAT SERPL-MCNC: 0.58 MG/DL (ref 0.51–0.95)
CREAT SERPL-MCNC: 0.63 MG/DL (ref 0.51–0.95)
D DIMER PPP FEU-MCNC: <=0.27 UG/ML FEU (ref 0–0.5)
DEPRECATED HCO3 PLAS-SCNC: 25 MMOL/L (ref 22–29)
DEPRECATED HCO3 PLAS-SCNC: 26 MMOL/L (ref 22–29)
EOSINOPHIL # BLD AUTO: 0.1 10E3/UL (ref 0–0.7)
EOSINOPHIL NFR BLD AUTO: 1 %
ERYTHROCYTE [DISTWIDTH] IN BLOOD BY AUTOMATED COUNT: 12.8 % (ref 10–15)
GFR SERPL CREATININE-BSD FRML MDRD: >90 ML/MIN/1.73M2
GFR SERPL CREATININE-BSD FRML MDRD: >90 ML/MIN/1.73M2
GLUCOSE SERPL-MCNC: 132 MG/DL (ref 70–99)
GLUCOSE SERPL-MCNC: 150 MG/DL (ref 70–99)
GLUCOSE UR STRIP-MCNC: NEGATIVE MG/DL
HCT VFR BLD AUTO: 38.1 % (ref 35–47)
HGB BLD-MCNC: 13.4 G/DL (ref 11.7–15.7)
HGB UR QL STRIP: NEGATIVE
HOLD SPECIMEN: NORMAL
HOLD SPECIMEN: NORMAL
IMM GRANULOCYTES # BLD: 0 10E3/UL
IMM GRANULOCYTES NFR BLD: 0 %
KETONES UR STRIP-MCNC: 10 MG/DL
LEUKOCYTE ESTERASE UR QL STRIP: ABNORMAL
LYMPHOCYTES # BLD AUTO: 1.9 10E3/UL (ref 0.8–5.3)
LYMPHOCYTES NFR BLD AUTO: 21 %
MAGNESIUM SERPL-MCNC: 1.5 MG/DL (ref 1.7–2.3)
MAGNESIUM SERPL-MCNC: 2.8 MG/DL (ref 1.7–2.3)
MCH RBC QN AUTO: 32.1 PG (ref 26.5–33)
MCHC RBC AUTO-ENTMCNC: 35.2 G/DL (ref 31.5–36.5)
MCV RBC AUTO: 91 FL (ref 78–100)
MONOCYTES # BLD AUTO: 0.5 10E3/UL (ref 0–1.3)
MONOCYTES NFR BLD AUTO: 6 %
MUCOUS THREADS #/AREA URNS LPF: PRESENT /LPF
NEUTROPHILS # BLD AUTO: 6.7 10E3/UL (ref 1.6–8.3)
NEUTROPHILS NFR BLD AUTO: 72 %
NITRATE UR QL: NEGATIVE
NRBC # BLD AUTO: 0 10E3/UL
NRBC BLD AUTO-RTO: 0 /100
PH UR STRIP: 7.5 [PH] (ref 5–9)
PLATELET # BLD AUTO: 312 10E3/UL (ref 150–450)
POTASSIUM SERPL-SCNC: 2.7 MMOL/L (ref 3.4–5.3)
POTASSIUM SERPL-SCNC: 3.1 MMOL/L (ref 3.4–5.3)
RBC # BLD AUTO: 4.17 10E6/UL (ref 3.8–5.2)
RBC URINE: 6 /HPF
SODIUM SERPL-SCNC: 139 MMOL/L (ref 136–145)
SODIUM SERPL-SCNC: 140 MMOL/L (ref 136–145)
SP GR UR STRIP: 1.02 (ref 1–1.03)
SQUAMOUS EPITHELIAL: 12 /HPF
TSH SERPL DL<=0.005 MIU/L-ACNC: 0.94 UIU/ML (ref 0.3–4.2)
UROBILINOGEN UR STRIP-MCNC: NORMAL MG/DL
WBC # BLD AUTO: 9.3 10E3/UL (ref 4–11)
WBC URINE: 3 /HPF

## 2023-08-15 PROCEDURE — 99284 EMERGENCY DEPT VISIT MOD MDM: CPT | Performed by: STUDENT IN AN ORGANIZED HEALTH CARE EDUCATION/TRAINING PROGRAM

## 2023-08-15 PROCEDURE — 85379 FIBRIN DEGRADATION QUANT: CPT | Performed by: STUDENT IN AN ORGANIZED HEALTH CARE EDUCATION/TRAINING PROGRAM

## 2023-08-15 PROCEDURE — 84443 ASSAY THYROID STIM HORMONE: CPT | Performed by: STUDENT IN AN ORGANIZED HEALTH CARE EDUCATION/TRAINING PROGRAM

## 2023-08-15 PROCEDURE — 85004 AUTOMATED DIFF WBC COUNT: CPT | Performed by: STUDENT IN AN ORGANIZED HEALTH CARE EDUCATION/TRAINING PROGRAM

## 2023-08-15 PROCEDURE — 80048 BASIC METABOLIC PNL TOTAL CA: CPT | Performed by: STUDENT IN AN ORGANIZED HEALTH CARE EDUCATION/TRAINING PROGRAM

## 2023-08-15 PROCEDURE — 83735 ASSAY OF MAGNESIUM: CPT | Performed by: STUDENT IN AN ORGANIZED HEALTH CARE EDUCATION/TRAINING PROGRAM

## 2023-08-15 PROCEDURE — 93005 ELECTROCARDIOGRAM TRACING: CPT

## 2023-08-15 PROCEDURE — 96368 THER/DIAG CONCURRENT INF: CPT

## 2023-08-15 PROCEDURE — 83735 ASSAY OF MAGNESIUM: CPT | Mod: 91 | Performed by: FAMILY MEDICINE

## 2023-08-15 PROCEDURE — 96366 THER/PROPH/DIAG IV INF ADDON: CPT

## 2023-08-15 PROCEDURE — 87086 URINE CULTURE/COLONY COUNT: CPT | Performed by: STUDENT IN AN ORGANIZED HEALTH CARE EDUCATION/TRAINING PROGRAM

## 2023-08-15 PROCEDURE — 36415 COLL VENOUS BLD VENIPUNCTURE: CPT | Performed by: STUDENT IN AN ORGANIZED HEALTH CARE EDUCATION/TRAINING PROGRAM

## 2023-08-15 PROCEDURE — 250N000013 HC RX MED GY IP 250 OP 250 PS 637: Performed by: STUDENT IN AN ORGANIZED HEALTH CARE EDUCATION/TRAINING PROGRAM

## 2023-08-15 PROCEDURE — 80048 BASIC METABOLIC PNL TOTAL CA: CPT | Mod: 91 | Performed by: FAMILY MEDICINE

## 2023-08-15 PROCEDURE — 81001 URINALYSIS AUTO W/SCOPE: CPT | Performed by: STUDENT IN AN ORGANIZED HEALTH CARE EDUCATION/TRAINING PROGRAM

## 2023-08-15 PROCEDURE — 36415 COLL VENOUS BLD VENIPUNCTURE: CPT | Performed by: FAMILY MEDICINE

## 2023-08-15 PROCEDURE — 93010 ELECTROCARDIOGRAM REPORT: CPT | Performed by: INTERNAL MEDICINE

## 2023-08-15 PROCEDURE — 99284 EMERGENCY DEPT VISIT MOD MDM: CPT | Mod: 25

## 2023-08-15 PROCEDURE — 250N000011 HC RX IP 250 OP 636: Mod: JZ | Performed by: STUDENT IN AN ORGANIZED HEALTH CARE EDUCATION/TRAINING PROGRAM

## 2023-08-15 PROCEDURE — 96365 THER/PROPH/DIAG IV INF INIT: CPT

## 2023-08-15 RX ORDER — MAGNESIUM OXIDE 400 MG/1
400 TABLET ORAL DAILY
Qty: 30 TABLET | Refills: 0 | Status: SHIPPED | OUTPATIENT
Start: 2023-08-15 | End: 2023-09-14

## 2023-08-15 RX ORDER — POTASSIUM CHLORIDE 7.45 MG/ML
10 INJECTION INTRAVENOUS ONCE
Status: COMPLETED | OUTPATIENT
Start: 2023-08-15 | End: 2023-08-15

## 2023-08-15 RX ORDER — MAGNESIUM SULFATE HEPTAHYDRATE 40 MG/ML
2 INJECTION, SOLUTION INTRAVENOUS ONCE
Status: COMPLETED | OUTPATIENT
Start: 2023-08-15 | End: 2023-08-15

## 2023-08-15 RX ORDER — POTASSIUM CHLORIDE 1500 MG/1
20 TABLET, EXTENDED RELEASE ORAL ONCE
Status: COMPLETED | OUTPATIENT
Start: 2023-08-15 | End: 2023-08-15

## 2023-08-15 RX ORDER — ONDANSETRON 4 MG/1
4 TABLET, ORALLY DISINTEGRATING ORAL ONCE
Status: COMPLETED | OUTPATIENT
Start: 2023-08-15 | End: 2023-08-15

## 2023-08-15 RX ADMIN — POTASSIUM CHLORIDE 20 MEQ: 1500 TABLET, EXTENDED RELEASE ORAL at 18:09

## 2023-08-15 RX ADMIN — MAGNESIUM SULFATE HEPTAHYDRATE 2 G: 40 INJECTION, SOLUTION INTRAVENOUS at 19:01

## 2023-08-15 RX ADMIN — POTASSIUM CHLORIDE 20 MEQ: 1500 TABLET, EXTENDED RELEASE ORAL at 18:59

## 2023-08-15 RX ADMIN — POTASSIUM CHLORIDE 10 MEQ: 7.46 INJECTION, SOLUTION INTRAVENOUS at 18:07

## 2023-08-15 RX ADMIN — ONDANSETRON 4 MG: 4 TABLET, ORALLY DISINTEGRATING ORAL at 16:48

## 2023-08-15 ASSESSMENT — ACTIVITIES OF DAILY LIVING (ADL)
ADLS_ACUITY_SCORE: 35
ADLS_ACUITY_SCORE: 35

## 2023-08-15 NOTE — DISCHARGE INSTRUCTIONS
Your heart rate here was on the high end of normal to very slightly elevated.  A fast normal rhythm heart rate is called sinus tachycardia.     Your potassium and magnesium were low and low potassium in particular can cause issues with your heart rate and/or heart rhythm.    Magnesium was prescribed and you can pick this up and start taking tomorrow.  Continue to take your potassium 3 times daily with meals.  You may need to have this increased at some point, but first lets have you follow-up with your PCP to get these labs rechecked.  Please follow-up with PCP at appointment we set up for you.  If possible try to add 1 serving of a higher potassium and higher magnesium food to your daily diet.     Return to the ER for heart racing sensation, lightheadedness, chest pain or tightness, shortness of breath, or other acute emergent health concern.

## 2023-08-15 NOTE — ED TRIAGE NOTES
Pt arrives VIA [facility] with complaints of fast heart rate and shakiness that started at about 1600 today. Denies any new changes to food or meds recently. Pt does have nausea.   BP (!) 121/107   Pulse 107   Temp (!) 96.6  F (35.9  C) (Tympanic)   Resp 18   Wt 95.3 kg (210 lb)   LMP 11/01/2007   SpO2 96%   BMI 34.95 kg/m        Triage Assessment       Row Name 08/15/23 1619       Triage Assessment (Adult)    Airway WDL WDL       Respiratory WDL    Respiratory WDL WDL       Skin Circulation/Temperature WDL    Skin Circulation/Temperature WDL WDL       Cardiac WDL    Cardiac WDL X;rhythm    Pulse Rate & Regularity tachycardic       Peripheral/Neurovascular WDL    Peripheral Neurovascular WDL WDL       Cognitive/Neuro/Behavioral WDL    Cognitive/Neuro/Behavioral WDL WDL

## 2023-08-15 NOTE — TELEPHONE ENCOUNTER
This is a Refill request from: patient   Quantity requested: 90 day supply  Last fill date: 07/05/2023  Last office visit:  6/28/2023  PCP: [id number]  Associated Dx: hyponatremia       Mila Garcia LPN  8/15/2023  10:26 AM

## 2023-08-15 NOTE — ED PROVIDER NOTES
History     Chief Complaint   Patient presents with    Chest Pain    Tachycardia    Anxiety       Nohelia Crockett is a 43 year old female who presents via EMS for racing heart rate.  Onset approximately half hour prior to presentation, while at rest getting ready to prepare some food.  She notes heart racing along with anxiety, dizziness, shakiness, nausea.  Denies similar prior episode.  Previously well with no recent illnesses.  Denies any new pain, dyspnea, cough, urinary symptoms, diarrhea, rash, throat tightness, fever, unintentional weight change, decreased p.o. intake, vomiting.  No recent medication changes or over-the-counter medication use.  Denies hormone therapy use.  No history of blood clots.  No new exposures to living apartment, foods, animals, topical treatments.  No recent increased stressors or panic attacks today.  Per EMS patient was in sinus tach 130.  She was given 1.5 mg of midazolam and small fluid bolus.    Allergies   Allergen Reactions    Penicillins Anaphylaxis    Aspirin Hives       Patient Active Problem List    Diagnosis Date Noted    Hypercalciuria 10/17/2022     Priority: Medium    History of kidney stones, hypercalciuria on chlorthalidone 10/17/2022     Priority: Medium    Morbid obesity (H) 08/20/2021     Priority: Medium    Suspected sleep apnea 05/08/2019     Priority: Medium    Attention deficit disorder 02/19/2018     Priority: Medium    Knee pain 02/19/2018     Priority: Medium     Overview:   secondary to patellofemoral dysfunction      Learning disability 02/19/2018     Priority: Medium    Migraine headache 02/19/2018     Priority: Medium     Overview:   Occasional severe HA, does not require daily prophylaxis      Obesity (BMI 35.0-39.9 without comorbidity) 12/14/2016     Priority: Medium    Adhesive capsulitis of right shoulder 06/06/2016     Priority: Medium    S/P shoulder surgery 06/06/2016     Priority: Medium    Gastroesophageal reflux disease without  esophagitis 05/26/2016     Priority: Medium    Pain of multiple sites 10/19/2015     Priority: Medium    History of arthroscopy of right knee 07/02/2015     Priority: Medium    Plantar fasciitis 11/14/2014     Priority: Medium    H/O excision of mass 09/03/2014     Priority: Medium    Genital herpes 12/07/2009     Priority: Medium     Overview:   Has had only one outbreak         Past Medical History:   Diagnosis Date    Adhesive capsulitis of right shoulder     Asthma 2/19/2018    Cyst of ovary 2006    Localized swelling, mass, or lump of upper extremity 08/26/2014    Migraine without status migrainosus, not intractable     Other developmental disorders of scholastic skills     Other specified behavioral and emotional disorders with onset usually occurring in childhood and adolescence     Other specified postprocedural states 09/03/2014    Other specified postprocedural states 06/06/2016    Peptic ulcer without hemorrhage or perforation 1999    Personal history of urinary calculi 2004    Plantar fascial fibromatosis 11/14/2014    Uncomplicated asthma        Past Surgical History:   Procedure Laterality Date    APPENDECTOMY OPEN  1999    ARTHROSCOPY SHOULDER      10/05,debridement and subacromial decompression of right shoulder.    ARTHROSCOPY SHOULDER  08/2007    acromioplasty distal clavicle excision, and extensive debridement of the bursa of the right shoulder by Dr. Villa.  Also left?    CHOLECYSTECTOMY  1999    COMBINED CYSTOSCOPY, URETEROSCOPY, LASER HOLMIUM LITHOTRIPSY URETER(S) Left 7/19/2022    Procedure: Left ureteroscopy with laser lithotripsy and stent placement;  Surgeon: Melecio Pantoja MD;  Location: [facility] OR    CYSTOSCOPY  2004    with retrogrades    DILATION AND CURETTAGE  11/2007    Ablation    LAPAROSCOPIC TUBAL LIGATION  2004    LITHOTRIPSY  12/2004    extracorporeal shockwave    OTHER SURGICAL HISTORY  08/13/2012    OTHER SURGICAL HISTORY Right 09/03/2014    Pathology showed epidermal inclusion  cysts    RELEASE CARPAL TUNNEL  2012       Family History   Problem Relation Age of Onset    Cancer Mother 46        Cancer,Lung cancer    Blood Disease Mother         Blood Disease    Asthma Father         Asthma    Diabetes Father         Diabetes,2    Hypertension Father         Hypertension    Heart Disease Father         Heart Disease,CHF    Other - See Comments Father          at age 65 from complications of a motor vehicle accident.    Family History Negative Sister         Good Health    Family History Negative Sister         Good Health    Breast Cancer No family hx of         Cancer-breast    Colon Cancer No family hx of         Cancer-colon    Prostate Cancer No family hx of         Cancer-prostate    Ovarian Cancer No family hx of         Cancer-ovarian    Anesthesia Reaction No family hx of         Anesthesia Problem       Social History     Tobacco Use    Smoking status: Former     Packs/day: 0.50     Years: 16.00     Pack years: 8.00     Types: Cigarettes     Quit date: 2016     Years since quittin.6    Smokeless tobacco: Never   Vaping Use    Vaping Use: Never used   Substance Use Topics    Alcohol use: Yes     Comment: Alcoholic Drinks/day: occasionally    Drug use: No       Medications:    magnesium oxide (MAG-OX) 400 MG tablet  chlorthalidone (HYGROTON) 25 MG tablet  gabapentin (NEURONTIN) 300 MG capsule  ketoconazole (NIZORAL) 2 % external cream  loratadine (CLARITIN) 10 MG tablet  potassium citrate (UROCIT-K) 10 MEQ (1080 MG) CR tablet  Vitamin D, Cholecalciferol, 25 MCG (1000 UT) TABS        Review of Systems: See HPI for pertinent negatives and positives. All other systems reviewed and found to be negative.    Physical Exam   BP (!) 121/94   Pulse 98   Temp (!) 96.6  F (35.9  C) (Tympanic)   Resp 20   Wt 95.3 kg (210 lb)   LMP 2007   SpO2 98%   BMI 34.95 kg/m       General: awake, comfortable  HEENT: atraumatic  Respiratory: normal effort, clear to auscultation  bilaterally  Cardiovascular: Mild tachycardia, regular rhythm, no murmurs, radial pulses symmetric 2+  Abdomen: soft, nondistended, nontender  Extremities: no deformities, edema, or tenderness  Skin: warm, dry, no rashes  Neuro: alert, no focal deficits  Psych: appropriate mood and affect    ED Course      ED Course as of 08/15/23 1834   Tue Aug 15, 2023   1638 EKG: NSR (rate 94), no evidence of acute ischemia with no ST abnormalities, LBBB, I/II/V4-6 TWI.    1750 Inform patient of hypokalemia.  She states she takes potassium 3 times daily currently.  We will replace with IV and p.o.   1827 Heart rate currently in the low 90s.       Results for orders placed or performed during the hospital encounter of 08/15/23 (from the past 24 hour(s))   CBC with platelets differential    Narrative    The following orders were created for panel order CBC with platelets differential.  Procedure                               Abnormality         Status                     ---------                               -----------         ------                     CBC with platelets and d...[[id number]]                      Final result                 Please view results for these tests on the individual orders.   D dimer quantitative   Result Value Ref Range    D-Dimer Quantitative <=0.27 0.00 - 0.50 ug/mL FEU    Narrative    This D-dimer assay is intended for use in conjunction with a clinical pretest probability assessment model to exclude pulmonary embolism (PE) and deep venous thrombosis (DVT) in outpatients suspected of PE or DVT. The cut-off value is 0.50 ug/mL FEU.   Basic metabolic panel   Result Value Ref Range    Sodium 139 136 - 145 mmol/L    Potassium 2.7 (L) 3.4 - 5.3 mmol/L    Chloride 101 98 - 107 mmol/L    Carbon Dioxide (CO2) 25 22 - 29 mmol/L    Anion Gap 13 7 - 15 mmol/L    Urea Nitrogen 14.7 6.0 - 20.0 mg/dL    Creatinine 0.63 0.51 - 0.95 mg/dL    Calcium 9.3 8.6 - 10.0 mg/dL    Glucose 150 (H) 70 - 99 mg/dL    GFR  Estimate >90 >60 mL/min/1.73m2   TSH Reflex [facility]   Result Value Ref Range    TSH 0.94 0.30 - 4.20 uIU/mL   CBC with platelets and differential   Result Value Ref Range    WBC Count 9.3 4.0 - 11.0 10e3/uL    RBC Count 4.17 3.80 - 5.20 10e6/uL    Hemoglobin 13.4 11.7 - 15.7 g/dL    Hematocrit 38.1 35.0 - 47.0 %    MCV 91 78 - 100 fL    MCH 32.1 26.5 - 33.0 pg    MCHC 35.2 31.5 - 36.5 g/dL    RDW 12.8 10.0 - 15.0 %    Platelet Count 312 150 - 450 10e3/uL    % Neutrophils 72 %    % Lymphocytes 21 %    % Monocytes 6 %    % Eosinophils 1 %    % Basophils 0 %    % Immature Granulocytes 0 %    NRBCs per 100 WBC 0 <1 /100    Absolute Neutrophils 6.7 1.6 - 8.3 10e3/uL    Absolute Lymphocytes 1.9 0.8 - 5.3 10e3/uL    Absolute Monocytes 0.5 0.0 - 1.3 10e3/uL    Absolute Eosinophils 0.1 0.0 - 0.7 10e3/uL    Absolute Basophils 0.0 0.0 - 0.2 10e3/uL    Absolute Immature Granulocytes 0.0 <=0.4 10e3/uL    Absolute NRBCs 0.0 10e3/uL   Magnesium   Result Value Ref Range    Magnesium 1.5 (L) 1.7 - 2.3 mg/dL   Extra Tube    Narrative    The following orders were created for panel order Extra Tube.  Procedure                               Abnormality         Status                     ---------                               -----------         ------                     Extra Red Top Tube[[id number]]                               Final result               Extra Green Top (Lithium...[[id number]]                      Final result                 Please view results for these tests on the individual orders.   Extra Red Top Tube   Result Value Ref Range    Hold Specimen x    Extra Green Top (Lithium Heparin) Tube   Result Value Ref Range    Hold Specimen x    UA with Microscopic reflex to Culture    Specimen: Urine, Clean Catch   Result Value Ref Range    Color Urine Yellow Colorless, Straw, Light Yellow, Yellow    Appearance Urine Clear Clear    Glucose Urine Negative Negative mg/dL    Bilirubin Urine Negative Negative    Ketones Urine 10 (A)  Negative mg/dL    Specific Gravity Urine 1.023 1.000 - 1.030    Blood Urine Negative Negative    pH Urine 7.5 5.0 - 9.0    Protein Albumin Urine 20 (A) Negative mg/dL    Urobilinogen Urine Normal Normal, 2.0 mg/dL    Nitrite Urine Negative Negative    Leukocyte Esterase Urine Moderate (A) Negative    Bacteria Urine Few (A) None Seen /HPF    Mucus Urine Present (A) None Seen /LPF    RBC Urine 6 (H) <=2 /HPF    WBC Urine 3 <=5 /HPF    Squamous Epithelials Urine 12 (H) <=1 /HPF    Narrative    Urine Culture ordered based on laboratory criteria       Medications   potassium chloride 10 mEq in 100 mL sterile water infusion (10 mEq Intravenous $New Bag 8/15/23 1807)   magnesium sulfate 2 g in 50 mL sterile water intermittent infusion (has no administration in time range)   potassium chloride ER (KLOR-CON M) CR tablet 20 mEq (has no administration in time range)   ondansetron (ZOFRAN ODT) ODT tab 4 mg (4 mg Oral $Given 8/15/23 1648)   potassium chloride ER (KLOR-CON M) CR tablet 20 mEq (20 mEq Oral $Given 8/15/23 1809)       Assessments & Plan (with Medical Decision Making)     I have reviewed the nursing notes.    43 year old female evaluated for acute onset sinus tachycardia.  EKG with normal sinus rhythm heart rate 94.  On cardiac monitor she does go into the low 100s.  Per EMS she was 130s sinus tachycardia.  Evaluation remarkable for hypokalemia and hypomagnesemia.  Suspect these are the cause for her symptoms/sinus tachycardia.  Giving both p.o. and IV replacements for both of these electrolytes.  Prescribing magnesium which will hopefully help hypokalemia.  Set up PCP appointment on 8/24/2023 for her.  Plan to discharge home once potassium magnesium infusions complete, with below plan and attached instructions on diagnosis provided including ED return precautions.  Case signed out to Dr. Forbes in event further management is needed.    I have reviewed the findings, diagnosis, plan, and need for any follow up with  the patient.    Patient instructions:   Your heart rate here was on the high end of normal to very slightly elevated.  A fast normal rhythm heart rate is called sinus tachycardia.     Your potassium and magnesium were low and low potassium in particular can cause issues with your heart rate and/or heart rhythm.    Magnesium was prescribed and you can pick this up and start taking tomorrow.  Continue to take your potassium 3 times daily with meals.  You may need to have this increased at some point, but first lets have you follow-up with your PCP to get these labs rechecked.  Please follow-up with PCP at appointment we set up for you.  If possible try to add 1 serving of a higher potassium and higher magnesium food to your daily diet.     Return to the ER for heart racing sensation, lightheadedness, chest pain or tightness, shortness of breath, or other acute emergent health concern.    New Prescriptions    MAGNESIUM OXIDE (MAG-OX) 400 MG TABLET    Take 1 tablet (400 mg) by mouth daily for 30 days       Final diagnoses:   Sinus tachycardia   Hypokalemia   Hypomagnesemia       8/15/2023   [facility]     Nelson Daily MD  08/15/23 1834  CONTINUATION OF CARE  Patient feeling better.  Repeat labs are reassuring.  Recommend repeat labs 1 to 2 weeks.  Follow-up with primary.  Return to ED with recurrence of symptoms or worsening symptoms.     Nico Forbes MD  08/15/23 2044

## 2023-08-16 RX ORDER — POTASSIUM CITRATE 10 MEQ/1
10 TABLET, EXTENDED RELEASE ORAL
Qty: 90 TABLET | Refills: 0 | Status: SHIPPED | OUTPATIENT
Start: 2023-08-16 | End: 2023-08-24

## 2023-08-17 LAB
ATRIAL RATE - MUSE: 94 BPM
BACTERIA UR CULT: NORMAL
DIASTOLIC BLOOD PRESSURE - MUSE: NORMAL MMHG
INTERPRETATION ECG - MUSE: NORMAL
P AXIS - MUSE: 47 DEGREES
PR INTERVAL - MUSE: 168 MS
QRS DURATION - MUSE: 88 MS
QT - MUSE: 362 MS
QTC - MUSE: 452 MS
R AXIS - MUSE: 63 DEGREES
SYSTOLIC BLOOD PRESSURE - MUSE: NORMAL MMHG
T AXIS - MUSE: 36 DEGREES
VENTRICULAR RATE- MUSE: 94 BPM

## 2023-08-17 ASSESSMENT — ANXIETY QUESTIONNAIRES
7. FEELING AFRAID AS IF SOMETHING AWFUL MIGHT HAPPEN: NOT AT ALL
1. FEELING NERVOUS, ANXIOUS, OR ON EDGE: NOT AT ALL
5. BEING SO RESTLESS THAT IT IS HARD TO SIT STILL: NOT AT ALL
IF YOU CHECKED OFF ANY PROBLEMS ON THIS QUESTIONNAIRE, HOW DIFFICULT HAVE THESE PROBLEMS MADE IT FOR YOU TO DO YOUR WORK, TAKE CARE OF THINGS AT HOME, OR GET ALONG WITH OTHER PEOPLE: NOT DIFFICULT AT ALL
2. NOT BEING ABLE TO STOP OR CONTROL WORRYING: NOT AT ALL
3. WORRYING TOO MUCH ABOUT DIFFERENT THINGS: NOT AT ALL
GAD7 TOTAL SCORE: 0
4. TROUBLE RELAXING: NOT AT ALL
6. BECOMING EASILY ANNOYED OR IRRITABLE: NOT AT ALL
GAD7 TOTAL SCORE: 0

## 2023-08-17 NOTE — RESULT ENCOUNTER NOTE
Final urine culture report is negative.  Adult Negative Urine culture parameters per protocol: Any # Urogenital single or mixed organism, <10,000 col/ml single organism (cath/midstream), and > 3 organisms (No susceptibilities performed).  [facility] Emergency Dept discharge antibiotic prescribed (If applicable): None  Treatment recommendations per [facility] ED Lab Result Urine Culture protocol.

## 2023-08-22 NOTE — PROGRESS NOTES
Assessment & Plan     1. Cervical disc disorder at C4-C5 level with radiculopathy  2. Paresthesias  Chronic, has worsened over the last year.  Gabapentin has been helpful.  Rx increase to 300mg BID.  Will attempt referral to Dr. Jaxson Gould for evaluation of generalized paresthesias for another opinion.  Had LP and evaluation with Dr. Manriquez ([facility] in 10/2020).  - gabapentin (NEURONTIN) 300 MG capsule; Take 1 capsule (300 mg) by mouth 2 times daily  Dispense: 180 capsule; Refill: 1  - Adult Neurology  Referral; Future    3. Hyponatremia  4. Hypokalemia  5. Hypomagnesemia  Will recheck levels today.  Suspect the need to increase her K-citrate to 20mEq BID if remains low.  - potassium citrate (UROCIT-K) 10 MEQ (1080 MG) CR tablet; Take 2 tablets (20 mEq) by mouth 2 times daily  Dispense: 360 tablet; Refill: 1  - Basic Metabolic Panel; Future  - Basic Metabolic Panel  - Magnesium; Future  - Magnesium    6. Wrist lump, left  Suspect ganglion cyst, spur vs acute inflammation of venous valve or other 2/2 to IV.  If XR normal; monitor with supportive cares (Ace, ice/heat, NSAID) x 1-2 weeks.  May need to evaluate further with MR or Sports Med referral.  - XR Wrist Left G/E 3 Views; Future    MDM:  Review of the result(s) of each unique test - BMP  Ordering of each unique test  Prescription drug management  Review of ER note.    Lyla Hendrix,   [facility]      Anthony Pozo is a 43 year old, presenting for the following health issues:  RECHECK (ED follow-up)        8/24/2023     2:06 PM   Additional Questions   Roomed by ESSENCE Zaragoza   Accompanied by self       History of Present Illness       Reason for visit:  Follow up from er visit  Symptom onset:  1-3 days ago  Symptoms include:  Lightheaded dizzy shaky ect  Symptom intensity:  Moderate  Symptom progression:  Staying the same  Had these symptoms before:  No  What makes it worse:  Low levels  What makes it  "better:  Not sure    She eats 0-1 servings of fruits and vegetables daily.She consumes 4 sweetened beverage(s) daily.She exercises with enough effort to increase her heart rate 10 to 19 minutes per day.  She exercises with enough effort to increase her heart rate 7 days per week.   She is taking medications regularly.       ED/UC Followup:    Facility:  [facility] ER  Date of visit: 8/15/23  Reason for visit: tachycardia, hypokalemia, hypomagnesemia.  Current Status: improved    Has not had any recurrence of tachycardia.  She continues to ruminate on what was the cause and why was her Mg/K levels were low.  Difficult to obtain what associated symptoms or other causes were going on around her.  In broken ability - she relates that she eats \"salads\" and does okay with vegetables.  No fast food.  Cooks a lot of her own food.  Says \"never\" to processed or packaged foods.    Is on chlorthalidone 2/2 to kidney stones and on potassium citrate 10mEq TID since that time.    Lump on L wrist - volar aspect.    Been there for ~1 week.  Started around the time they attempted an IV there.  Ice, heat, massage.  Painful.    Has been unable to make an appointment with Neurology ([facility]) due to unpaid bills from 2014 that she is not aware of.  Would like a referral placed somewhere else.  Had previously seen Dr. Manriquez ([facility]) in 10/2020 for non specific white matter lesions on MRI and dizziness.          Objective    /60   Pulse 88   Temp 98.2  F (36.8  C) (Tympanic)   Resp 16   Ht 1.651 m (5' 5\")   Wt 94.3 kg (208 lb)   LMP 11/01/2007   SpO2 99%   BMI 34.61 kg/m    Body mass index is 34.61 kg/m .  Physical Exam   GENERAL: healthy, alert and no distress  NECK: no adenopathy, no asymmetry, masses, or scars and thyroid normal to palpation  RESP: lungs clear to auscultation - no rales, rhonchi or wheezes  CV: regular rate and rhythm, normal S1 S2, no S3 or S4, no murmur, click or rub, no peripheral edema and " peripheral pulses strong  MS: palpable subcutaneous nodule in R volar wrist just slightly to radial side from midline - considerations for ganglion cyst vs bone spur. TTP  PSYCH: mentation appears normal, affect normal/bright, and speech pressured    No results found for any visits on 08/24/23.

## 2023-08-24 ENCOUNTER — HOSPITAL ENCOUNTER (OUTPATIENT)
Dept: GENERAL RADIOLOGY | Facility: OTHER | Age: 43
Discharge: HOME | End: 2023-08-24
Attending: FAMILY MEDICINE
Payer: COMMERCIAL

## 2023-08-24 ENCOUNTER — OFFICE VISIT (OUTPATIENT)
Dept: FAMILY MEDICINE | Facility: OTHER | Age: 43
End: 2023-08-24
Attending: FAMILY MEDICINE
Payer: COMMERCIAL

## 2023-08-24 VITALS
HEIGHT: 65 IN | SYSTOLIC BLOOD PRESSURE: 110 MMHG | DIASTOLIC BLOOD PRESSURE: 60 MMHG | HEART RATE: 88 BPM | RESPIRATION RATE: 16 BRPM | TEMPERATURE: 98.2 F | BODY MASS INDEX: 34.66 KG/M2 | OXYGEN SATURATION: 99 % | WEIGHT: 208 LBS

## 2023-08-24 DIAGNOSIS — R22.32 WRIST LUMP, LEFT: ICD-10-CM

## 2023-08-24 DIAGNOSIS — M50.121 CERVICAL DISC DISORDER AT C4-C5 LEVEL WITH RADICULOPATHY: ICD-10-CM

## 2023-08-24 DIAGNOSIS — R20.2 PARESTHESIAS: Primary | ICD-10-CM

## 2023-08-24 DIAGNOSIS — E83.42 HYPOMAGNESEMIA: ICD-10-CM

## 2023-08-24 DIAGNOSIS — E87.1 HYPONATREMIA: ICD-10-CM

## 2023-08-24 DIAGNOSIS — E87.6 HYPOKALEMIA: ICD-10-CM

## 2023-08-24 LAB
ANION GAP SERPL CALCULATED.3IONS-SCNC: 11 MMOL/L (ref 7–15)
BUN SERPL-MCNC: 11.5 MG/DL (ref 6–20)
CALCIUM SERPL-MCNC: 9.7 MG/DL (ref 8.6–10)
CHLORIDE SERPL-SCNC: 101 MMOL/L (ref 98–107)
CREAT SERPL-MCNC: 0.65 MG/DL (ref 0.51–0.95)
DEPRECATED HCO3 PLAS-SCNC: 26 MMOL/L (ref 22–29)
GFR SERPL CREATININE-BSD FRML MDRD: >90 ML/MIN/1.73M2
GLUCOSE SERPL-MCNC: 108 MG/DL (ref 70–99)
MAGNESIUM SERPL-MCNC: 1.7 MG/DL (ref 1.7–2.3)
POTASSIUM SERPL-SCNC: 3 MMOL/L (ref 3.4–5.3)
SODIUM SERPL-SCNC: 138 MMOL/L (ref 136–145)

## 2023-08-24 PROCEDURE — 80048 BASIC METABOLIC PNL TOTAL CA: CPT | Mod: ZL | Performed by: FAMILY MEDICINE

## 2023-08-24 PROCEDURE — G0463 HOSPITAL OUTPT CLINIC VISIT: HCPCS

## 2023-08-24 PROCEDURE — 73110 X-RAY EXAM OF WRIST: CPT | Mod: LT

## 2023-08-24 PROCEDURE — 83735 ASSAY OF MAGNESIUM: CPT | Mod: ZL | Performed by: FAMILY MEDICINE

## 2023-08-24 PROCEDURE — 99214 OFFICE O/P EST MOD 30 MIN: CPT | Performed by: FAMILY MEDICINE

## 2023-08-24 PROCEDURE — 36415 COLL VENOUS BLD VENIPUNCTURE: CPT | Mod: ZL | Performed by: FAMILY MEDICINE

## 2023-08-24 RX ORDER — POTASSIUM CITRATE 10 MEQ/1
20 TABLET, EXTENDED RELEASE ORAL 2 TIMES DAILY
Qty: 360 TABLET | Refills: 1 | Status: SHIPPED | OUTPATIENT
Start: 2023-08-24 | End: 2024-03-13

## 2023-08-24 RX ORDER — GABAPENTIN 300 MG/1
300 CAPSULE ORAL 2 TIMES DAILY
Qty: 180 CAPSULE | Refills: 1 | Status: SHIPPED | OUTPATIENT
Start: 2023-08-24 | End: 2023-12-07

## 2023-08-24 ASSESSMENT — PAIN SCALES - GENERAL: PAINLEVEL: MILD PAIN (3)

## 2023-08-24 NOTE — NURSING NOTE
"Chief Complaint   Patient presents with    RECHECK     ED follow-up       Initial /60   Pulse 88   Temp 98.2  F (36.8  C) (Tympanic)   Resp 16   Ht 1.651 m (5' 5\")   Wt 94.3 kg (208 lb)   LMP 11/01/2007   SpO2 99%   BMI 34.61 kg/m   Estimated body mass index is 34.61 kg/m  as calculated from the following:    Height as of this encounter: 1.651 m (5' 5\").    Weight as of this encounter: 94.3 kg (208 lb).  Medication Reconciliation: complete    Mila Garcia LPN    [facility] Directive reviewed.       "

## 2023-10-06 ENCOUNTER — LAB (OUTPATIENT)
Dept: LAB | Facility: OTHER | Age: 43
End: 2023-10-06
Attending: FAMILY MEDICINE
Payer: COMMERCIAL

## 2023-10-06 DIAGNOSIS — E87.6 HYPOKALEMIA: ICD-10-CM

## 2023-10-06 DIAGNOSIS — Z13.220 LIPID SCREENING: ICD-10-CM

## 2023-10-06 DIAGNOSIS — R73.9 ELEVATED BLOOD SUGAR: ICD-10-CM

## 2023-10-06 LAB
ANION GAP SERPL CALCULATED.3IONS-SCNC: 10 MMOL/L (ref 7–15)
BUN SERPL-MCNC: 13.1 MG/DL (ref 6–20)
CALCIUM SERPL-MCNC: 9.3 MG/DL (ref 8.6–10)
CHLORIDE SERPL-SCNC: 100 MMOL/L (ref 98–107)
CHOLEST SERPL-MCNC: 176 MG/DL
CREAT SERPL-MCNC: 0.66 MG/DL (ref 0.51–0.95)
DEPRECATED HCO3 PLAS-SCNC: 29 MMOL/L (ref 22–29)
EGFRCR SERPLBLD CKD-EPI 2021: >90 ML/MIN/1.73M2
GLUCOSE SERPL-MCNC: 93 MG/DL (ref 70–99)
HBA1C MFR BLD: 5.7 % (ref 4–6.2)
HDLC SERPL-MCNC: 63 MG/DL
LDLC SERPL CALC-MCNC: 91 MG/DL
NONHDLC SERPL-MCNC: 113 MG/DL
POTASSIUM SERPL-SCNC: 3.5 MMOL/L (ref 3.4–5.3)
SODIUM SERPL-SCNC: 139 MMOL/L (ref 135–145)
TRIGL SERPL-MCNC: 110 MG/DL

## 2023-10-06 PROCEDURE — 36415 COLL VENOUS BLD VENIPUNCTURE: CPT | Mod: ZL

## 2023-10-06 PROCEDURE — 83036 HEMOGLOBIN GLYCOSYLATED A1C: CPT | Mod: ZL

## 2023-10-06 PROCEDURE — 80048 BASIC METABOLIC PNL TOTAL CA: CPT | Mod: ZL

## 2023-10-06 PROCEDURE — 80061 LIPID PANEL: CPT | Mod: ZL

## 2023-10-12 ENCOUNTER — TELEPHONE (OUTPATIENT)
Dept: FAMILY MEDICINE | Facility: OTHER | Age: 43
End: 2023-10-12
Payer: MEDICAID

## 2023-10-12 NOTE — TELEPHONE ENCOUNTER
Patient is due for 1 year follow up with urology/ Please place order for BMP lab per last urology note. Patient scheduled 11/15/23.    Thanks,    Ruthann Decker on 10/12/2023 at 11:13 AM

## 2023-10-12 NOTE — TELEPHONE ENCOUNTER
A BMP was done on 10/06/23.  Can you cancel her appt, she will not need it since it was already drawn.  Mila Garcia LPN  10/12/2023  12:38 PM

## 2023-10-13 ENCOUNTER — MYC MEDICAL ADVICE (OUTPATIENT)
Dept: FAMILY MEDICINE | Facility: OTHER | Age: 43
End: 2023-10-13
Payer: MEDICAID

## 2023-10-13 DIAGNOSIS — Z87.442 HISTORY OF KIDNEY STONES: ICD-10-CM

## 2023-10-13 DIAGNOSIS — R82.994 HYPERCALCIURIA: ICD-10-CM

## 2023-10-13 NOTE — TELEPHONE ENCOUNTER
Requested Prescriptions   Pending Prescriptions Disp Refills    chlorthalidone (HYGROTON) 25 MG tablet 90 tablet 3     Sig: Take 1 tablet (25 mg) by mouth daily Combine with low salt diet to prevent stones       There is no refill protocol information for this order          Last Prescription Date:   10/17/2022 Kit  Last Fill Qty/Refills:         90, R-3    Last Office Visit:              8/24/2023   Future Office visit:           12/7/2023    Rosalba Ghotra RN on 10/13/2023 at 5:02 PM

## 2023-10-16 ENCOUNTER — MYC MEDICAL ADVICE (OUTPATIENT)
Dept: FAMILY MEDICINE | Facility: OTHER | Age: 43
End: 2023-10-16
Payer: MEDICAID

## 2023-10-16 RX ORDER — CHLORTHALIDONE 25 MG/1
25 TABLET ORAL DAILY
Qty: 90 TABLET | Refills: 3 | Status: SHIPPED | OUTPATIENT
Start: 2023-10-16 | End: 2024-01-26

## 2023-11-09 ENCOUNTER — TELEPHONE (OUTPATIENT)
Dept: FAMILY MEDICINE | Facility: OTHER | Age: 43
End: 2023-11-09
Payer: MEDICAID

## 2023-11-09 DIAGNOSIS — Z87.442 HISTORY OF KIDNEY STONES: Primary | ICD-10-CM

## 2023-11-09 DIAGNOSIS — R82.994 HYPERCALCIURIA: ICD-10-CM

## 2023-11-09 NOTE — TELEPHONE ENCOUNTER
Talked with patient and she states this is for the BMP from the urologist. She did have a BMP drawn on 10/06/23.  Do you still want another BMP?  If so, please place orders.  Mila Garcia LPN  11/9/2023  12:47 PM

## 2023-11-15 ENCOUNTER — LAB (OUTPATIENT)
Dept: LAB | Facility: OTHER | Age: 43
End: 2023-11-15
Attending: UROLOGY
Payer: COMMERCIAL

## 2023-11-15 ENCOUNTER — OFFICE VISIT (OUTPATIENT)
Dept: UROLOGY | Facility: OTHER | Age: 43
End: 2023-11-15
Attending: UROLOGY
Payer: COMMERCIAL

## 2023-11-15 VITALS
BODY MASS INDEX: 34.78 KG/M2 | SYSTOLIC BLOOD PRESSURE: 108 MMHG | DIASTOLIC BLOOD PRESSURE: 80 MMHG | RESPIRATION RATE: 20 BRPM | HEART RATE: 76 BPM | WEIGHT: 209 LBS | OXYGEN SATURATION: 97 %

## 2023-11-15 DIAGNOSIS — R82.994 HYPERCALCIURIA: ICD-10-CM

## 2023-11-15 DIAGNOSIS — Z87.442 HISTORY OF KIDNEY STONES: Primary | ICD-10-CM

## 2023-11-15 DIAGNOSIS — Z87.442 HISTORY OF KIDNEY STONES: ICD-10-CM

## 2023-11-15 LAB
ALBUMIN UR-MCNC: NEGATIVE MG/DL
ANION GAP SERPL CALCULATED.3IONS-SCNC: 11 MMOL/L (ref 7–15)
APPEARANCE UR: CLEAR
BACTERIA #/AREA URNS HPF: ABNORMAL /HPF
BILIRUB UR QL STRIP: NEGATIVE
BUN SERPL-MCNC: 14.8 MG/DL (ref 6–20)
CALCIUM SERPL-MCNC: 9.4 MG/DL (ref 8.6–10)
CHLORIDE SERPL-SCNC: 104 MMOL/L (ref 98–107)
COLOR UR AUTO: ABNORMAL
CREAT SERPL-MCNC: 0.66 MG/DL (ref 0.51–0.95)
DEPRECATED HCO3 PLAS-SCNC: 24 MMOL/L (ref 22–29)
EGFRCR SERPLBLD CKD-EPI 2021: >90 ML/MIN/1.73M2
GLUCOSE SERPL-MCNC: 140 MG/DL (ref 70–99)
GLUCOSE UR STRIP-MCNC: NEGATIVE MG/DL
HGB UR QL STRIP: NEGATIVE
KETONES UR STRIP-MCNC: NEGATIVE MG/DL
LEUKOCYTE ESTERASE UR QL STRIP: NEGATIVE
MUCOUS THREADS #/AREA URNS LPF: PRESENT /LPF
NITRATE UR QL: NEGATIVE
PH UR STRIP: 7.5 [PH] (ref 5–9)
POTASSIUM SERPL-SCNC: 3.5 MMOL/L (ref 3.4–5.3)
RBC URINE: 3 /HPF
SODIUM SERPL-SCNC: 139 MMOL/L (ref 135–145)
SP GR UR STRIP: 1.02 (ref 1–1.03)
SQUAMOUS EPITHELIAL: 4 /HPF
UROBILINOGEN UR STRIP-MCNC: NORMAL MG/DL
WBC URINE: 5 /HPF

## 2023-11-15 PROCEDURE — 81001 URINALYSIS AUTO W/SCOPE: CPT | Mod: ZL | Performed by: UROLOGY

## 2023-11-15 PROCEDURE — 36415 COLL VENOUS BLD VENIPUNCTURE: CPT | Mod: ZL

## 2023-11-15 PROCEDURE — 80048 BASIC METABOLIC PNL TOTAL CA: CPT | Mod: ZL

## 2023-11-15 PROCEDURE — 99213 OFFICE O/P EST LOW 20 MIN: CPT | Performed by: UROLOGY

## 2023-11-15 PROCEDURE — G0463 HOSPITAL OUTPT CLINIC VISIT: HCPCS

## 2023-11-15 ASSESSMENT — PAIN SCALES - GENERAL: PAINLEVEL: NO PAIN (1)

## 2023-11-15 NOTE — PROGRESS NOTES
It was my pleasure to meet Ms. Nohelia Crockett, a 43 year old year old female seen in consultation for chief complaint: Kidney Stone(s) Followup  .  Complex kidney stone disease.    Today she is accompanied by nobody.  Next    HPI: Ms. Nohelia Crockett has PMH significant for complex stone disease with previous left-sided ureteroscopy by Dr. Pantoja most recently in July 2022.    Stone is mixed calcium.  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  She was placed on potassiums citrate and hydrochlorothiazide 25 mg daily.    She follows a low-sodium diet but tends to eat a moderate amount of animal protein.  Fruits and vegetables are not a big part of her overall diet.    Seen in January 2023 with left renal colic.  CT scan at that point failed to demonstrate any nephrolithiasis.    She reports intermittent but mild left flank discomfort.  No gross hematuria.  A UA is pending as of today.  No no nausea or vomiting.    Past Medical History:   Diagnosis Date    Adhesive capsulitis of right shoulder     6/6/2016    Asthma 2/19/2018    Overview:  intermittent    Cyst of ovary 2006    Left    Localized swelling, mass, or lump of upper extremity 08/26/2014    Migraine without status migrainosus, not intractable     Occasional severe HA, does not require daily prophylaxis    Other developmental disorders of scholastic skills     Other specified behavioral and emotional disorders with onset usually occurring in childhood and adolescence     Other specified postprocedural states 09/03/2014    Other specified postprocedural states 06/06/2016    Peptic ulcer without hemorrhage or perforation 1999    Personal history of urinary calculi 2004    Right    Plantar fascial fibromatosis 11/14/2014    Uncomplicated asthma     intermittent       Past Surgical History:   Procedure Laterality Date    APPENDECTOMY OPEN  1999    ARTHROSCOPY SHOULDER      10/05,debridement and subacromial decompression of right shoulder.     ARTHROSCOPY SHOULDER  08/2007    acromioplasty distal clavicle excision, and extensive debridement of the bursa of the right shoulder by Dr. Villa.  Also left?    CHOLECYSTECTOMY  1999    COMBINED CYSTOSCOPY, URETEROSCOPY, LASER HOLMIUM LITHOTRIPSY URETER(S) Left 7/19/2022    Procedure: Left ureteroscopy with laser lithotripsy and stent placement;  Surgeon: Melecio Pantoja MD;  Location: [facility]    CYSTOSCOPY  2004    with retrogrades    DILATION AND CURETTAGE  11/2007    Ablation    LAPAROSCOPIC TUBAL LIGATION  2004    LITHOTRIPSY  12/2004    extracorporeal shockwave    OTHER SURGICAL HISTORY  08/13/2012    OTHER SURGICAL HISTORY Right 09/03/2014    Pathology showed epidermal inclusion cysts    RELEASE CARPAL TUNNEL  2012       FAMILY HISTORY: Denies family history of urologic cancer.  Negative    SOCIAL HISTORY: Currently negative.   reports that she quit smoking about 7 years ago. Her smoking use included cigarettes. She has a 8.00 pack-year smoking history. She has never used smokeless tobacco.    Current Outpatient Medications   Medication Sig Dispense Refill    chlorthalidone (HYGROTON) 25 MG tablet Take 1 tablet (25 mg) by mouth daily Combine with low salt diet to prevent stones 90 tablet 3    gabapentin (NEURONTIN) 300 MG capsule Take 1 capsule (300 mg) by mouth 2 times daily 180 capsule 1    ketoconazole (NIZORAL) 2 % external cream Apply topically 2 times daily 60 g 2    loratadine (CLARITIN) 10 MG tablet Take 10 mg by mouth      potassium citrate (UROCIT-K) 10 MEQ (1080 MG) CR tablet Take 2 tablets (20 mEq) by mouth 2 times daily 360 tablet 1    Vitamin D, Cholecalciferol, 25 MCG (1000 UT) TABS          ALLERGIES: Penicillins and Aspirin      REVIEW OF SYSTEMS:  Skin: negative  Eyes: negative  Ears/Nose/Throat: negative  Respiratory: No shortness of breath, dyspnea on exertion, cough, or hemoptysis  Cardiovascular: negative  Gastrointestinal: negative  Genitourinary: Occasional mild left flank  pain  Musculoskeletal: negative  Neurologic: negative  Psychiatric: negative  Hematologic/Lymphatic/Immunologic: negative  Endocrine: negative      GENERAL PHYSICAL EXAM:   Vitals: /80   Pulse 76   Resp 20   Wt 94.8 kg (209 lb)   SpO2 97%   BMI 34.78 kg/m    Body mass index is 34.78 kg/m .    GENERAL: Well groomed, well developed, well nourished female in NAD.,  Mildly obese  HEAD: Normocephalic.   NECK: Neck supple.   ENT: No jaundice   CV: Warm extremities, no lower extremity edema    RESPIRATORY: Normal respiratory effort.   GI: Soft, NT, ND, no palpable masses.    No CVAT bilaterally.  MS: Moving all four   SKIN: Warm to touch  NEURO: Alert and oriented x 3.  PSYCH: Normal mood and affect, pleasant and agreeable during interview and exam         CT Abdomen And Pelvis Without Contrast   Exam date and time: 1/22/2023 12:04 AM   Age: 42 years old   Clinical indication: Abdominal pain; Flank; Left; Prior surgery; Surgery date:   1-6 months; Surgery type: Kidney stone removal; Additional info: Flank pain h/o   stones      TECHNIQUE:   Imaging protocol: Computed tomography of the abdomen and pelvis without   contrast.   Radiation optimization: All CT scans at this facility use at least one of these   dose optimization techniques: automated exposure control; mA and/or kV   adjustment per patient size (includes targeted exams where dose is matched to   clinical indication); or iterative reconstruction.      COMPARISON:   CT ABDOMEN PELVIS W/O CONTRAST 7/17/2022 2:44 PM      FINDINGS:   Liver: Unremarkable.   Gallbladder and bile ducts: Cholecystectomy.   Pancreas: Unremarkable.   Spleen: Unremarkable.   Adrenal glands: Normal. No mass.   Kidneys and ureters: No hydronephrosis. Stable right renal cyst.   Stomach and bowel: No obstruction. Loading.   Appendix: Normal appendix.      Intraperitoneal space: No free air. No abscess. No ascites.   Vasculature: Unremarkable.   Lymph nodes: No significant adenopathy.    Urinary bladder: Unremarkable as visualized.   Reproductive: Small left adnexal cyst.   Bones/joints: No acute fracture. DJD in the spine.   Soft tissues: Umbilical hernia.                                                                       IMPRESSION:   1. No evidence of obstructive uropathy.   2. Small left adnexal cyst      THIS DOCUMENT HAS BEEN ELECTRONICALLY SIGNED BY NIMESH GARCIA MD    LABS: The last test results for Ms. Nohelia Crockett were reviewed.   BMP -   Recent Labs   Lab Test 11/15/23  0854 10/06/23  1221 08/24/23  1434 08/15/23  2000 08/15/23  1653    139 138 140 139   POTASSIUM 3.5 3.5 3.0* 3.1* 2.7*   CHLORIDE 104 100 101 103 101   CO2 24 29 26 26 25   BUN 14.8 13.1 11.5 12.3 14.7   CR 0.66 0.66 0.65 0.58 0.63   * 93 108* 132* 150*   VERA 9.4 9.3 9.7 9.1 9.3   MAG  --   --  1.7 2.8* 1.5*       CBC -   Recent Labs   Lab Test 08/15/23  1653 01/21/23  2359 07/27/22  1149   WBC 9.3 10.3 9.6   HGB 13.4 13.0 13.5    321 287       ASSESSMENT:   History of nephrolithiasis    Hypercalciuria    PLAN:   Overall the patient is doing well.  She is currently on the correct medications and I would advise that she continue these.  We discussed a low-salt, low animal protein diet with increased fruits and vegetables.  She is to definitely limit her meat intake which will cause hypercalciuria.    Normal calcium intake was recommended.  Drinking enough fluid to void at least 2 to 2-1/2 L a day was recommended.    I am a little concerned about her radiation exposure given her multiple CT scans.  If she were to have colic then I would recommend a renal ultrasound as an alternative to limit her radiation exposure.    Follow-up in 12 months if her UA is clear.  All questions were addressed.        Gus Delgado MD   [facility]

## 2023-11-15 NOTE — NURSING NOTE
"Chief Complaint   Patient presents with    Kidney Stone(s) Followup       Initial /80   Pulse 76   Resp 20   Wt 94.8 kg (209 lb)   SpO2 97%   BMI 34.78 kg/m   Estimated body mass index is 34.78 kg/m  as calculated from the following:    Height as of 8/24/23: 1.651 m (5' 5\").    Weight as of this encounter: 94.8 kg (209 lb).  Medication Reconciliation: complete  Donya Mcpherson RN on 11/15/2023 at 9:45 AM   "

## 2023-11-30 ASSESSMENT — ENCOUNTER SYMPTOMS
WEAKNESS: 0
HEADACHES: 0
EYE PAIN: 0
DIZZINESS: 1
PARESTHESIAS: 0
SHORTNESS OF BREATH: 0
DYSURIA: 0
ABDOMINAL PAIN: 0
DIARRHEA: 0
COUGH: 0
CHILLS: 0
NERVOUS/ANXIOUS: 0
FEVER: 0
ARTHRALGIAS: 1
NAUSEA: 0
JOINT SWELLING: 1
BREAST MASS: 0
FREQUENCY: 0
CONSTIPATION: 0
SORE THROAT: 0
HEMATOCHEZIA: 0
HEMATURIA: 0
MYALGIAS: 1
HEARTBURN: 0
PALPITATIONS: 0

## 2023-12-01 NOTE — COMMUNITY RESOURCES LIST (ENGLISH)
12/01/2023   [facility]  N/A  For questions about this resource list or additional care needs, please contact your primary care clinic or care manager.  Phone: [phone]   Email: N/A   Address: [address] [place] [address]   Hours: N/A        Housing       Shelter for families  1  [facility] Distance: 0.45 miles      In-Person   [address] [address]  Language: English  Hours: Mon - Sun Open 24 Hours  Fees: Free   Phone: [phone] Email: info@[employer] Website: https://www.[employer]/     Shelter for individuals  2  [facility] Distance: 0.45 miles      In-Person   [address] [address]  Language: English  Hours: Mon - Sun Open 24 Hours  Fees: Free   Phone: [phone] Email: info@[employer] Website: https://www.[employer].org/          Important Numbers & Websites       Emergency Services   911  [facility]   311  Poison Control   (145) 577-6925  Suicide Prevention Lifeline   (272) 991-8165 (TALK)  Child Abuse Hotline   (850) 229-6343 (4-A-Child)  Sexual Assault Hotline   (739) 311-9959 (HOPE)  National Runaway Safeline   (367) 728-4504 (RUNAWAY)  All-Options Talkline   (248) 227-2608  Substance Abuse Referral   (793) 145-5289 (HELP)

## 2023-12-06 ASSESSMENT — ENCOUNTER SYMPTOMS
DIZZINESS: 1
HEMATOCHEZIA: 0
NERVOUS/ANXIOUS: 0
EYE PAIN: 0
SORE THROAT: 0
ARTHRALGIAS: 1
CHILLS: 0
FEVER: 0
NAUSEA: 0
BREAST MASS: 0
COUGH: 0
FREQUENCY: 0
DYSURIA: 0
HEMATURIA: 0
PARESTHESIAS: 0
HEARTBURN: 0
CONSTIPATION: 0
JOINT SWELLING: 1
ABDOMINAL PAIN: 0
HEADACHES: 0
SHORTNESS OF BREATH: 0
MYALGIAS: 1
DIARRHEA: 0
PALPITATIONS: 0
WEAKNESS: 0

## 2023-12-06 NOTE — PROGRESS NOTES
SUBJECTIVE:   Nohelia is a 43 year old, presenting for the following:  Physical        2023     1:09 PM   Additional Questions   Roomed by ESSENCE Zaragoza   Accompanied by self       Healthy Habits:     Getting at least 3 servings of Calcium per day:  Yes    Bi-annual eye exam:  Yes    Dental care twice a year:  Yes    Sleep apnea or symptoms of sleep apnea:  None    Diet:  Low salt    Frequency of exercise:  4-5 days/week    Duration of exercise:  15-30 minutes    Taking medications regularly:  Yes    Medication side effects:  Other    Additional concerns today:  Yes      Joint Pain: R hip, outside aspect of thigh.        Have you ever done Advance Care Planning? (For example, a Health Directive, POLST, or a discussion with a medical provider or your loved ones about your wishes): None    Social History     Tobacco Use    Smoking status: Former     Packs/day: 0.50     Years: 16.00     Additional pack years: 0.00     Total pack years: 8.00     Types: Cigarettes     Quit date: 2016     Years since quittin.9    Smokeless tobacco: Never   Substance Use Topics    Alcohol use: Not Currently     Alcohol/week: 0.0 - 1.0 standard drinks of alcohol     Comment: Alcoholic Drinks/day: occasionally             2023     6:23 PM   Alcohol Use   Prescreen: >3 drinks/day or >7 drinks/week? Not Applicable     Reviewed orders with patient.  Reviewed health maintenance and updated orders accordingly - Yes  BP Readings from Last 3 Encounters:   23 110/74   11/15/23 108/80   23 110/60    Wt Readings from Last 3 Encounters:   23 96.2 kg (212 lb)   11/15/23 94.8 kg (209 lb)   23 94.3 kg (208 lb)                  Patient Active Problem List   Diagnosis    Adhesive capsulitis of right shoulder    Attention deficit disorder    Gastroesophageal reflux disease without esophagitis    Genital herpes    H/O excision of mass    History of arthroscopy of right knee    Knee pain    Learning disability     Migraine headache    Obesity (BMI 35.0-39.9 without comorbidity)    Pain of multiple sites    Plantar fasciitis    S/P shoulder surgery    Suspected sleep apnea    Morbid obesity (H)    Hypercalciuria    History of kidney stones, hypercalciuria on chlorthalidone     Past Surgical History:   Procedure Laterality Date    APPENDECTOMY OPEN      ARTHROSCOPY SHOULDER      10/05,debridement and subacromial decompression of right shoulder.    ARTHROSCOPY SHOULDER  2007    acromioplasty distal clavicle excision, and extensive debridement of the bursa of the right shoulder by Dr. Villa.  Also left?    CHOLECYSTECTOMY      COMBINED CYSTOSCOPY, URETEROSCOPY, LASER HOLMIUM LITHOTRIPSY URETER(S) Left 2022    Procedure: Left ureteroscopy with laser lithotripsy and stent placement;  Surgeon: Melecio Pantoja MD;  Location: [facility] OR    CYSTOSCOPY      with retrogrades    DILATION AND CURETTAGE  2007    Ablation    LAPAROSCOPIC TUBAL LIGATION      LITHOTRIPSY  2004    extracorporeal shockwave    OTHER SURGICAL HISTORY  2012    OTHER SURGICAL HISTORY Right 2014    Pathology showed epidermal inclusion cysts    RELEASE CARPAL TUNNEL         Social History     Tobacco Use    Smoking status: Former     Packs/day: 0.50     Years: 16.00     Additional pack years: 0.00     Total pack years: 8.00     Types: Cigarettes     Quit date: 2016     Years since quittin.9    Smokeless tobacco: Never   Substance Use Topics    Alcohol use: Not Currently     Alcohol/week: 0.0 - 1.0 standard drinks of alcohol     Comment: Alcoholic Drinks/day: occasionally     Family History   Problem Relation Age of Onset    Cancer Mother 46        Cancer,Lung cancer    Blood Disease Mother         Blood Disease    Asthma Father         Asthma    Diabetes Father         Diabetes,2    Hypertension Father         Hypertension    Heart Disease Father         Heart Disease,CHF    Other - See Comments Father          at  age 65 from complications of a motor vehicle accident.    Family History Negative Sister         Good Health    Family History Negative Sister         Good Health    Breast Cancer No family hx of         Cancer-breast    Colon Cancer No family hx of         Cancer-colon    Prostate Cancer No family hx of         Cancer-prostate    Ovarian Cancer No family hx of         Cancer-ovarian    Anesthesia Reaction No family hx of         Anesthesia Problem         Current Outpatient Medications   Medication Sig Dispense Refill    chlorthalidone (HYGROTON) 25 MG tablet Take 1 tablet (25 mg) by mouth daily Combine with low salt diet to prevent stones 90 tablet 3    diclofenac (VOLTAREN) 1 % topical gel Apply 4 g topically every 6 hours as needed for moderate pain 350 g 5    gabapentin (NEURONTIN) 100 MG capsule Take 1 capsule (100 mg) by mouth 2 times daily as needed for neuropathic pain 180 capsule 4    gabapentin (NEURONTIN) 300 MG capsule Take 1 capsule (300 mg) by mouth at bedtime 90 capsule 4    ketoconazole (NIZORAL) 2 % external cream Apply topically 2 times daily 60 g 2    loratadine (CLARITIN) 10 MG tablet Take 10 mg by mouth      potassium citrate (UROCIT-K) 10 MEQ (1080 MG) CR tablet Take 2 tablets (20 mEq) by mouth 2 times daily 360 tablet 1    Vitamin D, Cholecalciferol, 25 MCG (1000 UT) TABS        Allergies   Allergen Reactions    Penicillins Anaphylaxis    Aspirin Hives       Breast Cancer Screenin/30/2023     6:25 PM   Breast CA Risk Assessment (FHS-7)   Do you have a family history of breast, colon, or ovarian cancer? No / Unknown       click delete button to remove this line now  Mammogram Screening: Recommended annual mammography  Pertinent mammograms are reviewed under the imaging tab.    History of abnormal Pap smear: NO - age 30-65 PAP every 5 years with negative HPV co-testing recommended      Latest Ref Rng & Units 3/2/2022     4:42 PM   PAP / HPV   PAP  Negative for Intraepithelial Lesion  or Malignancy (NILM)    HPV 16 DNA Negative Negative    HPV 18 DNA Negative Negative    Other HR HPV Negative Negative      Pap: 3/2/22, neg co-testing.  Repeat 5 years.  Mammo: 7/14/23, birads1.   Colon: DUE  Dexa: NA/age  Lung: NA  Tdap 5/30/13, DUE; flu declines; Shingrix @ 50; PNA @ 65.  Consider covid booster - declines further.  RSV @ 60.    Reviewed and updated as needed this visit by clinical staff   Tobacco  Allergies  Meds  Problems  Med Hx  Surg Hx  Fam Hx  Soc   Hx    Reviewed and updated as needed this visit by Provider   Tobacco  Allergies  Meds  Problems  Med Hx  Surg Hx  Fam Hx         Past Medical History:   Diagnosis Date    Adhesive capsulitis of right shoulder     6/6/2016    Asthma 2/19/2018    Overview:  intermittent    Cyst of ovary 2006    Left    Localized swelling, mass, or lump of upper extremity 08/26/2014    Migraine without status migrainosus, not intractable     Occasional severe HA, does not require daily prophylaxis    Other developmental disorders of scholastic skills     Other specified behavioral and emotional disorders with onset usually occurring in childhood and adolescence     Other specified postprocedural states 09/03/2014    Other specified postprocedural states 06/06/2016    Peptic ulcer without hemorrhage or perforation 1999    Personal history of urinary calculi 2004    Right    Plantar fascial fibromatosis 11/14/2014    Uncomplicated asthma     intermittent      Past Surgical History:   Procedure Laterality Date    APPENDECTOMY OPEN  1999    ARTHROSCOPY SHOULDER      10/05,debridement and subacromial decompression of right shoulder.    ARTHROSCOPY SHOULDER  08/2007    acromioplasty distal clavicle excision, and extensive debridement of the bursa of the right shoulder by Dr. Villa.  Also left?    CHOLECYSTECTOMY  1999    COMBINED CYSTOSCOPY, URETEROSCOPY, LASER HOLMIUM LITHOTRIPSY URETER(S) Left 7/19/2022    Procedure: Left ureteroscopy with laser  "lithotripsy and stent placement;  Surgeon: Melecio Pantoja MD;  Location: [facility] OR    CYSTOSCOPY      with retrogrades    DILATION AND CURETTAGE  2007    Ablation    LAPAROSCOPIC TUBAL LIGATION      LITHOTRIPSY  2004    extracorporeal shockwave    OTHER SURGICAL HISTORY  2012    OTHER SURGICAL HISTORY Right 2014    Pathology showed epidermal inclusion cysts    RELEASE CARPAL TUNNEL       OB History    Para Term  AB Living   3 2 2 0 1 2   SAB IAB Ectopic Multiple Live Births   0 0 0 0 0       Review of Systems   Constitutional:  Negative for chills and fever.   HENT:  Negative for congestion, ear pain, hearing loss and sore throat.    Eyes:  Negative for pain and visual disturbance.   Respiratory:  Negative for cough and shortness of breath.    Cardiovascular:  Negative for chest pain, palpitations and peripheral edema.   Gastrointestinal:  Negative for abdominal pain, constipation, diarrhea, heartburn, hematochezia and nausea.   Breasts:  Negative for tenderness, breast mass and discharge.   Genitourinary:  Negative for dysuria, frequency, genital sores, hematuria, pelvic pain, urgency, vaginal bleeding and vaginal discharge.   Musculoskeletal:  Positive for arthralgias, joint swelling and myalgias.   Skin:  Negative for rash.   Neurological:  Positive for dizziness. Negative for weakness, headaches and paresthesias.   Psychiatric/Behavioral:  Negative for mood changes. The patient is not nervous/anxious.      OBJECTIVE:   /74   Pulse 99   Temp 97.6  F (36.4  C) (Tympanic)   Resp 16   Ht 1.651 m (5' 5\")   Wt 96.2 kg (212 lb)   LMP 10/31/2023 (Approximate)   SpO2 95%   BMI 35.28 kg/m    Physical Exam  GENERAL: healthy, alert and no distress  EYES: Eyes grossly normal to inspection, PERRL and conjunctivae and sclerae normal  HENT: ear canals and TM's normal, nose and mouth without ulcers or lesions  NECK: no adenopathy, no asymmetry, masses, or scars and thyroid " normal to palpation  RESP: lungs clear to auscultation - no rales, rhonchi or wheezes  CV: regular rate and rhythm, normal S1 S2, no S3 or S4, no murmur, click or rub, no peripheral edema and peripheral pulses strong  ABDOMEN: soft, nontender, no hepatosplenomegaly, no masses and bowel sounds normal  MS: tenderness to palpation over R GT bursa  SKIN: no suspicious lesions or rashes  PSYCH: mentation appears normal, affect normal/bright, and speech pressured    Diagnostic Test Results:  Results for orders placed or performed in visit on 12/07/23 (from the past 24 hour(s))   GC/Chlamydia by PCR    Specimen: Urine, Voided   Result Value Ref Range    Chlamydia Trachomatis Negative Negative    Neisseria gonorrhoeae Negative Negative    Narrative    Assay performed using [employer] real-time, reverse-transcriptase PCR.       ASSESSMENT/PLAN:     1. Routine history and physical examination of adult  - TDAP 7+ (ADACEL,BOOSTRIX)  - COLOGUARD([employer]); Future  - HIV Antigen Antibody Combo; Future  - Hepatitis C Screen Reflex to HCV RNA Quant and Genotype; Future  - GC/Chlamydia by PCR  - HIV Antigen Antibody Combo  - Hepatitis C Screen Reflex to HCV RNA Quant and Genotype    2. Need for diphtheria-tetanus-pertussis (Tdap) vaccine  Updated  - TDAP 7+ (ADACEL,BOOSTRIX)    3. Colon cancer screening  Will order cologuard for completion.  - COLOGUARD([employer]); Future    4. Screening for HIV without presence of risk factors  Obtained blood work today for screening.  - HIV Antigen Antibody Combo; Future  - HIV Antigen Antibody Combo    5. Encounter for hepatitis C screening test for low risk patient  Obtained blood work today for screening.  - Hepatitis C Screen Reflex to HCV RNA Quant and Genotype; Future  - Hepatitis C Screen Reflex to HCV RNA Quant and Genotype    6. Cervical disc disorder at C4-C5 level with radiculopathy  Chronic, with sedation throughout the day on higher gabapentin; therefore will Rx 100mg capsules  to use up to BID throughout the day; and 300mg capsules at bedtime.  - gabapentin (NEURONTIN) 300 MG capsule; Take 1 capsule (300 mg) by mouth at bedtime  Dispense: 90 capsule; Refill: 4  - gabapentin (NEURONTIN) 100 MG capsule; Take 1 capsule (100 mg) by mouth 2 times daily as needed for neuropathic pain  Dispense: 180 capsule; Refill: 4    7. Greater trochanteric bursitis of right hip  Chronic, worsening.  Declined steroid injection to the area today.  Will Rx voltaren gel for NSAID trial x 7 days.  Continue stretching.  - diclofenac (VOLTAREN) 1 % topical gel; Apply 4 g topically every 6 hours as needed for moderate pain  Dispense: 350 g; Refill: 5    8. Encounter for screening examination for chlamydial infection  Will obtain with screening labs today; had other labs recently.  - GC/Chlamydia by PCR      Patient has been advised of split billing requirements and indicates understanding: Yes    COUNSELING:  Reviewed preventive health counseling, as reflected in patient instructions    She reports that she quit smoking about 7 years ago. Her smoking use included cigarettes. She has a 8.00 pack-year smoking history. She has never used smokeless tobacco.          Lyla Hendrix, [facility]   MD Elyse HAAS

## 2023-12-07 ENCOUNTER — OFFICE VISIT (OUTPATIENT)
Dept: FAMILY MEDICINE | Facility: OTHER | Age: 43
End: 2023-12-07
Attending: FAMILY MEDICINE
Payer: COMMERCIAL

## 2023-12-07 ENCOUNTER — LAB (OUTPATIENT)
Dept: FAMILY MEDICINE | Facility: OTHER | Age: 43
End: 2023-12-07

## 2023-12-07 VITALS
HEART RATE: 99 BPM | RESPIRATION RATE: 16 BRPM | BODY MASS INDEX: 35.32 KG/M2 | WEIGHT: 212 LBS | TEMPERATURE: 97.6 F | DIASTOLIC BLOOD PRESSURE: 74 MMHG | HEIGHT: 65 IN | SYSTOLIC BLOOD PRESSURE: 110 MMHG | OXYGEN SATURATION: 95 %

## 2023-12-07 DIAGNOSIS — Z23 NEED FOR DIPHTHERIA-TETANUS-PERTUSSIS (TDAP) VACCINE: ICD-10-CM

## 2023-12-07 DIAGNOSIS — Z12.11 COLON CANCER SCREENING: ICD-10-CM

## 2023-12-07 DIAGNOSIS — Z11.4 SCREENING FOR HIV WITHOUT PRESENCE OF RISK FACTORS: ICD-10-CM

## 2023-12-07 DIAGNOSIS — Z00.00 ROUTINE HISTORY AND PHYSICAL EXAMINATION OF ADULT: ICD-10-CM

## 2023-12-07 DIAGNOSIS — Z00.00 ROUTINE HISTORY AND PHYSICAL EXAMINATION OF ADULT: Primary | ICD-10-CM

## 2023-12-07 DIAGNOSIS — M50.121 CERVICAL DISC DISORDER AT C4-C5 LEVEL WITH RADICULOPATHY: ICD-10-CM

## 2023-12-07 DIAGNOSIS — Z11.8 ENCOUNTER FOR SCREENING EXAMINATION FOR CHLAMYDIAL INFECTION: ICD-10-CM

## 2023-12-07 DIAGNOSIS — Z11.59 ENCOUNTER FOR HEPATITIS C SCREENING TEST FOR LOW RISK PATIENT: ICD-10-CM

## 2023-12-07 DIAGNOSIS — M70.61 GREATER TROCHANTERIC BURSITIS OF RIGHT HIP: ICD-10-CM

## 2023-12-07 LAB
C TRACH DNA SPEC QL PROBE+SIG AMP: NEGATIVE
N GONORRHOEA DNA SPEC QL NAA+PROBE: NEGATIVE

## 2023-12-07 PROCEDURE — 90471 IMMUNIZATION ADMIN: CPT

## 2023-12-07 PROCEDURE — 36415 COLL VENOUS BLD VENIPUNCTURE: CPT | Mod: ZL | Performed by: FAMILY MEDICINE

## 2023-12-07 PROCEDURE — 86803 HEPATITIS C AB TEST: CPT | Mod: ZL | Performed by: FAMILY MEDICINE

## 2023-12-07 PROCEDURE — 87591 N.GONORRHOEAE DNA AMP PROB: CPT | Mod: ZL | Performed by: FAMILY MEDICINE

## 2023-12-07 PROCEDURE — 87491 CHLMYD TRACH DNA AMP PROBE: CPT | Mod: ZL | Performed by: FAMILY MEDICINE

## 2023-12-07 PROCEDURE — 99396 PREV VISIT EST AGE 40-64: CPT | Performed by: FAMILY MEDICINE

## 2023-12-07 PROCEDURE — 87389 HIV-1 AG W/HIV-1&-2 AB AG IA: CPT | Mod: ZL | Performed by: FAMILY MEDICINE

## 2023-12-07 PROCEDURE — 90715 TDAP VACCINE 7 YRS/> IM: CPT

## 2023-12-07 RX ORDER — GABAPENTIN 100 MG/1
100 CAPSULE ORAL 2 TIMES DAILY PRN
Qty: 180 CAPSULE | Refills: 4 | Status: SHIPPED | OUTPATIENT
Start: 2023-12-07

## 2023-12-07 RX ORDER — GABAPENTIN 300 MG/1
300 CAPSULE ORAL AT BEDTIME
Qty: 90 CAPSULE | Refills: 4 | Status: SHIPPED | OUTPATIENT
Start: 2023-12-07

## 2023-12-07 ASSESSMENT — PAIN SCALES - GENERAL: PAINLEVEL: MILD PAIN (3)

## 2023-12-07 NOTE — COMMUNITY RESOURCES LIST (ENGLISH)
12/07/2023   [facility]  N/A  For questions about this resource list or additional care needs, please contact your primary care clinic or care manager.  Phone: [phone]   Email: N/A   Address: [address] [place] [address]   Hours: N/A        Housing       Shelter for families  1  [facility] Distance: 0.45 miles      In-Person   [address] [address]  Language: English  Hours: Mon - Sun Open 24 Hours  Fees: Free   Phone: (645) [phone] Email: info@[employer] Website: https://www.[employer]/     Shelter for individuals  2  [facility] Distance: 0.45 miles      In-Person   [address] [address]  Language: English  Hours: Mon - Sun Open 24 Hours  Fees: Free   Phone: (987) 513-2666 Email: info@[employer] Website: https://www.[employer].org/          Important Numbers & Websites       Emergency Services   911  [facility]   311  Poison Control   (944) 622-6616  Suicide Prevention Lifeline   (180) 300-4662 (TALK)  Child Abuse Hotline   (352) 867-2171 (4-A-Child)  Sexual Assault Hotline   (688) 752-8848 (HOPE)  National Runaway Safeline   (122) 422-7971 (RUNAWAY)  All-Options Talkline   (354) 572-3516  Substance Abuse Referral   (201) 456-1411 (HELP)

## 2023-12-07 NOTE — NURSING NOTE
"Chief Complaint   Patient presents with    Physical       Initial /74   Pulse 99   Temp 97.6  F (36.4  C) (Tympanic)   Resp 16   Ht 1.651 m (5' 5\")   Wt 96.2 kg (212 lb)   LMP 10/31/2023 (Approximate)   SpO2 95%   BMI 35.28 kg/m   Estimated body mass index is 35.28 kg/m  as calculated from the following:    Height as of this encounter: 1.651 m (5' 5\").    Weight as of this encounter: 96.2 kg (212 lb).  Medication Review: complete    The next two questions are to help us understand your food security.  If you are feeling you need any assistance in this area, we have resources available to support you today.          11/30/2023   SDOH- Food Insecurity   Within the past 12 months, did you worry that your food would run out before you got money to buy more? N   Within the past 12 months, did the food you bought just not last and you didn t have money to get more? N         Health Care Directive:  Patient does not have a Health Care Directive or Living Will: Discussed advance care planning with patient; information given to patient to review.    Mila Garcia LPN      "

## 2023-12-07 NOTE — COMMUNITY RESOURCES LIST (ENGLISH)
12/07/2023   [facility] - Outpatient Clinics  N/A  For additional resource needs, please contact your health insurance member services or your primary care team.  Phone: [phone]   Email: N/A   Address: [address] [place] [id number]   Hours: N/A        Housing       Shelter for families  1  [facility] Distance: 0.45 miles      In-Person   [address] [address]  Language: English  Hours: Mon - Sun Open 24 Hours  Fees: Free   Phone: (987) 576-4163 Email: info@[employer] Website: https://www.Moni/     Shelter for individuals  2  [facility] Distance: 0.45 miles      In-Person   [address] [address]  Language: English  Hours: Mon - Sun Open 24 Hours  Fees: Free   Phone: (219) 297-8134 Email: info@[employer] Website: https://www.Moni/          Important Numbers & Websites       [address].org  Poison Control   (877) 667-1556 [place]  Suicide and Crisis Lifeline   988 [facility]  Childhelp National Child Abuse Hotline   266.537.4775 Childhelphotline.org  National Sexual Assault Hotline   (278) 142-4523 (HOPE) Rainn.org  National Runaway Safeline   (983) 519-9156 (RUNAWAY) [address] [place], [place] [address]way.org  Pregnancy & Postpartum Support [place]   Call/text 901-222-4513 Ppsupport[place].org  Substance Abuse National Helpline ([facility]   724-440-HELP (2891) Findtreatment.gov  Emergency Services   911

## 2023-12-07 NOTE — PATIENT INSTRUCTIONS
Cologuard Patient Instructions    You received an order for a Cologuard test. You will receive your kit in the mail. Please follow the instructions that are provided in the kit.      Reminder: Ship Cologuard test the same day or the next day to allow enough delivery time. The lab must receive your specimen within 4 days for successful testing. If not delivered in time, you may have to complete the process again.    Home Pick-up:  Once you complete the kit, call [facility] at [phone] and they will schedule a UPS pickup for you.    OR    Drop Off Locations:  Once you have completed the collection and have it ready to be shipped, bring it to one of the following sites listed below. *Note: none of the sites ship out later than mid-morning. Please do not drop off Fridays, as they will not go out until Monday which will make your specimen inacceptable.     - [facility] ([address], [place] [address])      Drop off: Monday-Thursday, 8:00am-4:30pm at the [address] check-in desk      - [facility] ([address], [place] [address])   Drop off: Monday-Thursday, 8:00am-5:45pm     - [facility] ([address], [place] [address])     Drop off: Monday-Thursday, 3:00pm-5:30pm

## 2023-12-08 LAB
HCV AB SERPL QL IA: NONREACTIVE
HIV 1+2 AB+HIV1 P24 AG SERPL QL IA: NONREACTIVE

## 2023-12-30 LAB — NONINV COLON CA DNA+OCC BLD SCRN STL QL: NEGATIVE

## 2024-01-04 ENCOUNTER — MYC MEDICAL ADVICE (OUTPATIENT)
Dept: FAMILY MEDICINE | Facility: OTHER | Age: 44
End: 2024-01-04
Payer: MEDICAID

## 2024-01-04 DIAGNOSIS — L02.211 CUTANEOUS ABSCESS OF ABDOMINAL WALL: ICD-10-CM

## 2024-01-04 RX ORDER — CLINDAMYCIN HCL 300 MG
300 CAPSULE ORAL 3 TIMES DAILY
Qty: 21 CAPSULE | Refills: 0 | Status: SHIPPED | OUTPATIENT
Start: 2024-01-04 | End: 2024-01-23

## 2024-01-04 NOTE — TELEPHONE ENCOUNTER
Rx for antibiotic sent in.    Voltaren gel is sold OTC as well; can buy a tube to see if it is effective.  Try a different pharmacy?      Lyla Hendrix, DO

## 2024-01-18 ENCOUNTER — MYC MEDICAL ADVICE (OUTPATIENT)
Dept: FAMILY MEDICINE | Facility: OTHER | Age: 44
End: 2024-01-18
Payer: MEDICAID

## 2024-01-23 NOTE — PROGRESS NOTES
"ADDENDUM:  Labs returned with low potassium; will plan to have her cut her hydrochlorothiazide in half and take 1/2 tablet daily.  Continue with low salt diet (prevention of nephrolithiasis).  Recheck in 2 weeks.  Lyla Hendrix, DO on 1/26/2024 at 5:30 AM       Assessment & Plan     1. Vertigo  Chronic, worsening.   Labs today.  Plan to repeat MR brain as there may be further progression of her white matter disease.  If noted, will plan to refer to Neurology again for second opinion.  Otherwise - would recommend PT, and slow movements, increased water intake, regular exercise, consideration of compression stockings to help with return flow to heart from LEs.  - MR Brain w/o & w Contrast; Future  - CBC and Differential; Future  - Comprehensive Metabolic Panel; Future  - CRP inflammation; Future  - Iron; Future  - CBC and Differential  - Comprehensive Metabolic Panel  - CRP inflammation  - Iron    2. Sebaceous cyst  Chronic, recurrent throughout abdomen.  Aware she would have to see General Surgery for definitive removal of cyst if recurrently bothersome.  Non smoker (former smoker).  Use of anti-bacterial body wash.  Could consider bleach baths.    MDM:  Review of the result(s) of each unique test - see MyChart result notes  Ordering of each unique test      BMI  Estimated body mass index is 35.61 kg/m  as calculated from the following:    Height as of this encounter: 1.651 m (5' 5\").    Weight as of this encounter: 97.1 kg (214 lb).   Weight management plan: Discussed healthy diet and exercise guidelines      Follow up: pending results of testing as ordered      Subjective   Nohelia is a 43 year old, presenting for the following health issues:  Dizziness        1/24/2024    12:36 PM   Additional Questions   Roomed by ESSENCE Zaragoza   Accompanied by daughter     History of Present Illness       Reason for visit:  Vertigo spells and tiredness    She eats 2-3 servings of fruits and vegetables daily.She consumes 4 " "sweetened beverage(s) daily.She exercises with enough effort to increase her heart rate 20 to 29 minutes per day.  She exercises with enough effort to increase her heart rate 3 or less days per week.   She is taking medications regularly.     VERTIGO:  Has had chronically, but seems to be worsening lately.  Describing dizziness/room spinning when she changes positions quickly.  Comes on instantly and lasts ~5-15 seconds.  Occurring 5-15 times a day.  No hearing changes.  Has had MR brain in 2021 with abnormal burden of white matter lesions.  Has been evaluated with Neurology.  No imaging since.  Denies headaches.  Has completed PT previously, uncertain if that helped at all.  Had recent increase in gabapentin dose - which may be contributing.    TIRED:   Chronically.  Has never had a sleep study.  Snoring: yes, loudly (reported by daughter who is present with her today).  Stop: 4          Objective    /82   Pulse 92   Temp 98.3  F (36.8  C) (Tympanic)   Resp 16   Ht 1.651 m (5' 5\")   Wt 97.1 kg (214 lb)   LMP 10/31/2023 (Approximate)   SpO2 98%   BMI 35.61 kg/m    Body mass index is 35.61 kg/m .  Physical Exam   GENERAL: alert and no distress  EYES: Eyes grossly normal to inspection, PERRL and conjunctivae and sclerae normal  HENT: ear canals and TM's normal, nose and mouth without ulcers or lesions  NECK: no adenopathy, no asymmetry, masses, or scars  RESP: lungs clear to auscultation - no rales, rhonchi or wheezes  CV: regular rate and rhythm, normal S1 S2, no S3 or S4, no murmur, click or rub, no peripheral edema  ABDOMEN: obese with pannus, bowel sounds normal and erythematous/violaceous nodule within pannus fold.  No significant induration.  PSYCH: mentation appears normal, affect normal/bright, and speech pressured    No results found for any visits on 01/24/24.        Signed Electronically by: Lyla Hendrix DO  "

## 2024-01-24 ENCOUNTER — OFFICE VISIT (OUTPATIENT)
Dept: FAMILY MEDICINE | Facility: OTHER | Age: 44
End: 2024-01-24
Attending: FAMILY MEDICINE
Payer: COMMERCIAL

## 2024-01-24 VITALS
SYSTOLIC BLOOD PRESSURE: 118 MMHG | WEIGHT: 214 LBS | OXYGEN SATURATION: 98 % | RESPIRATION RATE: 16 BRPM | TEMPERATURE: 98.3 F | BODY MASS INDEX: 35.65 KG/M2 | HEIGHT: 65 IN | DIASTOLIC BLOOD PRESSURE: 82 MMHG | HEART RATE: 92 BPM

## 2024-01-24 DIAGNOSIS — Z87.442 HISTORY OF KIDNEY STONES: ICD-10-CM

## 2024-01-24 DIAGNOSIS — R42 VERTIGO: Primary | ICD-10-CM

## 2024-01-24 DIAGNOSIS — L72.3 SEBACEOUS CYST: ICD-10-CM

## 2024-01-24 DIAGNOSIS — E87.6 HYPOKALEMIA: ICD-10-CM

## 2024-01-24 DIAGNOSIS — R82.994 HYPERCALCIURIA: ICD-10-CM

## 2024-01-24 LAB
ALBUMIN SERPL BCG-MCNC: 4.5 G/DL (ref 3.5–5.2)
ALP SERPL-CCNC: 89 U/L (ref 40–150)
ALT SERPL W P-5'-P-CCNC: 15 U/L (ref 0–50)
ANION GAP SERPL CALCULATED.3IONS-SCNC: 9 MMOL/L (ref 7–15)
AST SERPL W P-5'-P-CCNC: 17 U/L (ref 0–45)
BASOPHILS # BLD AUTO: 0 10E3/UL (ref 0–0.2)
BASOPHILS NFR BLD AUTO: 1 %
BILIRUB SERPL-MCNC: 0.4 MG/DL
BUN SERPL-MCNC: 11.5 MG/DL (ref 6–20)
CALCIUM SERPL-MCNC: 9.6 MG/DL (ref 8.6–10)
CHLORIDE SERPL-SCNC: 101 MMOL/L (ref 98–107)
CREAT SERPL-MCNC: 0.78 MG/DL (ref 0.51–0.95)
CRP SERPL-MCNC: 44.54 MG/L
DEPRECATED HCO3 PLAS-SCNC: 31 MMOL/L (ref 22–29)
EGFRCR SERPLBLD CKD-EPI 2021: >90 ML/MIN/1.73M2
EOSINOPHIL # BLD AUTO: 0 10E3/UL (ref 0–0.7)
EOSINOPHIL NFR BLD AUTO: 1 %
ERYTHROCYTE [DISTWIDTH] IN BLOOD BY AUTOMATED COUNT: 12.8 % (ref 10–15)
GLUCOSE SERPL-MCNC: 106 MG/DL (ref 70–99)
HCT VFR BLD AUTO: 39.3 % (ref 35–47)
HGB BLD-MCNC: 13.9 G/DL (ref 11.7–15.7)
IMM GRANULOCYTES # BLD: 0 10E3/UL
IMM GRANULOCYTES NFR BLD: 1 %
IRON SERPL-MCNC: 39 UG/DL (ref 37–145)
LYMPHOCYTES # BLD AUTO: 2 10E3/UL (ref 0.8–5.3)
LYMPHOCYTES NFR BLD AUTO: 31 %
MCH RBC QN AUTO: 32.6 PG (ref 26.5–33)
MCHC RBC AUTO-ENTMCNC: 35.4 G/DL (ref 31.5–36.5)
MCV RBC AUTO: 92 FL (ref 78–100)
MONOCYTES # BLD AUTO: 0.7 10E3/UL (ref 0–1.3)
MONOCYTES NFR BLD AUTO: 11 %
NEUTROPHILS # BLD AUTO: 3.7 10E3/UL (ref 1.6–8.3)
NEUTROPHILS NFR BLD AUTO: 55 %
NRBC # BLD AUTO: 0 10E3/UL
NRBC BLD AUTO-RTO: 0 /100
PLATELET # BLD AUTO: 264 10E3/UL (ref 150–450)
POTASSIUM SERPL-SCNC: 2.9 MMOL/L (ref 3.4–5.3)
PROT SERPL-MCNC: 8.2 G/DL (ref 6.4–8.3)
RBC # BLD AUTO: 4.27 10E6/UL (ref 3.8–5.2)
SODIUM SERPL-SCNC: 141 MMOL/L (ref 135–145)
WBC # BLD AUTO: 6.5 10E3/UL (ref 4–11)

## 2024-01-24 PROCEDURE — 83540 ASSAY OF IRON: CPT | Mod: ZL | Performed by: FAMILY MEDICINE

## 2024-01-24 PROCEDURE — 85004 AUTOMATED DIFF WBC COUNT: CPT | Mod: ZL | Performed by: FAMILY MEDICINE

## 2024-01-24 PROCEDURE — 99214 OFFICE O/P EST MOD 30 MIN: CPT | Performed by: FAMILY MEDICINE

## 2024-01-24 PROCEDURE — 86140 C-REACTIVE PROTEIN: CPT | Mod: ZL | Performed by: FAMILY MEDICINE

## 2024-01-24 PROCEDURE — G0463 HOSPITAL OUTPT CLINIC VISIT: HCPCS

## 2024-01-24 PROCEDURE — 36415 COLL VENOUS BLD VENIPUNCTURE: CPT | Mod: ZL | Performed by: FAMILY MEDICINE

## 2024-01-24 PROCEDURE — 80053 COMPREHEN METABOLIC PANEL: CPT | Mod: ZL | Performed by: FAMILY MEDICINE

## 2024-01-24 ASSESSMENT — PAIN SCALES - GENERAL: PAINLEVEL: MILD PAIN (3)

## 2024-01-24 NOTE — NURSING NOTE
"Chief Complaint   Patient presents with    Dizziness       Initial /82   Pulse 92   Temp 98.3  F (36.8  C) (Tympanic)   Resp 16   Ht 1.651 m (5' 5\")   Wt 97.1 kg (214 lb)   LMP 10/31/2023 (Approximate)   SpO2 98%   BMI 35.61 kg/m   Estimated body mass index is 35.61 kg/m  as calculated from the following:    Height as of this encounter: 1.651 m (5' 5\").    Weight as of this encounter: 97.1 kg (214 lb).  Medication Review: complete    The next two questions are to help us understand your food security.  If you are feeling you need any assistance in this area, we have resources available to support you today.          1/18/2024   SDOH- Food Insecurity   Within the past 12 months, did you worry that your food would run out before you got money to buy more? N   Within the past 12 months, did the food you bought just not last and you didn t have money to get more? N         Health Care Directive:  Patient does not have a Health Care Directive or Living Will: Discussed advance care planning with patient; however, patient declined at this time.    Mila Garcia LPN      "

## 2024-01-26 ENCOUNTER — MYC MEDICAL ADVICE (OUTPATIENT)
Dept: FAMILY MEDICINE | Facility: OTHER | Age: 44
End: 2024-01-26
Payer: MEDICAID

## 2024-01-26 RX ORDER — CHLORTHALIDONE 25 MG/1
12.5 TABLET ORAL DAILY
Start: 2024-01-26 | End: 2024-02-07

## 2024-01-26 NOTE — TELEPHONE ENCOUNTER
LOV 1/24/2024 with Dr. Hendrix  From office visit note:    Current order for hygroton    Lab comment from Dr. Hendrix:    No prescription for just hydrochlorothiazide on file for patient    Current gabapentin prescription:    Should still have refills     Clarifying with Dr. Simeon Bradshaw, RN on 1/26/2024 at 9:26 AM

## 2024-01-30 NOTE — TELEPHONE ENCOUNTER
Yes, my apologies - cut her clorthalidone in 1/2 (taking 12.5mg daily).    She can take her gabapentin while she is making this change.    If still occurring - will need a longer trial without gabapentin.    Lyla Hendrix, DO

## 2024-01-31 ENCOUNTER — HOSPITAL ENCOUNTER (OUTPATIENT)
Dept: MRI IMAGING | Facility: OTHER | Age: 44
Discharge: HOME | End: 2024-01-31
Attending: FAMILY MEDICINE | Admitting: FAMILY MEDICINE
Payer: COMMERCIAL

## 2024-01-31 DIAGNOSIS — R42 VERTIGO: ICD-10-CM

## 2024-01-31 PROCEDURE — 255N000002 HC RX 255 OP 636: Mod: JZ | Performed by: FAMILY MEDICINE

## 2024-01-31 PROCEDURE — 70553 MRI BRAIN STEM W/O & W/DYE: CPT

## 2024-01-31 PROCEDURE — A9575 INJ GADOTERATE MEGLUMI 0.1ML: HCPCS | Mod: JZ | Performed by: FAMILY MEDICINE

## 2024-01-31 RX ADMIN — GADOTERATE MEGLUMINE 20 ML: 376.9 INJECTION INTRAVENOUS at 15:44

## 2024-02-01 ENCOUNTER — OFFICE VISIT (OUTPATIENT)
Dept: NEUROLOGY | Facility: OTHER | Age: 44
End: 2024-02-01
Attending: PSYCHIATRY & NEUROLOGY
Payer: MEDICAID

## 2024-02-01 VITALS
BODY MASS INDEX: 35.65 KG/M2 | HEART RATE: 92 BPM | TEMPERATURE: 97.3 F | RESPIRATION RATE: 18 BRPM | DIASTOLIC BLOOD PRESSURE: 80 MMHG | WEIGHT: 214 LBS | OXYGEN SATURATION: 97 % | SYSTOLIC BLOOD PRESSURE: 122 MMHG | HEIGHT: 65 IN

## 2024-02-01 DIAGNOSIS — G62.9 NEUROPATHY: ICD-10-CM

## 2024-02-01 DIAGNOSIS — R90.89 ABNORMAL BRAIN MRI: ICD-10-CM

## 2024-02-01 DIAGNOSIS — R20.2 PARESTHESIAS: Primary | ICD-10-CM

## 2024-02-01 LAB — VIT B12 SERPL-MCNC: 365 PG/ML (ref 232–1245)

## 2024-02-01 PROCEDURE — 36415 COLL VENOUS BLD VENIPUNCTURE: CPT | Mod: ZL | Performed by: PSYCHIATRY & NEUROLOGY

## 2024-02-01 PROCEDURE — 84155 ASSAY OF PROTEIN SERUM: CPT | Mod: ZL | Performed by: PSYCHIATRY & NEUROLOGY

## 2024-02-01 PROCEDURE — 84207 ASSAY OF VITAMIN B-6: CPT | Mod: ZL | Performed by: PSYCHIATRY & NEUROLOGY

## 2024-02-01 PROCEDURE — 86038 ANTINUCLEAR ANTIBODIES: CPT | Mod: ZL | Performed by: PSYCHIATRY & NEUROLOGY

## 2024-02-01 PROCEDURE — 84425 ASSAY OF VITAMIN B-1: CPT | Mod: ZL | Performed by: PSYCHIATRY & NEUROLOGY

## 2024-02-01 PROCEDURE — 82607 VITAMIN B-12: CPT | Mod: ZL | Performed by: PSYCHIATRY & NEUROLOGY

## 2024-02-01 PROCEDURE — 84165 PROTEIN E-PHORESIS SERUM: CPT | Mod: 26 | Performed by: STUDENT IN AN ORGANIZED HEALTH CARE EDUCATION/TRAINING PROGRAM

## 2024-02-01 PROCEDURE — 84165 PROTEIN E-PHORESIS SERUM: CPT | Mod: ZL | Performed by: STUDENT IN AN ORGANIZED HEALTH CARE EDUCATION/TRAINING PROGRAM

## 2024-02-01 PROCEDURE — 99205 OFFICE O/P NEW HI 60 MIN: CPT | Performed by: PSYCHIATRY & NEUROLOGY

## 2024-02-01 RX ORDER — DULOXETIN HYDROCHLORIDE 20 MG/1
CAPSULE, DELAYED RELEASE ORAL
Qty: 180 CAPSULE | Refills: 1 | Status: SHIPPED | OUTPATIENT
Start: 2024-02-01 | End: 2024-07-05

## 2024-02-01 ASSESSMENT — PAIN SCALES - GENERAL: PAINLEVEL: MODERATE PAIN (4)

## 2024-02-01 NOTE — PATIENT INSTRUCTIONS
Reason for today's visit: paresthesias    Your diagnosis: unclear at this time    Tests that you will need:   - EMG  - Labs: Vitamin b1, b6, b12, SPEP, MICH  - MRI cervical spine    Treatment plan:   - Continue gabapentin   - Start duloxetine 1 capsule daily for 2 weeks, then increase to 2 capsules daily      Your test results are reviewed several times a day.  Once they are all in, you will be sent a letter with your results and/or if you are signed up for on-line services, you will be e-mailed the results.  If there are serious findings, you typically will be called.    If you have any questions about your visit, your symptoms, your medication, your test results or it is not clear what your diagnosis or treatment plan is please contact our office at [phone] for general neurology    If you need follow-up in the future, please call [phone] for an appointment.      Jaxson Gould DO  Neurology

## 2024-02-01 NOTE — PROGRESS NOTES
Neurology Clinic - New Consultation  2/1/2024    Reason for Consultation: neuropathy    Requesting Provider: Dr. Hendrix    History of Presenting Symptoms:  Nohelia Crockett is a 43 year old female who presents today for evaluation of neuropathy.    She states she has had cold pains going down both legs and involving her hands.     She thinks she has had these symptoms for years.   She has cold, tingling feeling from her lateral hip all the way down into her feet worse on the L than the R.  The pain goes down to the toes on L but only to the level of the knee on the right.   In her hands she feels her hands will lock up.  She also has numb and tingling sensation in her hands.     She will feel she can't use her hands as well.  No bowel/bladder loss.  No sensory level.  She denies any vision changes.  She is not having any headaches.  Headaches resolved years ago with a ear piercing.     She is currently on gabapentin 100-100-300.   She has found the gabapentin immensely helpful.  She will notice if she does not take it.      She has seen Dr. Manriquez in the past for a question of MS who did not think that she had MS.   She did get a spinal tap and its reported as negative in his note although I cannot see oligoclonal bands tested.      No family hx of neuropathy or MS    She reports weight loss 25-30 lb weight loss over the past year.  Gives credit to a low sodium diet which she is on for her kidney.      ROS: Complete 10-point review of systems was negative except as noted in the HPI.    Past Medical History:  Patient Active Problem List   Diagnosis    Adhesive capsulitis of right shoulder    Attention deficit disorder    Gastroesophageal reflux disease without esophagitis    Genital herpes    H/O excision of mass    History of arthroscopy of right knee    Knee pain    Learning disability    Migraine headache    Obesity (BMI 35.0-39.9 without comorbidity)    Pain of multiple sites    Plantar fasciitis    S/P  shoulder surgery    Suspected sleep apnea    Morbid obesity (H)    Hypercalciuria    History of kidney stones, hypercalciuria on chlorthalidone       Past Surgical History:  Past Surgical History:   Procedure Laterality Date    APPENDECTOMY OPEN  1999    ARTHROSCOPY SHOULDER      10/05,debridement and subacromial decompression of right shoulder.    ARTHROSCOPY SHOULDER  08/2007    acromioplasty distal clavicle excision, and extensive debridement of the bursa of the right shoulder by Dr. Villa.  Also left?    CHOLECYSTECTOMY  1999    COMBINED CYSTOSCOPY, URETEROSCOPY, LASER HOLMIUM LITHOTRIPSY URETER(S) Left 7/19/2022    Procedure: Left ureteroscopy with laser lithotripsy and stent placement;  Surgeon: Melecio Pantoja MD;  Location: [facility] OR    CYSTOSCOPY  2004    with retrogrades    DILATION AND CURETTAGE  11/2007    Ablation    LAPAROSCOPIC TUBAL LIGATION  2004    LITHOTRIPSY  12/2004    extracorporeal shockwave    OTHER SURGICAL HISTORY  08/13/2012    OTHER SURGICAL HISTORY Right 09/03/2014    Pathology showed epidermal inclusion cysts    RELEASE CARPAL TUNNEL  2012       PCP: Lyla Hendrix    Allergies:   Allergies   Allergen Reactions    Penicillins Anaphylaxis    Aspirin Hives       Medications:  Current Outpatient Medications   Medication Sig Dispense Refill    chlorthalidone (HYGROTON) 25 MG tablet Take 0.5 tablets (12.5 mg) by mouth daily Combine with low salt diet to prevent stones      diclofenac (VOLTAREN) 1 % topical gel Apply 4 g topically every 6 hours as needed for moderate pain 350 g 5    gabapentin (NEURONTIN) 100 MG capsule Take 1 capsule (100 mg) by mouth 2 times daily as needed for neuropathic pain 180 capsule 4    gabapentin (NEURONTIN) 300 MG capsule Take 1 capsule (300 mg) by mouth at bedtime 90 capsule 4    ketoconazole (NIZORAL) 2 % external cream Apply topically 2 times daily 60 g 2    loratadine (CLARITIN) 10 MG tablet Take 10 mg by mouth      potassium citrate (UROCIT-K) 10 MEQ (1080 MG)  CR tablet Take 2 tablets (20 mEq) by mouth 2 times daily 360 tablet 1    Vitamin D, Cholecalciferol, 25 MCG (1000 UT) TABS          Family History:  Family History   Problem Relation Age of Onset    Cancer Mother 46        Cancer,Lung cancer    Blood Disease Mother         Blood Disease    Asthma Father         Asthma    Diabetes Father         Diabetes,2    Hypertension Father         Hypertension    Heart Disease Father         Heart Disease,CHF    Other - See Comments Father          at age 65 from complications of a motor vehicle accident.    Family History Negative Sister         Good Health    Family History Negative Sister         Good Health    Breast Cancer No family hx of         Cancer-breast    Colon Cancer No family hx of         Cancer-colon    Prostate Cancer No family hx of         Cancer-prostate    Ovarian Cancer No family hx of         Cancer-ovarian    Anesthesia Reaction No family hx of         Anesthesia Problem       Social History:  Social History     Socioeconomic History    Marital status:      Spouse name: Not on file    Number of children: Not on file    Years of education: Not on file    Highest education level: Not on file   Occupational History     Employer: [employer]   Tobacco Use    Smoking status: Former     Packs/day: 0.50     Years: 16.00     Additional pack years: 0.00     Total pack years: 8.00     Types: Cigarettes     Quit date: 2016     Years since quittin.0    Smokeless tobacco: Never   Vaping Use    Vaping Use: Never used   Substance and Sexual Activity    Alcohol use: Not Currently     Alcohol/week: 0.0 - 1.0 standard drinks of alcohol     Comment: Alcoholic Drinks/day: occasionally    Drug use: No    Sexual activity: Yes     Partners: Male     Birth control/protection: Surgical   Other Topics Concern    Parent/sibling w/ CABG, MI or angioplasty before 65F 55M? Not Asked   Social History Narrative    Patient is  (2020).   Lives with her son (Herman); daughter (Jluis) lives on her own.  Dog ( animal) - Moises, ~40# Brindle, mixed breed.    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     Social Determinants of Health     Financial Resource Strain: Low Risk  (1/18/2024)    Financial Resource Strain     Within the past 12 months, have you or your family members you live with been unable to get utilities (heat, electricity) when it was really needed?: No   Food Insecurity: Low Risk  (1/18/2024)    Food Insecurity     Within the past 12 months, did you worry that your food would run out before you got money to buy more?: No     Within the past 12 months, did the food you bought just not last and you didn t have money to get more?: No   Transportation Needs: Low Risk  (1/18/2024)    Transportation Needs     Within the past 12 months, has lack of transportation kept you from medical appointments, getting your medicines, non-medical meetings or appointments, work, or from getting things that you need?: No   Physical Activity: Not on file   Stress: Not on file   Social Connections: Not on file   Interpersonal Safety: Low Risk  (2/1/2024)    Interpersonal Safety     Do you feel physically and emotionally safe where you currently live?: Yes     Within the past 12 months, have you been hit, slapped, kicked or otherwise physically hurt by someone?: No     Within the past 12 months, have you been humiliated or emotionally abused in other ways by your partner or ex-partner?: No   Housing Stability: Low Risk  (1/18/2024)    Housing Stability     Do you have housing? : Yes     Are you worried about losing your housing?: No   Recent Concern: Housing Stability - High Risk (11/30/2023)    Housing Stability     Do you have housing? : No     Are you worried about losing your housing?: No     Alcohol: none  Tobacco: quit 8 years  "ago  Illicit drugs: none  Caffeine: a lot of tea and coffee.      Physical Exam:  Vitals: /80   Pulse 92   Temp 97.3  F (36.3  C)   Resp 18   Ht 1.651 m (5' 5\")   Wt 97.1 kg (214 lb)   LMP 10/31/2023 (Approximate)   SpO2 97%   BMI 35.61 kg/m     General: Seated comfortably in no acute distress.  HEENT: Neck supple with normal range of motion.  Lungs: breathing comfortably  Extremities: no edema  Skin: No rashes  Neurologic:     Mental Status: Fully alert, attentive and oriented. Speech clear and fluent, no paraphasic errors.     Cranial Nerves:  PERRL. EOMI with no nystagmus. Facial sensation intact/symmetric. Facial movements symmetric. Hearing not formally tested but intact to conversation. Palate elevation symmetric, uvula midline. No dysarthria. Shoulder shrug strong bilaterally. Tongue protrusion midline.     Motor: No tremors or other abnormal movements observed. Muscle tone normal throughout. No pronator drift. Strength 5/5 throughout upper and lower extremities.     Deep Tendon Reflexes: 2+/symmetric throughout upper and lower extremities. No clonus. Toes downgoing bilaterally.     Sensory: Intact/symmetric to light touch, pinprick, temperature, vibration and proprioception throughout upper and lower extremities. Negative Romberg.      Coordination: Finger-nose-finger and heel-shin intact without dysmetria.     Gait: Normal, steady casual gait.  Some difficulty with tandem but he was able to complete independently.    Pertinent Investigations:  A1C= 5.7  No vitamin b12    Assessment:  #Paresthesias  #abnormal brain MRI  #weight loss    Ms. Crockett is a 43-year-old female presenting for paresthesias.  These affects primarily her lateral legs but does go past the knee so would not be consistent with meralgia paresthetica.  Does affect her feet, but is also complaining of neck stiffness and involving her hands.  Gabapentin has been immensely helpful, but she has not been able to tolerate higher " doses due to sedation.  Discussed with her that I would start low-dose of Cymbalta.  She is agreeable to trying.  Understands that she needs to take for 3 to 4 months consistently before it would be considered a failure.  Side effects discussed.  In addition we will get some labs.  With her report of 25 pounds of weight loss and we will check some vitamin labs as well as an SPEP, and an MICH as her inflammatory markers were high previously.  Lastly, I have and lower suspicion, but given her abnormal brain MRI we will get an MRI of the C-spine.  Suspect her brain MRI changes are related to her previous tobacco use, concussions, and migraines.  That being said she does have some neck pain and given symptoms involve her hands as well would be reasonable to rule out a structural source.  She does think symptoms developed since her last C-spine MRI.  She asked about chiropractor but I advised her against doing until she has had her C-spine MRI.    Plan:  - EMG  - Labs: Vitamin b1, b6, b12, SPEP, MICH  - Continue gabapentin  100-100-300  - Start duloxetine 20mg capsules,  1 capsule daily for 2 weeks, then increase to 2 capsules daily  - MRI cervical spine with and without contrast  - If results all negative will need to discuss utility of small fiber skin biopsy if within her wishes, knowing that it would not affect treatment.    Follow up in 5 months or sooner as needed if any new or concerning symptoms.  Instructed she can follow-up with a virtual visit next month if she would like to check in.    Jaxson Gould DO   of Neurology      63 min spent on the date of the encounter in chart review, patient visit, review of tests, documentation and/or discussion with other providers about the issues documented above.     Dragon disclaimer: This documentation was completed with the aid of dictation software.  Please note that there may be some inconsistencies due to software errors.  Errors are corrected in  real time, however if there is a remaining error, please do not hesitate to reach out for clarification.

## 2024-02-01 NOTE — LETTER
2/1/2024         RE: Nohelia Crockett  [address]  [facility] [address]        Dear Colleague,    Thank you for referring your patient, Nohelia Crockett, to the [facility]. Please see a copy of my visit note below.    Neurology Clinic - New Consultation  2/1/2024    Reason for Consultation: neuropathy    Requesting Provider: Dr. Hendrix    History of Presenting Symptoms:  Nohelia Crockett is a 43 year old female who presents today for evaluation of neuropathy.    She states she has had cold pains going down both legs and involving her hands.     She thinks she has had these symptoms for years.   She has cold, tingling feeling from her lateral hip all the way down into her feet worse on the L than the R.  The pain goes down to the toes on L but only to the level of the knee on the right.   In her hands she feels her hands will lock up.  She also has numb and tingling sensation in her hands.     She will feel she can't use her hands as well.  No bowel/bladder loss.  No sensory level.  She denies any vision changes.  She is not having any headaches.  Headaches resolved years ago with a ear piercing.     She is currently on gabapentin 100-100-300.   She has found the gabapentin immensely helpful.  She will notice if she does not take it.      She has seen Dr. Manriquez in the past for a question of MS who did not think that she had MS.   She did get a spinal tap and its reported as negative in his note although I cannot see oligoclonal bands tested.      No family hx of neuropathy or MS    She reports weight loss 25-30 lb weight loss over the past year.  Gives credit to a low sodium diet which she is on for her kidney.      ROS: Complete 10-point review of systems was negative except as noted in the HPI.    Past Medical History:  Patient Active Problem List   Diagnosis     Adhesive capsulitis of right shoulder     Attention deficit disorder     Gastroesophageal reflux disease  without esophagitis     Genital herpes     H/O excision of mass     History of arthroscopy of right knee     Knee pain     Learning disability     Migraine headache     Obesity (BMI 35.0-39.9 without comorbidity)     Pain of multiple sites     Plantar fasciitis     S/P shoulder surgery     Suspected sleep apnea     Morbid obesity (H)     Hypercalciuria     History of kidney stones, hypercalciuria on chlorthalidone       Past Surgical History:  Past Surgical History:   Procedure Laterality Date     APPENDECTOMY OPEN  1999     ARTHROSCOPY SHOULDER      10/05,debridement and subacromial decompression of right shoulder.     ARTHROSCOPY SHOULDER  08/2007    acromioplasty distal clavicle excision, and extensive debridement of the bursa of the right shoulder by Dr. Villa.  Also left?     CHOLECYSTECTOMY  1999     COMBINED CYSTOSCOPY, URETEROSCOPY, LASER HOLMIUM LITHOTRIPSY URETER(S) Left 7/19/2022    Procedure: Left ureteroscopy with laser lithotripsy and stent placement;  Surgeon: Melecio Pantoja MD;  Location: [facility] OR     CYSTOSCOPY  2004    with retrogrades     DILATION AND CURETTAGE  11/2007    Ablation     LAPAROSCOPIC TUBAL LIGATION  2004     LITHOTRIPSY  12/2004    extracorporeal shockwave     OTHER SURGICAL HISTORY  08/13/2012     OTHER SURGICAL HISTORY Right 09/03/2014    Pathology showed epidermal inclusion cysts     RELEASE CARPAL TUNNEL  2012       PCP: Lyla Hendrix    Allergies:   Allergies   Allergen Reactions     Penicillins Anaphylaxis     Aspirin Hives       Medications:  Current Outpatient Medications   Medication Sig Dispense Refill     chlorthalidone (HYGROTON) 25 MG tablet Take 0.5 tablets (12.5 mg) by mouth daily Combine with low salt diet to prevent stones       diclofenac (VOLTAREN) 1 % topical gel Apply 4 g topically every 6 hours as needed for moderate pain 350 g 5     gabapentin (NEURONTIN) 100 MG capsule Take 1 capsule (100 mg) by mouth 2 times daily as needed for neuropathic pain 180 capsule  4     gabapentin (NEURONTIN) 300 MG capsule Take 1 capsule (300 mg) by mouth at bedtime 90 capsule 4     ketoconazole (NIZORAL) 2 % external cream Apply topically 2 times daily 60 g 2     loratadine (CLARITIN) 10 MG tablet Take 10 mg by mouth       potassium citrate (UROCIT-K) 10 MEQ (1080 MG) CR tablet Take 2 tablets (20 mEq) by mouth 2 times daily 360 tablet 1     Vitamin D, Cholecalciferol, 25 MCG (1000 UT) TABS          Family History:  Family History   Problem Relation Age of Onset     Cancer Mother 46        Cancer,Lung cancer     Blood Disease Mother         Blood Disease     Asthma Father         Asthma     Diabetes Father         Diabetes,2     Hypertension Father         Hypertension     Heart Disease Father         Heart Disease,CHF     Other - See Comments Father          at age 65 from complications of a motor vehicle accident.     Family History Negative Sister         Good Health     Family History Negative Sister         Good Health     Breast Cancer No family hx of         Cancer-breast     Colon Cancer No family hx of         Cancer-colon     Prostate Cancer No family hx of         Cancer-prostate     Ovarian Cancer No family hx of         Cancer-ovarian     Anesthesia Reaction No family hx of         Anesthesia Problem       Social History:  Social History     Socioeconomic History     Marital status:      Spouse name: Not on file     Number of children: Not on file     Years of education: Not on file     Highest education level: Not on file   Occupational History     Employer: [employer]   Tobacco Use     Smoking status: Former     Packs/day: 0.50     Years: 16.00     Additional pack years: 0.00     Total pack years: 8.00     Types: Cigarettes     Quit date: 2016     Years since quittin.0     Smokeless tobacco: Never   Vaping Use     Vaping Use: Never used   Substance and Sexual Activity     Alcohol use: Not Currently     Alcohol/week: 0.0 - 1.0 standard  drinks of alcohol     Comment: Alcoholic Drinks/day: occasionally     Drug use: No     Sexual activity: Yes     Partners: Male     Birth control/protection: Surgical   Other Topics Concern     Parent/sibling w/ CABG, MI or angioplasty before 65F 55M? Not Asked   Social History Narrative    Patient is  (8/2018).  Lives with her son (Herman); daughter (Jluis) lives on her own.  Dog ( animal) - Moises, ~40# Brindle, mixed breed.    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     Social Determinants of Health     Financial Resource Strain: Low Risk  (1/18/2024)    Financial Resource Strain      Within the past 12 months, have you or your family members you live with been unable to get utilities (heat, electricity) when it was really needed?: No   Food Insecurity: Low Risk  (1/18/2024)    Food Insecurity      Within the past 12 months, did you worry that your food would run out before you got money to buy more?: No      Within the past 12 months, did the food you bought just not last and you didn t have money to get more?: No   Transportation Needs: Low Risk  (1/18/2024)    Transportation Needs      Within the past 12 months, has lack of transportation kept you from medical appointments, getting your medicines, non-medical meetings or appointments, work, or from getting things that you need?: No   Physical Activity: Not on file   Stress: Not on file   Social Connections: Not on file   Interpersonal Safety: Low Risk  (2/1/2024)    Interpersonal Safety      Do you feel physically and emotionally safe where you currently live?: Yes      Within the past 12 months, have you been hit, slapped, kicked or otherwise physically hurt by someone?: No      Within the past 12 months, have you been humiliated or emotionally abused in other ways by your partner or ex-partner?: No   Housing Stability: Low  "Risk  (1/18/2024)    Housing Stability      Do you have housing? : Yes      Are you worried about losing your housing?: No   Recent Concern: Housing Stability - High Risk (11/30/2023)    Housing Stability      Do you have housing? : No      Are you worried about losing your housing?: No     Alcohol: none  Tobacco: quit 8 years ago  Illicit drugs: none  Caffeine: a lot of tea and coffee.      Physical Exam:  Vitals: /80   Pulse 92   Temp 97.3  F (36.3  C)   Resp 18   Ht 1.651 m (5' 5\")   Wt 97.1 kg (214 lb)   LMP 10/31/2023 (Approximate)   SpO2 97%   BMI 35.61 kg/m     General: Seated comfortably in no acute distress.  HEENT: Neck supple with normal range of motion.  Lungs: breathing comfortably  Extremities: no edema  Skin: No rashes  Neurologic:     Mental Status: Fully alert, attentive and oriented. Speech clear and fluent, no paraphasic errors.     Cranial Nerves:  PERRL. EOMI with no nystagmus. Facial sensation intact/symmetric. Facial movements symmetric. Hearing not formally tested but intact to conversation. Palate elevation symmetric, uvula midline. No dysarthria. Shoulder shrug strong bilaterally. Tongue protrusion midline.     Motor: No tremors or other abnormal movements observed. Muscle tone normal throughout. No pronator drift. Strength 5/5 throughout upper and lower extremities.     Deep Tendon Reflexes: 2+/symmetric throughout upper and lower extremities. No clonus. Toes downgoing bilaterally.     Sensory: Intact/symmetric to light touch, pinprick, temperature, vibration and proprioception throughout upper and lower extremities. Negative Romberg.      Coordination: Finger-nose-finger and heel-shin intact without dysmetria.     Gait: Normal, steady casual gait.  Some difficulty with tandem but he was able to complete independently.    Pertinent Investigations:  A1C= 5.7  No vitamin b12    Assessment:  #Paresthesias  #abnormal brain MRI  #weight loss    Ms. Crockett is a 43-year-old female " presenting for paresthesias.  These affects primarily her lateral legs but does go past the knee so would not be consistent with meralgia paresthetica.  Does affect her feet, but is also complaining of neck stiffness and involving her hands.  Gabapentin has been immensely helpful, but she has not been able to tolerate higher doses due to sedation.  Discussed with her that I would start low-dose of Cymbalta.  She is agreeable to trying.  Understands that she needs to take for 3 to 4 months consistently before it would be considered a failure.  Side effects discussed.  In addition we will get some labs.  With her report of 25 pounds of weight loss and we will check some vitamin labs as well as an SPEP, and an MICH as her inflammatory markers were high previously.  Lastly, I have and lower suspicion, but given her abnormal brain MRI we will get an MRI of the C-spine.  Suspect her brain MRI changes are related to her previous tobacco use, concussions, and migraines.  That being said she does have some neck pain and given symptoms involve her hands as well would be reasonable to rule out a structural source.  She does think symptoms developed since her last C-spine MRI.    Plan:  - EMG  - Labs: Vitamin b1, b6, b12, SPEP, MICH  - Continue gabapentin  100-100-300  - Start duloxetine 20mg capsules,  1 capsule daily for 2 weeks, then increase to 2 capsules daily  - MRI cervical spine with and without contrast  - If results all negative will need to discuss utility of small fiber skin biopsy if within her wishes, knowing that it would not affect treatment.    Follow up in 5 months or sooner as needed if any new or concerning symptoms.  Instructed she can follow-up with a virtual visit next month if she would like to check in.    Jaxson Gould DO   of Neurology      63 min spent on the date of the encounter in chart review, patient visit, review of tests, documentation and/or discussion with other providers  about the issues documented above.     Dragon disclaimer: This documentation was completed with the aid of dictation software.  Please note that there may be some inconsistencies due to software errors.  Errors are corrected in real time, however if there is a remaining error, please do not hesitate to reach out for clarification.        Again, thank you for allowing me to participate in the care of your patient.        Sincerely,        Jaxson Gould MD

## 2024-02-01 NOTE — NURSING NOTE
"No chief complaint on file.      Initial LMP 10/31/2023 (Approximate)  Estimated body mass index is 35.61 kg/m  as calculated from the following:    Height as of 1/24/24: 1.651 m (5' 5\").    Weight as of 1/24/24: 97.1 kg (214 lb).  Medication Review: complete    The next two questions are to help us understand your food security.  If you are feeling you need any assistance in this area, we have resources available to support you today.          1/18/2024   SDOH- Food Insecurity   Within the past 12 months, did you worry that your food would run out before you got money to buy more? N   Within the past 12 months, did the food you bought just not last and you didn t have money to get more? N         Health Care Directive:  Patient does not have a Health Care Directive or Living Will: Discussed advance care planning with patient; however, patient declined at this time.    Katerine Wallace LPN      "

## 2024-02-02 LAB — TOTAL PROTEIN SERUM FOR ELP: 7.5 G/DL (ref 6.4–8.3)

## 2024-02-05 LAB
ALBUMIN SERPL ELPH-MCNC: 4.2 G/DL (ref 3.7–5.1)
ALPHA1 GLOB SERPL ELPH-MCNC: 0.3 G/DL (ref 0.2–0.4)
ALPHA2 GLOB SERPL ELPH-MCNC: 0.7 G/DL (ref 0.5–0.9)
ANA SER QL IF: NEGATIVE
B-GLOBULIN SERPL ELPH-MCNC: 1 G/DL (ref 0.6–1)
GAMMA GLOB SERPL ELPH-MCNC: 1.3 G/DL (ref 0.7–1.6)
LOCATION OF TASK: NORMAL
M PROTEIN SERPL ELPH-MCNC: 0 G/DL
PROT PATTERN SERPL ELPH-IMP: NORMAL

## 2024-02-06 LAB — PYRIDOXAL PHOS SERPL-SCNC: 16.7 NMOL/L

## 2024-02-07 DIAGNOSIS — R82.994 HYPERCALCIURIA: ICD-10-CM

## 2024-02-07 DIAGNOSIS — Z87.442 HISTORY OF KIDNEY STONES: ICD-10-CM

## 2024-02-07 LAB — VIT B1 PYROPHOSHATE BLD-SCNC: 123 NMOL/L

## 2024-02-09 RX ORDER — CHLORTHALIDONE 25 MG/1
12.5 TABLET ORAL DAILY
Qty: 45 TABLET | Refills: 1 | Status: SHIPPED | OUTPATIENT
Start: 2024-02-09 | End: 2024-08-27

## 2024-02-09 NOTE — TELEPHONE ENCOUNTER
[id number] sent Rx request for the following:      Requested Prescriptions   Pending Prescriptions Disp Refills    chlorthalidone (HYGROTON) 25 MG tablet       Sig: Take 1 tablet (25 mg) by mouth daily Combine with low salt diet to prevent stones       Diuretics (Including Combos) Protocol Failed - 2/7/2024  9:44 AM        Failed - Medication indicated for associated diagnosis     Medication is associated with one or more of the following diagnoses:     Edema   Hypertension   Heart Failure   Meniere's Disease         Last Prescription Date:   1/26/24 historical  Last Fill Qty/Refills:         , R-    Last Office Visit:              1/24/24   Future Office visit:           none    Routing refill request to provider for review/approval because:  Drug not on the [facility] refill protocol   Dose adjustment    Onelia Willis RN on 2/9/2024 at 10:58 AM

## 2024-02-14 ENCOUNTER — LAB (OUTPATIENT)
Dept: LAB | Facility: OTHER | Age: 44
End: 2024-02-14
Payer: MEDICAID

## 2024-02-14 DIAGNOSIS — R42 VERTIGO: ICD-10-CM

## 2024-02-14 DIAGNOSIS — E87.6 HYPOKALEMIA: ICD-10-CM

## 2024-02-14 LAB
ANION GAP SERPL CALCULATED.3IONS-SCNC: 8 MMOL/L (ref 7–15)
BUN SERPL-MCNC: 15.5 MG/DL (ref 6–20)
CALCIUM SERPL-MCNC: 9.8 MG/DL (ref 8.6–10)
CHLORIDE SERPL-SCNC: 104 MMOL/L (ref 98–107)
CREAT SERPL-MCNC: 0.8 MG/DL (ref 0.51–0.95)
DEPRECATED HCO3 PLAS-SCNC: 29 MMOL/L (ref 22–29)
EGFRCR SERPLBLD CKD-EPI 2021: >90 ML/MIN/1.73M2
GLUCOSE SERPL-MCNC: 90 MG/DL (ref 70–99)
POTASSIUM SERPL-SCNC: 4.1 MMOL/L (ref 3.4–5.3)
SODIUM SERPL-SCNC: 141 MMOL/L (ref 135–145)

## 2024-02-14 PROCEDURE — 36415 COLL VENOUS BLD VENIPUNCTURE: CPT | Mod: ZL

## 2024-02-14 PROCEDURE — 80048 BASIC METABOLIC PNL TOTAL CA: CPT | Mod: ZL

## 2024-02-21 ENCOUNTER — HOSPITAL ENCOUNTER (OUTPATIENT)
Dept: MRI IMAGING | Facility: OTHER | Age: 44
Discharge: HOME | End: 2024-02-21
Attending: PSYCHIATRY & NEUROLOGY | Admitting: PSYCHIATRY & NEUROLOGY
Payer: MEDICAID

## 2024-02-21 DIAGNOSIS — R90.89 ABNORMAL BRAIN MRI: ICD-10-CM

## 2024-02-21 PROCEDURE — 72156 MRI NECK SPINE W/O & W/DYE: CPT

## 2024-02-21 PROCEDURE — 255N000002 HC RX 255 OP 636: Mod: JZ | Performed by: PSYCHIATRY & NEUROLOGY

## 2024-02-21 PROCEDURE — A9575 INJ GADOTERATE MEGLUMI 0.1ML: HCPCS | Mod: JZ | Performed by: PSYCHIATRY & NEUROLOGY

## 2024-02-21 RX ADMIN — GADOTERATE MEGLUMINE 20 ML: 376.9 INJECTION INTRAVENOUS at 12:00

## 2024-03-09 DIAGNOSIS — E87.1 HYPONATREMIA: ICD-10-CM

## 2024-03-12 ENCOUNTER — MYC REFILL (OUTPATIENT)
Dept: FAMILY MEDICINE | Facility: OTHER | Age: 44
End: 2024-03-12
Payer: COMMERCIAL

## 2024-03-12 DIAGNOSIS — E87.1 HYPONATREMIA: ICD-10-CM

## 2024-03-13 RX ORDER — POTASSIUM CITRATE 10 MEQ/1
20 TABLET, EXTENDED RELEASE ORAL 2 TIMES DAILY
Qty: 360 TABLET | Refills: 4 | Status: SHIPPED | OUTPATIENT
Start: 2024-03-13

## 2024-03-13 NOTE — TELEPHONE ENCOUNTER
Last Prescription Date: 8/24/2023  Last Qty/Refills: 360 / R-1  Last Office Visit: 1/24/2024  Future Office Visit: None     Requested Prescriptions   Pending Prescriptions Disp Refills    potassium citrate (UROCIT-K) 10 MEQ (1080 MG) CR tablet [Pharmacy Med Name: Potassium Citrate ER 10 MEQ (1080 MG) Oral Tablet Extended Release] 360 tablet 0     Sig: Take 2 tablets by mouth twice daily       There is no refill protocol information for this order        Routing refill request to provider for review/approval because:  Drug not on the [facility] refill protocol     Cristal Dang RN on 3/13/2024 at 1:17 PM

## 2024-03-18 RX ORDER — POTASSIUM CITRATE 10 MEQ/1
20 TABLET, EXTENDED RELEASE ORAL 2 TIMES DAILY
Qty: 360 TABLET | Refills: 1 | OUTPATIENT
Start: 2024-03-18

## 2024-03-18 NOTE — TELEPHONE ENCOUNTER
Refill request received for Potassium. Refilled on 3/13/24 for #360 [facility]. Called pharmacy and verified they had RX on file. Will refuse refill per protocol and send [employer] message to patient to notify her of refill on file. Shima Hernandez RN on 3/18/2024 at 9:27 AM

## 2024-05-07 ENCOUNTER — MYC MEDICAL ADVICE (OUTPATIENT)
Dept: NEUROLOGY | Facility: OTHER | Age: 44
End: 2024-05-07
Payer: COMMERCIAL

## 2024-05-07 DIAGNOSIS — R20.2 PARESTHESIAS: Primary | ICD-10-CM

## 2024-05-07 NOTE — TELEPHONE ENCOUNTER
EMG incorrectly ordered on 2/1/24.    Torsten'd up neurology referral for EMG.     Routing to provider to review and respond.  Cheyenne Garcia RN on 5/7/2024 at 1:38 PM

## 2024-06-19 ENCOUNTER — MYC MEDICAL ADVICE (OUTPATIENT)
Dept: NEUROLOGY | Facility: OTHER | Age: 44
End: 2024-06-19
Payer: COMMERCIAL

## 2024-06-19 DIAGNOSIS — G62.9 NEUROPATHY: Primary | ICD-10-CM

## 2024-07-05 ENCOUNTER — OFFICE VISIT (OUTPATIENT)
Dept: NEUROLOGY | Facility: OTHER | Age: 44
End: 2024-07-05
Attending: PSYCHIATRY & NEUROLOGY
Payer: COMMERCIAL

## 2024-07-05 VITALS
HEART RATE: 75 BPM | OXYGEN SATURATION: 97 % | RESPIRATION RATE: 16 BRPM | DIASTOLIC BLOOD PRESSURE: 71 MMHG | TEMPERATURE: 97.9 F | WEIGHT: 220 LBS | SYSTOLIC BLOOD PRESSURE: 120 MMHG | HEIGHT: 65 IN | BODY MASS INDEX: 36.65 KG/M2

## 2024-07-05 DIAGNOSIS — G57.12 MERALGIA PARESTHETICA OF LEFT SIDE: ICD-10-CM

## 2024-07-05 DIAGNOSIS — M79.2 NEUROPATHIC PAIN: ICD-10-CM

## 2024-07-05 DIAGNOSIS — R20.2 PARESTHESIAS: ICD-10-CM

## 2024-07-05 DIAGNOSIS — G62.9 NEUROPATHY: Primary | ICD-10-CM

## 2024-07-05 LAB
HOLD SPECIMEN: NORMAL
VIT B12 SERPL-MCNC: 640 PG/ML (ref 232–1245)

## 2024-07-05 PROCEDURE — G0463 HOSPITAL OUTPT CLINIC VISIT: HCPCS

## 2024-07-05 PROCEDURE — 84207 ASSAY OF VITAMIN B-6: CPT | Mod: ZL | Performed by: PSYCHIATRY & NEUROLOGY

## 2024-07-05 PROCEDURE — 99214 OFFICE O/P EST MOD 30 MIN: CPT | Performed by: PSYCHIATRY & NEUROLOGY

## 2024-07-05 PROCEDURE — G2211 COMPLEX E/M VISIT ADD ON: HCPCS | Performed by: PSYCHIATRY & NEUROLOGY

## 2024-07-05 PROCEDURE — 82607 VITAMIN B-12: CPT | Mod: ZL | Performed by: PSYCHIATRY & NEUROLOGY

## 2024-07-05 PROCEDURE — 36415 COLL VENOUS BLD VENIPUNCTURE: CPT | Mod: ZL | Performed by: PSYCHIATRY & NEUROLOGY

## 2024-07-05 RX ORDER — PYRIDOXINE HCL (VITAMIN B6) 25 MG
25 TABLET ORAL DAILY
COMMUNITY

## 2024-07-05 RX ORDER — DULOXETIN HYDROCHLORIDE 30 MG/1
CAPSULE, DELAYED RELEASE ORAL
Qty: 180 CAPSULE | Refills: 3 | Status: SHIPPED | OUTPATIENT
Start: 2024-07-05

## 2024-07-05 RX ORDER — MAGNESIUM 200 MG
500 TABLET ORAL DAILY
COMMUNITY

## 2024-07-05 ASSESSMENT — PAIN SCALES - GENERAL: PAINLEVEL: MODERATE PAIN (4)

## 2024-07-05 NOTE — PATIENT INSTRUCTIONS
Reason for today's visit: Neuropathic pain    Your diagnosis: Same as above and meralgia paresthetica    Tests that you will need:   EMG and vitamin 6 and b12 tests    Treatment plan:   - Increase cymbalta to 60mg daily (2 capsules)  - If not effective after 2-3 months let me know and can try lidocaine cream.        Your test results are reviewed several times a day.  Once they are all in, you will be sent a letter with your results and/or if you are signed up for on-line services, you will be e-mailed the results.  If there are serious findings, you typically will be called.    If you have any questions about your visit, your symptoms, your medication, your test results or it is not clear what your diagnosis or treatment plan is please contact our office at [phone] for general neurology    If you need follow-up in the future, please call [phone] for an appointment.      Jaxson Gould DO  Neurology

## 2024-07-05 NOTE — PROGRESS NOTES
Outpatient Neurology Visit  7/5/2024      Assessment:  #Neuropathic pain  #meralgia paresthetica, left  #abnormal brain MRI    Ms. Crockett is a 44-year-old female who neurology follows for neuropathic pain symptoms.  Her symptoms do sound consistent with neuropathic origin.  However her exam is relatively reassuring.  Unfortunately she is not able to drain an EMG locally, but is willing to travel.  Will get an EMG in the [facility].  Discussed with her utility of a skin biopsy if EMG is negative.  She is going to consider and we can discuss after EMG is obtained.  Ideally she will get EMG in [place].  Avoid traveling into the [facility].  I discussed with her pain control options.  Including increasing Cymbalta versus local treatment such as lidocaine gel.  At this time she would like to just increase Cymbalta.  Gabapentin has been helpful but she is not able to tolerate higher doses.  Will check B6 and B12 to ensure is normalized now that she is on supplements.  If EMG negative, skin biopsy negative, may consider repeating her brain MRI but with prominent pain symptoms think the yield of this is low at present      Plan:    - Increase cymbalta to 60mg daily (2 capsules)  - If not effective after 2-3 months let me know and can try lidocaine cream.    - EMG ion [facility], unable to get locally due to very old bill collections issue.    - Vitamin 6 and 12 recheck on supplementation.      Follow up in Neurology clinic in4 months virtually after completion of recommended diagnostic testing or earlier as needed    The longitudinal plan of care for the diagnosis(es)/condition(s) as documented were addressed during this visit. Due to the added complexity in care, I will continue to support Nohelia in the subsequent management and with ongoing continuity of care.       35 min spent on the date of the encounter in chart review, patient visit, review of tests, documentation and/or discussion with other providers about the  issues documented above.       Jaxson Gould DO   of Neurology    Subjective:  Nohelia Crockett is a 44 year old female who presents today for follow up evaluation of neuropathic pain      Brief history of symptoms: The patient was initially seen in neurologic consultation on 2/1/24 for evaluation of neurpathic pain. Please see the comprehensive neurologic consultation note from that date in the Epic records for details.     Previous Hx:  Nohelia Crockett is a 43 year old female who presents today for evaluation of neuropathy.    She states she has had cold pains going down both legs and involving her hands.     She thinks she has had these symptoms for years.   She has cold, tingling feeling from her lateral hip all the way down into her feet worse on the L than the R.  The pain goes down to the toes on L but only to the level of the knee on the right.   In her hands she feels her hands will lock up.  She also has numb and tingling sensation in her hands.     She will feel she can't use her hands as well.  No bowel/bladder loss.  No sensory level.  She denies any vision changes.  She is not having any headaches.  Headaches resolved years ago with a ear piercing.      She is currently on gabapentin 100-100-300.   She has found the gabapentin immensely helpful.  She will notice if she does not take it.       She has seen Dr. Manriquez in the past for a question of MS who did not think that she had MS.   She did get a spinal tap and its reported as negative in his note although I cannot see oligoclonal bands tested.       No family hx of neuropathy or MS     She reports weight loss 25-30 lb weight loss over the past year.  Gives credit to a low sodium diet which she is on for her kidney.      Interval history:     She is still on vitamin b6 and vitamin b12 as well as gabapentin 100-100-300, and duloxetine 40mg.   She still has pain in her feet bilaterally and her L arm.   She reports her L arm  "feels a little worse but her feet are about the same.    She denies any weakness or gait changes.      Physical Exam:  Vitals: /71 (BP Location: Right arm, Patient Position: Sitting, Cuff Size: Adult Large)   Pulse 75   Temp 97.9  F (36.6  C) (Temporal)   Resp 16   Ht 1.651 m (5' 5\")   Wt 99.8 kg (220 lb)   SpO2 97%   BMI 36.61 kg/m     General: Seated comfortably in no acute distress.  HEENT: Neck supple .    Pulmonary: Respirations unlabored.  Extremities: No LE edema.   Neurologic:  Awake alert, attentive, speech is clear and fluent.  No paraphasic errors.  Facial movement symmetric.  No dysarthria.  No tremors or other abnormal movements observed.  Strength 5 out of 5 throughout upper and lower extremities.  Deep tendon reflexes are 2+ in biceps, brachioradialis and patella.  Sensation is hyperalgesic over the left arm compared to the right.  Bilateral lower extremities with sensation intact to light touch.  Had previously been normal to vibration.  Does have hyperalgesia on the distal foot to pinprick.  Steady casual gait.  Numbness over the left lateral thigh.    Pertinent Investigations:  Vitamin b6: 15  B12: 323      Dragon disclaimer: This documentation was completed with the aid of dictation software.  Please note that there may be some inconsistencies due to software errors.  Errors are corrected in real time, however if there is a remaining error, please do not hesitate to reach out for clarification.      "

## 2024-07-05 NOTE — LETTER
7/5/2024      Nohelia Crockett  [address]  [facility] [address]      Dear Colleague,    Thank you for referring your patient, Nohelia Crockett, to the [facility]. Please see a copy of my visit note below.    Outpatient Neurology Visit  7/5/2024      Assessment:  #Neuropathic pain  #meralgia paresthetica, left  #abnormal brain MRI    Ms. Crockett is a 44-year-old female who neurology follows for neuropathic pain symptoms.  Her symptoms do sound consistent with neuropathic origin.  However her exam is relatively reassuring.  Unfortunately she is not able to drain an EMG locally, but is willing to travel.  Will get an EMG in the [facility].  Discussed with her utility of a skin biopsy if EMG is negative.  She is going to consider and we can discuss after EMG is obtained.  Ideally she will get EMG in [place].  Avoid traveling into the [facility].  I discussed with her pain control options.  Including increasing Cymbalta versus local treatment such as lidocaine gel.  At this time she would like to just increase Cymbalta.  Gabapentin has been helpful but she is not able to tolerate higher doses.  Will check B6 and B12 to ensure is normalized now that she is on supplements.  If EMG negative, skin biopsy negative, may consider repeating her brain MRI but with prominent pain symptoms think the yield of this is low at present      Plan:    - Increase cymbalta to 60mg daily (2 capsules)  - If not effective after 2-3 months let me know and can try lidocaine cream.    - EMG ion [facility], unable to get locally due to very old bill collections issue.    - Vitamin 6 and 12 recheck on supplementation.      Follow up in Neurology clinic in4 months virtually after completion of recommended diagnostic testing or earlier as needed      35 min spent on the date of the encounter in chart review, patient visit, review of tests, documentation and/or discussion with other providers about the  issues documented above.       Jaxson Gould DO   of Neurology    Subjective:  Nohelia Crockett is a 44 year old female who presents today for follow up evaluation of neuropathic pain      Brief history of symptoms: The patient was initially seen in neurologic consultation on 2/1/24 for evaluation of neurpathic pain. Please see the comprehensive neurologic consultation note from that date in the Epic records for details.     Previous Hx:  Nohelia Crockett is a 43 year old female who presents today for evaluation of neuropathy.    She states she has had cold pains going down both legs and involving her hands.     She thinks she has had these symptoms for years.   She has cold, tingling feeling from her lateral hip all the way down into her feet worse on the L than the R.  The pain goes down to the toes on L but only to the level of the knee on the right.   In her hands she feels her hands will lock up.  She also has numb and tingling sensation in her hands.     She will feel she can't use her hands as well.  No bowel/bladder loss.  No sensory level.  She denies any vision changes.  She is not having any headaches.  Headaches resolved years ago with a ear piercing.      She is currently on gabapentin 100-100-300.   She has found the gabapentin immensely helpful.  She will notice if she does not take it.       She has seen Dr. Manriquez in the past for a question of MS who did not think that she had MS.   She did get a spinal tap and its reported as negative in his note although I cannot see oligoclonal bands tested.       No family hx of neuropathy or MS     She reports weight loss 25-30 lb weight loss over the past year.  Gives credit to a low sodium diet which she is on for her kidney.      Interval history:     She is still on vitamin b6 and vitamin b12 as well as gabapentin 100-100-300, and duloxetine 40mg.   She still has pain in her feet bilaterally and her L arm.   She reports her L arm  "feels a little worse but her feet are about the same.    She denies any weakness or gait changes.      Physical Exam:  Vitals: /71 (BP Location: Right arm, Patient Position: Sitting, Cuff Size: Adult Large)   Pulse 75   Temp 97.9  F (36.6  C) (Temporal)   Resp 16   Ht 1.651 m (5' 5\")   Wt 99.8 kg (220 lb)   SpO2 97%   BMI 36.61 kg/m     General: Seated comfortably in no acute distress.  HEENT: Neck supple .    Pulmonary: Respirations unlabored.  Extremities: No LE edema.   Neurologic:  Awake alert, attentive, speech is clear and fluent.  No paraphasic errors.  Facial movement symmetric.  No dysarthria.  No tremors or other abnormal movements observed.  Strength 5 out of 5 throughout upper and lower extremities.  Deep tendon reflexes are 2+ in biceps, brachioradialis and patella.  Sensation is hyperalgesic over the left arm compared to the right.  Bilateral lower extremities with sensation intact to light touch.  Had previously been normal to vibration.  Does have hyperalgesia on the distal foot to pinprick.  Steady casual gait.  Numbness over the left lateral thigh.    Pertinent Investigations:  Vitamin b6: 15  B12: 323      Dragon disclaimer: This documentation was completed with the aid of dictation software.  Please note that there may be some inconsistencies due to software errors.  Errors are corrected in real time, however if there is a remaining error, please do not hesitate to reach out for clarification.          Again, thank you for allowing me to participate in the care of your patient.        Sincerely,        Jaxson Gould MD  "

## 2024-07-05 NOTE — NURSING NOTE
"Chief Complaint   Patient presents with    Follow Up     Parethesis       Initial /71 (BP Location: Right arm, Patient Position: Sitting, Cuff Size: Adult Large)   Pulse 75   Temp 97.9  F (36.6  C) (Temporal)   Resp 16   Ht 1.651 m (5' 5\")   Wt 99.8 kg (220 lb)   SpO2 97%   BMI 36.61 kg/m   Estimated body mass index is 36.61 kg/m  as calculated from the following:    Height as of this encounter: 1.651 m (5' 5\").    Weight as of this encounter: 99.8 kg (220 lb).  Medication Review: complete    The next two questions are to help us understand your food security.  If you are feeling you need any assistance in this area, we have resources available to support you today.          1/18/2024   SDOH- Food Insecurity   Within the past 12 months, did you worry that your food would run out before you got money to buy more? N   Within the past 12 months, did the food you bought just not last and you didn t have money to get more? N            Health Care Directive:  Patient does not have a Health Care Directive or Living Will: Patient states has Advance Directive and will bring in a copy to clinic.    Javier Carson      "

## 2024-07-08 LAB — PYRIDOXAL PHOS SERPL-SCNC: 255.9 NMOL/L

## 2024-07-19 ENCOUNTER — TELEPHONE (OUTPATIENT)
Dept: NEUROLOGY | Facility: CLINIC | Age: 44
End: 2024-07-19
Payer: COMMERCIAL

## 2024-07-19 NOTE — TELEPHONE ENCOUNTER
Patient confirmed scheduled appointment:  Date: 9/25/24  Time: 12:00pm  Visit type: EMG  Provider: Ashwin  Location: [facility]  Testing/imaging:   Additional notes:     Omayra Leigh on 7/19/2024 at 12:33 PM

## 2024-08-23 DIAGNOSIS — R82.994 HYPERCALCIURIA: ICD-10-CM

## 2024-08-23 DIAGNOSIS — Z87.442 HISTORY OF KIDNEY STONES: ICD-10-CM

## 2024-08-27 ENCOUNTER — MYC REFILL (OUTPATIENT)
Dept: FAMILY MEDICINE | Facility: OTHER | Age: 44
End: 2024-08-27
Payer: COMMERCIAL

## 2024-08-27 DIAGNOSIS — R82.994 HYPERCALCIURIA: ICD-10-CM

## 2024-08-27 DIAGNOSIS — Z87.442 HISTORY OF KIDNEY STONES: ICD-10-CM

## 2024-08-27 RX ORDER — CHLORTHALIDONE 25 MG/1
TABLET ORAL
Qty: 45 TABLET | Refills: 0 | Status: SHIPPED | OUTPATIENT
Start: 2024-08-27

## 2024-08-27 RX ORDER — CHLORTHALIDONE 25 MG/1
12.5 TABLET ORAL DAILY
Qty: 45 TABLET | Refills: 1 | Status: CANCELLED | OUTPATIENT
Start: 2024-08-27

## 2024-08-27 NOTE — TELEPHONE ENCOUNTER
[id number] of [place] sent Rx request for the following:      Requested Prescriptions   Pending Prescriptions Disp Refills    chlorthalidone (HYGROTON) 25 MG tablet [Pharmacy Med Name: Chlorthalidone 25 MG Oral Tablet] 45 tablet 0     Sig: TAKE 1/2 (ONE-HALF) TABLET BY MOUTH ONCE DAILY COMBINE WITH A LOW  SALT  DIET  TO  PREVENT  STONES.       Diuretics (Including Combos) Protocol Failed - 8/27/2024  2:46 PM        Failed - Medication indicated for associated diagnosis     Medication is associated with one or more of the following diagnoses:     Edema   Hypertension   Heart Failure   Meniere's Disease   Bilateral localized swelling of lower limbs   Pulmonary Hypertension     Last Prescription Date:   2/9/24  Last Fill Qty/Refills:         45, R-1    Last Office Visit:              1/24/24 (History of kidney stones discussed)   Future Office visit:             Future Appointments 8/27/2024 - 2/23/2025        Date Visit Type Length Department Provider     9/25/2024 12:00 PM EMG 45 min [facility] Gal Christopher MD    Location Instructions:     [facility] ([facility]) [place] area with multiple transportation and parking options. You may wish to review options for  service and self-parking in more detail on the [facility] s website at [email or web address].              11/1/2024 11:00 AM VIDEO VISIT RETURN 30 min  NEUROLOGY Jaxson Gould MD    Location Instructions:     [facility] is located [address] of [address] [facility] [address].              11/18/2024  9:30 AM LAB 10 min  LABORATORY  LAB    Location Instructions:     [facility] is located west of [address] and [address] [facility].              11/18/2024 10:00 AM TELEMEDICINE 30 min  UROLOGY Gus Delgado MD    Location Instructions:     [facility] is located west of [address] and [address] [facility].                   Unable to complete prescription refill per RN  Medication Refill Policy. Routing to covering provider for refill consideration, as PCP/provider is out of clinic >48 hours or Pt is completely out of medication and provider is out of the clinic today. Maia Raines RN .............. 8/27/2024  2:47 PM

## 2024-08-28 NOTE — TELEPHONE ENCOUNTER
chlorthalidone (HYGROTON) 25 MG tablet 45 tablet 0 8/27/2024 -- No   Sig: TAKE 1/2 (ONE-HALF) TABLET BY MOUTH ONCE DAILY COMBINE WITH A LOW  SALT  DIET  TO  PREVENT  STONES.   [facility]   Should have refills.  Basia Chinchilla RN on 8/28/2024 at 3:49 PM

## 2024-09-06 ENCOUNTER — HOSPITAL ENCOUNTER (OUTPATIENT)
Dept: GENERAL RADIOLOGY | Facility: OTHER | Age: 44
Discharge: HOME | End: 2024-09-06
Attending: STUDENT IN AN ORGANIZED HEALTH CARE EDUCATION/TRAINING PROGRAM
Payer: COMMERCIAL

## 2024-09-06 ENCOUNTER — OFFICE VISIT (OUTPATIENT)
Dept: FAMILY MEDICINE | Facility: OTHER | Age: 44
End: 2024-09-06
Attending: STUDENT IN AN ORGANIZED HEALTH CARE EDUCATION/TRAINING PROGRAM
Payer: COMMERCIAL

## 2024-09-06 VITALS
WEIGHT: 224.4 LBS | HEIGHT: 65 IN | DIASTOLIC BLOOD PRESSURE: 68 MMHG | BODY MASS INDEX: 37.39 KG/M2 | TEMPERATURE: 97.8 F | SYSTOLIC BLOOD PRESSURE: 118 MMHG | HEART RATE: 90 BPM | OXYGEN SATURATION: 97 % | RESPIRATION RATE: 18 BRPM

## 2024-09-06 DIAGNOSIS — L03.314 CELLULITIS OF GROIN: Primary | ICD-10-CM

## 2024-09-06 DIAGNOSIS — L84 CALLUS BETWEEN TOES: ICD-10-CM

## 2024-09-06 PROCEDURE — 87070 CULTURE OTHR SPECIMN AEROBIC: CPT | Mod: ZL | Performed by: STUDENT IN AN ORGANIZED HEALTH CARE EDUCATION/TRAINING PROGRAM

## 2024-09-06 PROCEDURE — 73660 X-RAY EXAM OF TOE(S): CPT | Mod: RT

## 2024-09-06 PROCEDURE — G0463 HOSPITAL OUTPT CLINIC VISIT: HCPCS

## 2024-09-06 PROCEDURE — 99213 OFFICE O/P EST LOW 20 MIN: CPT | Performed by: STUDENT IN AN ORGANIZED HEALTH CARE EDUCATION/TRAINING PROGRAM

## 2024-09-06 RX ORDER — SULFAMETHOXAZOLE/TRIMETHOPRIM 800-160 MG
1 TABLET ORAL 2 TIMES DAILY
Qty: 14 TABLET | Refills: 0 | Status: SHIPPED | OUTPATIENT
Start: 2024-09-06 | End: 2024-09-13

## 2024-09-06 ASSESSMENT — PAIN SCALES - GENERAL: PAINLEVEL: EXTREME PAIN (8)

## 2024-09-06 NOTE — NURSING NOTE
"Chief Complaint   Patient presents with    Derm Problem     Ingrown Hair Right Side of Groin x 2-3 Days     Nail Problem     Right Foot, 5th Digit        Initial /68 (BP Location: Left arm, Patient Position: Chair, Cuff Size: Adult Large)   Pulse 90   Temp 97.8  F (36.6  C) (Tympanic)   Resp 18   Ht 1.651 m (5' 5\")   Wt 101.8 kg (224 lb 6.4 oz)   SpO2 97%   BMI 37.34 kg/m   Estimated body mass index is 37.34 kg/m  as calculated from the following:    Height as of this encounter: 1.651 m (5' 5\").    Weight as of this encounter: 101.8 kg (224 lb 6.4 oz).      Medication Reconciliation: Complete.       Emeli Vallejo LPN on 9/6/2024 at 12:53 PM     "

## 2024-09-06 NOTE — PATIENT INSTRUCTIONS
Cellulitis    Bactrim twice a day for one week.  Heat packs.  Epson salt soaks.  Follow up with gynecology if not improving.      Toe pain/Callous    X-ray imaging was completed today.    [facility]    Address: [address], [place] [address]  Phone: [phone]    [facility]    [address] [place] [address] [phone]    [facility], unsure of travel locations    Schedule by Phone  [phone]

## 2024-09-07 NOTE — PROGRESS NOTES
Assessment & Plan     (L03.314) Cellulitis of groin  (primary encounter diagnosis)    Comment: Cellulitis of the outer lip.  It is currently draining spontaneously.  Her vital signs are stable.  No obvious ingrown hair that could be removed at this time.    Plan: sulfamethoxazole-trimethoprim (BACTRIM DS)         800-160 MG tablet, Swab Aerobic Bacterial         Culture Routine With Gram Stain, Ob/Gyn          Referral          Plan to treat with Bactrim twice a day for 1 week, she does have allergies to penicillins.  Continue the warm packs.  Ibuprofen and/or Tylenol.  Follow-up with gynecology if not improving.  Return to rapid clinic or ER if worsening or changing.  She is comfortable and agreeable with this plan.    (L84) Callus between toes    Comment: Callus on the lateral side of the fourth toe.  Most likely this is causing her symptoms.  Attempted to create a splint in the office without any success.  No evidence of fracture, dislocation or bony abnormality on x-ray imaging.  Films were reviewed with patient in the office.    Plan: XR Toe Right G/E 2 Views, Orthopedic          Referral        Plan to follow-up with podiatry for persistent symptoms.  In the meantime, wear open toed shoes is much as possible.  Try to keep the toes .  Return to rapid clinic or ER if symptoms worsen or change.  She is comfortable and agreeable with this plan.    Anthony Pozo is a 44 year old, presenting for the following health issues:  Derm Problem (Ingrown Hair Right Side of Groin x 2-3 Days ) and Nail Problem (Right Foot, 5th Digit )    HPI    Patient presents today with concerns of redness, swelling, and drainage from the right vulvar area.  She notes it started about 2 to 3 days ago, this morning it did start draining.  She notes it is mostly clear drainage.  It is painful.  No notable fevers.  She has been using Epsom salt soaks, heat packs.    She also notes her fourth digit of her  "right foot has developed pain.  She notes over the last couple of months, it has become more painful.  She feels like there is a \"blister\" or a callus type area on the lateral side of the toe.  Is not ever bled.  She has tried different things such as Band-Aids, gauze strips, or other items to try and separate the toes.  She notes that it is better when she wears open toed shoes versus tight shoes.      Review of Systems  Constitutional, HEENT, cardiovascular, pulmonary, gi and gu systems are negative, except as otherwise noted.        Objective    /68 (BP Location: Left arm, Patient Position: Chair, Cuff Size: Adult Large)   Pulse 90   Temp 97.8  F (36.6  C) (Tympanic)   Resp 18   Ht 1.651 m (5' 5\")   Wt 101.8 kg (224 lb 6.4 oz)   SpO2 97%   BMI 37.34 kg/m    Body mass index is 37.34 kg/m .    Physical Exam   GENERAL: alert and no distress  RESP: lungs clear to auscultation - no rales, rhonchi or wheezes  CV: regular rate and rhythm, normal S1 S2  ABDOMEN: soft, nontender, no hepatosplenomegaly, no masses and bowel sounds normal   (female): Right outer lip near the anterior aspect of the lip with approximately 1 inch x 1 inch area of firmness, erythema, tenderness, and clear slight drainage to palpation without any notable areas of fluctuance, normal urethral meatus  MS: Right fourth toe on the lateral side of toe with callus appearing area, no bleeding, blistering, pustules, redness, tender to palpation    Results for orders placed or performed during the hospital encounter of 09/06/24   XR Toe Right G/E 2 Views     Status: None    Narrative    PROCEDURE:  XR TOE RIGHT G/E 2 VIEWS    HISTORY: Callus between toes    COMPARISON:  None.    TECHNIQUE:  3 views of the right fourth toe were obtained.    FINDINGS:  The articular spaces are normal in height at the right  fourth toe. There are no fractures or destructive lesions.       Impression    IMPRESSION: Normal right fourth toe      CHLOE DU, " MD         SYSTEM ID:  [id number]         Signed Electronically by: Jamaiac Matthew PA-C

## 2024-09-10 LAB
BACTERIA SPEC CULT: ABNORMAL
GRAM STAIN RESULT: ABNORMAL
GRAM STAIN RESULT: ABNORMAL

## 2024-09-14 ENCOUNTER — MYC REFILL (OUTPATIENT)
Dept: FAMILY MEDICINE | Facility: OTHER | Age: 44
End: 2024-09-14
Payer: COMMERCIAL

## 2024-09-14 DIAGNOSIS — L21.9 SEBORRHEIC DERMATITIS: ICD-10-CM

## 2024-09-18 RX ORDER — KETOCONAZOLE 20 MG/G
CREAM TOPICAL 2 TIMES DAILY
Qty: 60 G | Refills: 2 | Status: SHIPPED | OUTPATIENT
Start: 2024-09-18

## 2024-09-18 NOTE — TELEPHONE ENCOUNTER
[id number] sent Rx request for the following:      Requested Prescriptions   Pending Prescriptions Disp Refills    ketoconazole (NIZORAL) 2 % external cream 60 g 2     Sig: Apply topically 2 times daily.       Antifungal Agents Failed - 9/14/2024  3:54 PM        Failed - Always Fail Criteria          Last Prescription Date:   11/17/22  Last Fill Qty/Refills:         60 g, R-2    Last Office Visit:              12/7/23   Future Office visit:           none    Routing refill request to provider for review/approval because:  Drug not on the G refill protocol     Onelia Willis RN on 9/18/2024 at 4:16 PM

## 2024-09-25 ENCOUNTER — OFFICE VISIT (OUTPATIENT)
Dept: NEUROLOGY | Facility: CLINIC | Age: 44
End: 2024-09-25
Payer: COMMERCIAL

## 2024-09-25 DIAGNOSIS — G62.9 NEUROPATHY: ICD-10-CM

## 2024-09-25 DIAGNOSIS — G62.9 SMALL FIBER NEUROPATHY: Primary | ICD-10-CM

## 2024-09-25 DIAGNOSIS — G89.29 CHRONIC BILATERAL LOW BACK PAIN WITHOUT SCIATICA: Primary | ICD-10-CM

## 2024-09-25 DIAGNOSIS — M54.50 CHRONIC BILATERAL LOW BACK PAIN WITHOUT SCIATICA: Primary | ICD-10-CM

## 2024-09-25 DIAGNOSIS — R20.2 PARESTHESIAS: ICD-10-CM

## 2024-09-25 PROCEDURE — 11104 PUNCH BX SKIN SINGLE LESION: CPT | Mod: LT | Performed by: PSYCHIATRY & NEUROLOGY

## 2024-09-25 PROCEDURE — 95910 NRV CNDJ TEST 7-8 STUDIES: CPT | Performed by: PSYCHIATRY & NEUROLOGY

## 2024-09-25 PROCEDURE — 95885 MUSC TST DONE W/NERV TST LIM: CPT | Performed by: PSYCHIATRY & NEUROLOGY

## 2024-09-25 PROCEDURE — 11105 PUNCH BX SKIN EA SEP/ADDL: CPT | Mod: LT | Performed by: PSYCHIATRY & NEUROLOGY

## 2024-09-25 PROCEDURE — 88356 ANALYSIS NERVE: CPT | Performed by: PSYCHIATRY & NEUROLOGY

## 2024-09-25 NOTE — LETTER
9/25/2024       RE: Nohelia Crockett  [address] [address]     Dear Colleague,    Thank you for referring your patient, Nohelia Crockett, to the [facility] EMG CLINIC [place] [facility]. Please see a copy of my visit note below.    Procedure Note: Neurodiagnostic skin biopsy   ?Reason for biopsy: Pain in feet. Eval for small fiber neuropathy.   Referral: Dr. Gould  Written informed consent was obtained.   ? The standard sites on the left foot dorsum and medial calf were sterilely prepped. Xylocaine 1% was administered by subdermal injection. A total of 6 mL was used. A 3 mm punch biopsy was removed from each site. The specimens were placed in fixative. The specimen from the foot was placed in a vial labeled as left foot and and the specimen from the calf was labeled as left calf. The presence of the specimen in the vial was verified by two individuals. After verification the samples were sent to the neuropathology laboratory for analysis. The biopsy sites were dressed with a pressure bandage. Estimated blood loss was less than 10 drops total. The patient was informed about post-procedure bandage and biopsy site care. She was advised that it might take up to 4 weeks for results of the test to be available. No complications.   Gal Christopher MD  Department of Neurology\      Again, thank you for allowing me to participate in the care of your patient.      Sincerely,    Gal Christopher MD

## 2024-09-25 NOTE — PROGRESS NOTES
[facility]  Electrodiagnostic Laboratory                 Department of Neurology                                                                                                         Test Date:  2024    Patient: Nohelia Crockett : 1980 Physician: Gal Christopher MD   Sex: Female AGE: 44 year Ref Phys: Jaxson Gould MD   ID#: [id number]   Technician: Samaria Webster     History and Examination:  44 year old with chronic pain in upper and lower limbs. This study is being performed to investigate for radiculopathy vs focal neuropathy.     Techniques:  Motor and sensory conduction studies were done with surface recording electrodes.   EMG was done with a concentric needle electrode.     Results:  Nerve conduction studies:   1. Bilateral sural and superficial peroneal sensory responses are normal.   2. Bilateral peroneal-EDB and tibial-AH motor responses are normal.     Needle EMG of selected right and left lower limb muscles was performed as tabulated below. No abnormal spontaneous activity was observed in the sampled muscles. Motor unit potential morphology and recruitment patterns were normal.     Interpretation:  This is a normal study. There is no electrophysiologic evidence of a large fiber polyneuropathy, focal neuropathy or lumbosacral radiculopathy affecting the lower limbs on the basis of this study.       Gal Christopher MD  Department of Neurology    Nerve Conduction Studies  Motor Sites      Latency Neg. Amp Neg. Amp Diff Segment Distance Velocity Neg. Dur Neg Area Diff Temperature Comment   Site (ms) Norm (mV) Norm (%)  cm m/s Norm (ms) (%) ( C)    Left Fibular (EDB) Motor   Ankle 4.1  < 6.0 6.1 -  Ankle-EDB 8   5.6  29.7    Bel Fibular Head 10.5 - 5.5 - -10 Bel Fibular Head-Ankle 30.4 48  > 38 6.1 -3 30.2    Pop Fossa 12.1 - 5.5 - 0 Pop Fossa-Bel Fibular Head 8.5 53  > 38 6.3 2 30.2    Right Fibular (EDB) Motor   Ankle 5.0  < 6.0 5.0 -  Ankle-EDB 8   6.0   30.3    Left Tibial (AHB) Motor   Ankle 4.8  < 6.5 13.5  > 5.0  Ankle-AH 6.8   3.9  30.5    Knee 13.4 - 10.0 - -26 Knee-Ankle 39.2 46  > 38 5.2 0 30.7    Right Tibial (AHB) Motor   Ankle 5.2  < 6.5 12.3  > 5.0  Ankle-AH 6.4   4.7  31      F-Wave Sites      Min F-Lat Max-Min F-Lat Mean F-Lat   Site (ms) (ms) (ms)   Left Fibular F-Wave   Ankle 46.9 2.2 -   Left Tibial F-Wave   Ankle 49.8 2.3 -     Sensory Sites      Onset Lat Peak Lat Amp (O-P) Amp (P-P) Segment Distance Velocity Temperature Comment   Site ms (ms)  V Norm ( V)  cm m/s Norm ( C)    Left Superficial Fibular Sensory   Lower Leg-Ankle 2.7 3.4 5  > 3 6 Lower Leg-Ankle 13 48 - 30.6    Right Superficial Fibular Sensory   Lower Leg-Ankle 2.9 3.6 8  > 3 9 Lower Leg-Ankle 12.5 43 - 30.8    Left Sural Sensory   Calf-Lat Malleolus 3.0 3.7 6  > 5 8 Calf-Lat Malleolus 14 47  > 38 30.7    Right Sural Sensory   Calf-Lat Malleolus 2.9 3.6 10  > 5 12 Calf-Lat Malleolus 14 48  > 38 31.2        Electromyography     Side Muscle Ins Act Fibs/PSW Fasc HF Amp Dur Poly Recrt Int Pat   Left Vastus lat Nml None Nml 0 Nml Nml 0 Nml Nml   Left Tib ant Nml None Nml 0 Nml Nml 0 Nml Nml   Left Gastroc Nml None Nml 0 Nml Nml 0 Nml Nml   Right Tib ant Nml None Nml 0 Nml Nml 0 Nml Nml   Right Gastroc Nml None Nml 0 Nml Nml 0 Nml Nml         NCS Waveforms:    Motor                Sensory                F-Wave           Ultrasound Images:

## 2024-09-25 NOTE — LETTER
2024       RE: Nohelia Crockett  [address]  [facility] [address]     Dear Colleague,    Thank you for referring your patient, Nohelia Crockett, to the [facility] EMG [facility] [place] at [facility]. Please see a copy of my visit note below.                        [facility]  Electrodiagnostic Laboratory                 Department of Neurology                                                                                                         Test Date:  2024    Patient: Nohelia Crockett : 1980 Physician: Gal Christopher MD   Sex: Female AGE: 44 year Ref Phys: Jaxson Gould MD   ID#: [id number]   Technician: Samaria Webster     History and Examination:  44 year old with chronic pain in upper and lower limbs. This study is being performed to investigate for radiculopathy vs focal neuropathy.     Techniques:  Motor and sensory conduction studies were done with surface recording electrodes.   EMG was done with a concentric needle electrode.     Results:  Nerve conduction studies:   1. Bilateral sural and superficial peroneal sensory responses are normal.   2. Bilateral peroneal-EDB and tibial-AH motor responses are normal.     Needle EMG of selected right and left lower limb muscles was performed as tabulated below. No abnormal spontaneous activity was observed in the sampled muscles. Motor unit potential morphology and recruitment patterns were normal.     Interpretation:  This is a normal study. There is no electrophysiologic evidence of a large fiber polyneuropathy, focal neuropathy or lumbosacral radiculopathy affecting the lower limbs on the basis of this study.       Gal Christopher MD  Department of Neurology    Nerve Conduction Studies  Motor Sites      Latency Neg. Amp Neg. Amp Diff Segment Distance Velocity Neg. Dur Neg Area Diff Temperature Comment   Site (ms) Norm (mV) Norm (%)  cm m/s Norm (ms) (%)  ( C)    Left Fibular (EDB) Motor   Ankle 4.1  < 6.0 6.1 -  Ankle-EDB 8   5.6  29.7    Bel Fibular Head 10.5 - 5.5 - -10 Bel Fibular Head-Ankle 30.4 48  > 38 6.1 -3 30.2    Pop Fossa 12.1 - 5.5 - 0 Pop Fossa-Bel Fibular Head 8.5 53  > 38 6.3 2 30.2    Right Fibular (EDB) Motor   Ankle 5.0  < 6.0 5.0 -  Ankle-EDB 8   6.0  30.3    Left Tibial (AHB) Motor   Ankle 4.8  < 6.5 13.5  > 5.0  Ankle-AH 6.8   3.9  30.5    Knee 13.4 - 10.0 - -26 Knee-Ankle 39.2 46  > 38 5.2 0 30.7    Right Tibial (AHB) Motor   Ankle 5.2  < 6.5 12.3  > 5.0  Ankle-AH 6.4   4.7  31      F-Wave Sites      Min F-Lat Max-Min F-Lat Mean F-Lat   Site (ms) (ms) (ms)   Left Fibular F-Wave   Ankle 46.9 2.2 -   Left Tibial F-Wave   Ankle 49.8 2.3 -     Sensory Sites      Onset Lat Peak Lat Amp (O-P) Amp (P-P) Segment Distance Velocity Temperature Comment   Site ms (ms)  V Norm ( V)  cm m/s Norm ( C)    Left Superficial Fibular Sensory   Lower Leg-Ankle 2.7 3.4 5  > 3 6 Lower Leg-Ankle 13 48 - 30.6    Right Superficial Fibular Sensory   Lower Leg-Ankle 2.9 3.6 8  > 3 9 Lower Leg-Ankle 12.5 43 - 30.8    Left Sural Sensory   Calf-Lat Malleolus 3.0 3.7 6  > 5 8 Calf-Lat Malleolus 14 47  > 38 30.7    Right Sural Sensory   Calf-Lat Malleolus 2.9 3.6 10  > 5 12 Calf-Lat Malleolus 14 48  > 38 31.2        Electromyography     Side Muscle Ins Act Fibs/PSW Fasc HF Amp Dur Poly Recrt Int Pat   Left Vastus lat Nml None Nml 0 Nml Nml 0 Nml Nml   Left Tib ant Nml None Nml 0 Nml Nml 0 Nml Nml   Left Gastroc Nml None Nml 0 Nml Nml 0 Nml Nml   Right Tib ant Nml None Nml 0 Nml Nml 0 Nml Nml   Right Gastroc Nml None Nml 0 Nml Nml 0 Nml Nml         NCS Waveforms:    Motor                Sensory                F-Wave           Ultrasound Images:            Again, thank you for allowing me to participate in the care of your patient.      Sincerely,    Gal Christopher MD

## 2024-09-25 NOTE — PROGRESS NOTES
Procedure Note: Neurodiagnostic skin biopsy   ?Reason for biopsy: Pain in feet. Eval for small fiber neuropathy.   Referral: Dr. Gould  Written informed consent was obtained.   ? The standard sites on the left foot dorsum and medial calf were sterilely prepped. Xylocaine 1% was administered by subdermal injection. A total of 6 mL was used. A 3 mm punch biopsy was removed from each site. The specimens were placed in fixative. The specimen from the foot was placed in a vial labeled as left foot and and the specimen from the calf was labeled as left calf. The presence of the specimen in the vial was verified by two individuals. After verification the samples were sent to the neuropathology laboratory for analysis. The biopsy sites were dressed with a pressure bandage. Estimated blood loss was less than 10 drops total. The patient was informed about post-procedure bandage and biopsy site care. She was advised that it might take up to 4 weeks for results of the test to be available. No complications.   Gal Christopher MD  Department of Neurology\

## 2024-09-29 ENCOUNTER — HOSPITAL ENCOUNTER (EMERGENCY)
Facility: OTHER | Age: 44
Discharge: HOME | End: 2024-09-29
Payer: COMMERCIAL

## 2024-09-29 VITALS
BODY MASS INDEX: 37.32 KG/M2 | WEIGHT: 224 LBS | SYSTOLIC BLOOD PRESSURE: 128 MMHG | OXYGEN SATURATION: 98 % | RESPIRATION RATE: 20 BRPM | DIASTOLIC BLOOD PRESSURE: 82 MMHG | TEMPERATURE: 98 F | HEART RATE: 94 BPM | HEIGHT: 65 IN

## 2024-09-29 DIAGNOSIS — M54.2 NECK PAIN: ICD-10-CM

## 2024-09-29 PROCEDURE — 99284 EMERGENCY DEPT VISIT MOD MDM: CPT

## 2024-09-29 PROCEDURE — 96372 THER/PROPH/DIAG INJ SC/IM: CPT

## 2024-09-29 PROCEDURE — 250N000011 HC RX IP 250 OP 636

## 2024-09-29 PROCEDURE — 250N000009 HC RX 250

## 2024-09-29 RX ORDER — DEXAMETHASONE SODIUM PHOSPHATE 4 MG/ML
10 VIAL (ML) INJECTION ONCE
Status: COMPLETED | OUTPATIENT
Start: 2024-09-29 | End: 2024-09-29

## 2024-09-29 RX ORDER — CYCLOBENZAPRINE HCL 10 MG
10 TABLET ORAL
Qty: 15 TABLET | Refills: 0 | Status: SHIPPED | OUTPATIENT
Start: 2024-09-29

## 2024-09-29 RX ORDER — METHYLPREDNISOLONE 4 MG
TABLET, DOSE PACK ORAL
Qty: 21 TABLET | Refills: 0 | Status: SHIPPED | OUTPATIENT
Start: 2024-09-30

## 2024-09-29 RX ORDER — KETOROLAC TROMETHAMINE 30 MG/ML
30 INJECTION, SOLUTION INTRAMUSCULAR; INTRAVENOUS ONCE
Status: COMPLETED | OUTPATIENT
Start: 2024-09-29 | End: 2024-09-29

## 2024-09-29 RX ADMIN — KETOROLAC TROMETHAMINE 30 MG: 30 INJECTION, SOLUTION INTRAMUSCULAR at 16:22

## 2024-09-29 RX ADMIN — DEXAMETHASONE SODIUM PHOSPHATE 10 MG: 4 INJECTION, SOLUTION INTRAMUSCULAR; INTRAVENOUS at 16:22

## 2024-09-29 ASSESSMENT — ACTIVITIES OF DAILY LIVING (ADL)
ADLS_ACUITY_SCORE: 35
ADLS_ACUITY_SCORE: 35

## 2024-09-29 ASSESSMENT — COLUMBIA-SUICIDE SEVERITY RATING SCALE - C-SSRS
6. HAVE YOU EVER DONE ANYTHING, STARTED TO DO ANYTHING, OR PREPARED TO DO ANYTHING TO END YOUR LIFE?: NO
1. IN THE PAST MONTH, HAVE YOU WISHED YOU WERE DEAD OR WISHED YOU COULD GO TO SLEEP AND NOT WAKE UP?: NO
2. HAVE YOU ACTUALLY HAD ANY THOUGHTS OF KILLING YOURSELF IN THE PAST MONTH?: NO

## 2024-09-29 ASSESSMENT — ENCOUNTER SYMPTOMS: NECK PAIN: 1

## 2024-09-29 NOTE — ED TRIAGE NOTES
"ED Nursing Triage Note (General)   ________________________________    Nohelia Crockett is a 44 year old Female that presents to triage via private vehicle with complaints of L) sided neck pain.  Patient states hx of chronic neck pain and states she has known pinched nerves.  Patient works with PT, however, states pain has persisted for the past 10 days.  Patient has an upcoming apt Wednesday, however, states she can't wait that long due to persisting pain. No injury or known reason for exacerbation.   Significant symptoms had onset 10 day(s) ago.  Vital signs:  Temp: 98  F (36.7  C) Temp src: Tympanic BP: 128/82 Pulse: 94   Resp: 20 SpO2: 98 %     Height: 165.1 cm (5' 5\") Weight: 101.6 kg (224 lb)  Estimated body mass index is 37.28 kg/m  as calculated from the following:    Height as of this encounter: 1.651 m (5' 5\").    Weight as of this encounter: 101.6 kg (224 lb).  PRE HOSPITAL PRIOR LIVING SITUATION Alone      Triage Assessment (Adult)       Row Name 09/29/24 1503          Triage Assessment    Airway WDL WDL        Respiratory WDL    Respiratory WDL WDL        Skin Circulation/Temperature WDL    Skin Circulation/Temperature WDL WDL        Cardiac WDL    Cardiac WDL WDL     Cardiac Rhythm NSR        Peripheral/Neurovascular WDL    Peripheral Neurovascular WDL WDL     Capillary Refill, General less than/equal to 3 secs        Cognitive/Neuro/Behavioral WDL    Cognitive/Neuro/Behavioral WDL WDL        Micah Coma Scale    Best Eye Response 4-->(E4) spontaneous     Best Motor Response 6-->(M6) obeys commands     Best Verbal Response 5-->(V5) oriented     Micah Coma Scale Score 15                     "

## 2024-09-29 NOTE — ED PROVIDER NOTES
History     Chief Complaint   Patient presents with    Neck Pain     HPI  Nohelia Crockett is a 44 year old female with neck pain. Hx neck pain. Increased neck pain over the past 10 days. No relief with tylenol, ibuprofen, heat or ice. Pain radiates down into her left arm. Intermittent tingling sensation in her left hand. She is left hand dominant. Denies any recent injuries or trauma. She has been working physical therapy with no change in pain. She was at work today and the neck pain become unbearable. Denies headache. Denies dizziness. Denies fever or chills.     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/17/2022     Priority: Medium    History of kidney stones, hypercalciuria on chlorthalidone 10/17/2022     Priority: Medium     CONSIDER RENAL US if presenting for renal colic symptoms (limiting radiation exposure)      Morbid obesity (H) 08/20/2021     Priority: Medium    Suspected sleep apnea 05/08/2019     Priority: Medium    Attention deficit disorder 02/19/2018     Priority: Medium    Knee pain 02/19/2018     Priority: Medium     Overview:   secondary to patellofemoral dysfunction      Learning disability 02/19/2018     Priority: Medium    Migraine headache 02/19/2018     Priority: Medium     Overview:   Occasional severe HA, does not require daily prophylaxis      Obesity (BMI 35.0-39.9 without comorbidity) 12/14/2016     Priority: Medium    Adhesive capsulitis of right shoulder 06/06/2016     Priority: Medium    S/P shoulder surgery 06/06/2016     Priority: Medium    Gastroesophageal reflux disease without esophagitis 05/26/2016     Priority: Medium    Pain of multiple sites 10/19/2015     Priority: Medium    History of arthroscopy of right knee 07/02/2015     Priority: Medium    Plantar fasciitis 11/14/2014     Priority: Medium    H/O excision of mass 09/03/2014      Priority: Medium    Genital herpes 12/07/2009     Priority: Medium     Overview:   Has had only one outbreak          Past Medical History:    Past Medical History:   Diagnosis Date    Adhesive capsulitis of right shoulder     Asthma 2/19/2018    Cyst of ovary 2006    Localized swelling, mass, or lump of upper extremity 08/26/2014    Migraine without status migrainosus, not intractable     Other developmental disorders of scholastic skills     Other specified behavioral and emotional disorders with onset usually occurring in childhood and adolescence     Other specified postprocedural states 09/03/2014    Other specified postprocedural states 06/06/2016    Peptic ulcer without hemorrhage or perforation 1999    Personal history of urinary calculi 2004    Plantar fascial fibromatosis 11/14/2014    Uncomplicated asthma        Past Surgical History:    Past Surgical History:   Procedure Laterality Date    APPENDECTOMY OPEN  1999    ARTHROSCOPY SHOULDER      10/05,debridement and subacromial decompression of right shoulder.    ARTHROSCOPY SHOULDER  08/2007    acromioplasty distal clavicle excision, and extensive debridement of the bursa of the right shoulder by Dr. Villa.  Also left?    CHOLECYSTECTOMY  1999    COMBINED CYSTOSCOPY, URETEROSCOPY, LASER HOLMIUM LITHOTRIPSY URETER(S) Left 7/19/2022    Procedure: Left ureteroscopy with laser lithotripsy and stent placement;  Surgeon: Melecio Pantoja MD;  Location: [facility] OR    CYSTOSCOPY  2004    with retrogrades    DILATION AND CURETTAGE  11/2007    Ablation    LAPAROSCOPIC TUBAL LIGATION  2004    LITHOTRIPSY  12/2004    extracorporeal shockwave    OTHER SURGICAL HISTORY  08/13/2012    OTHER SURGICAL HISTORY Right 09/03/2014    Pathology showed epidermal inclusion cysts    RELEASE CARPAL TUNNEL  2012       Family History:    Family History   Problem Relation Age of Onset    Cancer Mother 46        Cancer,Lung cancer    Blood Disease Mother         Blood Disease    Asthma  Father         Asthma    Diabetes Father         Diabetes,2    Hypertension Father         Hypertension    Heart Disease Father         Heart Disease,CHF    Other - See Comments Father          at age 65 from complications of a motor vehicle accident.    Family History Negative Sister         Good Health    Family History Negative Sister         Good Health    Breast Cancer No family hx of         Cancer-breast    Colon Cancer No family hx of         Cancer-colon    Prostate Cancer No family hx of         Cancer-prostate    Ovarian Cancer No family hx of         Cancer-ovarian    Anesthesia Reaction No family hx of         Anesthesia Problem       Social History:  Marital Status:   [4]  Social History     Tobacco Use    Smoking status: Former     Current packs/day: 0.00     Average packs/day: 0.5 packs/day for 16.0 years (8.0 ttl pk-yrs)     Types: Cigarettes     Start date: 2000     Quit date: 2016     Years since quittin.7     Passive exposure: Never    Smokeless tobacco: Never   Vaping Use    Vaping status: Never Used   Substance Use Topics    Alcohol use: Not Currently     Alcohol/week: 0.0 - 1.0 standard drinks of alcohol     Comment: Alcoholic Drinks/day: occasionally    Drug use: No        Medications:    cyclobenzaprine (FLEXERIL) 10 MG tablet  [START ON 2024] methylPREDNISolone (MEDROL DOSEPAK) 4 MG tablet therapy pack  chlorthalidone (HYGROTON) 25 MG tablet  cyanocobalamin 1000 MCG sublingual tablet  DULoxetine (CYMBALTA) 30 MG capsule  gabapentin (NEURONTIN) 100 MG capsule  gabapentin (NEURONTIN) 300 MG capsule  ketoconazole (NIZORAL) 2 % external cream  loratadine (CLARITIN) 10 MG tablet  potassium citrate (UROCIT-K) 10 MEQ (1080 MG) CR tablet  pyridOXINE (VITAMIN  B-6) 25 MG tablet  Vitamin D, Cholecalciferol, 25 MCG (1000 UT) TABS          Review of Systems   Musculoskeletal:  Positive for neck pain.   Neurological:         Paraesthesias    All other systems reviewed and are  "negative.      Physical Exam   BP: 128/82  Pulse: 94  Temp: 98  F (36.7  C)  Resp: 20  Height: 165.1 cm (5' 5\")  Weight: 101.6 kg (224 lb)  SpO2: 98 %      Physical Exam  Vitals and nursing note reviewed.   Constitutional:       Appearance: Normal appearance. She is not ill-appearing.   HENT:      Head: Normocephalic.   Neck:     Cardiovascular:      Rate and Rhythm: Normal rate and regular rhythm.      Pulses: Normal pulses.      Heart sounds: Normal heart sounds.   Musculoskeletal:      Cervical back: No rigidity. Muscular tenderness present. No spinous process tenderness.   Skin:     General: Skin is warm and dry.      Capillary Refill: Capillary refill takes less than 2 seconds.   Neurological:      General: No focal deficit present.      Mental Status: She is alert and oriented to person, place, and time.      GCS: GCS eye subscore is 4. GCS verbal subscore is 5. GCS motor subscore is 6.      Cranial Nerves: Cranial nerves 2-12 are intact.      Sensory: Sensation is intact.      Motor: Motor function is intact.      Coordination: Coordination is intact.      Gait: Gait is intact.   Psychiatric:         Mood and Affect: Mood normal.       No results found for this or any previous visit (from the past 24 hour(s)).    Medications   ketorolac (TORADOL) injection 30 mg (30 mg Intramuscular $Given 9/29/24 1622)   dexAMETHasone (DECADRON) injectable solution used ORALLY 10 mg (10 mg Oral $Given 9/29/24 1622)       Assessments & Plan (with Medical Decision Making)  Nohelia Crockett is a 44 year old female with neck pain. Hx neck pain. Increased neck pain over the past 10 days. No relief with tylenol, ibuprofen, heat or ice. Pain radiates down into her left arm. Intermittent tingling sensation in her left hand. She is left hand dominant. Denies any recent injuries or trauma. She has been working physical therapy with no change in pain. She was at work today and the neck pain become unbearable. Denies headache. Denies " "dizziness. Denies fever or chills.   VS in the ED. /82   Pulse 94   Temp 98  F (36.7  C) (Tympanic)   Resp 20   Ht 1.651 m (5' 5\")   Wt 101.6 kg (224 lb)   SpO2 98%   BMI 37.28 kg/m  Awake, alert, and conversant in no acute distress. No focal neuro deficits. Tenderness on palpation to left neck musculature. Sensation intact. Distal pulse present and strong. Gave Toradol and decadron.   Nohelia is a 43 y/o female evaluated today for neck pain. Hx cervical radiculopathy. Prior MR cervical spine (2/21/24) revealed asymmetric uncovertebral and facet degenerative changes again contribute to asymmetric foraminal narrowing at multiple levels. Hx epidural injection with no relief. I suspect she is having a flare of her chronic cervical radiculopathy. Her paresthesias are peripheral. No focal neuro deficits. Left arm is strong. Distal pulses present and strong. Sensation intact. Will give a short course of steroids. Will prescribe flexeril for as needed at bedtime for muscle spasms. Discussed close follow up with primary care provider. Discussed return to the ER precautions. Verbalizes understanding of discharge plan.      I have reviewed the nursing notes.    I have reviewed the findings, diagnosis, plan and need for follow up with the patient.  Medical Decision Making  The patient's presentation was of moderate complexity (a chronic illness mild to moderate exacerbation, progression, or side effect of treatment).    The patient's evaluation involved:  an assessment requiring an independent historian (see separate area of note for details)  review of 1 test result(s) ordered prior to this encounter (see separate area of note for details)    The patient's management necessitated moderate risk (prescription drug management including medications given in the ED).    Discharge Medication List as of 9/29/2024  5:49 PM        START taking these medications    Details   cyclobenzaprine (FLEXERIL) 10 MG tablet Take 1 " tablet (10 mg) by mouth nightly as needed for muscle spasms., Disp-15 tablet, R-0, [facility]      methylPREDNISolone (MEDROL DOSEPAK) 4 MG tablet therapy pack Follow Package Directions, Disp-21 tablet, R-0, E-Prescribe           Final diagnoses:   Neck pain     9/29/2024   [facility], APRN CNP  09/29/24 5058

## 2024-09-29 NOTE — DISCHARGE INSTRUCTIONS
I suspect you have pressure on a nerve in your neck. Will give you a short course of steroids to help with reducing inflammation on the nerve.     Flexeril (muscle relaxant) 0.5-1 tab as needed at bedtime for muscle spasms. Do not drive or operate machinery when taking this medication.     Take tylenol and ibuprofen as needed for pain or discomfort.     Please call and schedule an appointment to follow up with primary care provider for the next 1-2 weeks.     Please return to the ER if you experience weakness, unable to move your arm, dizziness, severe headache, or any new or worsening symptoms.

## 2024-09-29 NOTE — Clinical Note
Nohelia Crockett was seen and treated in our emergency department on 9/29/2024.  She may return to work on 10/01/2024.       If you have any questions or concerns, please don't hesitate to call.      Madhavi Sapp APRN CNP

## 2024-10-08 ENCOUNTER — OFFICE VISIT (OUTPATIENT)
Dept: FAMILY MEDICINE | Facility: OTHER | Age: 44
End: 2024-10-08
Attending: FAMILY MEDICINE
Payer: COMMERCIAL

## 2024-10-08 ENCOUNTER — MYC MEDICAL ADVICE (OUTPATIENT)
Dept: FAMILY MEDICINE | Facility: OTHER | Age: 44
End: 2024-10-08

## 2024-10-08 VITALS
TEMPERATURE: 98.5 F | RESPIRATION RATE: 20 BRPM | BODY MASS INDEX: 37.82 KG/M2 | DIASTOLIC BLOOD PRESSURE: 74 MMHG | SYSTOLIC BLOOD PRESSURE: 122 MMHG | OXYGEN SATURATION: 97 % | HEIGHT: 65 IN | WEIGHT: 227 LBS | HEART RATE: 96 BPM

## 2024-10-08 DIAGNOSIS — M54.12 CERVICAL RADICULOPATHY: Primary | ICD-10-CM

## 2024-10-08 PROCEDURE — G0463 HOSPITAL OUTPT CLINIC VISIT: HCPCS

## 2024-10-08 PROCEDURE — 99214 OFFICE O/P EST MOD 30 MIN: CPT | Performed by: FAMILY MEDICINE

## 2024-10-08 RX ORDER — METHOCARBAMOL 500 MG/1
500 TABLET, FILM COATED ORAL 4 TIMES DAILY PRN
Qty: 60 TABLET | Refills: 0 | Status: SHIPPED | OUTPATIENT
Start: 2024-10-08

## 2024-10-08 RX ORDER — TRAMADOL HYDROCHLORIDE 50 MG/1
50 TABLET ORAL EVERY 6 HOURS PRN
Qty: 15 TABLET | Refills: 0 | Status: SHIPPED | OUTPATIENT
Start: 2024-10-08 | End: 2024-10-13

## 2024-10-08 ASSESSMENT — PAIN SCALES - GENERAL: PAINLEVEL: WORST PAIN (10)

## 2024-10-08 NOTE — PROGRESS NOTES
"  Assessment & Plan     (M54.12) Cervical radiculopathy  (primary encounter diagnosis)  Comment: Patient reports left cervical radiculopathy difficult to target to a specific dermatome or peripheral nerve.  She is clearly very inflamed and painful over SCM as well as trapezium.  I discussed options with patient.  I think it is reasonable to update her MRI given her acute worsening of symptoms and subjective neuropathy although on exam this seems to be more muscular.  Cap refill is normal and reflexes appear symmetric    For now we will treat with methocarbamol and a small quantity of tramadol.  Suggest gentle heat and stretching.  Will restart physical therapy.    Encourage patient to follow-up with the neurologist as scheduled and to discuss with them her acute on chronic neck and upper extremity pain      Plan: methocarbamol (ROBAXIN) 500 MG tablet, MR         Cervical Spine w/o Contrast, traMADol (ULTRAM)         50 MG tablet          MED REC REQUIRED  Post Medication Reconciliation Status:     BMI  Estimated body mass index is 37.77 kg/m  as calculated from the following:    Height as of this encounter: 1.651 m (5' 5\").    Weight as of this encounter: 103 kg (227 lb).             No follow-ups on file.    Subjective   Nohelia is a 44 year old, presenting for the following health issues:  Follow Up ([facility] ER 09/29/24 ongoing neck pain, rated at 10 today)    HPI     Patient comes in to follow-up on recent ER visit.  She has had longstanding lower extremity neuropathy and cervical neck pain.  Pain has been progressively worsening and she did have an MRI about 9 months ago which was fairly unremarkable.    Patient reports 2 weeks ago pain significantly worsened.  She did not have any specific activities or injuries.  Woke up in the morning and had radiating pain down her left arm into the hand.  Also had diffuse numbness of the hand and forearm.  Not really sure if she has weakness but does not feel right.  Patient " "works as a  and has been very difficult for her to do any work.  She went to the emergency department and was given a Medrol Dosepak which she finished a couple of days ago.  Does not feel like it helped.  Continue to takes Tylenol and ibuprofen scheduled throughout the day which helps with the pain minimally but has not changed any of the other symptoms.    Pain is made worse with turning her head to the left or looking up.    She reports in the past having had injections, physical therapy and several different medication combinations all much success.  Saw neurology fairly recently who did EMG on the lower extremity and has follow-up with them here in about a month.                  Objective    /74 (BP Location: Right arm, Patient Position: Sitting, Cuff Size: Adult Large)   Pulse 96   Temp 98.5  F (36.9  C) (Tympanic)   Resp 20   Ht 1.651 m (5' 5\")   Wt 103 kg (227 lb)   SpO2 97%   BMI 37.77 kg/m    Body mass index is 37.77 kg/m .  Physical Exam   GENERAL: alert and no distress  EYES: Eyes grossly normal to inspection, PERRL and conjunctivae and sclerae normal  NECK: no adenopathy, no asymmetry, masses, or scars.  SCM shahid and tight left greater than right.  RESP: lungs clear to auscultation - no rales, rhonchi or wheezes  CV: regular rate and rhythm, normal S1 S2, no S3 or S4, no murmur, click or rub, no peripheral edema  MS:  strength and finger extension appear to be equal and even in both upper extremities.  Patient has a significant increase in pain with rotation of the head past 45 degrees on the right and greater than 20 degrees from midline on the left.  Tilting to the right causes significant pain as it is looking up.  Peezy him also very tender with palpation on the left.  NEURO: Normal strength and tone, mentation intact and speech normal.  Flexes are symmetric bilaterally  PSYCH: mentation appears normal, affect normal/bright            Signed Electronically by: Nate CHA" MD [place]

## 2024-10-08 NOTE — NURSING NOTE
"Chief Complaint   Patient presents with    Follow Up     [facility] ER 09/29/24 ongoing neck pain, rated at 10 today       Initial /74 (BP Location: Right arm, Patient Position: Sitting, Cuff Size: Adult Large)   Pulse 96   Temp 98.5  F (36.9  C) (Tympanic)   Resp 20   Ht 1.651 m (5' 5\")   Wt 103 kg (227 lb)   SpO2 97%   BMI 37.77 kg/m   Estimated body mass index is 37.77 kg/m  as calculated from the following:    Height as of this encounter: 1.651 m (5' 5\").    Weight as of this encounter: 103 kg (227 lb).  Medication Review: complete    The next two questions are to help us understand your food security.  If you are feeling you need any assistance in this area, we have resources available to support you today.          1/18/2024   SDOH- Food Insecurity   Within the past 12 months, did you worry that your food would run out before you got money to buy more? N   Within the past 12 months, did the food you bought just not last and you didn t have money to get more? N            Health Care Directive:  Patient does not have a Health Care Directive or Living Will: Discussed advance care planning with patient; however, patient declined at this time.    Maia Avila LPN      "

## 2024-10-09 ENCOUNTER — TRANSFERRED RECORDS (OUTPATIENT)
Dept: HEALTH INFORMATION MANAGEMENT | Facility: OTHER | Age: 44
End: 2024-10-09
Payer: COMMERCIAL

## 2024-10-15 ENCOUNTER — HOSPITAL ENCOUNTER (OUTPATIENT)
Dept: MRI IMAGING | Facility: OTHER | Age: 44
Discharge: HOME | End: 2024-10-15
Attending: FAMILY MEDICINE | Admitting: FAMILY MEDICINE
Payer: COMMERCIAL

## 2024-10-15 DIAGNOSIS — M54.12 CERVICAL RADICULOPATHY: ICD-10-CM

## 2024-10-15 PROCEDURE — 72141 MRI NECK SPINE W/O DYE: CPT

## 2024-10-22 LAB — SCANNED LAB RESULT: ABNORMAL

## 2024-11-01 ENCOUNTER — VIRTUAL VISIT (OUTPATIENT)
Dept: NEUROLOGY | Facility: OTHER | Age: 44
End: 2024-11-01
Attending: PSYCHIATRY & NEUROLOGY
Payer: COMMERCIAL

## 2024-11-01 DIAGNOSIS — G62.9 SMALL FIBER NEUROPATHY: Primary | ICD-10-CM

## 2024-11-01 DIAGNOSIS — M54.2 NECK PAIN: ICD-10-CM

## 2024-11-01 DIAGNOSIS — M50.121 CERVICAL DISC DISORDER AT C4-C5 LEVEL WITH RADICULOPATHY: ICD-10-CM

## 2024-11-01 PROCEDURE — G0463 HOSPITAL OUTPT CLINIC VISIT: HCPCS | Mod: GT

## 2024-11-01 PROCEDURE — 99214 OFFICE O/P EST MOD 30 MIN: CPT | Mod: 95 | Performed by: PSYCHIATRY & NEUROLOGY

## 2024-11-01 RX ORDER — GABAPENTIN 100 MG/1
200 CAPSULE ORAL 2 TIMES DAILY PRN
Qty: 180 CAPSULE | Refills: 4 | Status: SHIPPED | OUTPATIENT
Start: 2024-11-01

## 2024-11-01 ASSESSMENT — PAIN SCALES - GENERAL: PAINLEVEL_OUTOF10: SEVERE PAIN (6)

## 2024-11-01 NOTE — PROGRESS NOTES
"Nohelia is a 44 year old who is being evaluated via a billable video visit.      Assessment & Plan     #Small fiber neuropathy  #L arm numbness  #Neck pain    Ms. Andrew follows up for neuropathy like symptoms.  EMG was done only of LE and was normal but skin biopsy did show evidence of reduced epidermal nerve fiber density suggestive of small fiber neuropathy.  Will do lab work up for this as she is interested in etiology,and she has already had many negative tests such as HIV, HCV, MICH, A1C, vitamin levels.    In addition will refer to NSG per her request but suspect given MRI not clearly explanatory will need EMG so will order this as well of LUE in case surgical candidate.  She is not interested in injections again and states PT is already ordered.   Discussed medication options and she is open to only a small increase in her low dose gabapentin for now.  I did state that many small fiber neuropathies end up being idiopathic    - Increase gabapentin to 200-200-300  - EMG LUE when able  - NSG referral  - Labs:  copper, JASON, CRP, DYS2 panel to pyle, ACE, TTG antibodies    Follow up in approximately 4 month        BMI  Estimated body mass index is 37.77 kg/m  as calculated from the following:    Height as of 10/8/24: 1.651 m (5' 5\").    Weight as of 10/8/24: 103 kg (227 lb).             No follow-ups on file.    Subjective   Nohelia is a 44 year old, presenting for the following health issues:  Numbness (Follow up regarding paresthesias  )    Video Start Time:  11:01    HPI     Went to ER and had pain all the way down to her fingers on the Left.  She is having numbness that comes and goes.   The neck pain started in early September.    She states she has done physical therapy and injections.     Later states she has had Neck pain for a year and got injections in January 2024.     She is already seeing physical therapy.            Objective    Vitals - Patient Reported  Pain Score: Severe Pain (6)  Pain Loc: " Neck        Physical Exam   GENERAL: alert and no distress  EYES: Eyes grossly normal to inspection.  No discharge or erythema, or obvious scleral/conjunctival abnormalities.  RESP: No audible wheeze, cough, or visible cyanosis.    SKIN: Visible skin clear. No significant rash, abnormal pigmentation or lesions.  NEURO: Cranial nerves grossly intact.  Mentation and speech appropriate for age.  PSYCH: Appropriate affect, tone, and pace of words    MRI C spine personally reivewed as well as EMG and lab work up noted above      Video-Visit Details    Type of service:  Video Visit   Video End Time: 11:20 AM  Originating Location (pt. Location): Home    Distant Location (provider location):  Off-site  Platform used for Video Visit: Zoë  Signed Electronically by: Jaxson Gould DO

## 2024-11-01 NOTE — PATIENT INSTRUCTIONS
Reason for today's visit: Neck pain and neuropathy    Your diagnosis: small fiber neuropathy    Tests that you will need: EMG and labs listed in note for small fiber neuropathy    Treatment plan:   - Neurosurgery referral  - Increase gabapentin to 200mg twice during day and 300mg at night  - Do PT as recommended      Your test results are reviewed several times a day.  Once they are all in, you will be sent a letter with your results and/or if you are signed up for on-line services, you will be e-mailed the results.  If there are serious findings, you typically will be called.    If you have any questions about your visit, your symptoms, your medication, your test results or it is not clear what your diagnosis or treatment plan is please contact our office at [phone] for general neurology    If you need follow-up in the future, please call [phone] for an appointment.      Jaxson Gould DO  Neurology

## 2024-11-01 NOTE — NURSING NOTE
"No chief complaint on file.      Medication reconciliation completed.    FOOD SECURITY SCREENING QUESTIONS:    The next two questions are to help us understand your food security.  If you are feeling you need any assistance in this area, we have resources available to support you today.    Hunger Vital Signs:  Within the past 12 months we worried whether our food would run out before we got money to buy more. Never  Within the past 12 months the food we bought just didn't last and we didn't have money to get more. Never    Initial Breastfeeding No  Estimated body mass index is 37.77 kg/m  as calculated from the following:    Height as of 10/8/24: 1.651 m (5' 5\").    Weight as of 10/8/24: 103 kg (227 lb).       Kiera Bryan LPN .......  11/1/2024  10:40 AM    "

## 2024-11-01 NOTE — LETTER
"11/1/2024      Nohelia Andrew  [address]  [facility] [id number]      Dear Colleague,    Thank you for referring your patient, Nohelia Andrew, to the [facility]. Please see a copy of my visit note below.    Nohelia is a 44 year old who is being evaluated via a billable video visit.      Assessment & Plan    #Small fiber neuropathy  #L arm numbness  #Neck pain    Ms. Andrew follows up for neuropathy like symptoms.  EMG was done only of LE and was normal but skin biopsy did show evidence of reduced epidermal nerve fiber density suggestive of small fiber neuropathy.  Will do lab work up for this as she is interested in etiology,and she has already had many negative tests such as HIV, HCV, MICH, A1C, vitamin levels.    In addition will refer to NSG per her request but suspect given MRI not clearly explanatory will need EMG so will order this as well of LUE in case surgical candidate.  She is not interested in injections again and states PT is already ordered.   Discussed medication options and she is open to only a small increase in her low dose gabapentin for now.  I did state that many small fiber neuropathies end up being idiopathic    - Increase gabapentin to 200-200-300  - EMG LUE when able  - NSG referral  - Labs:  copper, JASON, CRP, DYS2 panel to pyle, ACE, TTG antibodies    Follow up in approximately 4 month        BMI  Estimated body mass index is 37.77 kg/m  as calculated from the following:    Height as of 10/8/24: 1.651 m (5' 5\").    Weight as of 10/8/24: 103 kg (227 lb).             No follow-ups on file.    Subjective  Nohelia is a 44 year old, presenting for the following health issues:  Numbness (Follow up regarding paresthesias  )    Video Start Time:  11:01    HPI     Went to ER and had pain all the way down to her fingers on the Left.  She is having numbness that comes and goes.   The neck pain started in early September.    She states she has done " physical therapy and injections.     Later states she has had Neck pain for a year and got injections in January 2024.     She is already seeing physical therapy.            Objective   Vitals - Patient Reported  Pain Score: Severe Pain (6)  Pain Loc: Neck        Physical Exam   GENERAL: alert and no distress  EYES: Eyes grossly normal to inspection.  No discharge or erythema, or obvious scleral/conjunctival abnormalities.  RESP: No audible wheeze, cough, or visible cyanosis.    SKIN: Visible skin clear. No significant rash, abnormal pigmentation or lesions.  NEURO: Cranial nerves grossly intact.  Mentation and speech appropriate for age.  PSYCH: Appropriate affect, tone, and pace of words    MRI C spine personally reivewed as well as EMG and lab work up noted above      Video-Visit Details    Type of service:  Video Visit   Video End Time: 11:20 AM  Originating Location (pt. Location): Home    Distant Location (provider location):  Off-site  Platform used for Video Visit: Zoë  Signed Electronically by: Jaxson Gould DO       Again, thank you for allowing me to participate in the care of your patient.        Sincerely,        Jaxson Gould MD

## 2024-11-04 DIAGNOSIS — Z87.442 HISTORY OF KIDNEY STONES: Primary | ICD-10-CM

## 2024-11-09 ENCOUNTER — MYC REFILL (OUTPATIENT)
Dept: FAMILY MEDICINE | Facility: OTHER | Age: 44
End: 2024-11-09
Payer: COMMERCIAL

## 2024-11-09 DIAGNOSIS — Z87.442 HISTORY OF KIDNEY STONES: ICD-10-CM

## 2024-11-09 DIAGNOSIS — R82.994 HYPERCALCIURIA: ICD-10-CM

## 2024-11-12 ENCOUNTER — THERAPY VISIT (OUTPATIENT)
Dept: PHYSICAL THERAPY | Facility: OTHER | Age: 44
End: 2024-11-12
Attending: FAMILY MEDICINE
Payer: COMMERCIAL

## 2024-11-12 DIAGNOSIS — M54.12 CERVICAL RADICULOPATHY: ICD-10-CM

## 2024-11-12 PROCEDURE — 97110 THERAPEUTIC EXERCISES: CPT | Mod: GP,PO

## 2024-11-12 PROCEDURE — 97161 PT EVAL LOW COMPLEX 20 MIN: CPT | Mod: GP,PO

## 2024-11-12 NOTE — PROGRESS NOTES
PHYSICAL THERAPY EVALUATION  Type of Visit: Evaluation       Fall Risk Screen:  Fall screen completed by: PT  Have you fallen 2 or more times in the past year?: No  Have you fallen and had an injury in the past year?: No  Is patient a fall risk?: No  Fall screen comments: Notes that she deals with vertigo so her balance isn't great all the time. No falls though.    Subjective         Presenting condition or subjective complaint: Patient is a 44 year old female referred to physical therapy with neck pain. Patient reports that she has been dealing with chronic neck pain for quite some time. It has slowly gotten worse. This most recent episode started up in September and hasn't gotten better. She has been taking muscle relaxers and pain medicine and this hasn't helped her. She has numbness/tingling down to the tips of her fingers on the left side. She notes that it is all of her fingers. Also gets tightness in her hand and she has a hard time getting her hand to relax. Is left hand dominant. Sleep has been very difficult because of the pain. There are times when taking deep breaths and sneezing can be painful. She is planning on getting an EMG in the UE's but it hasn't been scheduled yet. Is also planning on seeing a neurosurgeon for her neck.  Date of onset: 09/29/24    Relevant medical history: Depression; Arthritis; Smoking   Dates & types of surgery: See chart    Prior diagnostic imaging/testing results: X-ray; MRI     Prior therapy history for the same diagnosis, illness or injury: No      Prior Level of Function  Transfers: Independent  Ambulation: Independent  ADL: Independent    Living Environment  Social support: Alone   Type of home: Apartment/condo   Stairs to enter the home: Yes 1 Is there a railing: No     Ramp: No   Stairs inside the home: Yes 10 Is there a railing: Yes     Help at home: None  Equipment owned:   None.     Employment: Yes  at [employer]    Patient goals for therapy: Sleep, move,  lift    Pain assessment: Location: Neck and left UE /Rating: Best: 4/10, Worst: 10/10.      Objective   CERVICAL SPINE EVALUATION  PAIN: Pain Quality: Aching, Dull, and Sharp  Pain Frequency: constant  Pain is Exacerbated By: lifting, carrying, sleeping, reaching overhead, ADL's, house tasks, work tasks, driving/looking up and around her.  Pain is Relieved By: none  INTEGUMENTARY (edema, incisions): WNL  POSTURE:  Protracted shoulders, slight forward head.  GAIT:   Weightbearing Status: WBAT  Assistive Device(s): None  Gait Deviations: WNL  BALANCE/PROPRIOCEPTION: WNL  WEIGHTBEARING ALIGNMENT: WNL  ROM:   (Degrees) Left AROM Right AROM    Cervical Flexion WFL with pulling in posterior left cervical spine    Cervical Extension 5 degrees before onset of pain    Cervical Side bend 20 degrees before pain on left 30 degrees before onset of pain on left    Cervical Rotation 30 degrees with pain 50 degrees with pain     Left AROM Left PROM Right AROM Right PROM   Shoulder Flexion WFL  WFL    Shoulder Abduction WFL  WFL    Shoulder IR Able to reach behind her back  Able to reach behind her back    Shoulder ER Able to reach behind her head  Able to reach behind her head    Pain: Patient with all movements of the cervical spine. Primarily pain on left more than right.   DERMATOMES:  Patient reports that she has tingling and numbness to all of her fingers on the palmar aspect. With more tensioning of nerves, she feels more symptoms in the upper arm and forearm.   NEURAL TENSION:  Increased symptoms and tingling with median nerve tensioning.   FLEXIBILITY:  Overall stiffness in UE flexibility due to symptoms.     SPECIAL TESTS:    Left Right   Distraction Slight discomfort No significant findings   Spurling s Positive Negative      PALPATION:  Discomfort noted with palpation to cervical paraspinals, suboccipitals, upper trapezius, levator scap, rhomboids and scalenes. More of patient's symptoms are on left compared to the  right  SPINAL SEGMENTAL CONCLUSIONS:  Stiffness noted throughout cervical spine with central and unilateral PA glides. Most noted at C5-C7. Patient also has elevated first rib on left side compared to the right.     Assessment & Plan   CLINICAL IMPRESSIONS  Medical Diagnosis: Cervical radiculopathy (M54.12)    Treatment Diagnosis: Impaired mobility, decreased functional strength and endurance, neck pain with radicular symptoms   Impression/Assessment: Patient is a 44 year old female with neck and UE complaints.  The following significant findings have been identified: Pain, Decreased ROM/flexibility, Decreased joint mobility, Decreased strength, Impaired muscle performance, Decreased activity tolerance, and Impaired posture. These impairments interfere with their ability to perform self care tasks, work tasks, recreational activities, household chores, driving , household mobility, and community mobility as compared to previous level of function.     Clinical Decision Making (Complexity):  Clinical Presentation: Stable/Uncomplicated  Clinical Presentation Rationale: based on medical and personal factors listed in PT evaluation  Clinical Decision Making (Complexity): Low complexity    PLAN OF CARE  Treatment Interventions:  Modalities: Cryotherapy, E-stim, Hot Pack, Mechanical Traction, Ultrasound, Vasoneumatic Device  Interventions: Manual Therapy, Neuromuscular Re-education, Therapeutic Activity, Therapeutic Exercise, Aquatic Therapy    Long Term Goals     PT Goal 1  Goal Identifier: ROM  Goal Description: Patient will demonstrate 60 degrees of cervical rotation with no increase in neck pain in order to improve her safety and efficiency with looking behind her in a vehicle  Rationale: to maximize safety and independence within the community  Target Date: 01/07/25  PT Goal 2  Goal Identifier: Overhead reach  Goal Description: Patient will be able to reach overhead consistently with no increase in arm or neck pain in  order to improve her ability to reach for an item in an upper cupboard.  Rationale: to maximize safety and independence within the home  Target Date: 01/07/25  PT Goal 3  Goal Identifier: ADL's  Goal Description: Patient will be able to complete all dressing, bathing, and grooming activities with no increase in neck or arm pain in order to improve her ability to complete ADL's  Rationale: to maximize safety and independence with performance of ADLs and functional tasks  Target Date: 01/07/25      Frequency of Treatment: 1-2 times per week  Duration of Treatment: 8 weeks    Education Assessment:   Learner/Method: Patient;No Barriers to Learning    Risks and benefits of evaluation/treatment have been explained.   Patient/Family/caregiver agrees with Plan of Care.     Evaluation Time:     PT Eval, Low Complexity Minutes (50005): 30     Signing Clinician: ESME Rosenthal [facility]                                                                                   OUTPATIENT PHYSICAL THERAPY      PLAN OF TREATMENT FOR OUTPATIENT REHABILITATION   Patient's Last Name, First Name, Nohelia Wang  GAYATRI YOB: 1980   Provider's Name   [facility]   Medical Record No.  [id number]     Onset Date: 09/29/24  Start of Care Date: 11/12/24     Medical Diagnosis:  Cervical radiculopathy (M54.12)      PT Treatment Diagnosis:  Impaired mobility, decreased functional strength and endurance, neck pain with radicular symptoms Plan of Treatment  Frequency/Duration: 1-2 times per week/ 8 weeks    Certification date from 11/12/24 to 01/07/25         See note for plan of treatment details and functional goals     Abelardo Gamino PT                         I CERTIFY THE NEED FOR THESE SERVICES FURNISHED UNDER        THIS PLAN OF TREATMENT AND WHILE UNDER MY CARE     (Physician attestation of this document indicates review and certification of the therapy  plan).              Referring Provider:  Nate Marquis    Initial Assessment  See Epic Evaluation- Start of Care Date: 11/12/24

## 2024-11-13 PROBLEM — M54.12 CERVICAL RADICULOPATHY: Status: ACTIVE | Noted: 2024-11-13

## 2024-11-13 RX ORDER — CHLORTHALIDONE 25 MG/1
TABLET ORAL
Qty: 45 TABLET | Refills: 0 | Status: SHIPPED | OUTPATIENT
Start: 2024-11-13

## 2024-11-13 NOTE — TELEPHONE ENCOUNTER
Pt states she will call back after her surgery to schedule her PX.    Glo Wheeler on 11/13/2024 at 9:21 AM

## 2024-11-13 NOTE — TELEPHONE ENCOUNTER
[facility] sent Rx request for the following:      Requested Prescriptions   Pending Prescriptions Disp Refills    chlorthalidone (HYGROTON) 25 MG tablet 45 tablet 0       Diuretics (Including Combos) Protocol Failed - 11/13/2024  7:09 AM        Failed - Medication indicated for associated diagnosis     Medication is associated with one or more of the following diagnoses:     Edema   Hypertension   Heart Failure   Meniere's Disease   Bilateral localized swelling of lower limbs   Pulmonary Hypertension            Last Prescription Date:   8/27/24  Last Fill Qty/Refills:         45, R-0    Last Office Visit:              1/24/24   Future Office visit:                Routing refill request to provider for review/approval     Will route to unit 4 scheduling to call patient and help assist in scheduling appointment.  Basia Chinchilla RN on 11/13/2024 at 7:10 AM

## 2024-11-14 ENCOUNTER — THERAPY VISIT (OUTPATIENT)
Dept: PHYSICAL THERAPY | Facility: OTHER | Age: 44
End: 2024-11-14
Attending: FAMILY MEDICINE
Payer: COMMERCIAL

## 2024-11-14 DIAGNOSIS — M54.12 CERVICAL RADICULOPATHY: Primary | ICD-10-CM

## 2024-11-14 PROCEDURE — 97140 MANUAL THERAPY 1/> REGIONS: CPT | Mod: GP,CQ

## 2024-11-14 PROCEDURE — 97110 THERAPEUTIC EXERCISES: CPT | Mod: GP,CQ

## 2024-11-18 ENCOUNTER — VIRTUAL VISIT (OUTPATIENT)
Dept: UROLOGY | Facility: OTHER | Age: 44
End: 2024-11-18
Attending: UROLOGY
Payer: COMMERCIAL

## 2024-11-18 ENCOUNTER — LAB (OUTPATIENT)
Dept: LAB | Facility: OTHER | Age: 44
End: 2024-11-18
Attending: UROLOGY
Payer: COMMERCIAL

## 2024-11-18 VITALS
SYSTOLIC BLOOD PRESSURE: 122 MMHG | OXYGEN SATURATION: 95 % | TEMPERATURE: 96.7 F | RESPIRATION RATE: 16 BRPM | HEART RATE: 84 BPM | DIASTOLIC BLOOD PRESSURE: 84 MMHG

## 2024-11-18 DIAGNOSIS — R82.994 HYPERCALCIURIA: ICD-10-CM

## 2024-11-18 DIAGNOSIS — N39.3 FEMALE STRESS INCONTINENCE: ICD-10-CM

## 2024-11-18 DIAGNOSIS — Z87.442 HISTORY OF KIDNEY STONES: ICD-10-CM

## 2024-11-18 DIAGNOSIS — G62.9 SMALL FIBER NEUROPATHY: ICD-10-CM

## 2024-11-18 DIAGNOSIS — Z87.442 HISTORY OF KIDNEY STONES: Primary | ICD-10-CM

## 2024-11-18 LAB
ALBUMIN UR-MCNC: 10 MG/DL
ALBUMIN UR-MCNC: 10 MG/DL
APPEARANCE UR: CLEAR
APPEARANCE UR: CLEAR
BILIRUB UR QL STRIP: NEGATIVE
BILIRUB UR QL STRIP: NEGATIVE
COLOR UR AUTO: YELLOW
COLOR UR AUTO: YELLOW
CRP SERPL-MCNC: 6.85 MG/L
GLUCOSE UR STRIP-MCNC: NEGATIVE MG/DL
GLUCOSE UR STRIP-MCNC: NEGATIVE MG/DL
HGB UR QL STRIP: ABNORMAL
HGB UR QL STRIP: ABNORMAL
KETONES UR STRIP-MCNC: NEGATIVE MG/DL
KETONES UR STRIP-MCNC: NEGATIVE MG/DL
LEUKOCYTE ESTERASE UR QL STRIP: NEGATIVE
LEUKOCYTE ESTERASE UR QL STRIP: NEGATIVE
Lab: NORMAL
MUCOUS THREADS #/AREA URNS LPF: PRESENT /LPF
NITRATE UR QL: NEGATIVE
NITRATE UR QL: NEGATIVE
PERFORMING LABORATORY: NORMAL
PH UR STRIP: 6 [PH] (ref 5–9)
PH UR STRIP: 6 [PH] (ref 5–9)
RBC URINE: 1 /HPF
SP GR UR STRIP: 1.03 (ref 1–1.03)
SP GR UR STRIP: 1.03 (ref 1–1.03)
SPECIMEN STATUS: NORMAL
SQUAMOUS EPITHELIAL: 4 /HPF
TEST NAME: NORMAL
TSH SERPL DL<=0.005 MIU/L-ACNC: 3.39 UIU/ML (ref 0.3–4.2)
UROBILINOGEN UR STRIP-MCNC: NORMAL MG/DL
UROBILINOGEN UR STRIP-MCNC: NORMAL MG/DL
WBC URINE: 3 /HPF

## 2024-11-18 PROCEDURE — 81003 URINALYSIS AUTO W/O SCOPE: CPT | Mod: ZL

## 2024-11-18 PROCEDURE — G0463 HOSPITAL OUTPT CLINIC VISIT: HCPCS | Mod: GT

## 2024-11-18 PROCEDURE — 82164 ANGIOTENSIN I ENZYME TEST: CPT | Mod: ZL

## 2024-11-18 PROCEDURE — 83519 RIA NONANTIBODY: CPT | Mod: ZL

## 2024-11-18 PROCEDURE — 86235 NUCLEAR ANTIGEN ANTIBODY: CPT | Mod: ZL

## 2024-11-18 PROCEDURE — 82525 ASSAY OF COPPER: CPT | Mod: ZL

## 2024-11-18 PROCEDURE — 84443 ASSAY THYROID STIM HORMONE: CPT | Mod: ZL

## 2024-11-18 PROCEDURE — 36415 COLL VENOUS BLD VENIPUNCTURE: CPT | Mod: ZL

## 2024-11-18 PROCEDURE — 86364 TISS TRNSGLTMNASE EA IG CLAS: CPT | Mod: ZL

## 2024-11-18 PROCEDURE — 86140 C-REACTIVE PROTEIN: CPT | Mod: ZL

## 2024-11-18 PROCEDURE — 81001 URINALYSIS AUTO W/SCOPE: CPT | Mod: ZL

## 2024-11-18 NOTE — PATIENT INSTRUCTIONS
Start eating a natural probiotic - cultured yoghurt low in sugar, Greek yoghurt and unsweetened Kefir.

## 2024-11-18 NOTE — PROGRESS NOTES
Type of service:  Video Visit   Video Visit Start Time: 10:00  Video Visit End Time: 10:16    Originating Location (pt. Location): [facility]  Distant Location (provider location): [place], [place]  Platform used for Video Visit: Epic Virtual Visit Platform    I have reviewed the note as documented above.  This accurately captures the substance of my virtual visit with the patient. The patient states an understanding and is agreeable with the plan.   Gus Delgado MD   Urology     Chief Complaint: Follow Up (History of kidney stones)      HPI: Ms. Nohelia Crockett is a 44 year old year old female presenting today November 18, 2024 virtually in follow up of recurrent nephrolithiasis.    Previous patient of Dr. Pantoja last seen in October 2022.  She has had 2 previous surgeries with Dr. Pantoja in his past stone on her own.    24-hour urine showed hypercalciuria for which she was started on chlorthalidone 25 mg daily.      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    Here today for follow-up.  Denies any flank pain.    Reports mild MIKAL with coughing.  Does some Kegel exercises.      Current Outpatient Medications   Medication Sig Dispense Refill    chlorthalidone (HYGROTON) 25 MG tablet TAKE 1/2 (ONE-HALF) TABLET BY MOUTH ONCE DAILY COMBINE WITH A LOW  SALT  DIET  TO  PREVENT  STONES. 45 tablet 0    cyanocobalamin 1000 MCG sublingual tablet Place 500 mcg under the tongue daily      DULoxetine (CYMBALTA) 30 MG capsule Take 2 capsules daily. 180 capsule 3    gabapentin (NEURONTIN) 100 MG capsule Take 2 capsules (200 mg) by mouth 2 times daily as needed for neuropathic pain. 180 capsule 4    gabapentin (NEURONTIN) 300 MG capsule Take 1 capsule (300 mg) by mouth at bedtime 90 capsule 4    ketoconazole (NIZORAL) 2 % external cream Apply topically 2 times daily. 60 g 2    loratadine  (CLARITIN) 10 MG tablet Take 10 mg by mouth      potassium citrate (UROCIT-K) 10 MEQ (1080 MG) CR tablet Take 2 tablets by mouth twice daily 360 tablet 4    pyridOXINE (VITAMIN  B-6) 25 MG tablet Take 25 mg by mouth daily      Vitamin D, Cholecalciferol, 25 MCG (1000 UT) TABS       methocarbamol (ROBAXIN) 500 MG tablet Take 1 tablet (500 mg) by mouth 4 times daily as needed for muscle spasms. 60 tablet 0       ALLERGIES: Penicillins and Aspirin      GENERAL PHYSICAL EXAM:   Vitals: There were no vitals taken for this visit.  There is no height or weight on file to calculate BMI.    GENERAL: Well groomed, well developed, well nourished female in NAD.  ENT:  ENT exam normal  RESPIRATORY:  Normal respiratory effort   MS: Visibly moving all four   NEURO: Alert and oriented x 3.  PSYCH: Normal mood and affect, pleasant and agreeable during interview and exam.      RADIOLOGY: The following tests were reviewed: CT Abdomen And Pelvis Without Contrast   Exam date and time: 1/22/2023 12:04 AM   Age: 42 years old   Clinical indication: Abdominal pain; Flank; Left; Prior surgery; Surgery date:   1-6 months; Surgery type: Kidney stone removal; Additional info: Flank pain h/o   stones      TECHNIQUE:   Imaging protocol: Computed tomography of the abdomen and pelvis without   contrast.   Radiation optimization: All CT scans at this facility use at least one of these   dose optimization techniques: automated exposure control; mA and/or kV   adjustment per patient size (includes targeted exams where dose is matched to   clinical indication); or iterative reconstruction.      COMPARISON:   CT ABDOMEN PELVIS W/O CONTRAST 7/17/2022 2:44 PM      FINDINGS:   Liver: Unremarkable.   Gallbladder and bile ducts: Cholecystectomy.   Pancreas: Unremarkable.   Spleen: Unremarkable.   Adrenal glands: Normal. No mass.   Kidneys and ureters: No hydronephrosis. Stable right renal cyst.   Stomach and bowel: No obstruction. Loading.   Appendix: Normal  appendix.      Intraperitoneal space: No free air. No abscess. No ascites.   Vasculature: Unremarkable.   Lymph nodes: No significant adenopathy.   Urinary bladder: Unremarkable as visualized.   Reproductive: Small left adnexal cyst.   Bones/joints: No acute fracture. DJD in the spine.   Soft tissues: Umbilical hernia.                                                                       IMPRESSION:   1. No evidence of obstructive uropathy.   2. Small left adnexal cyst      THIS DOCUMENT HAS BEEN ELECTRONICALLY SIGNED BY NIMESH GARCIA MD    LABS: The last test results for Ms. Nohelia Crockett were reviewed.   Results for orders placed or performed in visit on 11/18/24 (from the past 24 hours)   Urinalysis Macroscopic   Result Value Ref Range    Color Urine Yellow Colorless, Straw, Light Yellow, Yellow    Appearance Urine Clear Clear    Glucose Urine Negative Negative mg/dL    Bilirubin Urine Negative Negative    Ketones Urine Negative Negative mg/dL    Specific Gravity Urine 1.026 1.000 - 1.030    Blood Urine Trace (A) Negative    pH Urine 6.0 5.0 - 9.0    Protein Albumin Urine 10 (A) Negative mg/dL    Urobilinogen Urine Normal Normal, 2.0 mg/dL    Nitrite Urine Negative Negative    Leukocyte Esterase Urine Negative Negative       BMP -   Recent Labs   Lab Test 02/14/24  0958 01/24/24  1315 11/15/23  0854 10/06/23  1221 08/24/23  1434 08/15/23  2000 08/15/23  1653    141 139   < > 138 140 139   POTASSIUM 4.1 2.9* 3.5   < > 3.0* 3.1* 2.7*   CHLORIDE 104 101 104   < > 101 103 101   CO2 29 31* 24   < > 26 26 25   BUN 15.5 11.5 14.8   < > 11.5 12.3 14.7   CR 0.80 0.78 0.66   < > 0.65 0.58 0.63   GLC 90 106* 140*   < > 108* 132* 150*   VERA 9.8 9.6 9.4   < > 9.7 9.1 9.3   MAG  --   --   --   --  1.7 2.8* 1.5*    < > = values in this interval not displayed.       CBC -   Recent Labs   Lab Test 01/24/24  1315 08/15/23  1653 01/21/23  2359   WBC 6.5 9.3 10.3   HGB 13.9 13.4 13.0    312 631       ASSESSMENT:    History of recurrent nephrolithiasis  Hypercalciuria  MIKAL  Acquired renal cyst right    PLAN:   Patient doing well now on diuretics as well as citrate supplementation.  I recommended probiotics to break down the oxalate in her diet.  Vitamin B6 is a good option as well which she is doing.    We discussed a low-salt, low animal protein diet with increased fruits and vegetables as well as her drinking enough fluid throughout the day.    Regarding her stress incontinence we will enroll her in pelvic floor therapy.  It is mild and does not warrant surgery.  She was advised to limit her caffeine.    Finally her urine shows trace of blood.  That will be sent for micro UA.  If significant then consideration of a renal ultrasound.    20 minutes spent on the date of this encounter doing chart review, history and exam, documentation and further activities as noted above.      Gus Delgado MD  [facility]

## 2024-11-18 NOTE — PROGRESS NOTES
"Chief Complaint   Patient presents with    Follow Up     History of kidney stones       Initial There were no vitals taken for this visit. Estimated body mass index is 37.77 kg/m  as calculated from the following:    Height as of 10/8/24: 1.651 m (5' 5\").    Weight as of 10/8/24: 103 kg (227 lb).  Medication Reconciliation: complete    Omayra Mcgill RN    "

## 2024-11-19 ENCOUNTER — THERAPY VISIT (OUTPATIENT)
Dept: PHYSICAL THERAPY | Facility: OTHER | Age: 44
End: 2024-11-19
Attending: FAMILY MEDICINE
Payer: COMMERCIAL

## 2024-11-19 DIAGNOSIS — M54.12 CERVICAL RADICULOPATHY: Primary | ICD-10-CM

## 2024-11-19 LAB
ENA SM IGG SER IA-ACNC: <0.7 U/ML
ENA SM IGG SER IA-ACNC: NEGATIVE
ENA SS-A AB SER IA-ACNC: <0.5 U/ML
ENA SS-A AB SER IA-ACNC: NEGATIVE
ENA SS-B IGG SER IA-ACNC: <0.6 U/ML
ENA SS-B IGG SER IA-ACNC: NEGATIVE
TTG IGA SER-ACNC: 1.1 U/ML
TTG IGG SER-ACNC: 0.7 U/ML
U1 SNRNP IGG SER IA-ACNC: 2 U/ML
U1 SNRNP IGG SER IA-ACNC: NEGATIVE

## 2024-11-19 PROCEDURE — 97140 MANUAL THERAPY 1/> REGIONS: CPT | Mod: GP,PO

## 2024-11-19 PROCEDURE — 97110 THERAPEUTIC EXERCISES: CPT | Mod: GP,PO

## 2024-11-20 LAB
ACE SERPL-CCNC: 38 U/L
COPPER SERPL-MCNC: 117.6 UG/DL

## 2024-11-24 LAB — MAYO MISC RESULT: NORMAL

## 2024-11-26 ENCOUNTER — TRANSFERRED RECORDS (OUTPATIENT)
Dept: HEALTH INFORMATION MANAGEMENT | Facility: OTHER | Age: 44
End: 2024-11-26
Payer: COMMERCIAL

## 2024-12-03 ENCOUNTER — TELEPHONE (OUTPATIENT)
Dept: FAMILY MEDICINE | Facility: OTHER | Age: 44
End: 2024-12-03
Payer: COMMERCIAL

## 2024-12-03 NOTE — TELEPHONE ENCOUNTER
Louise from [facility] called to inform Dr Hendrix that an auth for IM care requested is denied because of lack of information.

## 2024-12-03 NOTE — TELEPHONE ENCOUNTER
Left message to call back....................  12/3/2024   9:30 AM  Mila Garcia LPN  12/3/2024  9:30 AM  Ext:  [phone]

## 2024-12-09 ENCOUNTER — THERAPY VISIT (OUTPATIENT)
Dept: PHYSICAL THERAPY | Facility: OTHER | Age: 44
End: 2024-12-09
Attending: FAMILY MEDICINE
Payer: COMMERCIAL

## 2024-12-09 DIAGNOSIS — M54.12 CERVICAL RADICULOPATHY: Primary | ICD-10-CM

## 2024-12-09 PROCEDURE — 97140 MANUAL THERAPY 1/> REGIONS: CPT | Mod: GP,PO

## 2024-12-09 PROCEDURE — 97110 THERAPEUTIC EXERCISES: CPT | Mod: GP,PO

## 2024-12-11 ENCOUNTER — THERAPY VISIT (OUTPATIENT)
Dept: PHYSICAL THERAPY | Facility: OTHER | Age: 44
End: 2024-12-11
Attending: FAMILY MEDICINE
Payer: COMMERCIAL

## 2024-12-11 DIAGNOSIS — M54.12 CERVICAL RADICULOPATHY: Primary | ICD-10-CM

## 2024-12-11 DIAGNOSIS — M50.121 CERVICAL DISC DISORDER AT C4-C5 LEVEL WITH RADICULOPATHY: ICD-10-CM

## 2024-12-11 PROCEDURE — 97110 THERAPEUTIC EXERCISES: CPT | Mod: GP,PO

## 2024-12-11 PROCEDURE — 97140 MANUAL THERAPY 1/> REGIONS: CPT | Mod: GP,PO

## 2024-12-11 RX ORDER — GABAPENTIN 300 MG/1
300 CAPSULE ORAL AT BEDTIME
Qty: 90 CAPSULE | Refills: 1 | Status: SHIPPED | OUTPATIENT
Start: 2024-12-11

## 2024-12-11 NOTE — TELEPHONE ENCOUNTER
[id number] sent Rx request for the following:      Requested Prescriptions   Pending Prescriptions Disp Refills    gabapentin (NEURONTIN) 300 MG capsule [Pharmacy Med Name: Gabapentin 300 MG Oral Capsule] 30 capsule 0     Sig: Take 1 capsule by mouth at bedtime       There is no refill protocol information for this order          Last Prescription Date:   12/7/23  Last Fill Qty/Refills:         90, R-4    Last Office Visit:              11/1/24 Neurology   Future Office visit:           none    Routing refill request to provider for review/approval because:  Drug not on the G refill protocol     Onelia Willis RN on 12/11/2024 at 9:42 AM

## 2024-12-17 ENCOUNTER — TRANSFERRED RECORDS (OUTPATIENT)
Dept: HEALTH INFORMATION MANAGEMENT | Facility: OTHER | Age: 44
End: 2024-12-17
Payer: COMMERCIAL

## 2024-12-26 ENCOUNTER — THERAPY VISIT (OUTPATIENT)
Dept: PHYSICAL THERAPY | Facility: OTHER | Age: 44
End: 2024-12-26
Attending: FAMILY MEDICINE
Payer: COMMERCIAL

## 2024-12-26 DIAGNOSIS — M54.12 CERVICAL RADICULOPATHY: Primary | ICD-10-CM

## 2024-12-26 PROCEDURE — 97140 MANUAL THERAPY 1/> REGIONS: CPT | Mod: GP,PO

## 2024-12-26 PROCEDURE — 97110 THERAPEUTIC EXERCISES: CPT | Mod: GP,PO

## 2025-01-06 ENCOUNTER — THERAPY VISIT (OUTPATIENT)
Dept: PHYSICAL THERAPY | Facility: OTHER | Age: 45
End: 2025-01-06
Attending: UROLOGY
Payer: COMMERCIAL

## 2025-01-06 DIAGNOSIS — N39.3 FEMALE STRESS INCONTINENCE: ICD-10-CM

## 2025-01-06 PROCEDURE — 97530 THERAPEUTIC ACTIVITIES: CPT | Mod: GP

## 2025-01-06 PROCEDURE — 97112 NEUROMUSCULAR REEDUCATION: CPT | Mod: GP

## 2025-01-06 PROCEDURE — 97161 PT EVAL LOW COMPLEX 20 MIN: CPT | Mod: GP

## 2025-01-06 NOTE — PROGRESS NOTES
PHYSICAL THERAPY EVALUATION  Type of Visit: Evaluation  Fall Risk Screen:  Fall screen completed by: PT  Have you fallen 2 or more times in the past year?: No  Have you fallen and had an injury in the past year?: No  Is patient a fall risk?: No    Subjective         Presenting condition or subjective complaint: (Patient-Rptd) Bladder issues  Date of onset:  (ongoing for years)    Relevant medical history: Depression; Bladder or bowel problems     Prior diagnostic imaging/testing results: (Patient-Rptd) Other (Patient-Rptd) Kidney issues   Prior therapy history for the same diagnosis, illness or injury: (Patient-Rptd) No      Prior Level of Function  Transfers: Independent  Ambulation: Independent  ADL: Independent    Living Environment  Social support: (Patient-Rptd) With a significant other or spouse   Type of home: (Patient-Rptd) Apartment/condo   Stairs to enter the home: (Patient-Rptd) No       Ramp: (Patient-Rptd) No   Stairs inside the home: (Patient-Rptd) No       Help at home: (Patient-Rptd) None  Employment: (Patient-Rptd) Yes (Patient-Rptd)     Pain assessment: Pain denied     Objective      PELVIC EVALUATION  ADDITIONAL HISTORY:  Bladder History:  Feels bladder filling: Yes  Triggers for feeling of inability to wait to go to the bathroom: Yes cold temperature  How long can you wait to urinate: up to 30 minutes  Gets up at night to urinate: Yes 0-2  Can stop the flow of urine when urinating: Yes  Volume of urine usually released: -- (varies)   Other issues: Slow or hesitant urine stream; Trouble emptying bladder completely  Number of bladder infections in last 12 months:  0  Fluid intake per day: water - 80-120oz, flavored, caffeine - 1 cup coffee, 1 pop, 1-2 sweet teas daily, alcohol - very rarely  Medications taken for bladder: No     Activities causing urine leak: Cough; Sneeze; Other activities laughing, lifting, squatting  Amount of urine typically leaked: varies, small to large amount  Pads  used to help with leaking: No        Bowel History:  Frequency of bowel movement: 1-2x/day  Consistency of stool: Soft-formed    Ignores the urge to defecate: No  Other bowel issues: Loss of stool  Length of time spent trying to have a bowel movement: couple minutes    Sexual Function History:  Sexually active: Yes  Lubrication used: No No  Pelvic pain:    denies pain  Pain or difficulty with orgasms/erection/ejaculation: No    State of menopause: During menopause (I am in menopause now)  Hormone medications: No      Number of previous pregnancies: 3  Number of deliveries: 2  If you have delivered before, did you have any of these issues during delivery: Vaginal delivery  Have you been diagnosed with pelvic prolapse or abdominal separation: No  Do you get regular exercise: Yes  I do this type of exercise: active during the day, lots of walking, bending, squatting, lifting      Discussed reason for referral regarding pelvic health needs and external/internal pelvic floor muscle examination with patient/guardian.  Opportunity provided to ask questions and verbal consent for assessment and intervention was given.    POSTURE: WFL  LUMBAR SCREEN: AROM WFL  HIP SCREEN:  ROM:  moderate restriction IR R>L, discomfort adduction bilaterally        PELVIC EXAM  External Visual Inspection:  At rest: Increased genital hiatal opening  With voluntary pelvic floor contraction: Perineal elevation  Relaxation of PFM: Partial/delayed relaxation  With intra-abdominal pressure: Cough: No change  Bearing down as defecation: Perineal descent, Perineal elevation - initially elevated, with verbal cueing able to bear down with perineal descent    Integumentary:   Introitus: Loose, Introital gaping    External Digital Palpation per Perineum:   Ischiocavernosis: Unremarkable  Bulbo cavernosis: Unremarkable  Transverse perineal: Tightness  Levator ani: Tightness  Perineal body: Unremarkable  Public symphysis: Unremarkable    Internal Digital  Palpation:  Per Vagina:  Digital Muscle Performance: P (Power): 2  E (Endurance): 2  R (Repetitions): 2      Assessment & Plan   CLINICAL IMPRESSIONS  Medical Diagnosis: Female stress incontinence    Treatment Diagnosis: pelvic floor dysfunction, weakness   Impression/Assessment: Patient is a 44 year old female with pelvic floor complaints.  The following significant findings have been identified: Decreased ROM/flexibility, Decreased strength, Decreased proprioception, Impaired muscle performance, and Decreased activity tolerance. These impairments interfere with their ability to perform self care tasks, work tasks, recreational activities, household chores, driving , household mobility, and community mobility as compared to previous level of function.     Clinical Decision Making (Complexity):  Clinical Presentation: Stable/Uncomplicated  Clinical Presentation Rationale: based on medical and personal factors listed in PT evaluation  Clinical Decision Making (Complexity): Low complexity    PLAN OF CARE  Treatment Interventions:  Modalities: Biofeedback, Cryotherapy, E-stim, Hot Pack, Ultrasound  Interventions: Manual Therapy, Neuromuscular Re-education, Therapeutic Activity, Therapeutic Exercise    Long Term Goals     PT Goal 1  Goal Identifier: HEP  Goal Description: patient will be independent and compliant with HEP for success in PT  Rationale: to maximize safety and independence with performance of ADLs and functional tasks;to maximize safety and independence with self cares;to maximize safety and independence within the home;to maximize safety and independence within the community  Target Date: 02/03/25  PT Goal 2  Goal Identifier: incontinence  Goal Description: patient will report 50% reduction of urinary incontinence with daily activities including squatting and lifting as required for work tasks  Rationale: to maximize safety and independence with performance of ADLs and functional tasks;to maximize safety  and independence with self cares;to maximize safety and independence within the home;to maximize safety and independence within the community  Target Date: 03/03/25  PT Goal 3  Goal Identifier: pelvic floor contraction  Goal Description: patient will demonstrate pelvic floor strength of 3/5 or greater, with endurance of 4 seconds or greater in order to reduce incontinence and improve quality of life  Rationale: to maximize safety and independence with performance of ADLs and functional tasks;to maximize safety and independence with self cares;to maximize safety and independence within the home;to maximize safety and independence within the community  Target Date: 03/31/25      Frequency of Treatment: 1-2x/week  Duration of Treatment: 90 days    Education Assessment:   Learner/Method: Patient;Demonstration;Pictures/Video;No Barriers to Learning    Risks and benefits of evaluation/treatment have been explained.   Patient/Family/caregiver agrees with Plan of Care.     Evaluation Time:     PT Eval, Low Complexity Minutes (45248): 20     Signing Clinician: Shiloh Garcia DPT        [facility]                                                                                   OUTPATIENT PHYSICAL THERAPY      PLAN OF TREATMENT FOR OUTPATIENT REHABILITATION   Patient's Last Name, First Name, Nohelia Wang YOB: 1980   Provider's Name   [facility]   Medical Record No.  [id number]     Onset Date:  (ongoing for years)  Start of Care Date: 01/06/25     Medical Diagnosis:  Female stress incontinence    PT Treatment Diagnosis:  pelvic floor dysfunction, weakness Plan of Treatment  Frequency/Duration: 1-2x/week/ 90 days    Certification date from 01/06/25 to 04/05/25         See note for plan of treatment details and functional goals     Shiloh Garcia DPT                         I CERTIFY THE NEED FOR THESE SERVICES FURNISHED UNDER         THIS PLAN OF TREATMENT AND WHILE UNDER MY CARE     (Physician attestation of this document indicates review and certification of the therapy plan).              Referring Provider:  Gus Delgado    Initial Assessment  See Epic Evaluation- Start of Care Date: 01/06/25

## 2025-01-07 ENCOUNTER — THERAPY VISIT (OUTPATIENT)
Dept: PHYSICAL THERAPY | Facility: OTHER | Age: 45
End: 2025-01-07
Attending: FAMILY MEDICINE
Payer: COMMERCIAL

## 2025-01-07 DIAGNOSIS — M54.12 CERVICAL RADICULOPATHY: Primary | ICD-10-CM

## 2025-01-07 PROBLEM — N39.3 FEMALE STRESS INCONTINENCE: Status: ACTIVE | Noted: 2025-01-07

## 2025-01-07 PROCEDURE — 97140 MANUAL THERAPY 1/> REGIONS: CPT | Mod: GP,PO

## 2025-01-07 PROCEDURE — 97110 THERAPEUTIC EXERCISES: CPT | Mod: GP,PO

## 2025-01-08 ENCOUNTER — THERAPY VISIT (OUTPATIENT)
Dept: PHYSICAL THERAPY | Facility: OTHER | Age: 45
End: 2025-01-08
Attending: UROLOGY
Payer: COMMERCIAL

## 2025-01-08 ENCOUNTER — TRANSFERRED RECORDS (OUTPATIENT)
Dept: HEALTH INFORMATION MANAGEMENT | Facility: OTHER | Age: 45
End: 2025-01-08

## 2025-01-08 DIAGNOSIS — N39.3 FEMALE STRESS INCONTINENCE: Primary | ICD-10-CM

## 2025-01-08 PROCEDURE — 97110 THERAPEUTIC EXERCISES: CPT | Mod: GP

## 2025-01-08 PROCEDURE — 97530 THERAPEUTIC ACTIVITIES: CPT | Mod: GP

## 2025-01-08 PROCEDURE — 97112 NEUROMUSCULAR REEDUCATION: CPT | Mod: GP

## 2025-01-12 ENCOUNTER — HEALTH MAINTENANCE LETTER (OUTPATIENT)
Age: 45
End: 2025-01-12

## 2025-01-14 ENCOUNTER — THERAPY VISIT (OUTPATIENT)
Dept: PHYSICAL THERAPY | Facility: OTHER | Age: 45
End: 2025-01-14
Attending: FAMILY MEDICINE
Payer: COMMERCIAL

## 2025-01-14 DIAGNOSIS — M54.12 CERVICAL RADICULOPATHY: Primary | ICD-10-CM

## 2025-01-14 PROCEDURE — 97140 MANUAL THERAPY 1/> REGIONS: CPT | Mod: GP,PO

## 2025-01-14 PROCEDURE — 97110 THERAPEUTIC EXERCISES: CPT | Mod: GP,PO

## 2025-01-15 ENCOUNTER — THERAPY VISIT (OUTPATIENT)
Dept: PHYSICAL THERAPY | Facility: OTHER | Age: 45
End: 2025-01-15
Attending: UROLOGY
Payer: COMMERCIAL

## 2025-01-15 DIAGNOSIS — N39.3 FEMALE STRESS INCONTINENCE: Primary | ICD-10-CM

## 2025-01-15 PROCEDURE — 97110 THERAPEUTIC EXERCISES: CPT | Mod: GP

## 2025-01-15 PROCEDURE — 97530 THERAPEUTIC ACTIVITIES: CPT | Mod: GP

## 2025-01-15 PROCEDURE — 97112 NEUROMUSCULAR REEDUCATION: CPT | Mod: GP

## 2025-01-27 ENCOUNTER — THERAPY VISIT (OUTPATIENT)
Dept: PHYSICAL THERAPY | Facility: OTHER | Age: 45
End: 2025-01-27
Attending: UROLOGY
Payer: COMMERCIAL

## 2025-01-27 DIAGNOSIS — N39.3 FEMALE STRESS INCONTINENCE: Primary | ICD-10-CM

## 2025-01-27 PROCEDURE — 97110 THERAPEUTIC EXERCISES: CPT | Mod: GP

## 2025-01-27 PROCEDURE — 97112 NEUROMUSCULAR REEDUCATION: CPT | Mod: GP

## 2025-01-27 PROCEDURE — 97530 THERAPEUTIC ACTIVITIES: CPT | Mod: GP

## 2025-01-28 ENCOUNTER — THERAPY VISIT (OUTPATIENT)
Dept: PHYSICAL THERAPY | Facility: OTHER | Age: 45
End: 2025-01-28
Attending: FAMILY MEDICINE
Payer: COMMERCIAL

## 2025-01-28 DIAGNOSIS — M54.12 CERVICAL RADICULOPATHY: Primary | ICD-10-CM

## 2025-01-28 PROCEDURE — 97140 MANUAL THERAPY 1/> REGIONS: CPT | Mod: GP,PO

## 2025-01-28 NOTE — PROGRESS NOTES
01/28/25 0500   Appointment Info   Signing clinician's name / credentials Abelardo Gamino DPT   Total/Authorized Visits 9/10   Visits Used 9   Medical Diagnosis Cervical radiculopathy (M54.12)   PT Tx Diagnosis Impaired mobility, decreased functional strength and endurance, neck pain with radicular symptoms   Progress Note/Certification   Start of Care Date 11/12/24   Onset of illness/injury or Date of Surgery 09/29/24   Therapy Frequency 1-2 times per week   Predicted Duration 8 weeks   Certification date from 01/07/25   Certification date to 03/25/25   Progress Note Due Date 01/07/25   Progress Note Completed Date 11/12/24   Supervision   PT Assistant Visit Number 1       Present No   GOALS   PT Goals 2;3   PT Goal 1   Goal Identifier ROM   Goal Description Patient will demonstrate 60 degrees of cervical rotation with no increase in neck pain in order to improve her safety and efficiency with looking behind her in a vehicle   Rationale to maximize safety and independence within the community   Goal Progress Patient has not yet been consistent in this. Notes there are days when she can demonstrate this and other days when she cannot and it is painful.   Target Date 03/25/25   PT Goal 2   Goal Identifier Overhead reach   Goal Description Patient will be able to reach overhead consistently with no increase in arm or neck pain in order to improve her ability to reach for an item in an upper cupboard.   Rationale to maximize safety and independence within the home   Goal Progress Patient is able to do this however has increased symptoms in her UE's   Target Date 03/25/25   PT Goal 3   Goal Identifier ADL's   Goal Description Patient will be able to complete all dressing, bathing, and grooming activities with no increase in neck or arm pain in order to improve her ability to complete ADL's   Rationale to maximize safety and independence with performance of ADLs and functional tasks   Goal  Progress Again, patient has been able to do this however has increased symptoms when completing.   Target Date 03/25/25   Subjective Report   Subjective Report Patient reports that she is doing ok today. No major changes in her neck or UE pain. She also has been having symptoms in her right hand. Notes that it is pretty uncomfortable today. Today is her last scheduled visit. She is going to see her surgeon in about two weeks to discuss lack of long term significant improvements. We are going to hold her chart open for a few weeks while she does this.   Objective Measures   Objective Measures Objective Measure 1   Objective Measure 1   Objective Measure Subjective pain rating   Details 6/10   Treatment Interventions (PT)   Interventions Therapeutic Procedure/Exercise   Therapeutic Procedure/Exercise   Ther Proc 1 ROm/stretching/strengthening   Ther Proc 1 - Details Supine shoulder horizontal abd: x20 with red band, shoulder D2 pnf flexion: 2x10 with red band,upper trap stretch: 2x20 seconds, levator scap stretch: 2x30 seconds, scalene stretch: 2x20 seconds, pec stretch: 2x20 seconds, trx lean back stretch and sidebend stretch   Skilled Intervention VC/demonstration   Patient Response/Progress Deferred today due to the right hand pain with patient   Manual Therapy   Manual Therapy: Mobilization, MFR, MLD, friction massage minutes (67573) 25   Manual Therapy 1 STM/MFR   Manual Therapy 1 - Details STM/MFR to cervical paraspinals, suboccipitals, scalenes, upper trap, levator scap, rhomboids. Patient positioned in both supine and sitting. Inferior first rib mobs oscillating grade II/III. Sustained manual distraction: 5x30 seconds grade II/III   Patient Response/Progress tolerated well   Education   Learner/Method Patient;No Barriers to Learning   Plan   Home program Upper trap/levator scap stretch: 2x30 seconds, twice daily, shoulder blade squeezes; 2x10, twice daily, median nerve glides: 2x10, 2-3 times per day   Plan  for next session Assess response to HEP, progress mobility and strength as able, STM to cervical and upper back muscles.   Total Session Time   Timed Code Treatment Minutes 25   Total Treatment Time (sum of timed and untimed services) 25         [facility]                                                                                   OUTPATIENT PHYSICAL THERAPY    PLAN OF TREATMENT FOR OUTPATIENT REHABILITATION   Patient's Last Name, First Name, Nohelia Wang YOB: 1980   Provider's Name   [facility]   Medical Record No.  [id number]     Onset Date: 09/29/24  Start of Care Date: 11/12/24     Medical Diagnosis:  Cervical radiculopathy (M54.12)      PT Treatment Diagnosis:  Impaired mobility, decreased functional strength and endurance, neck pain with radicular symptoms Plan of Treatment  Frequency/Duration: 1-2 times per week/ 8 weeks    Certification date from 01/07/25 to 03/25/25         See note for plan of treatment details and functional goals     Abelardo Gamino, PT                         I CERTIFY THE NEED FOR THESE SERVICES FURNISHED UNDER        THIS PLAN OF TREATMENT AND WHILE UNDER MY CARE     (Physician attestation of this document indicates review and certification of the therapy plan).              Referring Provider:  Nate Marquis    Initial Assessment  See Epic Evaluation- Start of Care Date: 11/12/24      PLAN  Continue therapy per current plan of care. Will hold her chart open for a few weeks until she does have her appointment with her surgeon.    Beginning/End Dates of Progress Note Reporting Period:  11/12/24 to 01/28/2025    Referring Provider:  Nate Marquis

## 2025-01-29 ENCOUNTER — THERAPY VISIT (OUTPATIENT)
Dept: PHYSICAL THERAPY | Facility: OTHER | Age: 45
End: 2025-01-29
Attending: UROLOGY
Payer: COMMERCIAL

## 2025-01-29 DIAGNOSIS — N39.3 FEMALE STRESS INCONTINENCE: Primary | ICD-10-CM

## 2025-01-29 PROCEDURE — 97110 THERAPEUTIC EXERCISES: CPT | Mod: GP

## 2025-01-29 PROCEDURE — 97530 THERAPEUTIC ACTIVITIES: CPT | Mod: GP

## 2025-01-29 PROCEDURE — 97112 NEUROMUSCULAR REEDUCATION: CPT | Mod: GP

## 2025-02-02 ENCOUNTER — MYC MEDICAL ADVICE (OUTPATIENT)
Dept: NEUROLOGY | Facility: OTHER | Age: 45
End: 2025-02-02
Payer: COMMERCIAL

## 2025-02-02 DIAGNOSIS — M50.121 CERVICAL DISC DISORDER AT C4-C5 LEVEL WITH RADICULOPATHY: ICD-10-CM

## 2025-02-03 RX ORDER — GABAPENTIN 300 MG/1
300 CAPSULE ORAL 3 TIMES DAILY
Qty: 180 CAPSULE | Refills: 1 | Status: SHIPPED | OUTPATIENT
Start: 2025-02-03

## 2025-02-03 NOTE — TELEPHONE ENCOUNTER
Pt wondering if she needs to see you or if her meds can be increased?     Currently prescribed Gabapentin 300 mg at HS and 200 mg BID PRN. Cymbalta 60 mg daily.     Per Virtual visit from 11/1/24:  Discussed medication options and she is open to only a small increase in her low dose gabapentin for now. I did state that many small fiber neuropathies end up being idiopathic     Routing to provider to review and respond.  Cheyenne Garcia RN on 2/3/2025 at 8:10 AM

## 2025-02-13 DIAGNOSIS — Z87.442 HISTORY OF KIDNEY STONES: ICD-10-CM

## 2025-02-13 DIAGNOSIS — R82.994 HYPERCALCIURIA: ICD-10-CM

## 2025-02-19 RX ORDER — CHLORTHALIDONE 25 MG/1
TABLET ORAL
Qty: 45 TABLET | Refills: 0 | Status: SHIPPED | OUTPATIENT
Start: 2025-02-19

## 2025-02-19 NOTE — TELEPHONE ENCOUNTER
[id number] sent Rx request for the following:      Requested Prescriptions   Pending Prescriptions Disp Refills    chlorthalidone (HYGROTON) 25 MG tablet [Pharmacy Med Name: Chlorthalidone 25 MG Oral Tablet] 45 tablet 0     Sig: TAKE 1/2 (ONE-HALF) TABLET BY MOUTH ONCE DAILY WITH  A  LOW  SALT  DIET  TO  PREVENT  STONES       Diuretics (Including Combos) Protocol Failed - 2/19/2025 10:25 AM        Failed - Potassium level on file in past 12 months        Failed - Medication is active on med list and the sig matches. RN to manually verify dose and sig if red X/fail.     If the protocol passes (green check), you do not need to verify med dose and sig.    A prescription matches if they are the same clinical intention.    For Example: once daily and every morning are the same.    For all fails (red x), verify dose and sig.    If the refill does match what is on file, the RN can still proceed to approve the refill request.     If they do not match, route to the appropriate provider.             Failed - Medication indicated for associated diagnosis     Medication is associated with one or more of the following diagnoses:     Edema   Hypertension   Heart Failure   Meniere's Disease   Bilateral localized swelling of lower limbs   Pulmonary Hypertension          Failed - Has GFR on file in past 12 months and most recent value is normal     Last Prescription Date:   11/13/24  Last Fill Qty/Refills:         45, R-0    History of kidney stones [Z87.442]      Hypercalciuria [R82.994]        Last Office Visit:                10/8/24   1/24/24 (discussed)    Future Office visit:           None    Pt due for annual exam. Routing to provider for refill consideration. Routing to Unit scheduling pool, to assist Pt in scheduling appointment. Unable to complete prescription refill per RN Medication Refill Policy. Maia Raines RN .............. 2/19/2025  10:29 AM

## 2025-04-27 SDOH — HEALTH STABILITY: PHYSICAL HEALTH: ON AVERAGE, HOW MANY DAYS PER WEEK DO YOU ENGAGE IN MODERATE TO STRENUOUS EXERCISE (LIKE A BRISK WALK)?: 5 DAYS

## 2025-04-27 SDOH — HEALTH STABILITY: PHYSICAL HEALTH: ON AVERAGE, HOW MANY MINUTES DO YOU ENGAGE IN EXERCISE AT THIS LEVEL?: 10 MIN

## 2025-04-27 ASSESSMENT — SOCIAL DETERMINANTS OF HEALTH (SDOH): HOW OFTEN DO YOU GET TOGETHER WITH FRIENDS OR RELATIVES?: ONCE A WEEK

## 2025-05-01 ENCOUNTER — OFFICE VISIT (OUTPATIENT)
Dept: FAMILY MEDICINE | Facility: OTHER | Age: 45
End: 2025-05-01
Attending: FAMILY MEDICINE
Payer: COMMERCIAL

## 2025-05-01 VITALS
HEIGHT: 66 IN | TEMPERATURE: 97.8 F | WEIGHT: 231 LBS | RESPIRATION RATE: 20 BRPM | SYSTOLIC BLOOD PRESSURE: 112 MMHG | DIASTOLIC BLOOD PRESSURE: 74 MMHG | OXYGEN SATURATION: 97 % | BODY MASS INDEX: 37.12 KG/M2 | HEART RATE: 97 BPM

## 2025-05-01 DIAGNOSIS — Z00.00 ROUTINE HISTORY AND PHYSICAL EXAMINATION OF ADULT: Primary | ICD-10-CM

## 2025-05-01 DIAGNOSIS — R20.2 PARESTHESIAS: ICD-10-CM

## 2025-05-01 DIAGNOSIS — Z13.1 DIABETES MELLITUS SCREENING: ICD-10-CM

## 2025-05-01 DIAGNOSIS — Z12.31 VISIT FOR SCREENING MAMMOGRAM: ICD-10-CM

## 2025-05-01 DIAGNOSIS — Z13.220 LIPID SCREENING: ICD-10-CM

## 2025-05-01 LAB
ALBUMIN SERPL BCG-MCNC: 4 G/DL (ref 3.5–5.2)
ALP SERPL-CCNC: 113 U/L (ref 40–150)
ALT SERPL W P-5'-P-CCNC: 17 U/L (ref 0–50)
ANION GAP SERPL CALCULATED.3IONS-SCNC: 11 MMOL/L (ref 7–15)
AST SERPL W P-5'-P-CCNC: 22 U/L (ref 0–45)
BILIRUB SERPL-MCNC: 0.4 MG/DL
BUN SERPL-MCNC: 11.5 MG/DL (ref 6–20)
CALCIUM SERPL-MCNC: 9.2 MG/DL (ref 8.8–10.4)
CHLORIDE SERPL-SCNC: 101 MMOL/L (ref 98–107)
CHOLEST SERPL-MCNC: 195 MG/DL
CREAT SERPL-MCNC: 0.67 MG/DL (ref 0.51–0.95)
EGFRCR SERPLBLD CKD-EPI 2021: >90 ML/MIN/1.73M2
FASTING STATUS PATIENT QL REPORTED: YES
FASTING STATUS PATIENT QL REPORTED: YES
GLUCOSE SERPL-MCNC: 120 MG/DL (ref 70–99)
HCO3 SERPL-SCNC: 27 MMOL/L (ref 22–29)
HDLC SERPL-MCNC: 61 MG/DL
LDLC SERPL CALC-MCNC: 110 MG/DL
NONHDLC SERPL-MCNC: 134 MG/DL
POTASSIUM SERPL-SCNC: 3.7 MMOL/L (ref 3.4–5.3)
PROT SERPL-MCNC: 7.3 G/DL (ref 6.4–8.3)
SODIUM SERPL-SCNC: 139 MMOL/L (ref 135–145)
TRIGL SERPL-MCNC: 122 MG/DL

## 2025-05-01 PROCEDURE — 82465 ASSAY BLD/SERUM CHOLESTEROL: CPT | Mod: ZL | Performed by: FAMILY MEDICINE

## 2025-05-01 PROCEDURE — G0463 HOSPITAL OUTPT CLINIC VISIT: HCPCS | Performed by: FAMILY MEDICINE

## 2025-05-01 PROCEDURE — 99213 OFFICE O/P EST LOW 20 MIN: CPT | Mod: 25 | Performed by: FAMILY MEDICINE

## 2025-05-01 PROCEDURE — 84155 ASSAY OF PROTEIN SERUM: CPT | Mod: ZL | Performed by: FAMILY MEDICINE

## 2025-05-01 PROCEDURE — 36415 COLL VENOUS BLD VENIPUNCTURE: CPT | Mod: ZL | Performed by: FAMILY MEDICINE

## 2025-05-01 PROCEDURE — 99396 PREV VISIT EST AGE 40-64: CPT | Performed by: FAMILY MEDICINE

## 2025-05-01 RX ORDER — DULOXETIN HYDROCHLORIDE 30 MG/1
CAPSULE, DELAYED RELEASE ORAL
Qty: 180 CAPSULE | Refills: 4 | Status: SHIPPED | OUTPATIENT
Start: 2025-05-01

## 2025-05-01 ASSESSMENT — PAIN SCALES - GENERAL: PAINLEVEL_OUTOF10: MODERATE PAIN (6)

## 2025-05-01 NOTE — NURSING NOTE
"Chief Complaint   Patient presents with    Physical       Initial /74   Pulse 97   Temp 97.8  F (36.6  C) (Tympanic)   Resp 20   Ht 1.664 m (5' 5.5\")   Wt 104.8 kg (231 lb)   SpO2 97%   BMI 37.86 kg/m   Estimated body mass index is 37.86 kg/m  as calculated from the following:    Height as of this encounter: 1.664 m (5' 5.5\").    Weight as of this encounter: 104.8 kg (231 lb).  Medication Review: complete    The next two questions are to help us understand your food security.  If you are feeling you need any assistance in this area, we have resources available to support you today.          4/27/2025   SDOH- Food Insecurity   Within the past 12 months, did you worry that your food would run out before you got money to buy more? N   Within the past 12 months, did the food you bought just not last and you didn t have money to get more? N         Health Care Directive:  Patient does not have a Health Care Directive: Discussed advance care planning with patient; information given to patient to review.    Mila Garcia LPN      "
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
## 2025-05-07 ENCOUNTER — TRANSFERRED RECORDS (OUTPATIENT)
Dept: HEALTH INFORMATION MANAGEMENT | Facility: OTHER | Age: 45
End: 2025-05-07
Payer: COMMERCIAL

## 2025-05-23 ENCOUNTER — MYC REFILL (OUTPATIENT)
Dept: FAMILY MEDICINE | Facility: OTHER | Age: 45
End: 2025-05-23
Payer: COMMERCIAL

## 2025-05-23 DIAGNOSIS — Z87.442 HISTORY OF KIDNEY STONES: ICD-10-CM

## 2025-05-23 DIAGNOSIS — R82.994 HYPERCALCIURIA: ICD-10-CM

## 2025-05-23 DIAGNOSIS — E87.1 HYPONATREMIA: ICD-10-CM

## 2025-05-24 DIAGNOSIS — R82.994 HYPERCALCIURIA: ICD-10-CM

## 2025-05-24 DIAGNOSIS — Z87.442 HISTORY OF KIDNEY STONES: ICD-10-CM

## 2025-05-27 ENCOUNTER — MYC REFILL (OUTPATIENT)
Dept: FAMILY MEDICINE | Facility: OTHER | Age: 45
End: 2025-05-27

## 2025-05-27 ENCOUNTER — OFFICE VISIT (OUTPATIENT)
Dept: FAMILY MEDICINE | Facility: OTHER | Age: 45
End: 2025-05-27
Attending: FAMILY MEDICINE
Payer: COMMERCIAL

## 2025-05-27 VITALS
TEMPERATURE: 97.1 F | RESPIRATION RATE: 16 BRPM | HEIGHT: 65 IN | SYSTOLIC BLOOD PRESSURE: 122 MMHG | DIASTOLIC BLOOD PRESSURE: 64 MMHG | WEIGHT: 230 LBS | HEART RATE: 84 BPM | BODY MASS INDEX: 38.32 KG/M2 | OXYGEN SATURATION: 96 %

## 2025-05-27 DIAGNOSIS — Z01.818 PREOP GENERAL PHYSICAL EXAM: Primary | ICD-10-CM

## 2025-05-27 DIAGNOSIS — M54.12 CERVICAL RADICULOPATHY: ICD-10-CM

## 2025-05-27 DIAGNOSIS — Z87.442 HISTORY OF KIDNEY STONES: ICD-10-CM

## 2025-05-27 DIAGNOSIS — R82.994 HYPERCALCIURIA: ICD-10-CM

## 2025-05-27 DIAGNOSIS — E87.1 HYPONATREMIA: ICD-10-CM

## 2025-05-27 PROCEDURE — G0463 HOSPITAL OUTPT CLINIC VISIT: HCPCS | Performed by: FAMILY MEDICINE

## 2025-05-27 RX ORDER — CHLORTHALIDONE 25 MG/1
TABLET ORAL
Qty: 45 TABLET | Refills: 0 | Status: CANCELLED | OUTPATIENT
Start: 2025-05-27

## 2025-05-27 RX ORDER — POTASSIUM CITRATE 1080 MG/1
20 TABLET, EXTENDED RELEASE ORAL 2 TIMES DAILY
Qty: 360 TABLET | Refills: 4 | Status: CANCELLED | OUTPATIENT
Start: 2025-05-27

## 2025-05-27 ASSESSMENT — PAIN SCALES - GENERAL: PAINLEVEL_OUTOF10: SEVERE PAIN (8)

## 2025-05-27 NOTE — PROGRESS NOTES
Preoperative Evaluation  [facility]  [address]  [place] [address]  Phone: [phone]  Fax: [phone]  Primary Provider: Lyla Hendrix DO  Pre-op Performing Provider: Nate Marquis MD  May 27, 2025             5/23/2025   Surgical Information   What procedure is being done? Burning nerves on the left side of the neck   Facility or Hospital where procedure/surgery will be performed: [facility] surgery   Who is doing the procedure / surgery?    Date of surgery / procedure: June 2nd 2025   Time of surgery / procedure: N/a   Where do you plan to recover after surgery? at home with family     Fax number for surgical facility: [phone]    Assessment & Plan     The proposed surgical procedure is considered LOW risk.    (Z01.818) Preop general physical exam  (primary encounter diagnosis)  Comment: Patient reports that she is easily able to tolerate greater than 4 METS of activity without any cardiopulmonary symptoms.  She has not had any fevers, chills, cough or cold symptoms nor any dysuria, hematuria or change in bowel movements OB concerning for acute infection.  She reports she has had similar procedures in the past without any complications.    (M54.12) Cervical radiculopathy  Comment: She reports ongoing debilitating left-sided upper extremity weakness and numbness.  We appreciate Dr. Seymour care and expertise.            - No identified additional risk factors other than previously addressed    Preoperative Medication Instructions  Antiplatelet or Anticoagulation Medication Instructions   - We reviewed the medication list and the patient is not on an antiplatelet or anticoagulation medications.    Additional Medication Instructions   - Diuretics (furosemide, hydrochlorothiazide, chlorothalidone): DO NOT TAKE on the day of surgery.   - pregabalin, gabapentin: Continue without modification.   - SSRIs, SNRIs, TCAs, Antipsychotics: Continue without  modification.     Recommendation  Approval given to proceed with proposed procedure, without further diagnostic evaluation.        Anthony Pozo is a 45 year old, presenting for the following:  Pre-Op Exam (DOS 6/28/25, Ablation Left side of the Neck by Dr Lion contreras )          5/27/2025    10:19 AM   Additional Questions   Roomed by Marybeth TANG: Patient comes in for perioperative evaluation prior to undergoing left nerve ablation due to cervical radiculopathy.  Patient reports previously having had injection with near complete resolution of pain.  She reports numbness in addition to her pain in C7/8 nerve distribution to me today.  Perhaps some weakness as well.    Patient reports that she does remain physically active.  She is a  at work.  Has to carry bags up and down stairs.  Has to walk a > 200 yards frequently.  She has not had any chest pain, shortness of breath, exertional indigestion.  She does have the persistent numbness/weakness in the left extremity but no radiation to the jaw and nothing that is exertional.    She also reports no fevers or chills, no sore throat.  She has not had any issues recently with coughing, wheezing or shortness of breath.  No dysuria or polyuria.  No hematuria.  No change in bowel habits.  No abdominal pain.         5/23/2025   Pre-Op Questionnaire   Have you ever had a heart attack or stroke? No   Have you ever had surgery on your heart or blood vessels, such as a stent placement, a coronary artery bypass, or surgery on an artery in your head, neck, heart, or legs? No   Do you have chest pain with activity? No   Do you have a history of heart failure? No   Do you currently have a cold, bronchitis or symptoms of other infection? No   Do you have a cough, shortness of breath, or wheezing? No   Do you or anyone in your family have previous history of blood clots? (!) YES Mother -unknown if provoked but no known genetic clotting disorder   Do you or  does anyone in your family have a serious bleeding problem such as prolonged bleeding following surgeries or cuts? No   Have you ever had problems with anemia or been told to take iron pills? No   Have you had any abnormal blood loss such as black, tarry or bloody stools, or abnormal vaginal bleeding? No   Have you ever had a blood transfusion? No   Are you willing to have a blood transfusion if it is medically needed before, during, or after your surgery?  Yes   Have you or any of your relatives ever had problems with anesthesia? No   Do you have sleep apnea, excessive snoring or daytime drowsiness? No   Do you have any artifical heart valves or other implanted medical devices like a pacemaker, defibrillator, or continuous glucose monitor? No   Do you have artificial joints? No   Are you allergic to latex? No     Advance Care Planning     FULL CODE    Preoperative Review of    reviewed - controlled substances reflected in medication list.          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     Priority: Medium    History of kidney stones, hypercalciuria on chlorthalidone 10/17/2022     Priority: Medium     CONSIDER RENAL US if presenting for renal colic symptoms (limiting radiation exposure)      Morbid obesity (H) 08/20/2021     Priority: Medium    Suspected sleep apnea 05/08/2019     Priority: Medium    Attention deficit disorder 02/19/2018     Priority: Medium    Knee pain 02/19/2018     Priority: Medium     Overview:   secondary to patellofemoral dysfunction      Learning disability 02/19/2018     Priority: Medium    Migraine headache 02/19/2018     Priority: Medium     Overview:   Occasional severe HA, does not require daily prophylaxis      Obesity (BMI 35.0-39.9 without comorbidity) 12/14/2016     Priority: Medium    Adhesive capsulitis of right  shoulder 06/06/2016     Priority: Medium    S/P shoulder surgery 06/06/2016     Priority: Medium    Gastroesophageal reflux disease without esophagitis 05/26/2016     Priority: Medium    Pain of multiple sites 10/19/2015     Priority: Medium    History of arthroscopy of right knee 07/02/2015     Priority: Medium    Plantar fasciitis 11/14/2014     Priority: Medium    H/O excision of mass 09/03/2014     Priority: Medium    Genital herpes 12/07/2009     Priority: Medium     Overview:   Has had only one outbreak        Past Medical History:   Diagnosis Date    Adhesive capsulitis of right shoulder     6/6/2016    Asthma 02/19/2018    Overview:  intermittent    Cyst of ovary 2006    Left    Localized swelling, mass, or lump of upper extremity 08/26/2014    Migraine without status migrainosus, not intractable     Occasional severe HA, does not require daily prophylaxis    Neuropathy     small nerve - diagnosed in the [facility]    Other developmental disorders of scholastic skills     Other specified behavioral and emotional disorders with onset usually occurring in childhood and adolescence     Other specified postprocedural states 09/03/2014    Other specified postprocedural states 06/06/2016    Peptic ulcer without hemorrhage or perforation 1999    Personal history of urinary calculi 2004    Right    Plantar fascial fibromatosis 11/14/2014    Uncomplicated asthma     intermittent     Past Surgical History:   Procedure Laterality Date    APPENDECTOMY OPEN  1999    ARTHROSCOPY SHOULDER      10/05,debridement and subacromial decompression of right shoulder.    ARTHROSCOPY SHOULDER  08/2007    acromioplasty distal clavicle excision, and extensive debridement of the bursa of the right shoulder by Dr. Villa.  Also left?    CHOLECYSTECTOMY  1999    COMBINED CYSTOSCOPY, URETEROSCOPY, LASER HOLMIUM LITHOTRIPSY URETER(S) Left 7/19/2022    Procedure: Left ureteroscopy with laser lithotripsy and stent placement;  Surgeon: Kit  "Melecio CHA MD;  Location: [facility] OR    CYSTOSCOPY      with retrogrades    DILATION AND CURETTAGE  2007    Ablation    LAPAROSCOPIC TUBAL LIGATION      LITHOTRIPSY  2004    extracorporeal shockwave    OTHER SURGICAL HISTORY  2012    OTHER SURGICAL HISTORY Right 2014    Pathology showed epidermal inclusion cysts    RELEASE CARPAL TUNNEL       Current Outpatient Medications   Medication Sig Dispense Refill    chlorthalidone (HYGROTON) 25 MG tablet TAKE 1/2 (ONE-HALF) TABLET BY MOUTH ONCE DAILY WITH  A  LOW  SALT  DIET  TO  PREVENT  STONES 45 tablet 0    cyanocobalamin 1000 MCG sublingual tablet Place 500 mcg under the tongue daily      DULoxetine (CYMBALTA) 30 MG capsule Take 2 capsules daily. 180 capsule 4    gabapentin (NEURONTIN) 300 MG capsule Take 1 capsule (300 mg) by mouth 3 times daily. 180 capsule 1    ketoconazole (NIZORAL) 2 % external cream Apply topically 2 times daily. 60 g 2    loratadine (CLARITIN) 10 MG tablet Take 10 mg by mouth      potassium citrate (UROCIT-K) 10 MEQ (1080 MG) CR tablet Take 2 tablets by mouth twice daily 360 tablet 4    Vitamin D, Cholecalciferol, 25 MCG (1000 UT) TABS          Allergies   Allergen Reactions    Penicillins Anaphylaxis    Aspirin Hives        Social History     Tobacco Use    Smoking status: Former     Current packs/day: 0.00     Average packs/day: 0.5 packs/day for 16.0 years (8.0 ttl pk-yrs)     Types: Cigarettes     Start date: 2000     Quit date: 2016     Years since quittin.4     Passive exposure: Never    Smokeless tobacco: Never   Substance Use Topics    Alcohol use: Not Currently     Alcohol/week: 0.0 - 1.0 standard drinks of alcohol     Comment: Alcoholic Drinks/day: occasionally       History   Drug Use No               Objective    /64 (BP Location: Right arm, Patient Position: Sitting, Cuff Size: Adult Large)   Pulse 84   Temp 97.1  F (36.2  C) (Tympanic)   Resp 16   Ht 1.657 m (5' 5.25\")   Wt 104.3 kg (230 " "lb)   SpO2 96%   BMI 37.98 kg/m     Estimated body mass index is 37.98 kg/m  as calculated from the following:    Height as of this encounter: 1.657 m (5' 5.25\").    Weight as of this encounter: 104.3 kg (230 lb).  Physical Exam  GENERAL: alert and no distress  EYES: Eyes grossly normal to inspection, PERRL and conjunctivae and sclerae normal  HENT: ear canals and TM's normal, nose and mouth without ulcers or lesions  NECK: no adenopathy, no asymmetry, masses, or scars  RESP: lungs clear to auscultation - no rales, rhonchi or wheezes  CV: regular rate and rhythm, normal S1 S2, no S3 or S4, no murmur, click or rub, no peripheral edema  ABDOMEN: soft, nontender, no hepatosplenomegaly, no masses and bowel sounds normal  MS: no gross musculoskeletal defects noted, no edema  SKIN: no suspicious lesions or rashes  NEURO: Normal strength and tone, mentation intact and speech normal  PSYCH: mentation appears normal, affect normal/bright    Recent Labs   Lab Test 05/01/25  1145      POTASSIUM 3.7   CR 0.67        Diagnostics  Recent Results (from the past 720 hours)   Lipid Panel    Collection Time: 05/01/25 11:45 AM   Result Value Ref Range    Cholesterol 195 <200 mg/dL    Triglycerides 122 <150 mg/dL    Direct Measure HDL 61 >=50 mg/dL    LDL Cholesterol Calculated 110 (H) <100 mg/dL    Non HDL Cholesterol 134 (H) <130 mg/dL    Patient Fasting > 8hrs? Yes    Comprehensive Metabolic Panel    Collection Time: 05/01/25 11:45 AM   Result Value Ref Range    Sodium 139 135 - 145 mmol/L    Potassium 3.7 3.4 - 5.3 mmol/L    Carbon Dioxide (CO2) 27 22 - 29 mmol/L    Anion Gap 11 7 - 15 mmol/L    Urea Nitrogen 11.5 6.0 - 20.0 mg/dL    Creatinine 0.67 0.51 - 0.95 mg/dL    GFR Estimate >90 >60 mL/min/1.73m2    Calcium 9.2 8.8 - 10.4 mg/dL    Chloride 101 98 - 107 mmol/L    Glucose 120 (H) 70 - 99 mg/dL    Alkaline Phosphatase 113 40 - 150 U/L    AST 22 0 - 45 U/L    ALT 17 0 - 50 U/L    Protein Total 7.3 6.4 - 8.3 g/dL    " Albumin 4.0 3.5 - 5.2 g/dL    Bilirubin Total 0.4 <=1.2 mg/dL    Patient Fasting > 8hrs? Yes       No EKG required, no history of coronary heart disease, significant arrhythmia, peripheral arterial disease or other structural heart disease.    Revised Cardiac Risk Index (RCRI)  The patient has the following serious cardiovascular risks for perioperative complications:   - No serious cardiac risks = 0 points     RCRI Interpretation: 0 points: Class I (very low risk - 0.4% complication rate)         Signed Electronically by: Nate Marquis MD  A copy of this evaluation report is provided to the requesting physician.

## 2025-05-27 NOTE — PATIENT INSTRUCTIONS

## 2025-05-27 NOTE — NURSING NOTE
"Chief Complaint   Patient presents with    Pre-Op Exam     DOS 6/28/25, Ablation Left side of the Neck by Dr Lion contreras        Initial /64 (BP Location: Right arm, Patient Position: Sitting, Cuff Size: Adult Large)   Pulse 84   Temp 97.1  F (36.2  C) (Tympanic)   Resp 16   Ht 1.657 m (5' 5.25\")   Wt 104.3 kg (230 lb)   SpO2 96%   BMI 37.98 kg/m   Estimated body mass index is 37.98 kg/m  as calculated from the following:    Height as of this encounter: 1.657 m (5' 5.25\").    Weight as of this encounter: 104.3 kg (230 lb).  Medication Reconciliation: complete    Marybeth Alexandre LPN    Advance Care Directive reviewed    "

## 2025-05-28 RX ORDER — CHLORTHALIDONE 25 MG/1
TABLET ORAL
Qty: 45 TABLET | Refills: 0 | OUTPATIENT
Start: 2025-05-28

## 2025-05-28 RX ORDER — POTASSIUM CITRATE 1080 MG/1
20 TABLET, EXTENDED RELEASE ORAL 2 TIMES DAILY
Qty: 360 TABLET | Refills: 4 | Status: SHIPPED | OUTPATIENT
Start: 2025-05-28

## 2025-05-28 RX ORDER — CHLORTHALIDONE 25 MG/1
TABLET ORAL
Qty: 45 TABLET | Refills: 4 | Status: SHIPPED | OUTPATIENT
Start: 2025-05-28

## 2025-05-28 NOTE — TELEPHONE ENCOUNTER
Prescriptions approved earlier today. Pt notified via [employer]. Damien Patel RN .............. 5/28/2025  3:54 PM

## 2025-05-28 NOTE — TELEPHONE ENCOUNTER
[id number] sent Rx request for the following:      Requested Prescriptions   Pending Prescriptions Disp Refills    potassium citrate (UROCIT-K) 10 MEQ (1080 MG) CR tablet 360 tablet 4     Sig: Take 2 tablets (20 mEq) by mouth 2 times daily.       Rx Protocol Renal-Urologic Agent Failed - 5/28/2025  1:44 PM        Failed - Renal panel on file in the past 12 months        Last Prescription Date:   3/14/24  Last Fill Qty/Refills:         360, R-4           chlorthalidone (HYGROTON) 25 MG tablet 45 tablet 0     Sig: TAKE 1/2 (ONE-HALF) TABLET BY MOUTH ONCE DAILY WITH  A  LOW  SALT  DIET  TO  PREVENT  STONES       Diuretics (Including Combos) Protocol Failed - 5/28/2025  1:44 PM        Failed - Medication indicated for associated diagnosis     Medication is associated with one or more of the following diagnoses:     Edema   Hypertension   Heart Failure   Meniere's Disease   Bilateral localized swelling of lower limbs   Pulmonary Hypertension         Last Prescription Date:   2/19/25  Last Fill Qty/Refills:         45, R-0    Last Office Visit:              5/27/25 pre op   Future Office visit:           none    Routing refill request to provider for review/approval because:  Labs not current:  Renal profile    Onelia Willis RN on 5/28/2025 at 1:46 PM

## 2025-06-09 ENCOUNTER — HOSPITAL ENCOUNTER (OUTPATIENT)
Dept: MAMMOGRAPHY | Facility: OTHER | Age: 45
Discharge: HOME | End: 2025-06-09
Attending: FAMILY MEDICINE | Admitting: FAMILY MEDICINE
Payer: COMMERCIAL

## 2025-06-09 DIAGNOSIS — Z12.31 VISIT FOR SCREENING MAMMOGRAM: ICD-10-CM

## 2025-06-09 DIAGNOSIS — Z00.00 ROUTINE HISTORY AND PHYSICAL EXAMINATION OF ADULT: ICD-10-CM

## 2025-06-09 PROCEDURE — 77063 BREAST TOMOSYNTHESIS BI: CPT

## 2025-06-21 ENCOUNTER — APPOINTMENT (OUTPATIENT)
Dept: CT IMAGING | Facility: OTHER | Age: 45
End: 2025-06-21
Attending: PHYSICIAN ASSISTANT
Payer: COMMERCIAL

## 2025-06-21 ENCOUNTER — HOSPITAL ENCOUNTER (EMERGENCY)
Facility: OTHER | Age: 45
Discharge: HOME | End: 2025-06-22
Attending: PHYSICIAN ASSISTANT
Payer: COMMERCIAL

## 2025-06-21 VITALS
BODY MASS INDEX: 38.32 KG/M2 | HEIGHT: 65 IN | WEIGHT: 230 LBS | SYSTOLIC BLOOD PRESSURE: 136 MMHG | RESPIRATION RATE: 16 BRPM | TEMPERATURE: 99.3 F | OXYGEN SATURATION: 98 % | HEART RATE: 86 BPM | DIASTOLIC BLOOD PRESSURE: 82 MMHG

## 2025-06-21 DIAGNOSIS — R19.7 DIARRHEA: ICD-10-CM

## 2025-06-21 DIAGNOSIS — R50.9 SUBJECTIVE FEVER: ICD-10-CM

## 2025-06-21 DIAGNOSIS — R26.89 BALANCE PROBLEM: ICD-10-CM

## 2025-06-21 PROCEDURE — 70496 CT ANGIOGRAPHY HEAD: CPT | Mod: 26 | Performed by: RADIOLOGY

## 2025-06-21 PROCEDURE — 80048 BASIC METABOLIC PNL TOTAL CA: CPT | Performed by: PHYSICIAN ASSISTANT

## 2025-06-21 PROCEDURE — 70498 CT ANGIOGRAPHY NECK: CPT | Mod: 26 | Performed by: RADIOLOGY

## 2025-06-21 PROCEDURE — 93005 ELECTROCARDIOGRAM TRACING: CPT | Performed by: PHYSICIAN ASSISTANT

## 2025-06-21 PROCEDURE — 87476 LYME DIS DNA AMP PROBE: CPT | Performed by: PHYSICIAN ASSISTANT

## 2025-06-21 PROCEDURE — 82310 ASSAY OF CALCIUM: CPT | Performed by: PHYSICIAN ASSISTANT

## 2025-06-21 PROCEDURE — 82248 BILIRUBIN DIRECT: CPT | Performed by: PHYSICIAN ASSISTANT

## 2025-06-21 PROCEDURE — 84075 ASSAY ALKALINE PHOSPHATASE: CPT | Performed by: PHYSICIAN ASSISTANT

## 2025-06-21 PROCEDURE — 84703 CHORIONIC GONADOTROPIN ASSAY: CPT | Performed by: PHYSICIAN ASSISTANT

## 2025-06-21 PROCEDURE — 99285 EMERGENCY DEPT VISIT HI MDM: CPT | Performed by: PHYSICIAN ASSISTANT

## 2025-06-21 PROCEDURE — 70450 CT HEAD/BRAIN W/O DYE: CPT | Mod: 26 | Performed by: RADIOLOGY

## 2025-06-21 PROCEDURE — 87468 ANAPLSMA PHGCYTOPHLM AMP PRB: CPT | Performed by: PHYSICIAN ASSISTANT

## 2025-06-21 PROCEDURE — 87798 DETECT AGENT NOS DNA AMP: CPT | Performed by: PHYSICIAN ASSISTANT

## 2025-06-21 PROCEDURE — 99285 EMERGENCY DEPT VISIT HI MDM: CPT | Mod: 25 | Performed by: PHYSICIAN ASSISTANT

## 2025-06-21 PROCEDURE — 85014 HEMATOCRIT: CPT | Performed by: PHYSICIAN ASSISTANT

## 2025-06-21 PROCEDURE — 85004 AUTOMATED DIFF WBC COUNT: CPT | Performed by: PHYSICIAN ASSISTANT

## 2025-06-21 PROCEDURE — 83735 ASSAY OF MAGNESIUM: CPT | Performed by: PHYSICIAN ASSISTANT

## 2025-06-21 PROCEDURE — 86618 LYME DISEASE ANTIBODY: CPT | Performed by: PHYSICIAN ASSISTANT

## 2025-06-21 PROCEDURE — 70450 CT HEAD/BRAIN W/O DYE: CPT

## 2025-06-21 PROCEDURE — 36415 COLL VENOUS BLD VENIPUNCTURE: CPT | Performed by: PHYSICIAN ASSISTANT

## 2025-06-21 PROCEDURE — 70498 CT ANGIOGRAPHY NECK: CPT

## 2025-06-21 ASSESSMENT — ENCOUNTER SYMPTOMS
DIARRHEA: 1
DIZZINESS: 1
SHORTNESS OF BREATH: 0
LIGHT-HEADEDNESS: 1
CHILLS: 0
COUGH: 0
HEMATURIA: 0
ABDOMINAL PAIN: 0
FEVER: 1

## 2025-06-21 ASSESSMENT — COLUMBIA-SUICIDE SEVERITY RATING SCALE - C-SSRS
6. HAVE YOU EVER DONE ANYTHING, STARTED TO DO ANYTHING, OR PREPARED TO DO ANYTHING TO END YOUR LIFE?: NO
2. HAVE YOU ACTUALLY HAD ANY THOUGHTS OF KILLING YOURSELF IN THE PAST MONTH?: NO
1. IN THE PAST MONTH, HAVE YOU WISHED YOU WERE DEAD OR WISHED YOU COULD GO TO SLEEP AND NOT WAKE UP?: NO

## 2025-06-21 ASSESSMENT — ACTIVITIES OF DAILY LIVING (ADL): ADLS_ACUITY_SCORE: 41

## 2025-06-21 NOTE — LETTER
June 22, 2025      To Whom It May Concern:      Nohelia Crockett was seen in our Emergency Department today, 06/22/25.  I expect her condition to improve over the next 1-2 days.  She may return to work when improved.    Sincerely,        Miranda Marquis RN  Electronically signed

## 2025-06-22 LAB
A PHAGOCYTOPH DNA BLD QL NAA+PROBE: NOT DETECTED
ALBUMIN SERPL BCG-MCNC: 4.3 G/DL (ref 3.5–5.2)
ALP SERPL-CCNC: 119 U/L (ref 40–150)
ALT SERPL W P-5'-P-CCNC: 21 U/L (ref 0–50)
ANION GAP SERPL CALCULATED.3IONS-SCNC: 13 MMOL/L (ref 7–15)
AST SERPL W P-5'-P-CCNC: 25 U/L (ref 0–45)
BABESIA DNA BLD QL NAA+PROBE: NOT DETECTED
BASOPHILS # BLD AUTO: 0.1 10E3/UL (ref 0–0.2)
BASOPHILS NFR BLD AUTO: 1 %
BILIRUB DIRECT SERPL-MCNC: 0.12 MG/DL (ref 0–0.3)
BILIRUB SERPL-MCNC: 0.4 MG/DL
BUN SERPL-MCNC: 11.8 MG/DL (ref 6–20)
CALCIUM SERPL-MCNC: 9.7 MG/DL (ref 8.8–10.4)
CHLORIDE SERPL-SCNC: 99 MMOL/L (ref 98–107)
CREAT SERPL-MCNC: 0.73 MG/DL (ref 0.51–0.95)
EGFRCR SERPLBLD CKD-EPI 2021: >90 ML/MIN/1.73M2
EHRLICHIA DNA SPEC QL NAA+PROBE: NOT DETECTED
EOSINOPHIL # BLD AUTO: 0.1 10E3/UL (ref 0–0.7)
EOSINOPHIL NFR BLD AUTO: 1 %
ERYTHROCYTE [DISTWIDTH] IN BLOOD BY AUTOMATED COUNT: 12.4 % (ref 10–15)
FLUAV RNA SPEC QL NAA+PROBE: NEGATIVE
FLUBV RNA RESP QL NAA+PROBE: NEGATIVE
GLUCOSE SERPL-MCNC: 128 MG/DL (ref 70–99)
HCG SERPL QL: NEGATIVE
HCO3 SERPL-SCNC: 27 MMOL/L (ref 22–29)
HCT VFR BLD AUTO: 40.3 % (ref 35–47)
HGB BLD-MCNC: 13.9 G/DL (ref 11.7–15.7)
HOLD SPECIMEN: NORMAL
IMM GRANULOCYTES # BLD: 0 10E3/UL
IMM GRANULOCYTES NFR BLD: 0 %
LYMPHOCYTES # BLD AUTO: 2.7 10E3/UL (ref 0.8–5.3)
LYMPHOCYTES NFR BLD AUTO: 27 %
MAGNESIUM SERPL-MCNC: 1.7 MG/DL (ref 1.7–2.3)
MCH RBC QN AUTO: 32.3 PG (ref 26.5–33)
MCHC RBC AUTO-ENTMCNC: 34.5 G/DL (ref 31.5–36.5)
MCV RBC AUTO: 94 FL (ref 78–100)
MONOCYTES # BLD AUTO: 0.9 10E3/UL (ref 0–1.3)
MONOCYTES NFR BLD AUTO: 9 %
NEUTROPHILS # BLD AUTO: 6 10E3/UL (ref 1.6–8.3)
NEUTROPHILS NFR BLD AUTO: 62 %
NRBC # BLD AUTO: 0 10E3/UL
NRBC BLD AUTO-RTO: 0 /100
PLATELET # BLD AUTO: 257 10E3/UL (ref 150–450)
POTASSIUM SERPL-SCNC: 3.5 MMOL/L (ref 3.4–5.3)
PROT SERPL-MCNC: 8.1 G/DL (ref 6.4–8.3)
RBC # BLD AUTO: 4.31 10E6/UL (ref 3.8–5.2)
RSV RNA SPEC NAA+PROBE: NEGATIVE
SARS-COV-2 RNA RESP QL NAA+PROBE: NEGATIVE
SODIUM SERPL-SCNC: 139 MMOL/L (ref 135–145)
WBC # BLD AUTO: 9.7 10E3/UL (ref 4–11)

## 2025-06-22 PROCEDURE — 96361 HYDRATE IV INFUSION ADD-ON: CPT | Performed by: PHYSICIAN ASSISTANT

## 2025-06-22 PROCEDURE — 250N000009 HC RX 250: Performed by: PHYSICIAN ASSISTANT

## 2025-06-22 PROCEDURE — 250N000011 HC RX IP 250 OP 636: Performed by: PHYSICIAN ASSISTANT

## 2025-06-22 PROCEDURE — 250N000013 HC RX MED GY IP 250 OP 250 PS 637: Performed by: PHYSICIAN ASSISTANT

## 2025-06-22 PROCEDURE — 258N000003 HC RX IP 258 OP 636: Performed by: PHYSICIAN ASSISTANT

## 2025-06-22 PROCEDURE — 93010 ELECTROCARDIOGRAM REPORT: CPT | Performed by: INTERNAL MEDICINE

## 2025-06-22 PROCEDURE — 96360 HYDRATION IV INFUSION INIT: CPT | Performed by: PHYSICIAN ASSISTANT

## 2025-06-22 PROCEDURE — 87637 SARSCOV2&INF A&B&RSV AMP PRB: CPT | Performed by: PHYSICIAN ASSISTANT

## 2025-06-22 RX ORDER — IOPAMIDOL 755 MG/ML
75 INJECTION, SOLUTION INTRAVASCULAR ONCE
Status: COMPLETED | OUTPATIENT
Start: 2025-06-22 | End: 2025-06-22

## 2025-06-22 RX ORDER — MECLIZINE HCL 12.5 MG 12.5 MG/1
25 TABLET ORAL ONCE
Status: COMPLETED | OUTPATIENT
Start: 2025-06-22 | End: 2025-06-22

## 2025-06-22 RX ORDER — MECLIZINE HCL 12.5 MG 12.5 MG/1
12.5 TABLET ORAL 4 TIMES DAILY PRN
Qty: 30 TABLET | Refills: 0 | Status: SHIPPED | OUTPATIENT
Start: 2025-06-22

## 2025-06-22 RX ADMIN — SODIUM CHLORIDE 60 ML: 9 INJECTION, SOLUTION INTRAVENOUS at 00:36

## 2025-06-22 RX ADMIN — IOPAMIDOL 75 ML: 755 INJECTION, SOLUTION INTRAVENOUS at 00:36

## 2025-06-22 RX ADMIN — MECLIZINE 25 MG: 12.5 TABLET ORAL at 02:36

## 2025-06-22 RX ADMIN — SODIUM CHLORIDE 1000 ML: 0.9 INJECTION, SOLUTION INTRAVENOUS at 00:11

## 2025-06-22 ASSESSMENT — ACTIVITIES OF DAILY LIVING (ADL)
ADLS_ACUITY_SCORE: 41

## 2025-06-22 NOTE — DISCHARGE INSTRUCTIONS
Get plenty of fluids and rest.  As discussed, your lab work appeared very stable, you do have tickborne illness labs pending.  Your imaging appeared very stable as well.   I discussed your case with stroke team, they recommend outpatient MRI.  We did discuss further observation this evening versus close monitoring as outpatient.  SHe decided to continue as outpatient.  We will send you home with meclizine medication prescribed to her [facility].  Referrals placed for you to obtain outpatient MRI and referral to follow-up with PCP.  Return if there are worsening or concerning symptoms especially changes in vision, speech changes, extremity weakness etc.

## 2025-06-22 NOTE — ED PROVIDER NOTES
History     Chief Complaint   Patient presents with    Headache    Chills    Dizziness    Fever     HPI  Nohelia Crockett is a 45 year old female who presents to the ED for evaluation of HA, chills, dizziness, fever. Pt c/o headahce, diarrhea, fever 100.3-100.4 at home, chills, dizziness, started yesterday, denies any tick bites.     Allergies:  Allergies   Allergen Reactions    Penicillins Anaphylaxis    Aspirin Hives       Problem List:    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     Priority: Medium    History of kidney stones, hypercalciuria on chlorthalidone 10/17/2022     Priority: Medium     CONSIDER RENAL US if presenting for renal colic symptoms (limiting radiation exposure)      Morbid obesity (H) 08/20/2021     Priority: Medium    Suspected sleep apnea 05/08/2019     Priority: Medium    Attention deficit disorder 02/19/2018     Priority: Medium    Knee pain 02/19/2018     Priority: Medium     Overview:   secondary to patellofemoral dysfunction      Learning disability 02/19/2018     Priority: Medium    Migraine headache 02/19/2018     Priority: Medium     Overview:   Occasional severe HA, does not require daily prophylaxis      Obesity (BMI 35.0-39.9 without comorbidity) 12/14/2016     Priority: Medium    Adhesive capsulitis of right shoulder 06/06/2016     Priority: Medium    S/P shoulder surgery 06/06/2016     Priority: Medium    Gastroesophageal reflux disease without esophagitis 05/26/2016     Priority: Medium    Pain of multiple sites 10/19/2015     Priority: Medium    History of arthroscopy of right knee 07/02/2015     Priority: Medium    Plantar fasciitis 11/14/2014     Priority: Medium    H/O excision of mass 09/03/2014     Priority: Medium    Genital herpes 12/07/2009     Priority: Medium     Overview:   Has had only one outbreak           Past Medical History:    Past Medical History:   Diagnosis Date    Adhesive capsulitis of right shoulder     Asthma 02/19/2018    Cyst of ovary 2006    Localized swelling, mass, or lump of upper extremity 08/26/2014    Migraine without status migrainosus, not intractable     Neuropathy     Other developmental disorders of scholastic skills     Other specified behavioral and emotional disorders with onset usually occurring in childhood and adolescence     Other specified postprocedural states 09/03/2014    Other specified postprocedural states 06/06/2016    Peptic ulcer without hemorrhage or perforation 1999    Personal history of urinary calculi 2004    Plantar fascial fibromatosis 11/14/2014    Uncomplicated asthma        Past Surgical History:    Past Surgical History:   Procedure Laterality Date    APPENDECTOMY OPEN  1999    ARTHROSCOPY SHOULDER      10/05,debridement and subacromial decompression of right shoulder.    ARTHROSCOPY SHOULDER  08/2007    acromioplasty distal clavicle excision, and extensive debridement of the bursa of the right shoulder by Dr. Villa.  Also left?    CHOLECYSTECTOMY  1999    COMBINED CYSTOSCOPY, URETEROSCOPY, LASER HOLMIUM LITHOTRIPSY URETER(S) Left 7/19/2022    Procedure: Left ureteroscopy with laser lithotripsy and stent placement;  Surgeon: Melecio Pantoja MD;  Location: [facility] OR    CYSTOSCOPY  2004    with retrogrades    DILATION AND CURETTAGE  11/2007    Ablation    LAPAROSCOPIC TUBAL LIGATION  2004    LITHOTRIPSY  12/2004    extracorporeal shockwave    OTHER SURGICAL HISTORY  08/13/2012    OTHER SURGICAL HISTORY Right 09/03/2014    Pathology showed epidermal inclusion cysts    RELEASE CARPAL TUNNEL  2012       Family History:    Family History   Problem Relation Age of Onset    Cancer Mother 46        Cancer,Lung cancer    Blood Disease Mother         Blood Disease    Asthma Father         Asthma    Diabetes Father         Diabetes,2    Hypertension Father         Hypertension     Heart Disease Father         Heart Disease,CHF    Other - See Comments Father          at age 65 from complications of a motor vehicle accident.    Family History Negative Sister         Good Health    Family History Negative Sister         Good Health    Breast Cancer No family hx of         Cancer-breast    Colon Cancer No family hx of         Cancer-colon    Prostate Cancer No family hx of         Cancer-prostate    Ovarian Cancer No family hx of         Cancer-ovarian    Anesthesia Reaction No family hx of         Anesthesia Problem       Social History:  Marital Status:   [4]  Social History     Tobacco Use    Smoking status: Former     Current packs/day: 0.00     Average packs/day: 0.5 packs/day for 16.0 years (8.0 ttl pk-yrs)     Types: Cigarettes     Start date: 2000     Quit date: 2016     Years since quittin.4     Passive exposure: Never    Smokeless tobacco: Never   Vaping Use    Vaping status: Never Used   Substance Use Topics    Alcohol use: Not Currently     Alcohol/week: 0.0 - 1.0 standard drinks of alcohol     Comment: Alcoholic Drinks/day: occasionally    Drug use: No        Medications:    meclizine (ANTIVERT) 12.5 MG tablet  chlorthalidone (HYGROTON) 25 MG tablet  cyanocobalamin 1000 MCG sublingual tablet  DULoxetine (CYMBALTA) 30 MG capsule  gabapentin (NEURONTIN) 300 MG capsule  ketoconazole (NIZORAL) 2 % external cream  loratadine (CLARITIN) 10 MG tablet  potassium citrate (UROCIT-K) 10 MEQ (1080 MG) CR tablet  Vitamin D, Cholecalciferol, 25 MCG (1000 UT) TABS          Review of Systems   Constitutional:  Positive for fever. Negative for chills.   HENT:  Negative for congestion.    Eyes:  Negative for visual disturbance.   Respiratory:  Negative for cough and shortness of breath.    Cardiovascular:  Negative for chest pain.   Gastrointestinal:  Positive for diarrhea. Negative for abdominal pain.   Genitourinary:  Negative for hematuria.   Neurological:  Positive for  "dizziness and light-headedness. Negative for syncope.       Physical Exam   BP: 116/75  Pulse: 96  Temp: 99.3  F (37.4  C)  Resp: 18  Height: 165.1 cm (5' 5\")  Weight: 104.3 kg (230 lb)  SpO2: 98 %      Physical Exam  Constitutional:       General: She is not in acute distress.     Appearance: She is well-developed. She is not ill-appearing or diaphoretic.   HENT:      Head: Normocephalic and atraumatic.      Right Ear: Tympanic membrane normal.      Left Ear: Tympanic membrane normal.   Eyes:      Extraocular Movements: Extraocular movements intact.      Conjunctiva/sclera: Conjunctivae normal.      Pupils: Pupils are equal, round, and reactive to light.   Cardiovascular:      Rate and Rhythm: Normal rate and regular rhythm.   Pulmonary:      Effort: Pulmonary effort is normal.      Breath sounds: Normal breath sounds.   Abdominal:      Palpations: Abdomen is soft.      Tenderness: There is no abdominal tenderness.   Musculoskeletal:         General: No deformity. Normal range of motion.   Skin:     General: Skin is warm and dry.      Coloration: Skin is not jaundiced.      Findings: No rash.   Neurological:      Mental Status: She is alert and oriented to person, place, and time.      Cranial Nerves: No cranial nerve deficit.      Sensory: No sensory deficit.      Motor: No weakness.      Coordination: Coordination normal.      Comments: Balance issues when walking   Psychiatric:         Mood and Affect: Mood normal.         Behavior: Behavior normal.         Thought Content: Thought content normal.         Judgment: Judgment normal.         ED Course        Procedures         EKG read and interpreted by me at 0031. HR 87, NSR    Critical Care time:  none       No results found for this or any previous visit (from the past 24 hours).    Medications   sodium chloride 0.9% BOLUS 1,000 mL (0 mLs Intravenous Stopped 6/22/25 0215)   iopamidol (ISOVUE-370) solution 75 mL (75 mLs Intravenous $Given 6/22/25 0036)   sodium " chloride 0.9 % bag for CT scan flush (60 mLs Intravenous $Given 6/22/25 0036)   meclizine (ANTIVERT) tablet 25 mg (25 mg Oral $Given 6/22/25 9348)       Assessments & Plan (with Medical Decision Making)   I placed an urgent stroke/TIA consult. Sx's >24 hours, balance issues with walking.  She reports persistent dizziness while at rest.  She has no nystagmus.  No other focal neurological deficits.  I do have her stand up and walk and she can do this on her own but she does lean and use the wall to help with balance.  Vital signs are stable and she is afebrile.  No other areas of discomfort.    Patient has excellent appearing lab work and head/neck imaging.  She is unable to provide a urine or stool sample.    I consulted with Dr. Dell Morris, stroke neurology.  She does not recommend any changes this evening but says if symptoms continue the patient can obtain outpatient MRI study and either PCP/neurology follow-up.  Patient was given fluids.  She says she still feels a little off balance, I did give her a meclizine and gave her further options this evening including further observation and management in the hospital.  Unfortunately, this is a Saturday evening around midnight and we do not have MRI capabilities until Monday.  The patient says she would like to go home at this time.  I will go ahead and place a referral for her to obtain the MRI study and those results will go to her PCP.  We will also send her home on a course of meclizine to take as needed.    Strict return precautions are given to the pt, they will return if symptoms are worsening or concerning. The pt understands and agrees with the plan and they are discharged.     Castillo Cunningham PA-C    I have reviewed the nursing notes.    I have reviewed the findings, diagnosis, plan and need for follow up with the patient.        Discharge Medication List as of 6/22/2025  2:29 AM        START taking these medications    Details   meclizine (ANTIVERT)  12.5 MG tablet Take 1 tablet (12.5 mg) by mouth 4 times daily as needed for dizziness., Disp-30 tablet, R-0, E-Prescribe             Final diagnoses:   Diarrhea   Balance problem   Subjective fever       6/21/2025   [facility] PA  06/23/25 2135

## 2025-06-22 NOTE — ED TRIAGE NOTES
Pt c/o headahce, diarrhea, fever 100.3-100.4 at home, chills, dizziness, started yesterday, denies any tick bites.      Triage Assessment (Adult)       Row Name 06/21/25 2049          Triage Assessment    Airway WDL WDL        Respiratory WDL    Respiratory WDL WDL        Skin Circulation/Temperature WDL    Skin Circulation/Temperature WDL WDL        Cardiac WDL    Cardiac WDL WDL        Peripheral/Neurovascular WDL    Peripheral Neurovascular WDL WDL        Cognitive/Neuro/Behavioral WDL    Cognitive/Neuro/Behavioral WDL WDL

## 2025-06-22 NOTE — ED NOTES
"[facility]    Stroke Telephone Note    I was called by Castillo Cunningham on 06/21/25 regarding patient Nohelia Crockett. The patient is a 45 year old female with a history of cervical radiculopathy, obesity, migraine headache presenting to the ER with fever, chills, dizziness, headache, diarrhea and fever. A stat stroke consult was placed.    Symptoms started >24h ago. Dizziness at rest making it difficult to walk. Has a history of vertigo but this is \"not the same\" as prior. NIHSS 0. Unable to walk independently. MRI unavailable until Monday.    On review of chart, has had multiple MRIs in the past, including in 2024 for vertigo. She has chronic white matter changes that have not progressed on repeated MRIs. She saw Dr. Jaxson Gould in 11/2024 for small fiber neuropathy.     Vitals  BP: 136/82   Pulse: 86   Resp: 16   Temp: 99.3  F (37.4  C)   Weight: 104.3 kg (230 lb)    Imaging Findings  CT/CTA pending x >1hr    Impression  Dizziness, diarrhea, fever, chills, headache > 24h. Given constellation of symptoms, unlikely primary neurovascular pathology.     Recommendations  -Page back if CT/CTA show acute neurovascular pathology (stroke, hemorrhage, large vessel occlusion, critical vessel stenosis)    Assuming CT/CTA unrevealing:  -Defer immediate workup to primary service; if patient has unrelenting dizziness and gait instability, vestibular PT consultation +/- MRI brain wwo gadolinium once available would be reasonable    1:30AM ADDENDUM: CT/CTA unrevealing, recs as above    My recommendations are based on the information provided over the phone by Nohelia Crockett's in-person providers. They are not intended to replace the clinical judgment of her in-person providers. I was not requested to personally see or examine the patient at this time.     Britt Uribe MD  Vascular Neurology    To page me or covering stroke neurology team member, click here: [facility]  Choose \"On Call\" " "tab at top, then select \"NEUROLOGY/ALL SITES\" from middle drop-down box, press Enter, then look for \"stroke\" or \"telestroke\" for your site.         "

## 2025-06-23 LAB
ATRIAL RATE - MUSE: 87 BPM
B BURGDOR IGG+IGM SER QL: 0.23
DIASTOLIC BLOOD PRESSURE - MUSE: NORMAL MMHG
INTERPRETATION ECG - MUSE: NORMAL
P AXIS - MUSE: 13 DEGREES
PR INTERVAL - MUSE: 144 MS
QRS DURATION - MUSE: 84 MS
QT - MUSE: 360 MS
QTC - MUSE: 433 MS
R AXIS - MUSE: 45 DEGREES
SYSTOLIC BLOOD PRESSURE - MUSE: NORMAL MMHG
T AXIS - MUSE: 35 DEGREES
VENTRICULAR RATE- MUSE: 87 BPM

## 2025-06-24 LAB — B BURGDOR DNA SPEC QL NAA+PROBE: NOT DETECTED

## 2025-06-28 ENCOUNTER — HOSPITAL ENCOUNTER (OUTPATIENT)
Dept: MRI IMAGING | Facility: OTHER | Age: 45
Discharge: HOME | End: 2025-06-28
Attending: PHYSICIAN ASSISTANT | Admitting: RADIOLOGY
Payer: COMMERCIAL

## 2025-06-28 DIAGNOSIS — R26.89 BALANCE PROBLEM: ICD-10-CM

## 2025-06-28 PROCEDURE — 70553 MRI BRAIN STEM W/O & W/DYE: CPT

## 2025-06-28 PROCEDURE — 70553 MRI BRAIN STEM W/O & W/DYE: CPT | Mod: 26 | Performed by: RADIOLOGY

## 2025-06-28 PROCEDURE — A9575 INJ GADOTERATE MEGLUMI 0.1ML: HCPCS | Performed by: PHYSICIAN ASSISTANT

## 2025-06-28 PROCEDURE — 255N000002 HC RX 255 OP 636: Performed by: PHYSICIAN ASSISTANT

## 2025-06-28 RX ORDER — GADOTERATE MEGLUMINE 376.9 MG/ML
20 INJECTION INTRAVENOUS ONCE
Status: COMPLETED | OUTPATIENT
Start: 2025-06-28 | End: 2025-06-28

## 2025-06-28 RX ADMIN — GADOTERATE MEGLUMINE 20 ML: 376.9 INJECTION INTRAVENOUS at 10:12

## 2025-07-08 ENCOUNTER — OFFICE VISIT (OUTPATIENT)
Dept: FAMILY MEDICINE | Facility: OTHER | Age: 45
End: 2025-07-08
Attending: NURSE PRACTITIONER
Payer: COMMERCIAL

## 2025-07-08 VITALS
HEART RATE: 84 BPM | HEIGHT: 65 IN | TEMPERATURE: 98 F | WEIGHT: 231.2 LBS | DIASTOLIC BLOOD PRESSURE: 82 MMHG | RESPIRATION RATE: 16 BRPM | BODY MASS INDEX: 38.52 KG/M2 | SYSTOLIC BLOOD PRESSURE: 124 MMHG | OXYGEN SATURATION: 95 %

## 2025-07-08 DIAGNOSIS — R26.89 BALANCE PROBLEMS: Primary | ICD-10-CM

## 2025-07-08 DIAGNOSIS — R42 DIZZINESS: ICD-10-CM

## 2025-07-08 DIAGNOSIS — M25.562 ACUTE PAIN OF LEFT KNEE: ICD-10-CM

## 2025-07-08 PROCEDURE — G0463 HOSPITAL OUTPT CLINIC VISIT: HCPCS

## 2025-07-08 PROCEDURE — 99214 OFFICE O/P EST MOD 30 MIN: CPT | Performed by: NURSE PRACTITIONER

## 2025-07-08 ASSESSMENT — PAIN SCALES - GENERAL: PAINLEVEL_OUTOF10: SEVERE PAIN (8)

## 2025-07-08 NOTE — PATIENT INSTRUCTIONS
Please follow up with Dr. Gould who had previously noted he wanted to see you in 4 months -which would have been this past March. A new referral was placed.     Can follow up with him on new symptoms and recent brain MRI as well given your history.     Compression knee brace recommended - follow up if not improving in the next several weeks.
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
## 2025-07-08 NOTE — NURSING NOTE
"Chief Complaint   Patient presents with    ER F/U    MRI Transcription    Knee Pain     Left        Initial /82   Pulse 84   Temp 98  F (36.7  C) (Temporal)   Resp 16   Ht 1.651 m (5' 5\")   Wt 104.9 kg (231 lb 3.2 oz)   SpO2 95%   Breastfeeding No   BMI 38.47 kg/m   Estimated body mass index is 38.47 kg/m  as calculated from the following:    Height as of this encounter: 1.651 m (5' 5\").    Weight as of this encounter: 104.9 kg (231 lb 3.2 oz).  Medication Review: complete    The next two questions are to help us understand your food security.  If you are feeling you need any assistance in this area, we have resources available to support you today.          4/27/2025   SDOH- Food Insecurity   Within the past 12 months, did you worry that your food would run out before you got money to buy more? N   Within the past 12 months, did the food you bought just not last and you didn t have money to get more? N         Health Care Directive:  Patient does not have a Health Care Directive: Discussed advance care planning with patient; however, patient declined at this time.    Talya Jung, IRVIN      "

## 2025-07-11 ENCOUNTER — RESULTS FOLLOW-UP (OUTPATIENT)
Dept: FAMILY MEDICINE | Facility: OTHER | Age: 45
End: 2025-07-11
Payer: COMMERCIAL

## 2025-08-06 ENCOUNTER — OFFICE VISIT (OUTPATIENT)
Dept: NEUROLOGY | Facility: OTHER | Age: 45
End: 2025-08-06
Attending: PSYCHIATRY & NEUROLOGY
Payer: COMMERCIAL

## 2025-08-06 VITALS
WEIGHT: 230 LBS | HEIGHT: 65 IN | SYSTOLIC BLOOD PRESSURE: 111 MMHG | RESPIRATION RATE: 20 BRPM | BODY MASS INDEX: 38.32 KG/M2 | DIASTOLIC BLOOD PRESSURE: 73 MMHG | HEART RATE: 77 BPM | TEMPERATURE: 97.6 F | OXYGEN SATURATION: 97 %

## 2025-08-06 DIAGNOSIS — R42 DIZZINESS: ICD-10-CM

## 2025-08-06 DIAGNOSIS — G62.9 SMALL FIBER NEUROPATHY: Primary | ICD-10-CM

## 2025-08-06 DIAGNOSIS — R26.89 BALANCE PROBLEMS: ICD-10-CM

## 2025-08-06 PROCEDURE — G0463 HOSPITAL OUTPT CLINIC VISIT: HCPCS

## 2025-08-06 ASSESSMENT — PAIN SCALES - GENERAL: PAINLEVEL_OUTOF10: MODERATE PAIN (4)

## 2025-08-07 ENCOUNTER — MYC MEDICAL ADVICE (OUTPATIENT)
Dept: NEUROLOGY | Facility: OTHER | Age: 45
End: 2025-08-07
Payer: COMMERCIAL

## (undated) DEVICE — SHEATH URETERAL ACCESS NAVIGATOR HD 12/14FRX46CM M0062502260

## (undated) DEVICE — TUOGHY BORST ADAPTER WITH SIDE ARM

## (undated) DEVICE — BASKET NITINOL TIPLESS HALO  1.5FRX120CM 554120

## (undated) DEVICE — SLEEVE COMPRESSION SCD KNEE MED 74022

## (undated) DEVICE — GUIDEWIRE SENSOR DUAL FLEX STR 0.035"X150CM M0066703080

## (undated) DEVICE — SHEATH URETERAL ACCESS NAVIGATOR HD 12/14FRX36CM M0062502250

## (undated) DEVICE — CATH INTERMITTENT CLEAN-CATH 16FR 16" VINYL LF 421716

## (undated) DEVICE — SUPERPULSED LASER FIBER

## (undated) DEVICE — GUIDEWIRE AMPLATZ SUPER STIFF .038"X145CM M0066401061

## (undated) DEVICE — LASER FIBER HOLMIUM FLEXIVA TRACTIP 200UM M0068403960

## (undated) DEVICE — PAD CHUX UNDERPAD 30X36" P3036C

## (undated) DEVICE — CATH URETERAL 5FRX70CM OPEN END FLEX TIP G14521

## (undated) DEVICE — PACK CYSTO SBA15CSFCA

## (undated) DEVICE — Device

## (undated) DEVICE — SOL WATER 1500ML

## (undated) DEVICE — GLOVE PROTEXIS POWDER FREE SMT 8.5 2D72PT85X

## (undated) RX ORDER — HYDROMORPHONE HYDROCHLORIDE 1 MG/ML
INJECTION, SOLUTION INTRAMUSCULAR; INTRAVENOUS; SUBCUTANEOUS
Status: DISPENSED
Start: 2022-07-27

## (undated) RX ORDER — LIDOCAINE HYDROCHLORIDE 10 MG/ML
INJECTION, SOLUTION INFILTRATION; PERINEURAL
Status: DISPENSED
Start: 2023-01-12

## (undated) RX ORDER — BETAMETHASONE SODIUM PHOSPHATE AND BETAMETHASONE ACETATE 3; 3 MG/ML; MG/ML
INJECTION, SUSPENSION INTRA-ARTICULAR; INTRALESIONAL; INTRAMUSCULAR; SOFT TISSUE
Status: DISPENSED
Start: 2023-01-12

## (undated) RX ORDER — LIDOCAINE HYDROCHLORIDE 20 MG/ML
INJECTION, SOLUTION EPIDURAL; INFILTRATION; INTRACAUDAL; PERINEURAL
Status: DISPENSED
Start: 2022-07-19

## (undated) RX ORDER — ONDANSETRON 4 MG/1
TABLET, ORALLY DISINTEGRATING ORAL
Status: DISPENSED
Start: 2023-08-15

## (undated) RX ORDER — KETOROLAC TROMETHAMINE 30 MG/ML
INJECTION, SOLUTION INTRAMUSCULAR; INTRAVENOUS
Status: DISPENSED
Start: 2023-01-21

## (undated) RX ORDER — SULFAMETHOXAZOLE/TRIMETHOPRIM 800-160 MG
TABLET ORAL
Status: DISPENSED
Start: 2020-12-17

## (undated) RX ORDER — PROPOFOL 10 MG/ML
INJECTION, EMULSION INTRAVENOUS
Status: DISPENSED
Start: 2022-07-19

## (undated) RX ORDER — MAGNESIUM SULFATE HEPTAHYDRATE 40 MG/ML
INJECTION, SOLUTION INTRAVENOUS
Status: DISPENSED
Start: 2023-08-15

## (undated) RX ORDER — DEXAMETHASONE SODIUM PHOSPHATE 4 MG/ML
INJECTION, SOLUTION INTRA-ARTICULAR; INTRALESIONAL; INTRAMUSCULAR; INTRAVENOUS; SOFT TISSUE
Status: DISPENSED
Start: 2022-07-19

## (undated) RX ORDER — PHENAZOPYRIDINE HYDROCHLORIDE 100 MG/1
TABLET, FILM COATED ORAL
Status: DISPENSED
Start: 2020-12-17

## (undated) RX ORDER — DEXAMETHASONE SODIUM PHOSPHATE 4 MG/ML
INJECTION, SOLUTION INTRA-ARTICULAR; INTRALESIONAL; INTRAMUSCULAR; INTRAVENOUS; SOFT TISSUE
Status: DISPENSED
Start: 2024-09-29

## (undated) RX ORDER — POTASSIUM CHLORIDE 7.45 MG/ML
INJECTION INTRAVENOUS
Status: DISPENSED
Start: 2023-08-15

## (undated) RX ORDER — LIDOCAINE HYDROCHLORIDE 10 MG/ML
INJECTION, SOLUTION INFILTRATION; PERINEURAL
Status: DISPENSED
Start: 2023-04-10

## (undated) RX ORDER — ONDANSETRON 2 MG/ML
INJECTION INTRAMUSCULAR; INTRAVENOUS
Status: DISPENSED
Start: 2022-07-19

## (undated) RX ORDER — HYDROCODONE BITARTRATE AND ACETAMINOPHEN 5; 325 MG/1; MG/1
TABLET ORAL
Status: DISPENSED
Start: 2022-07-19

## (undated) RX ORDER — MECLIZINE HCL 12.5 MG 12.5 MG/1
TABLET ORAL
Status: DISPENSED
Start: 2025-06-22

## (undated) RX ORDER — POTASSIUM CHLORIDE 1500 MG/1
TABLET, EXTENDED RELEASE ORAL
Status: DISPENSED
Start: 2023-08-15

## (undated) RX ORDER — SCOLOPAMINE TRANSDERMAL SYSTEM 1 MG/1
PATCH, EXTENDED RELEASE TRANSDERMAL
Status: DISPENSED
Start: 2022-07-19

## (undated) RX ORDER — FENTANYL CITRATE 50 UG/ML
INJECTION, SOLUTION INTRAMUSCULAR; INTRAVENOUS
Status: DISPENSED
Start: 2022-07-19

## (undated) RX ORDER — BUPIVACAINE HYDROCHLORIDE 5 MG/ML
INJECTION, SOLUTION EPIDURAL; INTRACAUDAL
Status: DISPENSED
Start: 2023-04-10

## (undated) RX ORDER — LIDOCAINE HYDROCHLORIDE 10 MG/ML
INJECTION, SOLUTION EPIDURAL; INFILTRATION; INTRACAUDAL; PERINEURAL
Status: DISPENSED
Start: 2024-09-25

## (undated) RX ORDER — KETOROLAC TROMETHAMINE 30 MG/ML
INJECTION, SOLUTION INTRAMUSCULAR; INTRAVENOUS
Status: DISPENSED
Start: 2024-09-29

## (undated) RX ORDER — IOPAMIDOL 755 MG/ML
INJECTION, SOLUTION INTRAVASCULAR
Status: DISPENSED
Start: 2022-07-19